# Patient Record
Sex: MALE | Race: WHITE | Employment: OTHER | URBAN - METROPOLITAN AREA
[De-identification: names, ages, dates, MRNs, and addresses within clinical notes are randomized per-mention and may not be internally consistent; named-entity substitution may affect disease eponyms.]

---

## 2017-01-03 ENCOUNTER — TELEPHONE (OUTPATIENT)
Dept: PAIN MANAGEMENT | Age: 47
End: 2017-01-03

## 2017-01-04 ENCOUNTER — TELEPHONE (OUTPATIENT)
Dept: FAMILY MEDICINE CLINIC | Age: 47
End: 2017-01-04

## 2017-01-30 ENCOUNTER — OFFICE VISIT (OUTPATIENT)
Dept: FAMILY MEDICINE CLINIC | Age: 47
End: 2017-01-30

## 2017-01-30 VITALS
HEIGHT: 66 IN | RESPIRATION RATE: 20 BRPM | OXYGEN SATURATION: 98 % | SYSTOLIC BLOOD PRESSURE: 122 MMHG | DIASTOLIC BLOOD PRESSURE: 76 MMHG | HEART RATE: 101 BPM | WEIGHT: 125 LBS | BODY MASS INDEX: 20.09 KG/M2

## 2017-01-30 DIAGNOSIS — Z87.09 HX OF PNEUMOTHORAX: ICD-10-CM

## 2017-01-30 DIAGNOSIS — M81.0 OSTEOPOROSIS: ICD-10-CM

## 2017-01-30 DIAGNOSIS — Z72.0 TOBACCO ABUSE: ICD-10-CM

## 2017-01-30 DIAGNOSIS — G89.4 CHRONIC PAIN SYNDROME: Primary | ICD-10-CM

## 2017-01-30 PROCEDURE — 99213 OFFICE O/P EST LOW 20 MIN: CPT | Performed by: INTERNAL MEDICINE

## 2017-02-01 LAB — CELIAC PANEL: 6 UNITS (ref 0–19)

## 2017-02-06 ENCOUNTER — HOSPITAL ENCOUNTER (OUTPATIENT)
Dept: GENERAL RADIOLOGY | Age: 47
Discharge: HOME | End: 2017-02-06
Attending: INTERNAL MEDICINE | Admitting: INTERNAL MEDICINE

## 2017-02-06 DIAGNOSIS — M81.0 OSTEOPOROSIS: Primary | ICD-10-CM

## 2017-02-06 DIAGNOSIS — M81.0 AGE-RELATED OSTEOPOROSIS WITHOUT CURRENT PATHOLOGICAL FRACTURE: ICD-10-CM

## 2017-02-06 DIAGNOSIS — M81.0 OSTEOPOROSIS: ICD-10-CM

## 2017-02-14 ENCOUNTER — TELEPHONE (OUTPATIENT)
Dept: FAMILY MEDICINE CLINIC | Age: 47
End: 2017-02-14

## 2017-02-14 RX ORDER — HYDROXYZINE HYDROCHLORIDE 25 MG/1
TABLET, FILM COATED ORAL
Qty: 90 TABLET | Refills: 0 | Status: SHIPPED | OUTPATIENT
Start: 2017-02-14 | End: 2017-03-11 | Stop reason: SDUPTHER

## 2017-02-22 ENCOUNTER — OFFICE VISIT (OUTPATIENT)
Dept: PAIN MANAGEMENT | Age: 47
End: 2017-02-22

## 2017-02-22 VITALS
SYSTOLIC BLOOD PRESSURE: 122 MMHG | HEIGHT: 64 IN | DIASTOLIC BLOOD PRESSURE: 84 MMHG | BODY MASS INDEX: 20.49 KG/M2 | HEART RATE: 102 BPM | WEIGHT: 120 LBS

## 2017-02-22 DIAGNOSIS — M96.1 FAILED BACK SYNDROME OF LUMBAR SPINE: ICD-10-CM

## 2017-02-22 DIAGNOSIS — M81.0 OSTEOPOROSIS: ICD-10-CM

## 2017-02-22 DIAGNOSIS — M51.36 DDD (DEGENERATIVE DISC DISEASE), LUMBAR: ICD-10-CM

## 2017-02-22 DIAGNOSIS — G89.4 CHRONIC PAIN SYNDROME: Primary | ICD-10-CM

## 2017-02-22 DIAGNOSIS — G89.29 CHRONIC PAIN OF RIGHT KNEE: ICD-10-CM

## 2017-02-22 DIAGNOSIS — F32.A DEPRESSION, UNSPECIFIED DEPRESSION TYPE: ICD-10-CM

## 2017-02-22 DIAGNOSIS — M25.561 CHRONIC PAIN OF RIGHT KNEE: ICD-10-CM

## 2017-02-22 DIAGNOSIS — F51.01 PRIMARY INSOMNIA: ICD-10-CM

## 2017-02-22 PROBLEM — M51.369 DDD (DEGENERATIVE DISC DISEASE), LUMBAR: Status: ACTIVE | Noted: 2017-02-22

## 2017-02-22 PROCEDURE — 99244 OFF/OP CNSLTJ NEW/EST MOD 40: CPT | Performed by: NURSE PRACTITIONER

## 2017-02-22 RX ORDER — DULOXETIN HYDROCHLORIDE 30 MG/1
30 CAPSULE, DELAYED RELEASE ORAL 2 TIMES DAILY
Qty: 60 CAPSULE | Refills: 0 | Status: SHIPPED | OUTPATIENT
Start: 2017-02-22 | End: 2017-03-22 | Stop reason: SDUPTHER

## 2017-02-22 RX ORDER — OXYCODONE HYDROCHLORIDE AND ACETAMINOPHEN 5; 325 MG/1; MG/1
1 TABLET ORAL EVERY 6 HOURS PRN
Qty: 84 TABLET | Refills: 0 | Status: SHIPPED | OUTPATIENT
Start: 2017-02-22 | End: 2017-03-22 | Stop reason: SDUPTHER

## 2017-02-22 RX ORDER — AMITRIPTYLINE HYDROCHLORIDE 25 MG/1
25 TABLET, FILM COATED ORAL NIGHTLY
Qty: 28 TABLET | Refills: 0 | Status: SHIPPED | OUTPATIENT
Start: 2017-02-22 | End: 2017-03-22 | Stop reason: SDUPTHER

## 2017-02-22 RX ORDER — TIZANIDINE 4 MG/1
2 TABLET ORAL 2 TIMES DAILY
Qty: 30 TABLET | Refills: 0 | Status: SHIPPED | OUTPATIENT
Start: 2017-02-22 | End: 2017-03-22 | Stop reason: SDUPTHER

## 2017-02-27 RX ORDER — MONTELUKAST SODIUM 10 MG/1
10 TABLET ORAL NIGHTLY
Qty: 90 TABLET | Refills: 3 | Status: SHIPPED | OUTPATIENT
Start: 2017-02-27 | End: 2018-03-02 | Stop reason: SDUPTHER

## 2017-02-27 RX ORDER — BUDESONIDE AND FORMOTEROL FUMARATE DIHYDRATE 80; 4.5 UG/1; UG/1
2 AEROSOL RESPIRATORY (INHALATION) 2 TIMES DAILY
Qty: 1 INHALER | Refills: 5 | Status: SHIPPED | OUTPATIENT
Start: 2017-02-27 | End: 2017-09-06 | Stop reason: ALTCHOICE

## 2017-03-07 ENCOUNTER — TELEPHONE (OUTPATIENT)
Dept: FAMILY MEDICINE CLINIC | Age: 47
End: 2017-03-07

## 2017-03-13 RX ORDER — HYDROXYZINE HYDROCHLORIDE 25 MG/1
TABLET, FILM COATED ORAL
Qty: 90 TABLET | Refills: 0 | Status: SHIPPED | OUTPATIENT
Start: 2017-03-13 | End: 2017-03-14 | Stop reason: ALTCHOICE

## 2017-03-14 ENCOUNTER — OFFICE VISIT (OUTPATIENT)
Dept: ENDOCRINOLOGY | Age: 47
End: 2017-03-14

## 2017-03-14 VITALS
RESPIRATION RATE: 16 BRPM | HEIGHT: 64 IN | SYSTOLIC BLOOD PRESSURE: 129 MMHG | HEART RATE: 84 BPM | DIASTOLIC BLOOD PRESSURE: 83 MMHG | OXYGEN SATURATION: 98 % | WEIGHT: 118.6 LBS | BODY MASS INDEX: 20.25 KG/M2

## 2017-03-14 DIAGNOSIS — M81.0 OSTEOPOROSIS: Primary | ICD-10-CM

## 2017-03-14 PROCEDURE — 99205 OFFICE O/P NEW HI 60 MIN: CPT | Performed by: INTERNAL MEDICINE

## 2017-03-22 ENCOUNTER — OFFICE VISIT (OUTPATIENT)
Dept: PAIN MANAGEMENT | Age: 47
End: 2017-03-22

## 2017-03-22 VITALS
BODY MASS INDEX: 21.42 KG/M2 | HEART RATE: 104 BPM | SYSTOLIC BLOOD PRESSURE: 121 MMHG | DIASTOLIC BLOOD PRESSURE: 72 MMHG | WEIGHT: 125 LBS

## 2017-03-22 DIAGNOSIS — G89.4 CHRONIC PAIN SYNDROME: Primary | ICD-10-CM

## 2017-03-22 DIAGNOSIS — Z98.890 HISTORY OF BACK SURGERY: ICD-10-CM

## 2017-03-22 DIAGNOSIS — M96.1 FAILED BACK SYNDROME OF LUMBAR SPINE: ICD-10-CM

## 2017-03-22 DIAGNOSIS — M81.0 OSTEOPOROSIS: ICD-10-CM

## 2017-03-22 DIAGNOSIS — M51.36 DDD (DEGENERATIVE DISC DISEASE), LUMBAR: ICD-10-CM

## 2017-03-22 PROCEDURE — 99213 OFFICE O/P EST LOW 20 MIN: CPT | Performed by: NURSE PRACTITIONER

## 2017-03-22 PROCEDURE — L0642 LO SAG RI AN/POS PNL PRE OTS: HCPCS | Performed by: NURSE PRACTITIONER

## 2017-03-22 RX ORDER — TIZANIDINE 4 MG/1
4 TABLET ORAL DAILY
Qty: 30 TABLET | Refills: 0 | Status: SHIPPED | OUTPATIENT
Start: 2017-03-22 | End: 2017-04-19 | Stop reason: SDUPTHER

## 2017-03-22 RX ORDER — DULOXETIN HYDROCHLORIDE 30 MG/1
30 CAPSULE, DELAYED RELEASE ORAL 2 TIMES DAILY
Qty: 60 CAPSULE | Refills: 0 | Status: SHIPPED | OUTPATIENT
Start: 2017-03-22 | End: 2017-04-19 | Stop reason: SDUPTHER

## 2017-03-22 RX ORDER — OXYCODONE HYDROCHLORIDE AND ACETAMINOPHEN 5; 325 MG/1; MG/1
1 TABLET ORAL EVERY 8 HOURS PRN
Qty: 30 TABLET | Refills: 0 | Status: SHIPPED | OUTPATIENT
Start: 2017-03-22 | End: 2017-04-19

## 2017-03-22 RX ORDER — OXYCODONE HYDROCHLORIDE AND ACETAMINOPHEN 5; 325 MG/1; MG/1
1 TABLET ORAL EVERY 6 HOURS PRN
Qty: 84 TABLET | Refills: 0 | Status: SHIPPED | OUTPATIENT
Start: 2017-03-22 | End: 2017-03-22 | Stop reason: SDUPTHER

## 2017-03-22 RX ORDER — AMITRIPTYLINE HYDROCHLORIDE 25 MG/1
25 TABLET, FILM COATED ORAL NIGHTLY
Qty: 28 TABLET | Refills: 0 | Status: SHIPPED | OUTPATIENT
Start: 2017-03-22 | End: 2017-04-19 | Stop reason: SDUPTHER

## 2017-03-31 ENCOUNTER — TELEPHONE (OUTPATIENT)
Dept: FAMILY MEDICINE CLINIC | Age: 47
End: 2017-03-31

## 2017-03-31 ENCOUNTER — TELEPHONE (OUTPATIENT)
Dept: PAIN MANAGEMENT | Age: 47
End: 2017-03-31

## 2017-03-31 RX ORDER — HYDROXYZINE HYDROCHLORIDE 25 MG/1
TABLET, FILM COATED ORAL
Qty: 90 TABLET | Refills: 0 | Status: SHIPPED | OUTPATIENT
Start: 2017-03-31 | End: 2017-04-19 | Stop reason: SDUPTHER

## 2017-04-14 ENCOUNTER — HOSPITAL ENCOUNTER (OUTPATIENT)
Dept: OTHER | Age: 47
Discharge: HOME | End: 2017-04-14
Attending: INTERNAL MEDICINE | Admitting: INTERNAL MEDICINE

## 2017-04-14 LAB
24HR URINE VOLUME (ML): 2000 ML
CALCIUM 24 HOUR URINE: 92 MG/24 HR (ref 42–353)
CREATININE 24 HOUR URINE: 0.5 G/24HR (ref 0.6–2.5)

## 2017-04-17 ENCOUNTER — TELEPHONE (OUTPATIENT)
Dept: ENDOCRINOLOGY | Age: 47
End: 2017-04-17

## 2017-04-18 RX ORDER — DULOXETIN HYDROCHLORIDE 30 MG/1
CAPSULE, DELAYED RELEASE ORAL
Qty: 60 CAPSULE | Refills: 0 | OUTPATIENT
Start: 2017-04-18

## 2017-04-19 ENCOUNTER — TELEPHONE (OUTPATIENT)
Dept: PAIN MANAGEMENT | Age: 47
End: 2017-04-19

## 2017-04-19 ENCOUNTER — OFFICE VISIT (OUTPATIENT)
Dept: PAIN MANAGEMENT | Age: 47
End: 2017-04-19

## 2017-04-19 VITALS
DIASTOLIC BLOOD PRESSURE: 84 MMHG | BODY MASS INDEX: 20.56 KG/M2 | SYSTOLIC BLOOD PRESSURE: 115 MMHG | WEIGHT: 120 LBS | HEART RATE: 76 BPM

## 2017-04-19 DIAGNOSIS — G89.4 CHRONIC PAIN SYNDROME: Primary | ICD-10-CM

## 2017-04-19 DIAGNOSIS — M81.0 OSTEOPOROSIS: ICD-10-CM

## 2017-04-19 DIAGNOSIS — F51.01 PRIMARY INSOMNIA: ICD-10-CM

## 2017-04-19 DIAGNOSIS — Z98.890 HISTORY OF BACK SURGERY: ICD-10-CM

## 2017-04-19 DIAGNOSIS — M51.36 DDD (DEGENERATIVE DISC DISEASE), LUMBAR: ICD-10-CM

## 2017-04-19 DIAGNOSIS — M96.1 FAILED BACK SYNDROME OF LUMBAR SPINE: ICD-10-CM

## 2017-04-19 PROCEDURE — 99213 OFFICE O/P EST LOW 20 MIN: CPT | Performed by: NURSE PRACTITIONER

## 2017-04-19 RX ORDER — BUPRENORPHINE 5 UG/H
1 PATCH TRANSDERMAL WEEKLY
Qty: 4 PATCH | Refills: 0 | Status: SHIPPED | OUTPATIENT
Start: 2017-04-19 | End: 2017-05-17 | Stop reason: SDUPTHER

## 2017-04-19 RX ORDER — HYDROXYZINE HYDROCHLORIDE 25 MG/1
TABLET, FILM COATED ORAL
Qty: 90 TABLET | Refills: 0 | Status: SHIPPED | OUTPATIENT
Start: 2017-04-19 | End: 2017-06-02 | Stop reason: SDUPTHER

## 2017-04-19 RX ORDER — DULOXETIN HYDROCHLORIDE 30 MG/1
30 CAPSULE, DELAYED RELEASE ORAL 2 TIMES DAILY
Qty: 60 CAPSULE | Refills: 0 | Status: SHIPPED | OUTPATIENT
Start: 2017-04-19 | End: 2017-05-17 | Stop reason: SDUPTHER

## 2017-04-19 RX ORDER — TIZANIDINE 4 MG/1
4 TABLET ORAL 2 TIMES DAILY
Qty: 60 TABLET | Refills: 0 | Status: SHIPPED | OUTPATIENT
Start: 2017-04-19 | End: 2017-05-17 | Stop reason: SDUPTHER

## 2017-04-19 RX ORDER — AMITRIPTYLINE HYDROCHLORIDE 25 MG/1
25 TABLET, FILM COATED ORAL NIGHTLY
Qty: 28 TABLET | Refills: 0 | Status: SHIPPED | OUTPATIENT
Start: 2017-04-19 | End: 2017-05-17 | Stop reason: SDUPTHER

## 2017-05-17 ENCOUNTER — OFFICE VISIT (OUTPATIENT)
Dept: PAIN MANAGEMENT | Age: 47
End: 2017-05-17

## 2017-05-17 VITALS
BODY MASS INDEX: 20.94 KG/M2 | DIASTOLIC BLOOD PRESSURE: 82 MMHG | WEIGHT: 122.2 LBS | HEART RATE: 79 BPM | SYSTOLIC BLOOD PRESSURE: 124 MMHG

## 2017-05-17 DIAGNOSIS — M81.0 OSTEOPOROSIS: ICD-10-CM

## 2017-05-17 DIAGNOSIS — G89.4 CHRONIC PAIN SYNDROME: Primary | ICD-10-CM

## 2017-05-17 DIAGNOSIS — Z98.890 HISTORY OF BACK SURGERY: ICD-10-CM

## 2017-05-17 DIAGNOSIS — M96.1 FAILED BACK SYNDROME OF LUMBAR SPINE: ICD-10-CM

## 2017-05-17 DIAGNOSIS — M51.36 DDD (DEGENERATIVE DISC DISEASE), LUMBAR: ICD-10-CM

## 2017-05-17 DIAGNOSIS — F51.01 PRIMARY INSOMNIA: ICD-10-CM

## 2017-05-17 DIAGNOSIS — F32.A DEPRESSION, UNSPECIFIED DEPRESSION TYPE: ICD-10-CM

## 2017-05-17 PROCEDURE — 99213 OFFICE O/P EST LOW 20 MIN: CPT | Performed by: NURSE PRACTITIONER

## 2017-05-17 RX ORDER — DULOXETIN HYDROCHLORIDE 30 MG/1
30 CAPSULE, DELAYED RELEASE ORAL 2 TIMES DAILY
Qty: 60 CAPSULE | Refills: 0 | Status: SHIPPED | OUTPATIENT
Start: 2017-05-17 | End: 2017-06-14 | Stop reason: SDUPTHER

## 2017-05-17 RX ORDER — TIZANIDINE 4 MG/1
4 TABLET ORAL 2 TIMES DAILY
Qty: 60 TABLET | Refills: 0 | Status: SHIPPED | OUTPATIENT
Start: 2017-05-17 | End: 2017-06-14 | Stop reason: SDUPTHER

## 2017-05-17 RX ORDER — AMITRIPTYLINE HYDROCHLORIDE 25 MG/1
25 TABLET, FILM COATED ORAL NIGHTLY
Qty: 28 TABLET | Refills: 0 | Status: SHIPPED | OUTPATIENT
Start: 2017-05-17 | End: 2017-06-14 | Stop reason: SDUPTHER

## 2017-05-17 RX ORDER — BUPRENORPHINE 5 UG/H
1 PATCH TRANSDERMAL WEEKLY
Qty: 4 PATCH | Refills: 0 | Status: SHIPPED | OUTPATIENT
Start: 2017-05-17 | End: 2017-06-14

## 2017-06-02 ENCOUNTER — OFFICE VISIT (OUTPATIENT)
Dept: FAMILY MEDICINE CLINIC | Age: 47
End: 2017-06-02

## 2017-06-02 VITALS
WEIGHT: 120 LBS | BODY MASS INDEX: 20.49 KG/M2 | HEIGHT: 64 IN | SYSTOLIC BLOOD PRESSURE: 122 MMHG | HEART RATE: 104 BPM | DIASTOLIC BLOOD PRESSURE: 73 MMHG | RESPIRATION RATE: 24 BRPM

## 2017-06-02 DIAGNOSIS — J44.1 ACUTE EXACERBATION OF CHRONIC OBSTRUCTIVE PULMONARY DISEASE (COPD) (HCC): Primary | ICD-10-CM

## 2017-06-02 DIAGNOSIS — F41.9 ANXIETY: ICD-10-CM

## 2017-06-02 PROCEDURE — 99213 OFFICE O/P EST LOW 20 MIN: CPT | Performed by: FAMILY MEDICINE

## 2017-06-02 RX ORDER — DOXYCYCLINE HYCLATE 100 MG
100 TABLET ORAL 2 TIMES DAILY
Qty: 20 TABLET | Refills: 0 | Status: SHIPPED | OUTPATIENT
Start: 2017-06-02 | End: 2017-06-12

## 2017-06-02 RX ORDER — PREDNISONE 20 MG/1
20 TABLET ORAL 2 TIMES DAILY
Qty: 10 TABLET | Refills: 0 | Status: SHIPPED | OUTPATIENT
Start: 2017-06-02 | End: 2017-06-07

## 2017-06-02 RX ORDER — HYDROXYZINE HYDROCHLORIDE 25 MG/1
TABLET, FILM COATED ORAL
Qty: 90 TABLET | Refills: 0 | Status: SHIPPED | OUTPATIENT
Start: 2017-06-02 | End: 2017-07-11 | Stop reason: SDUPTHER

## 2017-06-14 ENCOUNTER — TELEPHONE (OUTPATIENT)
Dept: PAIN MANAGEMENT | Age: 47
End: 2017-06-14

## 2017-06-14 ENCOUNTER — OFFICE VISIT (OUTPATIENT)
Dept: PAIN MANAGEMENT | Age: 47
End: 2017-06-14

## 2017-06-14 VITALS
WEIGHT: 123 LBS | DIASTOLIC BLOOD PRESSURE: 80 MMHG | BODY MASS INDEX: 21.11 KG/M2 | SYSTOLIC BLOOD PRESSURE: 119 MMHG | HEART RATE: 93 BPM

## 2017-06-14 DIAGNOSIS — M81.0 OSTEOPOROSIS: ICD-10-CM

## 2017-06-14 DIAGNOSIS — G89.4 CHRONIC PAIN SYNDROME: Primary | ICD-10-CM

## 2017-06-14 DIAGNOSIS — M51.36 DDD (DEGENERATIVE DISC DISEASE), LUMBAR: ICD-10-CM

## 2017-06-14 DIAGNOSIS — F41.9 ANXIETY: ICD-10-CM

## 2017-06-14 DIAGNOSIS — M25.562 LEFT KNEE PAIN, UNSPECIFIED CHRONICITY: ICD-10-CM

## 2017-06-14 DIAGNOSIS — Z98.890 HISTORY OF BACK SURGERY: ICD-10-CM

## 2017-06-14 DIAGNOSIS — M96.1 FAILED BACK SYNDROME OF LUMBAR SPINE: ICD-10-CM

## 2017-06-14 PROCEDURE — 99213 OFFICE O/P EST LOW 20 MIN: CPT | Performed by: NURSE PRACTITIONER

## 2017-06-14 RX ORDER — BUPRENORPHINE 10 UG/H
1 PATCH TRANSDERMAL WEEKLY
Qty: 4 PATCH | Refills: 0 | Status: SHIPPED | OUTPATIENT
Start: 2017-06-14 | End: 2017-07-12 | Stop reason: SDUPTHER

## 2017-06-14 RX ORDER — TIZANIDINE 4 MG/1
4 TABLET ORAL 2 TIMES DAILY
Qty: 60 TABLET | Refills: 0 | Status: SHIPPED | OUTPATIENT
Start: 2017-06-14 | End: 2017-07-12 | Stop reason: SDUPTHER

## 2017-06-14 RX ORDER — DULOXETIN HYDROCHLORIDE 30 MG/1
30 CAPSULE, DELAYED RELEASE ORAL 2 TIMES DAILY
Qty: 60 CAPSULE | Refills: 0 | Status: SHIPPED | OUTPATIENT
Start: 2017-06-14 | End: 2017-07-12 | Stop reason: SDUPTHER

## 2017-06-14 RX ORDER — AMITRIPTYLINE HYDROCHLORIDE 25 MG/1
25 TABLET, FILM COATED ORAL NIGHTLY
Qty: 28 TABLET | Refills: 0 | Status: SHIPPED | OUTPATIENT
Start: 2017-06-14 | End: 2017-07-12 | Stop reason: SDUPTHER

## 2017-07-07 ENCOUNTER — TELEPHONE (OUTPATIENT)
Dept: PAIN MANAGEMENT | Age: 47
End: 2017-07-07

## 2017-07-07 ENCOUNTER — TELEPHONE (OUTPATIENT)
Dept: FAMILY MEDICINE CLINIC | Age: 47
End: 2017-07-07

## 2017-07-07 RX ORDER — GUAIFENESIN AND CODEINE PHOSPHATE 100; 10 MG/5ML; MG/5ML
5 SOLUTION ORAL
COMMUNITY
Start: 2016-12-14 | End: 2017-07-11

## 2017-07-07 RX ORDER — GUAIFENESIN AND CODEINE PHOSPHATE 100; 10 MG/5ML; MG/5ML
5 SOLUTION ORAL 2 TIMES DAILY PRN
Status: CANCELLED | OUTPATIENT
Start: 2017-07-07 | End: 2017-07-14

## 2017-07-08 RX ORDER — BENZONATATE 100 MG/1
100 CAPSULE ORAL 3 TIMES DAILY PRN
Qty: 60 CAPSULE | Refills: 0 | Status: SHIPPED | OUTPATIENT
Start: 2017-07-08 | End: 2017-07-11

## 2017-07-11 ENCOUNTER — OFFICE VISIT (OUTPATIENT)
Dept: FAMILY MEDICINE CLINIC | Age: 47
End: 2017-07-11

## 2017-07-11 VITALS
HEART RATE: 106 BPM | SYSTOLIC BLOOD PRESSURE: 134 MMHG | WEIGHT: 123 LBS | DIASTOLIC BLOOD PRESSURE: 76 MMHG | RESPIRATION RATE: 16 BRPM | BODY MASS INDEX: 21 KG/M2 | OXYGEN SATURATION: 97 % | HEIGHT: 64 IN

## 2017-07-11 DIAGNOSIS — M96.1 FAILED BACK SYNDROME OF LUMBAR SPINE: ICD-10-CM

## 2017-07-11 DIAGNOSIS — Z87.09 HX OF PNEUMOTHORAX: ICD-10-CM

## 2017-07-11 DIAGNOSIS — J44.1 COPD EXACERBATION (HCC): Primary | ICD-10-CM

## 2017-07-11 DIAGNOSIS — M81.0 OSTEOPOROSIS: ICD-10-CM

## 2017-07-11 DIAGNOSIS — F41.9 ANXIETY: ICD-10-CM

## 2017-07-11 DIAGNOSIS — J41.0 SIMPLE CHRONIC BRONCHITIS (HCC): ICD-10-CM

## 2017-07-11 DIAGNOSIS — M51.36 DDD (DEGENERATIVE DISC DISEASE), LUMBAR: ICD-10-CM

## 2017-07-11 PROCEDURE — 94010 BREATHING CAPACITY TEST: CPT | Performed by: INTERNAL MEDICINE

## 2017-07-11 PROCEDURE — 99214 OFFICE O/P EST MOD 30 MIN: CPT | Performed by: INTERNAL MEDICINE

## 2017-07-11 RX ORDER — VARENICLINE TARTRATE 25 MG
KIT ORAL
Qty: 1 EACH | Refills: 0 | Status: SHIPPED | OUTPATIENT
Start: 2017-07-11 | End: 2018-02-14

## 2017-07-11 RX ORDER — AMOXICILLIN AND CLAVULANATE POTASSIUM 875; 125 MG/1; MG/1
TABLET, FILM COATED ORAL
Refills: 0 | COMMUNITY
Start: 2017-07-07 | End: 2017-08-30 | Stop reason: ALTCHOICE

## 2017-07-11 RX ORDER — HYDROXYZINE HYDROCHLORIDE 25 MG/1
TABLET, FILM COATED ORAL
Qty: 90 TABLET | Refills: 0 | Status: SHIPPED | OUTPATIENT
Start: 2017-07-11 | End: 2017-08-23 | Stop reason: SDUPTHER

## 2017-07-11 RX ORDER — LORATADINE 10 MG/1
10 TABLET ORAL DAILY
Status: CANCELLED | OUTPATIENT
Start: 2017-07-11

## 2017-07-11 RX ORDER — ALENDRONATE SODIUM 70 MG/1
TABLET ORAL
Refills: 3 | COMMUNITY
Start: 2017-06-15 | End: 2017-09-14

## 2017-07-11 RX ORDER — LORATADINE 10 MG/1
TABLET ORAL
Qty: 90 TABLET | Refills: 1 | Status: SHIPPED | OUTPATIENT
Start: 2017-07-11 | End: 2018-03-28 | Stop reason: SDUPTHER

## 2017-07-11 RX ORDER — GUAIFENESIN AND CODEINE PHOSPHATE 100; 10 MG/5ML; MG/5ML
SOLUTION ORAL
Qty: 118 ML | Refills: 0 | Status: SHIPPED | OUTPATIENT
Start: 2017-07-11 | End: 2017-07-19 | Stop reason: SDUPTHER

## 2017-07-11 RX ORDER — PREDNISONE 10 MG/1
TABLET ORAL
Refills: 0 | COMMUNITY
Start: 2017-07-07 | End: 2017-08-30 | Stop reason: ALTCHOICE

## 2017-07-11 RX ORDER — MONTELUKAST SODIUM 10 MG/1
10 TABLET ORAL NIGHTLY
Qty: 90 TABLET | Refills: 3 | Status: CANCELLED | OUTPATIENT
Start: 2017-07-11

## 2017-07-11 RX ORDER — BUPRENORPHINE 5 UG/H
1 PATCH TRANSDERMAL
COMMUNITY
End: 2017-07-11

## 2017-07-11 RX ORDER — AMITRIPTYLINE HYDROCHLORIDE 10 MG/1
10 TABLET, FILM COATED ORAL
COMMUNITY
End: 2017-08-09

## 2017-07-12 ENCOUNTER — OFFICE VISIT (OUTPATIENT)
Dept: PAIN MANAGEMENT | Age: 47
End: 2017-07-12

## 2017-07-12 VITALS
BODY MASS INDEX: 21.46 KG/M2 | WEIGHT: 125 LBS | SYSTOLIC BLOOD PRESSURE: 128 MMHG | DIASTOLIC BLOOD PRESSURE: 86 MMHG | HEART RATE: 89 BPM

## 2017-07-12 DIAGNOSIS — G47.00 INSOMNIA, UNSPECIFIED TYPE: ICD-10-CM

## 2017-07-12 DIAGNOSIS — M47.816 FACET ARTHROPATHY, LUMBAR: ICD-10-CM

## 2017-07-12 DIAGNOSIS — M96.1 FAILED BACK SYNDROME OF LUMBAR SPINE: ICD-10-CM

## 2017-07-12 DIAGNOSIS — F32.A DEPRESSION, UNSPECIFIED DEPRESSION TYPE: ICD-10-CM

## 2017-07-12 DIAGNOSIS — M25.562 LEFT KNEE PAIN, UNSPECIFIED CHRONICITY: ICD-10-CM

## 2017-07-12 DIAGNOSIS — F41.9 ANXIETY: ICD-10-CM

## 2017-07-12 DIAGNOSIS — G89.4 CHRONIC PAIN SYNDROME: Primary | ICD-10-CM

## 2017-07-12 DIAGNOSIS — M81.0 OSTEOPOROSIS: ICD-10-CM

## 2017-07-12 DIAGNOSIS — M51.36 DDD (DEGENERATIVE DISC DISEASE), LUMBAR: ICD-10-CM

## 2017-07-12 PROCEDURE — 99213 OFFICE O/P EST LOW 20 MIN: CPT | Performed by: NURSE PRACTITIONER

## 2017-07-12 RX ORDER — BUPRENORPHINE 10 UG/H
1 PATCH TRANSDERMAL WEEKLY
Qty: 4 PATCH | Refills: 0 | Status: SHIPPED | OUTPATIENT
Start: 2017-07-12 | End: 2017-08-09 | Stop reason: SDUPTHER

## 2017-07-12 RX ORDER — TIZANIDINE 4 MG/1
4 TABLET ORAL 2 TIMES DAILY
Qty: 60 TABLET | Refills: 0 | Status: SHIPPED | OUTPATIENT
Start: 2017-07-12 | End: 2017-08-09 | Stop reason: SDUPTHER

## 2017-07-12 RX ORDER — DULOXETIN HYDROCHLORIDE 30 MG/1
30 CAPSULE, DELAYED RELEASE ORAL 2 TIMES DAILY
Qty: 60 CAPSULE | Refills: 0 | Status: SHIPPED | OUTPATIENT
Start: 2017-07-12 | End: 2017-08-09 | Stop reason: SDUPTHER

## 2017-07-12 RX ORDER — AMITRIPTYLINE HYDROCHLORIDE 25 MG/1
25 TABLET, FILM COATED ORAL NIGHTLY
Qty: 28 TABLET | Refills: 0 | Status: SHIPPED | OUTPATIENT
Start: 2017-07-12 | End: 2017-08-09 | Stop reason: SDUPTHER

## 2017-07-14 RX ORDER — DULOXETIN HYDROCHLORIDE 30 MG/1
CAPSULE, DELAYED RELEASE ORAL
Qty: 60 CAPSULE | Refills: 0 | OUTPATIENT
Start: 2017-07-14

## 2017-07-18 DIAGNOSIS — J41.0 SIMPLE CHRONIC BRONCHITIS (HCC): Primary | ICD-10-CM

## 2017-07-18 RX ORDER — GUAIFENESIN AND CODEINE PHOSPHATE 100; 10 MG/5ML; MG/5ML
SOLUTION ORAL
Qty: 118 ML | Refills: 0 | OUTPATIENT
Start: 2017-07-18

## 2017-07-19 ENCOUNTER — TELEPHONE (OUTPATIENT)
Dept: FAMILY MEDICINE CLINIC | Age: 47
End: 2017-07-19

## 2017-07-19 DIAGNOSIS — J44.9 CHRONIC OBSTRUCTIVE PULMONARY DISEASE, UNSPECIFIED COPD TYPE (HCC): Primary | ICD-10-CM

## 2017-07-19 DIAGNOSIS — R06.02 SOB (SHORTNESS OF BREATH): Primary | ICD-10-CM

## 2017-07-19 RX ORDER — GUAIFENESIN AND CODEINE PHOSPHATE 100; 10 MG/5ML; MG/5ML
SOLUTION ORAL
Qty: 118 ML | Refills: 0 | Status: SHIPPED | OUTPATIENT
Start: 2017-07-19 | End: 2017-08-30 | Stop reason: ALTCHOICE

## 2017-07-19 RX ORDER — GUAIFENESIN AND CODEINE PHOSPHATE 100; 10 MG/5ML; MG/5ML
SOLUTION ORAL
Qty: 118 ML | Refills: 0 | OUTPATIENT
Start: 2017-07-19

## 2017-08-07 ENCOUNTER — HOSPITAL ENCOUNTER (OUTPATIENT)
Dept: PULMONOLOGY | Age: 47
Discharge: HOME | End: 2017-08-07
Attending: INTERNAL MEDICINE | Admitting: INTERNAL MEDICINE

## 2017-08-07 DIAGNOSIS — R06.02 SHORTNESS OF BREATH: ICD-10-CM

## 2017-08-09 ENCOUNTER — OFFICE VISIT (OUTPATIENT)
Dept: PAIN MANAGEMENT | Age: 47
End: 2017-08-09

## 2017-08-09 VITALS
SYSTOLIC BLOOD PRESSURE: 136 MMHG | HEART RATE: 92 BPM | BODY MASS INDEX: 21.42 KG/M2 | DIASTOLIC BLOOD PRESSURE: 83 MMHG | WEIGHT: 124.8 LBS

## 2017-08-09 DIAGNOSIS — M81.0 OSTEOPOROSIS, UNSPECIFIED OSTEOPOROSIS TYPE, UNSPECIFIED PATHOLOGICAL FRACTURE PRESENCE: ICD-10-CM

## 2017-08-09 DIAGNOSIS — M51.36 DDD (DEGENERATIVE DISC DISEASE), LUMBAR: ICD-10-CM

## 2017-08-09 DIAGNOSIS — F32.A DEPRESSION, UNSPECIFIED DEPRESSION TYPE: ICD-10-CM

## 2017-08-09 DIAGNOSIS — F41.9 ANXIETY: ICD-10-CM

## 2017-08-09 DIAGNOSIS — M25.562 LEFT KNEE PAIN, UNSPECIFIED CHRONICITY: ICD-10-CM

## 2017-08-09 DIAGNOSIS — M96.1 FAILED BACK SYNDROME OF LUMBAR SPINE: ICD-10-CM

## 2017-08-09 DIAGNOSIS — G89.4 CHRONIC PAIN SYNDROME: Primary | ICD-10-CM

## 2017-08-09 DIAGNOSIS — F51.01 PRIMARY INSOMNIA: ICD-10-CM

## 2017-08-09 DIAGNOSIS — J44.9 CHRONIC OBSTRUCTIVE PULMONARY DISEASE, UNSPECIFIED COPD TYPE (HCC): ICD-10-CM

## 2017-08-09 PROCEDURE — 99213 OFFICE O/P EST LOW 20 MIN: CPT | Performed by: NURSE PRACTITIONER

## 2017-08-09 RX ORDER — BUPRENORPHINE 10 UG/H
1 PATCH TRANSDERMAL WEEKLY
Qty: 4 PATCH | Refills: 0 | Status: SHIPPED | OUTPATIENT
Start: 2017-08-09 | End: 2017-09-05

## 2017-08-09 RX ORDER — TIZANIDINE 4 MG/1
4 TABLET ORAL 2 TIMES DAILY
Qty: 60 TABLET | Refills: 0 | Status: SHIPPED | OUTPATIENT
Start: 2017-08-09 | End: 2017-09-05 | Stop reason: SDUPTHER

## 2017-08-09 RX ORDER — DULOXETIN HYDROCHLORIDE 30 MG/1
30 CAPSULE, DELAYED RELEASE ORAL 2 TIMES DAILY
Qty: 60 CAPSULE | Refills: 0 | Status: SHIPPED | OUTPATIENT
Start: 2017-08-09 | End: 2017-09-05 | Stop reason: SDUPTHER

## 2017-08-09 RX ORDER — AMITRIPTYLINE HYDROCHLORIDE 25 MG/1
25 TABLET, FILM COATED ORAL NIGHTLY
Qty: 28 TABLET | Refills: 0 | Status: SHIPPED | OUTPATIENT
Start: 2017-08-09 | End: 2017-09-05 | Stop reason: SDUPTHER

## 2017-08-10 ENCOUNTER — HOSPITAL ENCOUNTER (OUTPATIENT)
Dept: PULMONOLOGY | Age: 47
Discharge: HOME | End: 2017-08-10
Attending: INTERNAL MEDICINE | Admitting: INTERNAL MEDICINE

## 2017-08-10 DIAGNOSIS — R06.02 SHORTNESS OF BREATH: ICD-10-CM

## 2017-08-10 DIAGNOSIS — R06.02 SOB (SHORTNESS OF BREATH): ICD-10-CM

## 2017-08-10 LAB
DLCO: NORMAL ML/MMHG SEC
FEV1/FVC: NORMAL %
FEV1: NORMAL LITERS
FVC: NORMAL LITERS
TLC: NORMAL LITERS

## 2017-08-10 PROCEDURE — 94729 DIFFUSING CAPACITY: CPT | Performed by: INTERNAL MEDICINE

## 2017-08-10 PROCEDURE — 94010 BREATHING CAPACITY TEST: CPT | Performed by: INTERNAL MEDICINE

## 2017-08-23 DIAGNOSIS — F41.9 ANXIETY: ICD-10-CM

## 2017-08-23 RX ORDER — HYDROXYZINE HYDROCHLORIDE 25 MG/1
TABLET, FILM COATED ORAL
Qty: 90 TABLET | Refills: 0 | Status: SHIPPED | OUTPATIENT
Start: 2017-08-23 | End: 2017-10-11 | Stop reason: ALTCHOICE

## 2017-08-30 ENCOUNTER — OFFICE VISIT (OUTPATIENT)
Dept: PULMONOLOGY | Age: 47
End: 2017-08-30

## 2017-08-30 ENCOUNTER — TELEPHONE (OUTPATIENT)
Dept: PULMONOLOGY | Age: 47
End: 2017-08-30

## 2017-08-30 VITALS
SYSTOLIC BLOOD PRESSURE: 120 MMHG | HEIGHT: 64 IN | HEART RATE: 105 BPM | TEMPERATURE: 98.5 F | OXYGEN SATURATION: 99 % | WEIGHT: 124 LBS | BODY MASS INDEX: 21.17 KG/M2 | RESPIRATION RATE: 16 BRPM | DIASTOLIC BLOOD PRESSURE: 82 MMHG

## 2017-08-30 DIAGNOSIS — J40 BRONCHITIS: ICD-10-CM

## 2017-08-30 DIAGNOSIS — J44.9 COPD, MILD (HCC): Primary | ICD-10-CM

## 2017-08-30 DIAGNOSIS — J43.2 CENTRILOBULAR EMPHYSEMA (HCC): ICD-10-CM

## 2017-08-30 DIAGNOSIS — Z72.0 TOBACCO ABUSE: ICD-10-CM

## 2017-08-30 DIAGNOSIS — R04.2 HEMOPTYSIS: ICD-10-CM

## 2017-08-30 PROCEDURE — 99215 OFFICE O/P EST HI 40 MIN: CPT | Performed by: INTERNAL MEDICINE

## 2017-08-30 RX ORDER — BUDESONIDE AND FORMOTEROL FUMARATE DIHYDRATE 160; 4.5 UG/1; UG/1
2 AEROSOL RESPIRATORY (INHALATION) 2 TIMES DAILY
Qty: 1 INHALER | Refills: 6 | Status: SHIPPED | OUTPATIENT
Start: 2017-08-30 | End: 2017-09-06 | Stop reason: ALTCHOICE

## 2017-08-30 RX ORDER — LEVOFLOXACIN 500 MG/1
500 TABLET, FILM COATED ORAL DAILY
Qty: 7 TABLET | Refills: 0 | Status: SHIPPED | OUTPATIENT
Start: 2017-08-30 | End: 2017-09-06

## 2017-08-31 RX ORDER — FLUTICASONE FUROATE AND VILANTEROL 200; 25 UG/1; UG/1
1 POWDER RESPIRATORY (INHALATION) DAILY
Qty: 1 EACH | Refills: 6 | Status: SHIPPED | OUTPATIENT
Start: 2017-08-31 | End: 2020-04-17 | Stop reason: SDUPTHER

## 2017-09-05 ENCOUNTER — OFFICE VISIT (OUTPATIENT)
Dept: PAIN MANAGEMENT | Age: 47
End: 2017-09-05

## 2017-09-05 VITALS
HEART RATE: 88 BPM | BODY MASS INDEX: 21.28 KG/M2 | SYSTOLIC BLOOD PRESSURE: 134 MMHG | WEIGHT: 124 LBS | DIASTOLIC BLOOD PRESSURE: 93 MMHG

## 2017-09-05 DIAGNOSIS — F41.9 ANXIETY: ICD-10-CM

## 2017-09-05 DIAGNOSIS — M81.0 OSTEOPOROSIS, UNSPECIFIED OSTEOPOROSIS TYPE, UNSPECIFIED PATHOLOGICAL FRACTURE PRESENCE: ICD-10-CM

## 2017-09-05 DIAGNOSIS — M96.1 FAILED BACK SYNDROME OF LUMBAR SPINE: ICD-10-CM

## 2017-09-05 DIAGNOSIS — M25.562 ACUTE PAIN OF LEFT KNEE: ICD-10-CM

## 2017-09-05 DIAGNOSIS — M51.36 DDD (DEGENERATIVE DISC DISEASE), LUMBAR: ICD-10-CM

## 2017-09-05 DIAGNOSIS — M25.562 LEFT KNEE PAIN, UNSPECIFIED CHRONICITY: ICD-10-CM

## 2017-09-05 DIAGNOSIS — R04.2 HEMOPTYSIS: ICD-10-CM

## 2017-09-05 DIAGNOSIS — G89.4 CHRONIC PAIN SYNDROME: Primary | ICD-10-CM

## 2017-09-05 PROBLEM — M25.559 HIP PAIN: Status: ACTIVE | Noted: 2017-09-05

## 2017-09-05 PROCEDURE — 99213 OFFICE O/P EST LOW 20 MIN: CPT | Performed by: NURSE PRACTITIONER

## 2017-09-05 RX ORDER — AMITRIPTYLINE HYDROCHLORIDE 25 MG/1
25 TABLET, FILM COATED ORAL NIGHTLY
Qty: 28 TABLET | Refills: 0 | Status: SHIPPED | OUTPATIENT
Start: 2017-09-05 | End: 2017-09-06 | Stop reason: SDUPTHER

## 2017-09-05 RX ORDER — OXYCODONE HYDROCHLORIDE AND ACETAMINOPHEN 5; 325 MG/1; MG/1
1 TABLET ORAL EVERY 8 HOURS PRN
COMMUNITY
End: 2017-09-14 | Stop reason: ALTCHOICE

## 2017-09-05 RX ORDER — DULOXETIN HYDROCHLORIDE 30 MG/1
30 CAPSULE, DELAYED RELEASE ORAL 2 TIMES DAILY
Qty: 60 CAPSULE | Refills: 0 | Status: SHIPPED | OUTPATIENT
Start: 2017-09-05 | End: 2018-01-15 | Stop reason: SDUPTHER

## 2017-09-05 RX ORDER — PREDNISONE 20 MG/1
20 TABLET ORAL 3 TIMES DAILY
COMMUNITY
End: 2017-12-13

## 2017-09-05 RX ORDER — TIZANIDINE 4 MG/1
4 TABLET ORAL 2 TIMES DAILY
Qty: 60 TABLET | Refills: 0 | Status: SHIPPED | OUTPATIENT
Start: 2017-09-05 | End: 2018-01-24 | Stop reason: ALTCHOICE

## 2017-09-05 RX ORDER — IBUPROFEN 800 MG/1
800 TABLET ORAL EVERY 8 HOURS PRN
COMMUNITY
End: 2017-09-05 | Stop reason: SDUPTHER

## 2017-09-05 RX ORDER — IBUPROFEN 800 MG/1
800 TABLET ORAL EVERY 8 HOURS PRN
Qty: 120 TABLET | Refills: 0 | Status: SHIPPED | OUTPATIENT
Start: 2017-09-05 | End: 2017-09-14 | Stop reason: ALTCHOICE

## 2017-09-06 ENCOUNTER — OFFICE VISIT (OUTPATIENT)
Dept: FAMILY MEDICINE CLINIC | Age: 47
End: 2017-09-06

## 2017-09-06 VITALS
WEIGHT: 124 LBS | RESPIRATION RATE: 16 BRPM | OXYGEN SATURATION: 98 % | HEART RATE: 106 BPM | BODY MASS INDEX: 21.17 KG/M2 | HEIGHT: 64 IN | DIASTOLIC BLOOD PRESSURE: 68 MMHG | SYSTOLIC BLOOD PRESSURE: 128 MMHG

## 2017-09-06 DIAGNOSIS — M25.552 PAIN OF BOTH HIP JOINTS: ICD-10-CM

## 2017-09-06 DIAGNOSIS — M96.1 FAILED BACK SYNDROME OF LUMBAR SPINE: ICD-10-CM

## 2017-09-06 DIAGNOSIS — M25.551 PAIN OF BOTH HIP JOINTS: ICD-10-CM

## 2017-09-06 DIAGNOSIS — G89.4 CHRONIC PAIN SYNDROME: ICD-10-CM

## 2017-09-06 DIAGNOSIS — M81.0 OSTEOPOROSIS WITHOUT CURRENT PATHOLOGICAL FRACTURE, UNSPECIFIED OSTEOPOROSIS TYPE: Primary | ICD-10-CM

## 2017-09-06 DIAGNOSIS — J43.2 CENTRILOBULAR EMPHYSEMA (HCC): ICD-10-CM

## 2017-09-06 DIAGNOSIS — G47.00 INSOMNIA, UNSPECIFIED TYPE: ICD-10-CM

## 2017-09-06 DIAGNOSIS — F41.9 ANXIETY: ICD-10-CM

## 2017-09-06 LAB — VITAMIN D 25-HYDROXY: 38 NG/ML

## 2017-09-06 PROCEDURE — 36415 COLL VENOUS BLD VENIPUNCTURE: CPT | Performed by: INTERNAL MEDICINE

## 2017-09-06 PROCEDURE — 99214 OFFICE O/P EST MOD 30 MIN: CPT | Performed by: INTERNAL MEDICINE

## 2017-09-06 RX ORDER — AMITRIPTYLINE HYDROCHLORIDE 25 MG/1
TABLET, FILM COATED ORAL
Qty: 60 TABLET | Refills: 5 | Status: SHIPPED | OUTPATIENT
Start: 2017-09-06 | End: 2017-11-29

## 2017-09-07 LAB
CULTURE, RESPIRATORY: NORMAL
GRAM STAIN RESULT: NORMAL

## 2017-09-12 ENCOUNTER — TELEPHONE (OUTPATIENT)
Dept: FAMILY MEDICINE CLINIC | Age: 47
End: 2017-09-12

## 2017-09-12 RX ORDER — CELECOXIB 100 MG/1
CAPSULE ORAL
Qty: 60 CAPSULE | Refills: 0 | Status: SHIPPED | OUTPATIENT
Start: 2017-09-12 | End: 2018-03-28

## 2017-09-12 NOTE — TELEPHONE ENCOUNTER
Pt reports cage has slipped on back ksurgery. Ortho has referred back to neurosurgery. Offered celebrex.  Pt may go to hospital where neurosurgeon has privilages

## 2017-09-14 ENCOUNTER — OFFICE VISIT (OUTPATIENT)
Dept: FAMILY MEDICINE CLINIC | Age: 47
End: 2017-09-14

## 2017-09-14 ENCOUNTER — OFFICE VISIT (OUTPATIENT)
Dept: ENDOCRINOLOGY | Age: 47
End: 2017-09-14

## 2017-09-14 ENCOUNTER — HOSPITAL ENCOUNTER (OUTPATIENT)
Dept: CT IMAGING | Age: 47
Discharge: HOME | End: 2017-09-14
Attending: INTERNAL MEDICINE | Admitting: INTERNAL MEDICINE

## 2017-09-14 VITALS
WEIGHT: 135 LBS | HEIGHT: 64 IN | OXYGEN SATURATION: 96 % | BODY MASS INDEX: 23.05 KG/M2 | RESPIRATION RATE: 16 BRPM | SYSTOLIC BLOOD PRESSURE: 154 MMHG | DIASTOLIC BLOOD PRESSURE: 68 MMHG | HEART RATE: 112 BPM

## 2017-09-14 VITALS
RESPIRATION RATE: 16 BRPM | HEART RATE: 92 BPM | SYSTOLIC BLOOD PRESSURE: 138 MMHG | DIASTOLIC BLOOD PRESSURE: 72 MMHG | WEIGHT: 131 LBS | HEIGHT: 64 IN | BODY MASS INDEX: 22.36 KG/M2

## 2017-09-14 DIAGNOSIS — M81.8 OTHER OSTEOPOROSIS WITHOUT CURRENT PATHOLOGICAL FRACTURE: Primary | ICD-10-CM

## 2017-09-14 DIAGNOSIS — J43.2 CENTRILOBULAR EMPHYSEMA (HCC): ICD-10-CM

## 2017-09-14 DIAGNOSIS — M51.36 DDD (DEGENERATIVE DISC DISEASE), LUMBAR: ICD-10-CM

## 2017-09-14 DIAGNOSIS — R04.2 HEMOPTYSIS: ICD-10-CM

## 2017-09-14 DIAGNOSIS — G89.4 CHRONIC PAIN SYNDROME: Primary | ICD-10-CM

## 2017-09-14 PROCEDURE — 99213 OFFICE O/P EST LOW 20 MIN: CPT | Performed by: INTERNAL MEDICINE

## 2017-09-14 PROCEDURE — 99212 OFFICE O/P EST SF 10 MIN: CPT | Performed by: INTERNAL MEDICINE

## 2017-09-14 RX ORDER — ACETAMINOPHEN AND CODEINE PHOSPHATE 300; 30 MG/1; MG/1
TABLET ORAL
Qty: 40 TABLET | Refills: 0 | Status: SHIPPED | OUTPATIENT
Start: 2017-09-14 | End: 2017-09-22 | Stop reason: SDUPTHER

## 2017-09-21 ENCOUNTER — TELEPHONE (OUTPATIENT)
Dept: FAMILY MEDICINE CLINIC | Age: 47
End: 2017-09-21

## 2017-09-22 RX ORDER — ACETAMINOPHEN AND CODEINE PHOSPHATE 300; 30 MG/1; MG/1
TABLET ORAL
Qty: 120 TABLET | Refills: 0 | Status: SHIPPED | OUTPATIENT
Start: 2017-09-22 | End: 2017-10-11 | Stop reason: ALTCHOICE

## 2017-09-29 ENCOUNTER — TELEPHONE (OUTPATIENT)
Dept: FAMILY MEDICINE CLINIC | Age: 47
End: 2017-09-29

## 2017-09-29 NOTE — TELEPHONE ENCOUNTER
Was Rx hydroxine for anxiety is not helping. Wanting to know if you can change to something different to help with his anxiety. States last time had surgery he was prescribed ativan or something different. [facility]  Please call back at [phone]-- leave message      Please address today

## 2017-09-30 NOTE — TELEPHONE ENCOUNTER
I do not prescribe ativan. Can refer to Dr Ricardo Holloway for anxiety and can prescribe buspar in the meantime if interested.

## 2017-10-02 RX ORDER — BUSPIRONE HYDROCHLORIDE 10 MG/1
TABLET ORAL
Qty: 90 TABLET | Refills: 0 | Status: SHIPPED | OUTPATIENT
Start: 2017-10-02 | End: 2017-10-05

## 2017-10-03 NOTE — TELEPHONE ENCOUNTER
I spoke with patient. He is aware that medication sent in to pharmacy. He says this is fine. He also says he is waiting on surgery date due to s2 disc being herniated. He was put on morphine for 7 days and will call back when he has a for sure date so he can follow up.

## 2017-10-05 ENCOUNTER — TELEPHONE (OUTPATIENT)
Dept: FAMILY MEDICINE CLINIC | Age: 47
End: 2017-10-05

## 2017-10-05 DIAGNOSIS — F41.9 ANXIETY: Primary | ICD-10-CM

## 2017-10-05 RX ORDER — QUETIAPINE FUMARATE 25 MG/1
TABLET, FILM COATED ORAL
Qty: 60 TABLET | Refills: 0 | Status: SHIPPED | OUTPATIENT
Start: 2017-10-05 | End: 2017-10-30

## 2017-10-06 ENCOUNTER — TELEPHONE (OUTPATIENT)
Dept: FAMILY MEDICINE CLINIC | Age: 47
End: 2017-10-06

## 2017-10-06 ENCOUNTER — TELEPHONE (OUTPATIENT)
Dept: PSYCHIATRY | Age: 47
End: 2017-10-06

## 2017-10-06 NOTE — TELEPHONE ENCOUNTER
Had to change buspar to generic seriquil. Took this med back in 2005. This med knocks him out, to where he sleeps too many hours. Wanting to know if there is a different med you can Rx. Wanting to know if you will call it in to [facility].  Please call pt's wife back at [phone] or [phone]    Please address today

## 2017-10-06 NOTE — TELEPHONE ENCOUNTER
NP referral per Dr. 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

## 2017-10-06 NOTE — TELEPHONE ENCOUNTER
I have made a referral to Dr Dewayne Goldberg. I do not have another alternative. I have ordered the lowest dose. With his insomnia and anxiety it might be good for him to catch up on sleep. He could use a lower dose.

## 2017-10-10 ENCOUNTER — TELEPHONE (OUTPATIENT)
Dept: FAMILY MEDICINE CLINIC | Age: 47
End: 2017-10-10

## 2017-10-10 DIAGNOSIS — M96.1 POST LAMINECTOMY SYNDROME: Primary | ICD-10-CM

## 2017-10-10 DIAGNOSIS — M96.1 FAILED BACK SYNDROME OF LUMBAR SPINE: ICD-10-CM

## 2017-10-11 ENCOUNTER — OFFICE VISIT (OUTPATIENT)
Dept: PAIN MANAGEMENT | Age: 47
End: 2017-10-11

## 2017-10-11 VITALS
WEIGHT: 131.2 LBS | HEIGHT: 64 IN | BODY MASS INDEX: 22.4 KG/M2 | SYSTOLIC BLOOD PRESSURE: 145 MMHG | DIASTOLIC BLOOD PRESSURE: 94 MMHG | HEART RATE: 112 BPM

## 2017-10-11 DIAGNOSIS — M54.16 LUMBAR RADICULOPATHY: ICD-10-CM

## 2017-10-11 DIAGNOSIS — Z79.891 ENCOUNTER FOR MONITORING OPIOID MAINTENANCE THERAPY: ICD-10-CM

## 2017-10-11 DIAGNOSIS — Z51.81 ENCOUNTER FOR MONITORING OPIOID MAINTENANCE THERAPY: ICD-10-CM

## 2017-10-11 DIAGNOSIS — M96.1 POSTLAMINECTOMY SYNDROME, LUMBAR: Primary | ICD-10-CM

## 2017-10-11 PROCEDURE — G0444 DEPRESSION SCREEN ANNUAL: HCPCS | Performed by: ANESTHESIOLOGY

## 2017-10-11 PROCEDURE — 99204 OFFICE O/P NEW MOD 45 MIN: CPT | Performed by: ANESTHESIOLOGY

## 2017-10-11 ASSESSMENT — ENCOUNTER SYMPTOMS
COUGH: 0
BACK PAIN: 1
ORTHOPNEA: 0
SHORTNESS OF BREATH: 0
VOMITING: 0
ABDOMINAL PAIN: 0
HEMOPTYSIS: 0
CONSTIPATION: 0
WHEEZING: 0
HEARTBURN: 0
NAUSEA: 0
EYE REDNESS: 0
EYE DISCHARGE: 0
EYE PAIN: 0

## 2017-10-11 ASSESSMENT — PATIENT HEALTH QUESTIONNAIRE - PHQ9
3. TROUBLE FALLING OR STAYING ASLEEP: 3
SUM OF ALL RESPONSES TO PHQ9 QUESTIONS 1 & 2: 6
5. POOR APPETITE OR OVEREATING: 3
8. MOVING OR SPEAKING SO SLOWLY THAT OTHER PEOPLE COULD HAVE NOTICED. OR THE OPPOSITE, BEING SO FIGETY OR RESTLESS THAT YOU HAVE BEEN MOVING AROUND A LOT MORE THAN USUAL: 0
9. THOUGHTS THAT YOU WOULD BE BETTER OFF DEAD, OR OF HURTING YOURSELF: 0
10. IF YOU CHECKED OFF ANY PROBLEMS, HOW DIFFICULT HAVE THESE PROBLEMS MADE IT FOR YOU TO DO YOUR WORK, TAKE CARE OF THINGS AT HOME, OR GET ALONG WITH OTHER PEOPLE: 3
6. FEELING BAD ABOUT YOURSELF - OR THAT YOU ARE A FAILURE OR HAVE LET YOURSELF OR YOUR FAMILY DOWN: 1
SUM OF ALL RESPONSES TO PHQ QUESTIONS 1-9: 19
1. LITTLE INTEREST OR PLEASURE IN DOING THINGS: 3
7. TROUBLE CONCENTRATING ON THINGS, SUCH AS READING THE NEWSPAPER OR WATCHING TELEVISION: 3
2. FEELING DOWN, DEPRESSED OR HOPELESS: 3
4. FEELING TIRED OR HAVING LITTLE ENERGY: 3

## 2017-10-11 NOTE — PATIENT INSTRUCTIONS
1. Follow with spine surgeon - referral placed for Dr. Brayan Sawyer as requested. 2. I have referred you to Counselor (Dr. Ada Adams) in your PCP's office. 3. Continue tizanidine and Cymbalta as before. 4. I will provide you with Morphine for severe pain, after urine test results are in.  5. Otherwise follow back with us after 1 month. Patient Education        Developing a Pain Management Plan: Care Instructions  Your Care Instructions  Some diseases and injuries can cause pain that lasts a long time. You don't need to live with uncontrolled pain. A pain management plan helps you find ways to control pain with side effects you can live with. There are things you can do to help with pain. Only you know how much pain you feel. Constant pain can make you depressed. It can cause stress and make it hard for you to eat and sleep. But there are ways to control the pain. They can help you stay active, improve your mood, and heal faster. Your plan can include many types of pain control. You may take prescription or over-the-counter drugs. You can also try physical treatments and behavioral methods. Some medical treatments also help with pain. For example, radiation can be used to reduce pain from bone cancer. You and your doctor will work to make your plan. Be sure to read it often. Change it if your pain is not under control. Follow-up care is a key part of your treatment and safety. Be sure to make and go to all appointments, and call your doctor if you are having problems. It's also a good idea to know your test results and keep a list of the medicines you take. How can you care for yourself at home? Physical treatments  · Be safe with medicines. Take pain medicines exactly as directed. ¨ If the doctor gave you a prescription medicine for pain, take it exactly as prescribed. ¨ If you are not taking a prescription pain medicine, ask your doctor if you can take an over-the-counter medicine.   · If your pain medicine causes side effects such as constipation or nausea, you may need to take other medicines for those problems. Talk to your doctor about any side effects you have. · Hot or cold compresses applied directly to the skin can help sore muscles. Put ice or a cold pack on the painful area for 10 to 20 minutes at a time. Put a thin cloth between the ice and your skin. You may find that it helps to switch between cold and heat. Try a heating pad set on low or a warm cloth on the area for 10 to 15 minutes at a time. · Hydrotherapy uses flowing water to relax muscles. You may want to sit in a hot tub or a steam bath. Or use a shower or a sitz bath to help your pain. · Massage therapy is rubbing the soft tissues of the body. It eases tension and pain. It also improves blood flow and helps you relax. · Transcutaneous electrical nerve stimulation (TENS) uses a gentle electric current applied to the skin for pain relief. You can use TENS at home after you learn how. · Acupuncture is a form of traditional [facility] medicine. It uses very thin needles inserted into certain points of the body. It is done by a person with special training (acupuncturist). · If you get physical therapy, make sure to do any home exercises or stretching your therapist has prescribed. · Stay as active as you can. Try to get some physical activity every day. Behavioral treatments  · Biofeedback teaches you to control a body function that you normally don't think about. These are things like skin temperature, heart rate, and blood pressure. At first, you will use a machine to give you feedback on the function you want to control. Then a therapist will teach you what to do next. Over time, you can stop using the machine. · Breathing techniques can help you relax and get rid of tension. · Guided imagery is a series of thoughts and images that can focus your attention away from your pain.  When you do it, you use all your senses to help your of this information.

## 2017-10-11 NOTE — PROGRESS NOTES
the past. Pain worse with prolonged activities, standing, walking, bending and nothing seems to help. No help from PT since the flare, and tried multiple opioids with variable help; currently on Celebrex, tizanidine and Cymbalta through PCP. RED FLAG symptoms (Symptoms may include, but not limited to, acute trauma, fever, drug use, malignancy, weakness, perianal numbness, bladder/bowel changes) - No    History of COPD - seen Pulmonary (Dr. Yadiel Leyva)    Therapies Tried:    Chiropractic Manipulation: No / N/A   Physical Therapy: Yes / Benefit <30%   Home Exercise: No / N/A   TENS Therapy: No / N/A   Psychotherapy: No / N/A   Injections: No / N/A   Surgery: None Recently / N/A    Pain Medications Tried:    NSAIDS: None Recently / N/A - intolerant   Antidepressants: Yes / depression/anxiety - on Elavil, Cymbalta, Seroquel   Anticonvulsants: Yes / not recently - intolerant of Neurontin   Muscle Relaxants: Yes / on tizanidine    Opioids: Yes / tried Butrans, Percocet, morphine since the flare     Past Medical History:   Diagnosis Date    Chronic back pain     COPD (chronic obstructive pulmonary disease) ([facility] [address]     Hip pain 9/5/2017    ?  CAM possible bilateral superior femoral head-heck junction bump suggestive of femoro acetabular impingement syndrome    History of back surgery 12/26/2016    Osteoporosis     Spontaneous pneumothorax     1991    Tobacco abuse        Past Surgical History:   Procedure Laterality Date    BACK SURGERY      1st rods, 2nd tens unit 3rd rods and screw removed, only has cages now    TESTICLE TORSION REDUCTION      x 2       Allergies   Allergen Reactions    Bactrim [Sulfamethoxazole-Trimethoprim] Diarrhea    Biaxin [Clarithromycin] Diarrhea    Diclofenac Diarrhea    Neurontin [Gabapentin] Swelling     Leg swelling    Robaxin [Methocarbamol]      Eyes swell and get bloodshot    Toradol [Ketorolac Tromethamine] Other (See Comments)     Eyes swell and bloodshot    Tramadol Other (See Comments)     Eyes swell and get bloodshot    Sulfa Antibiotics Nausea And Vomiting       Current Outpatient Prescriptions   Medication Sig Dispense Refill    celecoxib (CELEBREX) 100 MG capsule 1 po bid to take the place of motrin if effective 60 capsule 0    amitriptyline (ELAVIL) 25 MG tablet 1-2 po q hs for sleep 60 tablet 5    tiZANidine (ZANAFLEX) 4 MG tablet Take 1 tablet by mouth 2 times daily 60 tablet 0    DULoxetine (CYMBALTA) 30 MG extended release capsule Take 1 capsule by mouth 2 times daily 60 capsule 0    Fluticasone Furoate-Vilanterol (BREO ELLIPTA) 200-25 MCG/INH AEPB Inhale 1 puff into the lungs daily 1 each 6    tiotropium (SPIRIVA RESPIMAT) 2.5 MCG/ACT AERS inhaler Inhale 2 puffs into the lungs daily 1 Inhaler 6    vitamin D (CHOLECALCIFEROL) 1000 UNIT TABS tablet Take 5 tablets by mouth daily 90 tablet 1    loratadine (CLARITIN) 10 MG tablet 1 po q day 90 tablet 1    ipratropium (ATROVENT HFA) 17 MCG/ACT inhaler 1 puff qid 1 Inhaler 3    diclofenac sodium (VOLTAREN) 1 % GEL Apply 2-4 g topically 2 times daily 5 Tube 0    Tens Unit MISC by Does not apply route 1 each 0    montelukast (SINGULAIR) 10 MG tablet Take 1 tablet by mouth nightly 90 tablet 3    magnesium oxide (MAG-OX) 400 MG tablet Take 400 mg by mouth daily      Calcium Citrate (CALCITRATE PO) Take 200 mg by mouth three times daily      albuterol (PROVENTIL HFA;VENTOLIN HFA) 108 (90 BASE) MCG/ACT inhaler Inhale 2 puffs into the lungs every 4 hours as needed for Wheezing.  QUEtiapine (SEROQUEL) 25 MG tablet 1 - 2 po q hs to replace amitriptyline and buspar 60 tablet 0    denosumab (PROLIA) 60 MG/ML SOLN SC injection Inject 1 mL into the skin once for 1 dose 1 mL 0    predniSONE (DELTASONE) 20 MG tablet Take 20 mg by mouth 3 times daily      varenicline (CHANTIX STARTING MONTH BEN) 0.5 MG X 11 & 1 MG X 42 tablet Take as directed in pack.  Disp 1 starter pack 1 each 0     No current facility-administered and posteriorly displaces proximal left S1 nerve root. SACRUM AND SI JOINTS:  Unremarkable  OTHER:  None\"       Review of Systems:   Review of Systems   Constitutional: Negative for chills, fever, malaise/fatigue and weight loss. HENT: Negative for hearing loss and tinnitus. Eyes: Negative for pain, discharge and redness. Respiratory: Negative for cough, hemoptysis, shortness of breath and wheezing. Cardiovascular: Negative for chest pain, palpitations and orthopnea. Gastrointestinal: Negative for abdominal pain, constipation, heartburn, nausea and vomiting. Genitourinary: Negative for frequency, hematuria and urgency. Musculoskeletal: Positive for back pain. Negative for falls, joint pain, myalgias and neck pain. Skin: Negative for itching and rash. Neurological: Positive for tingling (left leg) and sensory change (left leg). Negative for dizziness, focal weakness, seizures, weakness and headaches. Endo/Heme/Allergies: Does not bruise/bleed easily. Psychiatric/Behavioral: Positive for depression. Negative for substance abuse and suicidal ideas. The patient is nervous/anxious. The patient does not have insomnia. Physical Exam:   Physical Exam   Constitutional: He is oriented to person, place, and time. He appears distressed. In mild distress, ambulates without help   HENT:   Head: Normocephalic. Eyes: EOM are normal. Pupils are equal, round, and reactive to light. Neck: Normal range of motion. Neck supple. No thyromegaly present. Cardiovascular: Normal rate, regular rhythm, normal heart sounds and intact distal pulses. Pulmonary/Chest: Effort normal and breath sounds normal. No respiratory distress. He exhibits no tenderness. Abdominal: Soft. Bowel sounds are normal. He exhibits no mass. There is no tenderness. There is no guarding. Musculoskeletal: Normal range of motion. He exhibits tenderness. He exhibits no deformity.    Exam of back showed mild to moderate anxiety/depression. Plan:     1. Chronic low back pain for 20 years; s/p lumbar fusion X 2 (last surgery in 2013). 2. He was doing relatively well after the surgery, until pain flared up 2 months ago. 3. Reviewed MRI results with patient in detail - evidence for L4-5, L5-S1 discectomy and spacers; left paracentral disc extrusion at L5-S1 impinging on exiting nerve root. 4. He has failed PT, medications through several providers and awaiting surgical consult with Dr. Elif Chow; requesting referral for the same (ordered). 5. Encouraged him to continue tizanidine, Celebrex and Cymbalta as before. He has higher PHQ scores and is anxious about impending surgery - will refer to [facility] (Dr. Jesus Woo) in his PCP's office for counseling. 6. Discussed role and limitation of opioids in detail; informed him to comply with opioid requirements through one provider. He has not required opioid since last surgery, until the flare 2 months ago. 7. I will get UDS and medication agreement. I will provide MS Contin 15 mg q12h, to help through the flare after the UDS results are in - may follow back with us after the surgical consult. - Encouraged regular home exercises. - Counseled on dual effects, limitations, side effects and possible complications of opioids for chronic pain, including dependence and addiction.  - Educational material provided on multidisciplinary chronic pain management, safe opioid use. Goals of chronic opioid therapy:    Long-term vs. Short-term. Multi-disciplinary comprehensive pain management with functional improvement and reduced opioid use. Analgesic Plan:   Continue present regimen: Yes   Adjust dose of present analgesic: No   Switch analgesics: Yes   Add/Adjust concomitant therapy: Yes -  counseling. Controlled Substances Monitoring: Attestation: The Prescription Monitoring Report for this patient was reviewed today.  Christopher Luis MD)  Documentation: Possible medication side effects, risk of tolerance and/or dependence, and alternative treatments discussed. , Random urine drug screen sent today., No signs of potential drug abuse or diversion identified., Medication contract signed today. Torrey Arrieta MD)    The entire treatment plan was discussed with patient and agreed. More than 45 minutes spent on face-to-face encounter with the patient by the provider. More than 50% of the time spent on counseling/coordination of care regarding chronic pain.         Natalya Patino MD  Board Certified in Anesthesiology and Pain Medicine

## 2017-10-13 DIAGNOSIS — G89.4 CHRONIC PAIN SYNDROME: ICD-10-CM

## 2017-10-13 DIAGNOSIS — M81.0 OSTEOPOROSIS, UNSPECIFIED OSTEOPOROSIS TYPE, UNSPECIFIED PATHOLOGICAL FRACTURE PRESENCE: ICD-10-CM

## 2017-10-13 DIAGNOSIS — M25.562 LEFT KNEE PAIN, UNSPECIFIED CHRONICITY: ICD-10-CM

## 2017-10-13 DIAGNOSIS — M51.36 DDD (DEGENERATIVE DISC DISEASE), LUMBAR: ICD-10-CM

## 2017-10-13 DIAGNOSIS — F41.9 ANXIETY: ICD-10-CM

## 2017-10-15 LAB
6-ACETYLMORPHINE: NOT DETECTED
7-AMINOCLONAZEPAM: NOT DETECTED
ALPHA-OH-ALPRAZOLAM: NOT DETECTED
ALPRAZOLAM: NOT DETECTED
AMPHETAMINE: NOT DETECTED
BARBITURATES: NOT DETECTED
BENZOYLECGONINE: NOT DETECTED
BUPRENORPHINE: NOT DETECTED
CARISOPRODOL: NOT DETECTED
CLONAZEPAM: NOT DETECTED
CODEINE: NOT DETECTED
CREATININE URINE: 25.8 MG/DL (ref 20–400)
DIAZEPAM: NOT DETECTED
DRUGS EXPECTED: NORMAL
EER PAIN MGT DRUG PANEL, HIGH RES/EMIT U: NORMAL
ETHYL GLUCURONIDE: NOT DETECTED
FENTANYL: NOT DETECTED
HYDROCODONE: NOT DETECTED
HYDROMORPHONE: NOT DETECTED
LORAZEPAM: NOT DETECTED
MARIJUANA METABOLITE: NOT DETECTED
MDA: NOT DETECTED
MDEA: NOT DETECTED
MDMA URINE: NOT DETECTED
MEPERIDINE: NOT DETECTED
METHADONE: NOT DETECTED
METHAMPHETAMINE: NOT DETECTED
METHYLPHENIDATE: NOT DETECTED
MIDAZOLAM: NOT DETECTED
MORPHINE: NOT DETECTED
NORBUPRENORPHINE, FREE: NOT DETECTED
NORDIAZEPAM: NOT DETECTED
NORFENTANYL: NOT DETECTED
NORHYDROCODONE, URINE: NOT DETECTED
NOROXYCODONE: NOT DETECTED
NOROXYMORPHONE, URINE: NOT DETECTED
OXAZEPAM: NOT DETECTED
OXYCODONE: NOT DETECTED
OXYMORPHONE: NOT DETECTED
PAIN MANAGEMENT DRUG PANEL: NORMAL
PAIN MANAGEMENT DRUG PANEL: NORMAL
PCP: NOT DETECTED
PHENTERMINE: NOT DETECTED
PROPOXYPHENE: NOT DETECTED
TAPENTADOL, URINE: NOT DETECTED
TAPENTADOL-O-SULFATE, URINE: NOT DETECTED
TEMAZEPAM: NOT DETECTED
TRAMADOL: PRESENT
ZOLPIDEM: NOT DETECTED

## 2017-10-16 ENCOUNTER — TELEPHONE (OUTPATIENT)
Dept: PAIN MANAGEMENT | Age: 47
End: 2017-10-16

## 2017-10-16 DIAGNOSIS — M96.1 FAILED BACK SYNDROME OF LUMBAR SPINE: Primary | ICD-10-CM

## 2017-10-16 RX ORDER — MORPHINE SULFATE 15 MG/1
15 TABLET, FILM COATED, EXTENDED RELEASE ORAL 2 TIMES DAILY
Qty: 30 TABLET | Refills: 0 | Status: SHIPPED | OUTPATIENT
Start: 2017-10-16 | End: 2017-10-31 | Stop reason: SDUPTHER

## 2017-10-16 RX ORDER — DULOXETIN HYDROCHLORIDE 30 MG/1
CAPSULE, DELAYED RELEASE ORAL
Qty: 60 CAPSULE | Refills: 0 | OUTPATIENT
Start: 2017-10-16

## 2017-10-18 ENCOUNTER — TELEPHONE (OUTPATIENT)
Dept: ENDOCRINOLOGY | Age: 47
End: 2017-10-18

## 2017-10-18 ENCOUNTER — CLINICAL DOCUMENTATION (OUTPATIENT)
Dept: PAIN MANAGEMENT | Age: 47
End: 2017-10-18

## 2017-10-19 ENCOUNTER — TELEPHONE (OUTPATIENT)
Dept: PAIN MANAGEMENT | Age: 47
End: 2017-10-19

## 2017-10-26 ENCOUNTER — OFFICE VISIT (OUTPATIENT)
Dept: PSYCHOLOGY | Age: 47
End: 2017-10-26

## 2017-10-26 ENCOUNTER — NURSE ONLY (OUTPATIENT)
Age: 47
End: 2017-10-26

## 2017-10-26 DIAGNOSIS — M81.0 AGE-RELATED OSTEOPOROSIS WITHOUT CURRENT PATHOLOGICAL FRACTURE: Primary | ICD-10-CM

## 2017-10-26 DIAGNOSIS — F32.89 OTHER DEPRESSION: Primary | ICD-10-CM

## 2017-10-26 PROCEDURE — 4004F PT TOBACCO SCREEN RCVD TLK: CPT | Performed by: PSYCHOLOGIST

## 2017-10-26 PROCEDURE — 90791 PSYCH DIAGNOSTIC EVALUATION: CPT | Performed by: PSYCHOLOGIST

## 2017-10-26 PROCEDURE — 96372 THER/PROPH/DIAG INJ SC/IM: CPT | Performed by: INTERNAL MEDICINE

## 2017-10-26 NOTE — Clinical Note
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 He is very focused on wanting relief from his anxiety especially now in the midst of possible impending surgery. He will find out plan on 11/6 with Dr. Alex Redman. I know he declined psychiatry but probably wouldn't have been able to be seen until January long after surgery. He was open to discussing another SSRI but already on amitriptyline and duloxetine for chronic pain and I know this complicates the picture. Wondering your thoughts about consult with Noel Gutierrezver to adjust these? Or possibly a curbside consult with Dr. Evan Pichardo? Otherwise I'm not sure what direction to go from medication standpoint. He did agree to f/u with me next week, I will try to get him to focus on relaxation training but because he tends to be so medically focused, really wants a \"medicine\" to fix this. Let me know what you think.  Kena

## 2017-10-26 NOTE — PROGRESS NOTES
Behavioral Health Consultation  Isabel Tay PsyD  Psychologist  10/26/2017  10:45 AM      Time spent with Patient: 30 minutes  This is patient's first  [facility] appointment. Reason for Consult:  Anxiety  Referring Provider: MD Miguel Angel Smiley 59, [address]    Pt provided informed consent for the behavioral health program. Discussed with patient model of service to include the limits of confidentiality (i.e. abuse reporting, suicide intervention, etc.) and short-term intervention focused approach. Pt indicated understanding. Feedback given to PCP. S:    Going to meet with Dr. Ghada Cole on 11/6 to decide what surgery to do for herniated disc. Had a rosie in back, if surgery happens, surgeon will go back going in to put rods back in on both sides, will have to be opened front and back. Work hx: Retired, working part time [address] [place] [address] [place] where wife  at [address] [place] [address]. Worried about financial on wife as well. Medical issues: Arachroiditis, worried about the financial impact on his wife with another surgery     : 4 years    Medications: Amitryptiline used for chronic pain, cymbalta used for chronic pain. PCP tried quetiapine, hydroxyzine, and buspirone, none of which pt doesn't feel helps with his anxiety. He brought them here today to give back to PCP to dispose of. Rest of appt focused on anxiety management as he approaches having another potential surgery.      O:  MSE:    Appearance    alert, cooperative  Appetite abnormal: poor  Sleep disturbance Yes  Fatigue Yes  Loss of pleasure Yes  Impulsive behavior No  Speech    normal rate, normal volume and well articulated  Mood    Anxious  Worried about needing another surgery  Affect    Congruent with full range  Thought Content    intact  Thought Process    linear, goal directed and coherent  Associations    logical connections  Insight    Fair to good  Judgment    Intact  Orientation    oriented to person, (CALCITRATE PO) Take 200 mg by mouth three times daily      albuterol (PROVENTIL HFA;VENTOLIN HFA) 108 (90 BASE) MCG/ACT inhaler Inhale 2 puffs into the lungs every 4 hours as needed for Wheezing. No current facility-administered medications for this visit. Social History:   Social History     Social History    Marital status: Single     Spouse name: N/A    Number of children: N/A    Years of education: N/A     Occupational History     ( [employer])      On Medical Leave Currently     Social History Main Topics    Smoking status: Current Every Day Smoker     Packs/day: 0.50     Years: 30.00     Types: Cigarettes    Smokeless tobacco: Never Used    Alcohol use No    Drug use:      Types: Marijuana      Comment: In [address] Sexual activity: No     Other Topics Concern    Not on file     Social History Narrative    No narrative on file       TOBACCO:   reports that he has been smoking Cigarettes. He has a 15.00 pack-year smoking history. He has never used smokeless tobacco.  ETOH:   reports that he does not drink alcohol. Family History:   Family History   Problem Relation Age of Onset    Arthritis Mother     Other Mother     Other Father          A:    Pt presenting with chronic anxiety exacerbated by worry about another impending back surgery. Chronic pain flared up again about 2 months ago, feels he was doing fairly well until this time. News that he has another herniated disc, will meet with Dr. Brayan Sawyer (surgeon) on 11/6 to discuss surgery plan. If surgeon does go back in, will also put rods back in to stabilize area. He is very much wanting medication management to manage current health anxiety. Reported he was prescribed lorazepam in the past for short term use to manage anxiety pre-surgery. Discussed the contraindications for chronic anxiety and that consult with PCP would have to be discussed to see if there are others options.     Pt is taking amitriptyline and

## 2017-10-26 NOTE — PROGRESS NOTES
..After obtaining consent, and per orders of Dr. Ana Covington, injection of Prolia given in Right arm by Zaida Magallon. Patient instructed to remain in clinic for 20 minutes afterwards, and to report any adverse reaction to me immediately.  Lot # [id number] exp 8/19 patient advised wait 20 min to watch for allergic reaction such as SOB, hives, itching

## 2017-10-31 DIAGNOSIS — M96.1 POSTLAMINECTOMY SYNDROME, LUMBAR: Primary | ICD-10-CM

## 2017-10-31 RX ORDER — MORPHINE SULFATE 15 MG/1
15 TABLET, FILM COATED, EXTENDED RELEASE ORAL 2 TIMES DAILY
Qty: 30 TABLET | Refills: 0 | Status: SHIPPED | OUTPATIENT
Start: 2017-10-31 | End: 2017-11-16 | Stop reason: SDUPTHER

## 2017-10-31 NOTE — TELEPHONE ENCOUNTER
The patient called and needs a refill of his pain medication. He is getting to ready to go to pre op testing for Dr. Ghada Cole to do surgery. She is waiting for results before she schedules.     [facility] Complete

## 2017-11-06 ENCOUNTER — TELEPHONE (OUTPATIENT)
Dept: FAMILY MEDICINE CLINIC | Age: 47
End: 2017-11-06

## 2017-11-06 NOTE — TELEPHONE ENCOUNTER
Patient wife requesting referral to be placed to [address] [place] [address] surgeon at [facility] \[facility]\"" AT [place]. Orthopedic surgeon that was referred on 10/11/17 evaluated patient and determined that she will not be able to conduct sx because patient had nicotine in his system upon evaluation. Please advise.

## 2017-11-06 NOTE — TELEPHONE ENCOUNTER
I will send a copy to Dr. Mecca Hagan who recommended Dr Randi Mejia and see if Dr Pito Delgado is an acceptable alternative. Please get me Dr. James Sandy regarding his apt.

## 2017-11-08 ENCOUNTER — TELEPHONE (OUTPATIENT)
Dept: FAMILY MEDICINE CLINIC | Age: 47
End: 2017-11-08

## 2017-11-08 DIAGNOSIS — M96.1 POST LAMINECTOMY SYNDROME: Primary | ICD-10-CM

## 2017-11-08 NOTE — TELEPHONE ENCOUNTER
Melisa Felty calling from Dr Amber Boyd office. States the difficulty in getting pt in is because: 1) they are awaiting results of nicotine test to come back. He had it done yesterday, and it is now in process. The doctor will not do surgery unless he is nicotine free, because the nicotine interferes with the bone fusion. Pt stated to their office he is still smoking 2 pks of cigarettes a day. 2) pt was kicked out of prior pain management doctor because he tested positive for cocaine, and negative for his prescribed pain meds. Prior to surgery, he has to have clean urines from the pain management doctor, testing only positive for the prescribed pain meds, and negative for any of the other drugs. Melisa Felty is going to fax a copy of Dr Amber Boyd qualifications for surgery.

## 2017-11-09 ENCOUNTER — TELEPHONE (OUTPATIENT)
Dept: FAMILY MEDICINE CLINIC | Age: 47
End: 2017-11-09

## 2017-11-09 NOTE — TELEPHONE ENCOUNTER
Telephone encounter from 10/06/17 patient stated not interested in seeing Dr. Draper No at this time.  Ok to get rid of?

## 2017-11-13 ENCOUNTER — TELEPHONE (OUTPATIENT)
Dept: FAMILY MEDICINE CLINIC | Age: 47
End: 2017-11-13

## 2017-11-13 ENCOUNTER — OFFICE VISIT (OUTPATIENT)
Dept: ORTHOPEDIC SURGERY | Age: 47
End: 2017-11-13

## 2017-11-13 VITALS
RESPIRATION RATE: 16 BRPM | BODY MASS INDEX: 22.36 KG/M2 | HEART RATE: 130 BPM | DIASTOLIC BLOOD PRESSURE: 85 MMHG | WEIGHT: 131 LBS | SYSTOLIC BLOOD PRESSURE: 128 MMHG | HEIGHT: 64 IN | TEMPERATURE: 98.2 F

## 2017-11-13 DIAGNOSIS — G03.9 ARACHNOIDITIS: Primary | ICD-10-CM

## 2017-11-13 DIAGNOSIS — F11.20 NARCOTIC DEPENDENCY, CONTINUOUS (HCC): ICD-10-CM

## 2017-11-13 PROCEDURE — 99243 OFF/OP CNSLTJ NEW/EST LOW 30: CPT | Performed by: ORTHOPAEDIC SURGERY

## 2017-11-13 PROCEDURE — G8427 DOCREV CUR MEDS BY ELIG CLIN: HCPCS | Performed by: ORTHOPAEDIC SURGERY

## 2017-11-13 PROCEDURE — G8420 CALC BMI NORM PARAMETERS: HCPCS | Performed by: ORTHOPAEDIC SURGERY

## 2017-11-13 PROCEDURE — G8484 FLU IMMUNIZE NO ADMIN: HCPCS | Performed by: ORTHOPAEDIC SURGERY

## 2017-11-13 RX ORDER — MIRTAZAPINE 15 MG/1
15 TABLET, ORALLY DISINTEGRATING ORAL NIGHTLY
Qty: 30 TABLET | Refills: 0 | Status: SHIPPED | OUTPATIENT
Start: 2017-11-13 | End: 2018-03-28

## 2017-11-13 NOTE — PROGRESS NOTES
NAME:  Pito Pereira    :    1970    CSN:    [id number]    DOS:    2017    ASSESSMENT:  Arachnoiditis and narcotic dependency. 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 Thank you Monica Phelan for your kind referral    DME: No orders of the defined types were placed in this encounter. HPI:      Thank you Dr. Cecy Castellon MD for referring Pito Pereira for consultation for low back pain. It was my pleasure to evaluate your patient. The patient intake form was reviewed first for chief complaint of low back pain, past medical history, and review of systems on 2017. Patient is a 52y.o. year old male who presents with chief complaint of pain in the low back and left leg. He states that the back pain exceeds the leg pain. The leg pain radiates with associated numbness and tingling into the lateral thigh and calf. He gets spasms of discomfort. His problems started in  with right leg and back pain. He underwent surgery by Dr. Brooks Lama. He then developed hardware loosening and went to a Dr. Raj Butler in [place] who removed his hardware and told him he had osteoporosis. This was in . He has been having ongoing low back pain ever since. He is using a lumbar corset and has been getting MS Contin as well as Cymbalta Zanaflex Celebrex and Deltasone. He works part time as a employee at [employer]. He denies any right-sided leg pain or bowel or bladder difficulties. He apparently has been told recently that he has arachnoiditis. ROS: Pertinent items are noted in HPI  Review of systems reviewed from Patient History Form dated on 17 and available in the patient's chart under the Media tab.          Past Medical History    Allergies   Allergen Reactions    Bactrim times daily 60 capsule 0    Fluticasone Furoate-Vilanterol (BREO ELLIPTA) 200-25 MCG/INH AEPB Inhale 1 puff into the lungs daily 1 each 6    tiotropium (SPIRIVA RESPIMAT) 2.5 MCG/ACT AERS inhaler Inhale 2 puffs into the lungs daily 1 Inhaler 6    vitamin D (CHOLECALCIFEROL) 1000 UNIT TABS tablet Take 5 tablets by mouth daily 90 tablet 1    loratadine (CLARITIN) 10 MG tablet 1 po q day 90 tablet 1    ipratropium (ATROVENT HFA) 17 MCG/ACT inhaler 1 puff qid 1 Inhaler 3    varenicline (CHANTIX STARTING MONTH BEN) 0.5 MG X 11 & 1 MG X 42 tablet Take as directed in pack. Disp 1 starter pack 1 each 0    diclofenac sodium (VOLTAREN) 1 % GEL Apply 2-4 g topically 2 times daily 5 Tube 0    Tens Unit MISC by Does not apply route 1 each 0    montelukast (SINGULAIR) 10 MG tablet Take 1 tablet by mouth nightly 90 tablet 3    magnesium oxide (MAG-OX) 400 MG tablet Take 400 mg by mouth daily      Calcium Citrate (CALCITRATE PO) Take 200 mg by mouth three times daily      albuterol (PROVENTIL HFA;VENTOLIN HFA) 108 (90 BASE) MCG/ACT inhaler Inhale 2 puffs into the lungs every 4 hours as needed for Wheezing. No current facility-administered medications for this visit. @FLOW([id number]])@    Physical Exam:    There were no vitals taken for this visit. Constitutional: the patient appears to be alert and oriented to person place and time. Neuro-Muscular exam:     Cervical spine: Range of motion is full and there is no tenderness. Upper extremities:  Demonstrate a full free range of motion of the shoulders, elbows, wrists and hands. No gross asymmetry. Motor function is 5/5, sensory intact, and DTR's are 1-2+ bilaterally. Sensory exam is normal.  Pulses are 1+ bilaterally. Shoulders: No evidence of any winging or atrophy. Impingement sign is negative bilaterally. Apprehension sign is negative bilaterally. Elbows: show no evidence of any asymmetry. There is no evidence of any effusion.  Range of motion is full with no varus valgus laxity. Wrists and hands: show no evidence of any swelling or asymmetry. Digits maintain a full range of motion. There is no clubbing or cyanosis. Lower extremities: Standing limb alignment is normal. Gait is within normal limits without Trendelenburg sign. Hips: show negative logroll bilaterally. Trochanters are nontender. Knees: demonstrate normal alignment. There is no evidence of any effusion. Range of motion is full. Lachman sign negative anterior posterior stress testing Giselle sign negative bilaterally. No sign of laxity to varus valgus stress testing. The patellofemoral joint tracks normally without pain. Ankles and feet show full range of motion with no evidence of hind, mid or forefoot deformity. Lumbosacral spine: reveals mild-moderate tenderness and mild-moderate spasm. Painful and reduced LS ROM noted. Straight leg raise is positive at 80 degrees on left. DTR's are 1+ bilaterally, motor strength is 5/5 and sensation normal, including heel and toe gait. Peripheral pulses are palpable at 1+ bilaterally. Babinskis are downgoing bilaterally    Skin: Integument intact. No abrasions. DATA:    No results found for this or any previous visit (from the past 504 hour(s)). RADIOGRAPHS: AP lateral lumbar spine films from an outside facility show obliquely oriented interbody grafts at L4 5 and L5-S1 which have compromised the integrity of the L5 vertebral body and virtually sliced in half. There is a nonunion present at both levels but there is no evidence of translational instability or coronal or sagittal deformity. MRI lumbar spine is reviewed and shows disc degeneration at L4 5 and L5-S1 with a central divots in the L5 vertebral body which is quite large. There nerve rootlets layer out somewhat anterior to what would be expected but I don't see actual arachnoiditis per se. No results found.   Reviewed by Dr. Orville Gitelman:      Elba Rawls MD FACS  [facility]  11/13/2017

## 2017-11-13 NOTE — TELEPHONE ENCOUNTER
Pt called and stated that he saw a new surgeon today Dr. Abena Weiss. Pt also states that he will need a med refill on his Morphine 10 mg 11/16/17. Pt would like to know if you would like him to come in for a f/u appt or what is the next step with our office? The appt from Dr. Abena Weiss is in pt encounters from today. Last seen 10/11/17  Disposition none/after surgeon appt   Ok to refill?   Oarrs done

## 2017-11-16 ENCOUNTER — OFFICE VISIT (OUTPATIENT)
Dept: PAIN MANAGEMENT | Age: 47
End: 2017-11-16

## 2017-11-16 VITALS
HEART RATE: 109 BPM | HEIGHT: 64 IN | BODY MASS INDEX: 22.53 KG/M2 | DIASTOLIC BLOOD PRESSURE: 86 MMHG | WEIGHT: 132 LBS | SYSTOLIC BLOOD PRESSURE: 129 MMHG

## 2017-11-16 DIAGNOSIS — M47.26 OSTEOARTHRITIS OF SPINE WITH RADICULOPATHY, LUMBAR REGION: ICD-10-CM

## 2017-11-16 DIAGNOSIS — M96.1 POSTLAMINECTOMY SYNDROME, LUMBAR: Primary | ICD-10-CM

## 2017-11-16 DIAGNOSIS — G89.4 CHRONIC PAIN SYNDROME: ICD-10-CM

## 2017-11-16 DIAGNOSIS — F11.90 CHRONIC, CONTINUOUS USE OF OPIOIDS: ICD-10-CM

## 2017-11-16 PROCEDURE — G8484 FLU IMMUNIZE NO ADMIN: HCPCS | Performed by: ANESTHESIOLOGY

## 2017-11-16 PROCEDURE — 99214 OFFICE O/P EST MOD 30 MIN: CPT | Performed by: ANESTHESIOLOGY

## 2017-11-16 PROCEDURE — 4004F PT TOBACCO SCREEN RCVD TLK: CPT | Performed by: ANESTHESIOLOGY

## 2017-11-16 PROCEDURE — G8420 CALC BMI NORM PARAMETERS: HCPCS | Performed by: ANESTHESIOLOGY

## 2017-11-16 PROCEDURE — G8427 DOCREV CUR MEDS BY ELIG CLIN: HCPCS | Performed by: ANESTHESIOLOGY

## 2017-11-16 RX ORDER — MORPHINE SULFATE 15 MG/1
15 TABLET, FILM COATED, EXTENDED RELEASE ORAL 2 TIMES DAILY
Qty: 60 TABLET | Refills: 0 | Status: SHIPPED | OUTPATIENT
Start: 2017-11-16 | End: 2017-11-20 | Stop reason: SDUPTHER

## 2017-11-16 ASSESSMENT — ENCOUNTER SYMPTOMS
EYE PAIN: 0
COUGH: 0
NAUSEA: 0
ORTHOPNEA: 0
EYE DISCHARGE: 0
CONSTIPATION: 0
ABDOMINAL PAIN: 0
VOMITING: 0
BACK PAIN: 1
SHORTNESS OF BREATH: 0
EYE REDNESS: 0
WHEEZING: 0
HEMOPTYSIS: 0
HEARTBURN: 0

## 2017-11-16 NOTE — PROGRESS NOTES
Take 1 tablet by mouth nightly 30 tablet 0    predniSONE (DELTASONE) 20 MG tablet Take 20 mg by mouth 3 times daily      varenicline (CHANTIX STARTING MONTH PAK) 0.5 MG X 11 & 1 MG X 42 tablet Take as directed in pack. Disp 1 starter pack 1 each 0     No current facility-administered medications for this visit. Family History   Problem Relation Age of Onset    Arthritis Mother     Other Mother     Other Father        Social History     Social History    Marital status: Single     Spouse name: N/A    Number of children: N/A    Years of education: N/A     Occupational History     ( [employer])      On Medical Leave Currently     Social History Main Topics    Smoking status: Current Every Day Smoker     Packs/day: 0.50     Years: 30.00     Types: Cigarettes    Smokeless tobacco: Never Used    Alcohol use No    Drug use: No      Comment: In [employer] Only    Sexual activity: No     Other Topics Concern    Not on file     Social History Narrative    No narrative on file     Imaging Studies:   MRI LS (09/21/2017) [facility]  \"Impression:  L5-S1 small moderate left paracentral disc extrusion abutting and posteriorly displacing proximal left S1 nerve root. \"    Review of Systems:   Review of Systems   Constitutional: Negative for chills, fever, malaise/fatigue and weight loss. HENT: Negative for hearing loss and tinnitus. Eyes: Negative for pain, discharge and redness. Respiratory: Negative for cough, hemoptysis, shortness of breath and wheezing. Cardiovascular: Negative for chest pain, palpitations and orthopnea. Gastrointestinal: Negative for abdominal pain, constipation, heartburn, nausea and vomiting. Genitourinary: Negative for frequency, hematuria and urgency. Musculoskeletal: Positive for back pain. Negative for falls, joint pain, myalgias and neck pain. Skin: Negative for itching and rash. Neurological: Positive for tingling.  Negative for dizziness, sensory change,

## 2017-11-16 NOTE — TELEPHONE ENCOUNTER
Pt called and needs PA done on the Morphine 15 mg. Pt went ahead and paid for 10 tabs at [facility], why we are waiting for the PA so he does not have to go without his meds. Pt will need a new Rx for the Morphine ER 15 mg due to for fitting the rest of the Rx. I let pt know that I spoke with Dr. Maribel Bentley and we will baxter new Rx for the rest of the Rx to be picked up tomorrow in office 11/17/17. I also let pt know that ill call [facility]. I did call [facility] to verify that pt did pay for 10 tabs.

## 2017-11-20 RX ORDER — MORPHINE SULFATE 15 MG/1
15 TABLET, FILM COATED, EXTENDED RELEASE ORAL 2 TIMES DAILY
Qty: 40 TABLET | Refills: 0 | Status: SHIPPED | OUTPATIENT
Start: 2017-11-20 | End: 2017-12-06 | Stop reason: SDUPTHER

## 2017-12-05 DIAGNOSIS — G89.4 CHRONIC PAIN SYNDROME: ICD-10-CM

## 2017-12-05 DIAGNOSIS — M47.26 OSTEOARTHRITIS OF SPINE WITH RADICULOPATHY, LUMBAR REGION: ICD-10-CM

## 2017-12-05 DIAGNOSIS — M96.1 POSTLAMINECTOMY SYNDROME, LUMBAR: ICD-10-CM

## 2017-12-06 RX ORDER — MORPHINE SULFATE 15 MG/1
15 TABLET, FILM COATED, EXTENDED RELEASE ORAL 2 TIMES DAILY
Qty: 60 TABLET | Refills: 0 | Status: SHIPPED | OUTPATIENT
Start: 2017-12-09 | End: 2018-01-04 | Stop reason: SDUPTHER

## 2017-12-13 ENCOUNTER — OFFICE VISIT (OUTPATIENT)
Dept: FAMILY MEDICINE CLINIC | Age: 47
End: 2017-12-13

## 2017-12-13 ENCOUNTER — OFFICE VISIT (OUTPATIENT)
Dept: PSYCHOLOGY | Age: 47
End: 2017-12-13

## 2017-12-13 VITALS
SYSTOLIC BLOOD PRESSURE: 128 MMHG | DIASTOLIC BLOOD PRESSURE: 72 MMHG | HEART RATE: 88 BPM | RESPIRATION RATE: 16 BRPM | BODY MASS INDEX: 23.22 KG/M2 | OXYGEN SATURATION: 98 % | WEIGHT: 136 LBS | HEIGHT: 64 IN

## 2017-12-13 DIAGNOSIS — R60.0 LOCALIZED EDEMA: ICD-10-CM

## 2017-12-13 DIAGNOSIS — F41.9 ANXIETY: Primary | ICD-10-CM

## 2017-12-13 DIAGNOSIS — J40 BRONCHITIS: Primary | ICD-10-CM

## 2017-12-13 DIAGNOSIS — F41.9 ANXIETY: ICD-10-CM

## 2017-12-13 DIAGNOSIS — G89.4 CHRONIC PAIN SYNDROME: ICD-10-CM

## 2017-12-13 DIAGNOSIS — M81.0 OSTEOPOROSIS OF MULTIPLE SITES: ICD-10-CM

## 2017-12-13 DIAGNOSIS — G03.9 ARACHNOIDITIS: ICD-10-CM

## 2017-12-13 DIAGNOSIS — M96.1 POSTLAMINECTOMY SYNDROME, LUMBAR: ICD-10-CM

## 2017-12-13 LAB
ALBUMIN SERPL-MCNC: 4.2 G/DL (ref 3.4–5)
ALP BLD-CCNC: 104 U/L (ref 40–129)
ALT SERPL-CCNC: 20 U/L (ref 10–40)
ANION GAP SERPL CALCULATED.3IONS-SCNC: 10 MMOL/L (ref 3–16)
AST SERPL-CCNC: 22 U/L (ref 15–37)
BASOPHILS ABSOLUTE: 0 K/UL (ref 0–0.2)
BASOPHILS RELATIVE PERCENT: 0.5 %
BILIRUB SERPL-MCNC: <0.2 MG/DL (ref 0–1)
BILIRUBIN DIRECT: <0.2 MG/DL (ref 0–0.3)
BILIRUBIN, INDIRECT: ABNORMAL MG/DL (ref 0–1)
BUN BLDV-MCNC: 4 MG/DL (ref 7–20)
CALCIUM SERPL-MCNC: 9.7 MG/DL (ref 8.3–10.6)
CHLORIDE BLD-SCNC: 103 MMOL/L (ref 99–110)
CHOLESTEROL, TOTAL: 154 MG/DL (ref 0–199)
CO2: 30 MMOL/L (ref 21–32)
CREAT SERPL-MCNC: 0.8 MG/DL (ref 0.9–1.3)
EOSINOPHILS ABSOLUTE: 0.4 K/UL (ref 0–0.6)
EOSINOPHILS RELATIVE PERCENT: 3.9 %
GFR AFRICAN AMERICAN: >60
GFR NON-AFRICAN AMERICAN: >60
GLUCOSE BLD-MCNC: 93 MG/DL (ref 70–99)
HCT VFR BLD CALC: 44.6 % (ref 40.5–52.5)
HDLC SERPL-MCNC: 34 MG/DL (ref 40–60)
HEMOGLOBIN: 14.9 G/DL (ref 13.5–17.5)
LDL CHOLESTEROL CALCULATED: 96 MG/DL
LYMPHOCYTES ABSOLUTE: 3.5 K/UL (ref 1–5.1)
LYMPHOCYTES RELATIVE PERCENT: 35.6 %
MCH RBC QN AUTO: 30.9 PG (ref 26–34)
MCHC RBC AUTO-ENTMCNC: 33.5 G/DL (ref 31–36)
MCV RBC AUTO: 92.2 FL (ref 80–100)
MONOCYTES ABSOLUTE: 0.8 K/UL (ref 0–1.3)
MONOCYTES RELATIVE PERCENT: 8.6 %
NEUTROPHILS ABSOLUTE: 5 K/UL (ref 1.7–7.7)
NEUTROPHILS RELATIVE PERCENT: 51.4 %
PDW BLD-RTO: 14.6 % (ref 12.4–15.4)
PLATELET # BLD: 334 K/UL (ref 135–450)
PMV BLD AUTO: 8 FL (ref 5–10.5)
POTASSIUM SERPL-SCNC: 5.8 MMOL/L (ref 3.5–5.1)
PRO-BNP: 57 PG/ML (ref 0–124)
RBC # BLD: 4.83 M/UL (ref 4.2–5.9)
SODIUM BLD-SCNC: 143 MMOL/L (ref 136–145)
TOTAL PROTEIN: 6.3 G/DL (ref 6.4–8.2)
TRIGL SERPL-MCNC: 122 MG/DL (ref 0–150)
TSH REFLEX FT4: 1.71 UIU/ML (ref 0.27–4.2)
VLDLC SERPL CALC-MCNC: 24 MG/DL
WBC # BLD: 9.8 K/UL (ref 4–11)

## 2017-12-13 PROCEDURE — 36415 COLL VENOUS BLD VENIPUNCTURE: CPT | Performed by: INTERNAL MEDICINE

## 2017-12-13 PROCEDURE — 4004F PT TOBACCO SCREEN RCVD TLK: CPT | Performed by: INTERNAL MEDICINE

## 2017-12-13 PROCEDURE — 99214 OFFICE O/P EST MOD 30 MIN: CPT | Performed by: INTERNAL MEDICINE

## 2017-12-13 PROCEDURE — G8484 FLU IMMUNIZE NO ADMIN: HCPCS | Performed by: INTERNAL MEDICINE

## 2017-12-13 PROCEDURE — G8420 CALC BMI NORM PARAMETERS: HCPCS | Performed by: INTERNAL MEDICINE

## 2017-12-13 PROCEDURE — 90832 PSYTX W PT 30 MINUTES: CPT | Performed by: PSYCHOLOGIST

## 2017-12-13 PROCEDURE — G8427 DOCREV CUR MEDS BY ELIG CLIN: HCPCS | Performed by: INTERNAL MEDICINE

## 2017-12-13 RX ORDER — FUROSEMIDE 20 MG/1
TABLET ORAL
Qty: 15 TABLET | Refills: 0 | Status: SHIPPED | OUTPATIENT
Start: 2017-12-13 | End: 2018-02-14

## 2017-12-13 RX ORDER — ERGOCALCIFEROL 1.25 MG/1
CAPSULE ORAL
Qty: 12 CAPSULE | Refills: 3 | Status: SHIPPED | OUTPATIENT
Start: 2017-12-13 | End: 2019-02-11 | Stop reason: SDUPTHER

## 2017-12-13 RX ORDER — LEVOFLOXACIN 250 MG/1
TABLET ORAL
Qty: 7 TABLET | Refills: 0 | Status: SHIPPED | OUTPATIENT
Start: 2017-12-13 | End: 2018-02-14

## 2017-12-13 ASSESSMENT — ANXIETY QUESTIONNAIRES
6. BECOMING EASILY ANNOYED OR IRRITABLE: 3-NEARLY EVERY DAY
1. FEELING NERVOUS, ANXIOUS, OR ON EDGE: 3-NEARLY EVERY DAY
4. TROUBLE RELAXING: 3-NEARLY EVERY DAY
7. FEELING AFRAID AS IF SOMETHING AWFUL MIGHT HAPPEN: 3-NEARLY EVERY DAY
GAD7 TOTAL SCORE: 19
3. WORRYING TOO MUCH ABOUT DIFFERENT THINGS: 3-NEARLY EVERY DAY
2. NOT BEING ABLE TO STOP OR CONTROL WORRYING: 3-NEARLY EVERY DAY
5. BEING SO RESTLESS THAT IT IS HARD TO SIT STILL: 1-SEVERAL DAYS

## 2017-12-13 ASSESSMENT — PATIENT HEALTH QUESTIONNAIRE - PHQ9
6. FEELING BAD ABOUT YOURSELF - OR THAT YOU ARE A FAILURE OR HAVE LET YOURSELF OR YOUR FAMILY DOWN: 2
3. TROUBLE FALLING OR STAYING ASLEEP: 3
8. MOVING OR SPEAKING SO SLOWLY THAT OTHER PEOPLE COULD HAVE NOTICED. OR THE OPPOSITE, BEING SO FIGETY OR RESTLESS THAT YOU HAVE BEEN MOVING AROUND A LOT MORE THAN USUAL: 2
2. FEELING DOWN, DEPRESSED OR HOPELESS: 1
SUM OF ALL RESPONSES TO PHQ9 QUESTIONS 1 & 2: 3
10. IF YOU CHECKED OFF ANY PROBLEMS, HOW DIFFICULT HAVE THESE PROBLEMS MADE IT FOR YOU TO DO YOUR WORK, TAKE CARE OF THINGS AT HOME, OR GET ALONG WITH OTHER PEOPLE: 3
1. LITTLE INTEREST OR PLEASURE IN DOING THINGS: 2
9. THOUGHTS THAT YOU WOULD BE BETTER OFF DEAD, OR OF HURTING YOURSELF: 0
4. FEELING TIRED OR HAVING LITTLE ENERGY: 2
5. POOR APPETITE OR OVEREATING: 0
7. TROUBLE CONCENTRATING ON THINGS, SUCH AS READING THE NEWSPAPER OR WATCHING TELEVISION: 1
SUM OF ALL RESPONSES TO PHQ QUESTIONS 1-9: 13

## 2017-12-13 NOTE — Clinical Note
Dr. Levar Nunez you can do me a favor with this brenden. He was referred to you before you closed your practice. He declined scheduling at the time due to what I suspect was his struggle to accepting the seriousness of his mental health issues (very focused on his physical health/chronic pain issues). More motivational work with him has gotten him to be open to consult with you. Would you please consider taking him?  Thank you, C

## 2017-12-13 NOTE — PROGRESS NOTES
Behavioral Health Consultation Follow-up  Tramanie Li PsyD  Psychologist  12/13/2017  8:30 AM      Time spent with Patient: 30 minutes  This is patient's second  [facility] appointment. Reason for Consult:  Anxiety  Referring Provider: Ana Franklin MD  [address]    Feedback given to PCP. S:    Last appt: 10/26. Working with Dr. New Mason, prescribed water therapy. No surgery right now, back is too unstable due to his specialist being concerned about osteoporosis. Wants to make sure bones strong enough, on Prolia given every 6 months, have to get enough Vit D, dexa scan. Dr. New Mason is prescribing morphine for pain and trying to strengthen his muscles to get him ready for surgery.      Psych med: mirtazapine \"makes me jittery\", upsets stomach     Sleep: 2 hours sleep per night    Stressors: financial worries, wife wants pt to apply for SSDI    Medical hx: surgeon: Dr. Corinna Hoffman said pt can go back to work but very restricted     Work hx: at [employer], retired from [employer] after back problems started     O:  MSE:    Appearance  alert, cooperative  Appetite abnormal: poor  Sleep disturbance Yes  Fatigue Yes  Loss of pleasure Yes, not leaving house much because worried about falling  Impulsive behavior No  Speech    normal rate, normal volume and well articulated  Mood    Anxious  Depressed from the pain  Affect    Anxious with full range  Thought Content    intact  Thought Process    linear, goal directed and coherent  Associations    logical connections  Insight    Good  Judgment    Intact  Orientation    oriented to person, place, time, and general circumstances  Memory    recent and remote memory intact  Attention/Concentration    intact  Morbid ideation No  Suicide Assessment    no suicidal ideation      History:    Medications:   Current Outpatient Prescriptions   Medication Sig Dispense Refill    morphine (MS CONTIN) 15 MG extended release tablet Take 1 tablet by mouth 2 times Currently     Social History Main Topics    Smoking status: Current Every Day Smoker     Packs/day: 0.50     Years: 30.00     Types: Cigarettes    Smokeless tobacco: Never Used    Alcohol use No    Drug use: No      Comment: In [employer] Only    Sexual activity: No     Other Topics Concern    Not on file     Social History Narrative    No narrative on file       TOBACCO:   reports that he has been smoking Cigarettes. He has a 15.00 pack-year smoking history. He has never used smokeless tobacco.  ETOH:   reports that he does not drink alcohol. Family History:   Family History   Problem Relation Age of Onset    Arthritis Mother     Other Mother     Other Father          A:  See S: above. Anxiety and depression continue due to chronic pain. News that back surgery on hold for now due to surgeon's concern about osteoporosis being stable enough. Pt continues with Dr. Maximino Esquivel with pain management which he stated has been very helpful. We continued to focus on discussing benefits of trying another medication for the worsening anxiety symptoms which includes ruminative worries about health, irritability, and severe sleep trouble. He continued to repeat the past benefits of benzodiazepines. Emphasized with him due to the current severity of his medical and mental health that it may require more aggressive medications, encouraged him to be open with PCP about her recommendations. Hoping if pt sleeps better, this could improve mood overall. Pt going to meet with PCP after this consult this morning. Denied any si/hi risk, intent, or plan.      PHQ Scores 12/13/2017 10/11/2017 12/27/2016   PHQ2 Score 3 6 0   PHQ9 Score 13 19 0     Interpretation of Total Score Depression Severity: 1-4 = Minimal depression, 5-9 = Mild depression, 10-14 = Moderate depression, 15-19 = Moderately severe depression, 20-27 = Severe depression    JUANCHO 7 SCORE 12/13/2017   JUANCHO-7 Total Score 19     Interpretation of JUANCHO-7 score: 5-9 = mild anxiety, 10-14 = moderate anxiety, 15+ = severe anxiety. Recommend referral to behavioral health for scores 10 or greater. Diagnosis: Anxiety      Diagnosis Date    Chronic back pain     COPD (chronic obstructive pulmonary disease) ([facility] [address]     Hip pain 9/5/2017    ? CAM possible bilateral superior femoral head-heck junction bump suggestive of femoro acetabular impingement syndrome    History of back surgery 12/26/2016    Osteoporosis     Spontaneous pneumothorax     1991    Tobacco abuse      Problems with primary support group, Problems related to the social environment, Occupational problems, Economic problems and Other psychosocial and environmental problems      Plan:  Pt interventions:    Trained in strategies for increasing balanced thinking, Discussed self-care (sleep, nutrition, rewarding activities, social support, exercise), Discussed benefits of referral for specialty care, Motivational Interviewing to determine importance and readiness for change, Discussed potential barriers to change and Supportive techniques    Pt Behavioral Change Plan:    1. Talk with Dr. Jordan Reyes about medication for ruminative thinking, irritability, and sleep trouble and/or consider referral to psychiatry again  2.  Return to clinic for Dr. Jaren Wilkerson as needed

## 2017-12-13 NOTE — PROGRESS NOTES
HPI: Susan Mcleod presents for insomnia, sore throat, cough, discussion of long-term management of back disease. Emphysematous COPD. History of pneumothorax Tobacco since 2002. Up to 2 packs a day. Currently on Spiriva, albuterol, Breo,, Singulair. Spirometry 217 with FEV1 of 2.95 and FEV1/FVC of 68. Follows with Dr. Yovany Keating pulmonology. Notes that over the last week he has had some increasing cough and sputum production. Sore throat. Pulmonologist told him to cut back on his albuterol for acute symptoms only. He is increased his smoking. Denies any fevers. Bilateral lower extremity edema. No fevers. Feels like this is doing worse. He denies GE reflux or vomiting. Spinal stenosis and lumbar surgery with post laminectomy syndrome. Treatment with epidurals, narcotics, current arachnoiditis. Not a candidate for surgery at this time. Chronic pain. Following with Dr. Brandy Bunch Pain management with good result. Is concerned that he cannot work. Was given release by his orthopedist. Once to follow-up qualify for SSI. Physical labor. Yesterday noted lower extremity edema bilaterally. Says this is happened in the past. No increased salt, shortness of breath, or steroid use. Has been active. No history of clots. Positive tobacco. No diagnosis of peripheral vascular disease. Osteoporosis and treatment with high-dose vitamin D. Used sing injectable and following with endocrinology. No celiac disease    Insomnia with anxiety. He has been on Seroquel, hydroxyzine, BuSpar, Remeron, and Cymbalta. Has been on benzodiazepines in the past buther not medications that I prescribed. He is given a referral to psychiatry last month that declined. He is now having more difficulty with insomnia and concentration. He is interested in following with psychiatry. States Seroquel made him too sleepy. Remeron causes him agitation.  He is on Cymbalta 30 mg twice a day            PMH:  Testicular torsion  Back surgery x 2  spont 3 Encounters:   12/13/17 136 lb (61.7 kg)   11/16/17 132 lb (59.9 kg)   11/13/17 131 lb (59.4 kg)       Physical Exam     NAD alert and cooperative Appropriate  HEENT: No oral masses. Mild erythema posterior pharynx. TMs unremarkable. Lungs increased  I to E ratio without any wheezes rales or rhonchi. Cardiovascular exam regular rate and rhythm without murmur. No point tenderness to the abdomen. No fluid. No hepatosplenomegaly. Difficult exam.  Lower edema to the ankle bilaterally. No cords. Good pulses. Feet are warm. Mild erythema. Right slightly more swollen than left      Chemistry        Component Value Date/Time     06/24/2015 0640    K 4.6 06/24/2015 0640     06/24/2015 0640    CO2 27 06/24/2015 0640    BUN 6 (L) 06/24/2015 0640    CREATININE 0.8 (L) 06/24/2015 0640        Component Value Date/Time    CALCIUM 9.7 06/24/2015 0640    ALKPHOS 94 12/27/2016 1527    AST 16 12/27/2016 1527    ALT 22 12/27/2016 1527    BILITOT 0.5 12/27/2016 1527            Lab Results   Component Value Date    WBC 9.2 12/27/2016    HGB 16.3 12/27/2016    HCT 48.4 12/27/2016    MCV 92.8 12/27/2016     12/27/2016     Lab Results   Component Value Date    LABA1C 5.3 12/27/2016     Lab Results   Component Value Date    .4 12/27/2016     Lab Results   Component Value Date    LABA1C 5.3 12/27/2016     No components found for: CHLPL  No results found for: TRIG  No results found for: HDL  No results found for: LDLCALC  No results found for: LABVLDL    Old labs and records reviewed or requested  Discussed past lab and studies with patient   1. Bronchitis     2. Postlaminectomy syndrome, lumbar     3. Osteoporosis of multiple sites     4. Anxiety  TSH WITH REFLEX TO FT4   5. Chronic pain syndrome     6. Arachnoiditis     7. Localized edema  Basic Metabolic Panel    BRAIN NATRIURETIC PEPTIDE (BNP)    CBC Auto Differential    Hepatic Function Panel    Lipid Panel     Bronchitis. Emphysema.-year-old sputum seen.

## 2018-01-01 ENCOUNTER — HOSPITAL ENCOUNTER (OUTPATIENT)
Dept: OTHER | Age: 48
Discharge: HOME | End: 2018-01-31
Attending: ANESTHESIOLOGY | Admitting: ANESTHESIOLOGY

## 2018-01-04 ENCOUNTER — TELEPHONE (OUTPATIENT)
Dept: PAIN MANAGEMENT | Age: 48
End: 2018-01-04

## 2018-01-04 ENCOUNTER — TELEPHONE (OUTPATIENT)
Dept: FAMILY MEDICINE CLINIC | Age: 48
End: 2018-01-04

## 2018-01-04 DIAGNOSIS — F41.9 ANXIETY: Primary | ICD-10-CM

## 2018-01-04 DIAGNOSIS — M96.1 POSTLAMINECTOMY SYNDROME, LUMBAR: ICD-10-CM

## 2018-01-04 DIAGNOSIS — M47.26 OSTEOARTHRITIS OF SPINE WITH RADICULOPATHY, LUMBAR REGION: ICD-10-CM

## 2018-01-04 DIAGNOSIS — G89.4 CHRONIC PAIN SYNDROME: ICD-10-CM

## 2018-01-04 RX ORDER — MORPHINE SULFATE 15 MG/1
15 TABLET, FILM COATED, EXTENDED RELEASE ORAL 2 TIMES DAILY
Qty: 60 TABLET | Refills: 0 | Status: SHIPPED | OUTPATIENT
Start: 2018-01-08 | End: 2018-01-24 | Stop reason: SDUPTHER

## 2018-01-04 NOTE — TELEPHONE ENCOUNTER
Please find out which ER and try to get the notes and XR results (I do not see any recent visit in chart). I will order XR if one was not done within the past month. May follow with us as scheduled on 01/18/17 to discuss further options.

## 2018-01-09 ENCOUNTER — TELEPHONE (OUTPATIENT)
Dept: FAMILY MEDICINE CLINIC | Age: 48
End: 2018-01-09

## 2018-01-09 RX ORDER — NICOTINE 21 MG/24HR
1 PATCH, TRANSDERMAL 24 HOURS TRANSDERMAL EVERY 24 HOURS
Qty: 30 PATCH | Refills: 0 | Status: SHIPPED | OUTPATIENT
Start: 2018-01-09 | End: 2018-02-14 | Stop reason: SDUPTHER

## 2018-01-09 NOTE — TELEPHONE ENCOUNTER
ashlyn suggested referral to UC. I can make if he would like    I really think he should take seroquel, can take 1/2 or 1/4 pill if desired.  Needs to sleep    I can prescribe nicotene patch    Did he secure magan apt?

## 2018-01-11 ENCOUNTER — TELEPHONE (OUTPATIENT)
Dept: FAMILY MEDICINE CLINIC | Age: 48
End: 2018-01-11

## 2018-01-15 DIAGNOSIS — M81.0 OSTEOPOROSIS, UNSPECIFIED OSTEOPOROSIS TYPE, UNSPECIFIED PATHOLOGICAL FRACTURE PRESENCE: ICD-10-CM

## 2018-01-15 DIAGNOSIS — G89.4 CHRONIC PAIN SYNDROME: ICD-10-CM

## 2018-01-15 DIAGNOSIS — M25.562 LEFT KNEE PAIN, UNSPECIFIED CHRONICITY: ICD-10-CM

## 2018-01-15 DIAGNOSIS — F41.9 ANXIETY: ICD-10-CM

## 2018-01-15 DIAGNOSIS — M51.36 DDD (DEGENERATIVE DISC DISEASE), LUMBAR: ICD-10-CM

## 2018-01-15 LAB
6-ACETYLMORPHINE: NOT DETECTED
7-AMINOCLONAZEPAM: NOT DETECTED
ALPHA-OH-ALPRAZOLAM: NOT DETECTED
ALPRAZOLAM: NOT DETECTED
AMPHETAMINE: NOT DETECTED
BARBITURATES: NOT DETECTED
BENZOYLECGONINE: NOT DETECTED
BUPRENORPHINE: NOT DETECTED
CARISOPRODOL: NOT DETECTED
CLONAZEPAM: NOT DETECTED
CODEINE: NOT DETECTED
CREATININE URINE: 141.4 MG/DL (ref 20–400)
DIAZEPAM: NOT DETECTED
DRUGS EXPECTED: NORMAL
EER PAIN MGT DRUG PANEL, HIGH RES/EMIT U: NORMAL
ETHYL GLUCURONIDE: NOT DETECTED
FENTANYL: NOT DETECTED
HYDROCODONE: NOT DETECTED
HYDROMORPHONE: NOT DETECTED
LORAZEPAM: NOT DETECTED
MARIJUANA METABOLITE: NOT DETECTED
MDA: NOT DETECTED
MDEA: NOT DETECTED
MDMA URINE: NOT DETECTED
MEPERIDINE: NOT DETECTED
METHADONE: NOT DETECTED
METHAMPHETAMINE: NOT DETECTED
METHYLPHENIDATE: NOT DETECTED
MIDAZOLAM: NOT DETECTED
MORPHINE: NOT DETECTED
NORBUPRENORPHINE, FREE: NOT DETECTED
NORDIAZEPAM: NOT DETECTED
NORFENTANYL: NOT DETECTED
NORHYDROCODONE, URINE: NOT DETECTED
NOROXYCODONE: NOT DETECTED
NOROXYMORPHONE, URINE: NOT DETECTED
OXAZEPAM: NOT DETECTED
OXYCODONE: NOT DETECTED
OXYMORPHONE: NOT DETECTED
PAIN MANAGEMENT DRUG PANEL: NORMAL
PAIN MANAGEMENT DRUG PANEL: NORMAL
PCP: NOT DETECTED
PHENTERMINE: NOT DETECTED
PROPOXYPHENE: NOT DETECTED
TAPENTADOL, URINE: NOT DETECTED
TAPENTADOL-O-SULFATE, URINE: NOT DETECTED
TEMAZEPAM: NOT DETECTED
TRAMADOL: NOT DETECTED
ZOLPIDEM: NOT DETECTED

## 2018-01-15 RX ORDER — DULOXETIN HYDROCHLORIDE 30 MG/1
30 CAPSULE, DELAYED RELEASE ORAL 2 TIMES DAILY
Qty: 60 CAPSULE | Refills: 0 | Status: SHIPPED | OUTPATIENT
Start: 2018-01-15 | End: 2018-03-02 | Stop reason: SDUPTHER

## 2018-01-24 ENCOUNTER — OFFICE VISIT (OUTPATIENT)
Dept: PAIN MANAGEMENT | Age: 48
End: 2018-01-24

## 2018-01-24 VITALS
DIASTOLIC BLOOD PRESSURE: 87 MMHG | HEIGHT: 64 IN | HEART RATE: 96 BPM | SYSTOLIC BLOOD PRESSURE: 129 MMHG | WEIGHT: 138.6 LBS | BODY MASS INDEX: 23.66 KG/M2

## 2018-01-24 DIAGNOSIS — M96.1 POSTLAMINECTOMY SYNDROME, LUMBAR: ICD-10-CM

## 2018-01-24 DIAGNOSIS — M47.26 OSTEOARTHRITIS OF SPINE WITH RADICULOPATHY, LUMBAR REGION: ICD-10-CM

## 2018-01-24 DIAGNOSIS — F11.90 CHRONIC, CONTINUOUS USE OF OPIOIDS: Primary | ICD-10-CM

## 2018-01-24 PROCEDURE — 99214 OFFICE O/P EST MOD 30 MIN: CPT | Performed by: ANESTHESIOLOGY

## 2018-01-24 PROCEDURE — G8484 FLU IMMUNIZE NO ADMIN: HCPCS | Performed by: ANESTHESIOLOGY

## 2018-01-24 PROCEDURE — 4004F PT TOBACCO SCREEN RCVD TLK: CPT | Performed by: ANESTHESIOLOGY

## 2018-01-24 PROCEDURE — G8427 DOCREV CUR MEDS BY ELIG CLIN: HCPCS | Performed by: ANESTHESIOLOGY

## 2018-01-24 PROCEDURE — G8420 CALC BMI NORM PARAMETERS: HCPCS | Performed by: ANESTHESIOLOGY

## 2018-01-24 RX ORDER — MORPHINE SULFATE 15 MG/1
15 TABLET, FILM COATED, EXTENDED RELEASE ORAL 2 TIMES DAILY
Qty: 60 TABLET | Refills: 0 | Status: SHIPPED | OUTPATIENT
Start: 2018-02-07 | End: 2018-03-07 | Stop reason: SDUPTHER

## 2018-01-24 RX ORDER — BACLOFEN 10 MG/1
10 TABLET ORAL 3 TIMES DAILY
Qty: 90 TABLET | Refills: 1 | Status: SHIPPED | OUTPATIENT
Start: 2018-01-24 | End: 2018-03-07 | Stop reason: SDUPTHER

## 2018-01-24 ASSESSMENT — ENCOUNTER SYMPTOMS
VOMITING: 0
EYE REDNESS: 0
COUGH: 0
NAUSEA: 0
EYE PAIN: 0
HEMOPTYSIS: 0
EYE DISCHARGE: 0
BACK PAIN: 0
ORTHOPNEA: 0
ABDOMINAL PAIN: 0
CONSTIPATION: 0
HEARTBURN: 0
WHEEZING: 0
SHORTNESS OF BREATH: 0

## 2018-01-24 NOTE — PROGRESS NOTES
[facility]  [address], [place], [address]  [phone] (O)  [phone] (f)    1/24/18        Kaleb Haque  1970      Dionne Vasquez MD      Chief Complain:   Chief Complaint   Patient presents with    Lower Back Pain     The patient complains of lumbar pain that radiates down both legs, but the right leg is worse. History of Present Illness   HPI    Seen us since 10/2017 -  Chronic low back pain since failed back surgeries in 2013  Followed with other pain providers and was on chronic opioids for years; seen spine surgeon - no surgery. Pain constant in lower back, achy, sharp, episodically worse with radiation to left leg with sapsm, no weakness or incontinence. Pain worse with daily activities, standing, walking and helped somewhat by pain medication. RED FLAG symptoms (Symptoms may include, but not limited to, acute trauma, fever, drug use, malignancy, weakness, perianal numbness, bladder/bowel changes) since last visit - No    Changes since last visit:   Seen [address] [place] [address] for anxiety. Was in ER for leg spasm. Past Medical History:   Diagnosis Date    Chronic back pain     COPD (chronic obstructive pulmonary disease) [address] [address]     Hip pain 9/5/2017    ?  CAM possible bilateral superior femoral head-heck junction bump suggestive of femoro acetabular impingement syndrome    History of back surgery 12/26/2016    Osteoporosis     Spontaneous pneumothorax     1991    Tobacco abuse        Past Surgical History:   Procedure Laterality Date    BACK SURGERY      1st rods, 2nd tens unit 3rd rods and screw removed, only has cages now    TESTICLE TORSION REDUCTION      x 2       Allergies   Allergen Reactions    Bactrim [Sulfamethoxazole-Trimethoprim] Diarrhea    Biaxin [Clarithromycin] Diarrhea    Diclofenac Diarrhea    Neurontin [Gabapentin] Swelling     Leg swelling    Robaxin [Methocarbamol]      Eyes swell and get bloodshot    Seroquel [Quetiapine]     Toradol [Ketorolac Tromethamine] Other (See Comments)     Eyes swell and bloodshot    Tramadol Other (See Comments)     Eyes swell and get bloodshot    Sulfa Antibiotics Nausea And Vomiting       Current Outpatient Prescriptions   Medication Sig Dispense Refill    baclofen (LIORESAL) 10 MG tablet Take 1 tablet by mouth 3 times daily 90 tablet 1    [START ON 2/7/2018] morphine (MS CONTIN) 15 MG extended release tablet Take 1 tablet by mouth 2 times daily for 30 days.  Earliest Fill Date: 2/7/18 60 tablet 0    DULoxetine (CYMBALTA) 30 MG extended release capsule Take 1 capsule by mouth 2 times daily 60 capsule 0    nicotine (NICODERM CQ) 21 MG/24HR Place 1 patch onto the skin every 24 hours 30 patch 0    vitamin D (ERGOCALCIFEROL) 73721 units CAPS capsule 1 po q week 12 capsule 3    diclofenac sodium (VOLTAREN) 1 % GEL Apply 2-4 g topically 2 times daily 5 Tube 0    levofloxacin (LEVAQUIN) 250 MG tablet 1 po q d 7 tablet 0    furosemide (LASIX) 20 MG tablet 1 po up to twice weekly for edema 15 tablet 0    mirtazapine (REMERON SOLTAB) 15 MG disintegrating tablet Take 1 tablet by mouth nightly 30 tablet 0    celecoxib (CELEBREX) 100 MG capsule 1 po bid to take the place of motrin if effective 60 capsule 0    Fluticasone Furoate-Vilanterol (BREO ELLIPTA) 200-25 MCG/INH AEPB Inhale 1 puff into the lungs daily 1 each 6    tiotropium (SPIRIVA RESPIMAT) 2.5 MCG/ACT AERS inhaler Inhale 2 puffs into the lungs daily 1 Inhaler 6    loratadine (CLARITIN) 10 MG tablet 1 po q day 90 tablet 1    Tens Unit MISC by Does not apply route 1 each 0    montelukast (SINGULAIR) 10 MG tablet Take 1 tablet by mouth nightly 90 tablet 3    magnesium oxide (MAG-OX) 400 MG tablet Take 400 mg by mouth daily      Calcium Citrate (CALCITRATE PO) Take 200 mg by mouth three times daily      albuterol (PROVENTIL HFA;VENTOLIN HFA) 108 (90 BASE) MCG/ACT inhaler Inhale 2 puffs into the lungs every 4 hours as needed for Wheezing.  denosumab (PROLIA) 60 MG/ML SOLN SC injection Inject 1 mL into the skin once for 1 dose 1 mL 0    varenicline (CHANTIX STARTING MONTH PAK) 0.5 MG X 11 & 1 MG X 42 tablet Take as directed in pack. Disp 1 starter pack 1 each 0     No current facility-administered medications for this visit. Family History   Problem Relation Age of Onset    Arthritis Mother     Other Mother     Other Father        Social History     Social History    Marital status: Single     Spouse name: N/A    Number of children: N/A    Years of education: N/A     Occupational History     ( [employer])      On Medical Leave Currently     Social History Main Topics    Smoking status: Current Every Day Smoker     Packs/day: 0.50     Years: 30.00     Types: Cigarettes    Smokeless tobacco: Never Used    Alcohol use No    Drug use: No      Comment: In [employer] Only    Sexual activity: No     Other Topics Concern    Not on file     Social History Narrative    No narrative on file         Imaging Studies:   MRI LS (09/21/2017) [facility]  \"Impression:  L5-S1 small moderate left paracentral disc extrusion abutting and posteriorly displacing proximal left S1 nerve root. \"    Results of the above studies were personally reviewed by me with the patient in detail along with the images, when available. The patient was instructed to contact the primary care provider regarding other unrelated findings not addressed during today's visit. Review of Systems:   Review of Systems   Constitutional: Negative for chills, fever, malaise/fatigue and weight loss. HENT: Negative for hearing loss and tinnitus. Eyes: Negative for pain, discharge and redness. Respiratory: Negative for cough, hemoptysis, shortness of breath and wheezing. Cardiovascular: Negative for chest pain, palpitations and orthopnea. Gastrointestinal: Negative for abdominal pain, constipation, heartburn, nausea and vomiting.    Genitourinary: on the right side and 2+ on the left side. Patellar reflexes are 2+ on the right side and 1+ on the left side. Achilles reflexes are 2+ on the right side and 1+ on the left side. Skin: Skin is warm. No rash noted. He is not diaphoretic. Psychiatric: His behavior is normal. Thought content normal. His mood appears anxious. His affect is labile. His speech is rapid and/or pressured. Vitals:    01/24/18 1428   BP: 129/87   Site: Right Arm   Position: Sitting   Cuff Size: Medium Adult   Pulse: 96   Weight: 138 lb 9.6 oz (62.9 kg)   Height: 5' 4\" (1.626 m)       Body mass index is 23.79 kg/m². Pain Score:   6 /10    Goals of chronic pain therapy:      Multi-disciplinary comprehensive pain management with functional improvement and reduced opioid use. Prescription pain medication monitoring:      ORT Score =  4   Risk factors - COPD, anxiety. MEDD current = 30     OARRS:    Checked today: Yes    Adverse report: No   UDS:    Date of last UDS: 11/16/2017    Adverse report: Yes      Medication Effects -        Analgesia:      Average level of pain for the past month = 7/10     Percentage of pain relief = 40%     Activities Improved: Activities of daily living = 10%     Sleep = 5%     Mood = 10%     Social functioning = 10%     Adverse Effects: Any adverse effects: No     Type/Severity of side effect: None    Aberrant Behavior:     Requests for frequent early refills: No     Increased dose without authorization: No     Reports lost or stolen prescriptions: No     Attempts to obtain prescriptions from other providers: No     Use of pain medication in response to situational stressor: No      Increasingly unkempt or impaired: No     Negative mood changes: No     Abusing alcohol or illicit drugs: No     Appears intoxicated: No     Other: NA     Controlled Substances Monitoring:    Attestation: The Prescription Monitoring Report for this patient was reviewed today.  (Caleb Styles MD)  Documentation: Possible medication side effects, risk of tolerance and/or dependence, and alternative treatments discussed., No signs of potential drug abuse or diversion identified. Lexx Alberto MD)    Based on an analysis of risks, benefits, and alternatives, utilization of prescription drugs is medically reasonable for the treatment of chronic pain. Overall Progress:       PEG Score = 6.5 / 10     Physical Therapy: scheduled to start   Counseling: referred to outside [facility] services   Injections: N/A     Patient compliance on plan of care:  Compliant   Progress on current plan:  Fair    Assessment:    1. Postlaminectomy syndrome, lumbar  Chronic.  - baclofen (LIORESAL) 10 MG tablet; Take 1 tablet by mouth 3 times daily  Dispense: 90 tablet; Refill: 1  - morphine (MS CONTIN) 15 MG extended release tablet; Take 1 tablet by mouth 2 times daily for 30 days. Earliest Fill Date: 2/7/18  Dispense: 60 tablet; Refill: 0    2. Osteoarthritis of spine with radiculopathy, lumbar region  Chronic.  - baclofen (LIORESAL) 10 MG tablet; Take 1 tablet by mouth 3 times daily  Dispense: 90 tablet; Refill: 1  - morphine (MS CONTIN) 15 MG extended release tablet; Take 1 tablet by mouth 2 times daily for 30 days. Earliest Fill Date: 2/7/18  Dispense: 60 tablet; Refill: 0    3. Chronic, continuous use of opioids      Plan:     1. Chronic low back pain with radiation to the left leg; no acute changes. 2. He has seen spine surgeon, and wishes to continue conservative care due to osteoporosis. 3. Reports episodic flare of leg pain with severe muscle spasm; tizanidine not helping. 4. May trial baclofen for spasm/chronic pain; may continue MS Contin 15 mg BID as before. 5. Counseled on role and limitation of medications and injections - he wishes to try PT first.  6. May consider further options, if pain unimproved after PT.      Analgesic Plan:   Continue present regimen: Yes   Adjust dose of present analgesic: No   Switch

## 2018-01-24 NOTE — PROGRESS NOTES
No new problems, but he feels like the medication maybe not working as well. He is being more active at home. Gertrudis Edwards first PT Appointment next week.

## 2018-01-24 NOTE — PATIENT INSTRUCTIONS
1. Continue water therapy and follow with Counselor as scheduled. 2. I have provided baclofen and MS Contin - take as prescribed. 3. Follow with us after therapy for further options. Patient Education        Safe Use of Opioid Pain Medicine: Care Instructions  Your Care Instructions  Pain is your body's way of warning you that something is wrong. Pain feels different for everybody. Only you can describe your pain. A doctor can suggest or prescribe many types of medicines for pain. These range from over-the-counter medicines like acetaminophen (Tylenol) to powerful medicines called opioids. Examples of opioids are fentanyl, hydrocodone, morphine, and oxycodone. Heroin is an illegal opioid  Opioids are strong medicines. They can help you manage pain when you use them the right way. But if you misuse them, they can cause serious harm and even death. For these reasons, doctors are very careful about how they prescribe opioids. If you decide to take opioids, here are some things to remember. · Keep your doctor informed. You can get addicted to opioids. The risk is higher if you have a history of substance use. Your doctor will monitor you closely for signs of misuse and addiction and to figure out when you no longer need to take opioids. · Make a treatment plan. The goal of your plan is to be able to function and do the things you need to do, even if you still have some pain. You might be able to manage your pain with other non-opioid options like physical therapy, relaxation, or over-the-counter pain medicines. · Be aware of the side effects. Opioids can cause serious side effects, such as constipation, dry mouth, and nausea. And over time, you may need a higher dose to get pain relief. This is called tolerance. Your body also gets used to opioids. This is called physical dependence. If you suddenly stop taking them, you may have withdrawal symptoms.   The doctor carefully considered what pain medicine is right

## 2018-02-01 ENCOUNTER — HOSPITAL ENCOUNTER (OUTPATIENT)
Dept: OTHER | Age: 48
Discharge: HOME | End: 2018-02-28
Attending: ANESTHESIOLOGY | Admitting: ANESTHESIOLOGY

## 2018-02-14 ENCOUNTER — OFFICE VISIT (OUTPATIENT)
Dept: FAMILY MEDICINE CLINIC | Age: 48
End: 2018-02-14

## 2018-02-14 VITALS
SYSTOLIC BLOOD PRESSURE: 126 MMHG | DIASTOLIC BLOOD PRESSURE: 74 MMHG | RESPIRATION RATE: 18 BRPM | BODY MASS INDEX: 23.56 KG/M2 | HEIGHT: 64 IN | OXYGEN SATURATION: 97 % | HEART RATE: 89 BPM | WEIGHT: 138 LBS

## 2018-02-14 DIAGNOSIS — M47.26 OSTEOARTHRITIS OF SPINE WITH RADICULOPATHY, LUMBAR REGION: ICD-10-CM

## 2018-02-14 DIAGNOSIS — N40.0 PROSTATISM: ICD-10-CM

## 2018-02-14 DIAGNOSIS — J43.2 CENTRILOBULAR EMPHYSEMA (HCC): ICD-10-CM

## 2018-02-14 DIAGNOSIS — M81.0 OSTEOPOROSIS, UNSPECIFIED OSTEOPOROSIS TYPE, UNSPECIFIED PATHOLOGICAL FRACTURE PRESENCE: ICD-10-CM

## 2018-02-14 DIAGNOSIS — R93.5 ABDOMINAL ULTRASOUND, ABNORMAL: ICD-10-CM

## 2018-02-14 DIAGNOSIS — R19.00 ABDOMINAL SWELLING: ICD-10-CM

## 2018-02-14 DIAGNOSIS — R60.0 LOCALIZED EDEMA: Primary | ICD-10-CM

## 2018-02-14 DIAGNOSIS — F11.20 NARCOTIC DEPENDENCY, CONTINUOUS (HCC): ICD-10-CM

## 2018-02-14 LAB
ALBUMIN SERPL-MCNC: 4.3 G/DL (ref 3.4–5)
ALP BLD-CCNC: 106 U/L (ref 40–129)
ALT SERPL-CCNC: 41 U/L (ref 10–40)
ANION GAP SERPL CALCULATED.3IONS-SCNC: 11 MMOL/L (ref 3–16)
AST SERPL-CCNC: 28 U/L (ref 15–37)
BASOPHILS ABSOLUTE: 0.1 K/UL (ref 0–0.2)
BASOPHILS RELATIVE PERCENT: 0.6 %
BILIRUB SERPL-MCNC: 0.3 MG/DL (ref 0–1)
BILIRUBIN DIRECT: <0.2 MG/DL (ref 0–0.3)
BILIRUBIN URINE: NEGATIVE
BILIRUBIN, INDIRECT: ABNORMAL MG/DL (ref 0–1)
BLOOD, URINE: NEGATIVE
BUN BLDV-MCNC: 5 MG/DL (ref 7–20)
CALCIUM SERPL-MCNC: 9.7 MG/DL (ref 8.3–10.6)
CHLORIDE BLD-SCNC: 100 MMOL/L (ref 99–110)
CLARITY: CLEAR
CO2: 30 MMOL/L (ref 21–32)
COLOR: YELLOW
CREAT SERPL-MCNC: 0.8 MG/DL (ref 0.9–1.3)
EOSINOPHILS ABSOLUTE: 0.4 K/UL (ref 0–0.6)
EOSINOPHILS RELATIVE PERCENT: 3.8 %
GFR AFRICAN AMERICAN: >60
GFR NON-AFRICAN AMERICAN: >60
GLUCOSE BLD-MCNC: 100 MG/DL (ref 70–99)
GLUCOSE URINE: NEGATIVE MG/DL
HBA1C MFR BLD: 5.5 %
HCT VFR BLD CALC: 45.8 % (ref 40.5–52.5)
HEMOGLOBIN: 15.5 G/DL (ref 13.5–17.5)
KETONES, URINE: NEGATIVE MG/DL
LEUKOCYTE ESTERASE, URINE: NEGATIVE
LYMPHOCYTES ABSOLUTE: 3.1 K/UL (ref 1–5.1)
LYMPHOCYTES RELATIVE PERCENT: 33 %
MCH RBC QN AUTO: 31.1 PG (ref 26–34)
MCHC RBC AUTO-ENTMCNC: 33.8 G/DL (ref 31–36)
MCV RBC AUTO: 92.1 FL (ref 80–100)
MICROSCOPIC EXAMINATION: NORMAL
MONOCYTES ABSOLUTE: 1 K/UL (ref 0–1.3)
MONOCYTES RELATIVE PERCENT: 10.1 %
NEUTROPHILS ABSOLUTE: 4.9 K/UL (ref 1.7–7.7)
NEUTROPHILS RELATIVE PERCENT: 52.5 %
NITRITE, URINE: NEGATIVE
PDW BLD-RTO: 14.1 % (ref 12.4–15.4)
PH UA: 6
PLATELET # BLD: 344 K/UL (ref 135–450)
PMV BLD AUTO: 7.8 FL (ref 5–10.5)
POTASSIUM SERPL-SCNC: 5 MMOL/L (ref 3.5–5.1)
PROTEIN UA: NEGATIVE MG/DL
RBC # BLD: 4.97 M/UL (ref 4.2–5.9)
RHEUMATOID FACTOR: <10 IU/ML
SEDIMENTATION RATE, ERYTHROCYTE: 10 MM/HR (ref 0–15)
SODIUM BLD-SCNC: 141 MMOL/L (ref 136–145)
SPECIFIC GRAVITY UA: 1.01
TOTAL PROTEIN: 6.5 G/DL (ref 6.4–8.2)
URINE REFLEX TO CULTURE: NORMAL
URINE TYPE: NORMAL
UROBILINOGEN, URINE: 0.2 E.U./DL
WBC # BLD: 9.4 K/UL (ref 4–11)

## 2018-02-14 PROCEDURE — 3023F SPIROM DOC REV: CPT | Performed by: INTERNAL MEDICINE

## 2018-02-14 PROCEDURE — 83036 HEMOGLOBIN GLYCOSYLATED A1C: CPT | Performed by: INTERNAL MEDICINE

## 2018-02-14 PROCEDURE — 36415 COLL VENOUS BLD VENIPUNCTURE: CPT | Performed by: INTERNAL MEDICINE

## 2018-02-14 PROCEDURE — 81003 URINALYSIS AUTO W/O SCOPE: CPT | Performed by: INTERNAL MEDICINE

## 2018-02-14 PROCEDURE — 99214 OFFICE O/P EST MOD 30 MIN: CPT | Performed by: INTERNAL MEDICINE

## 2018-02-14 PROCEDURE — G8484 FLU IMMUNIZE NO ADMIN: HCPCS | Performed by: INTERNAL MEDICINE

## 2018-02-14 PROCEDURE — G8427 DOCREV CUR MEDS BY ELIG CLIN: HCPCS | Performed by: INTERNAL MEDICINE

## 2018-02-14 PROCEDURE — 4004F PT TOBACCO SCREEN RCVD TLK: CPT | Performed by: INTERNAL MEDICINE

## 2018-02-14 PROCEDURE — G8420 CALC BMI NORM PARAMETERS: HCPCS | Performed by: INTERNAL MEDICINE

## 2018-02-14 PROCEDURE — G8926 SPIRO NO PERF OR DOC: HCPCS | Performed by: INTERNAL MEDICINE

## 2018-02-14 RX ORDER — FUROSEMIDE 20 MG/1
TABLET ORAL
Qty: 15 TABLET | Refills: 0 | Status: SHIPPED | OUTPATIENT
Start: 2018-02-14 | End: 2020-04-17 | Stop reason: ALTCHOICE

## 2018-02-14 RX ORDER — MAGNESIUM OXIDE 400 MG/1
TABLET ORAL
Qty: 30 TABLET | Refills: 5 | Status: SHIPPED | OUTPATIENT
Start: 2018-02-14 | End: 2020-11-04

## 2018-02-14 RX ORDER — NICOTINE 21 MG/24HR
1 PATCH, TRANSDERMAL 24 HOURS TRANSDERMAL EVERY 24 HOURS
Qty: 30 PATCH | Refills: 0 | Status: SHIPPED | OUTPATIENT
Start: 2018-02-14 | End: 2018-03-28 | Stop reason: SDUPTHER

## 2018-02-14 NOTE — PROGRESS NOTES
HPI: Valentina Douglas presents for recurrent lower extremity edema. Chronic health concerns include emphysematous COPD, spinal stenosis, os laminectomy syndrome, osteoporosis, mood disorder, prostatism. Ice is noted the onset of lower extremity edema. Went to the emergency room both times. Lasix was given the first time. This time films were done of the legs and ankles which were unremarkable. He states he woke eye days ago with bilateral cool and pale legs. Positive pain and swelling. Heat made them worse. Ice was helpful. Positive pruritus. No shortness of breath. No change in urine. Was concerned with diabetes. No change in medicine. Eyes new palpitations or shortness of breath. Increasing abdominal bur girth. Is down to 2 cigarettes a day. No history of peripheral vascular disease diagnosis. Cholesterol is not elevated. His is a recurrent problem which is worsened in the last 5 days. He is use of compression stockings    Physical images COPD with history of pneumothorax. Peak tobacco 2 packs a day down to 2 cigarettes a day. Taking Spiriva, albuterol, Brio, and Singulair. Spirometry 2017 with FEV1 of 2.95 and FEV1 over FVC of 68%. Follows with Dr. Pilo Barajas pulmonary. Nicotine patch. No breathing complaints today. States he is doing better. Immunizations up-to-date     Spinal stenosis and lumbar surgery with post laminectomy syndrome. Treatment with epidurals, narcotics, current arachnoiditis. Not a candidate for surgery at this time. Chronic pain. Following with Dr. Darwin Hurtado Pain management with good result.      Osteoporosis and treatment with high-dose vitamin D. Used sing injectable and following with endocrinology. No celiac disease     Insomnia with anxiety. He has been on benzodiazepine, Seroquel, hydroxyzine, BuSpar, Remeron, and Cymbalta. He is actually doing better. Currently using Cymbalta 60 mg daily and amitriptyline 1:59 at night when necessary.  Regarding physical therapy.            PMH:  Testicular torsion  Back surgery x 2  spont PTX           SH + tobacco 2 cigarettes per day. . . Denies alcohol or drugs. Positive pain management. Does stretching.      FH arthritis, diabetes    Review of systems: Anxiety, insomnia, recurrent bronchitis emphysema, palpitations with anxiety. No syncope. No GE reflux. Rare constipation. Has a prostatism. Chronic pain. Intermittent lower extremity edema.      12 point ROS reviewed and negative other than mentioned above  Allergies   Allergen Reactions    Bactrim [Sulfamethoxazole-Trimethoprim] Diarrhea    Biaxin [Clarithromycin] Diarrhea    Diclofenac Diarrhea    Neurontin [Gabapentin] Swelling     Leg swelling    Robaxin [Methocarbamol]      Eyes swell and get bloodshot    Seroquel [Quetiapine]     Toradol [Ketorolac Tromethamine] Other (See Comments)     Eyes swell and bloodshot    Tramadol Other (See Comments)     Eyes swell and get bloodshot    Sulfa Antibiotics Nausea And Vomiting       Outpatient Prescriptions Marked as Taking for the 2/14/18 encounter (Office Visit) with Brandy Miller MD   Medication Sig Dispense Refill    nicotine (NICODERM CQ) 21 MG/24HR Place 1 patch onto the skin every 24 hours 30 patch 0    furosemide (LASIX) 20 MG tablet 1 po q day for edema x 5 days then as directed 15 tablet 0    magnesium oxide (MAG-OX) 400 MG tablet 1 po q day 30 tablet 5    Calcium Citrate 200 MG TABS 1 po bid 60 tablet 5    baclofen (LIORESAL) 10 MG tablet Take 1 tablet by mouth 3 times daily 90 tablet 1    morphine (MS CONTIN) 15 MG extended release tablet Take 1 tablet by mouth 2 times daily for 30 days.  Earliest Fill Date: 2/7/18 60 tablet 0    DULoxetine (CYMBALTA) 30 MG extended release capsule Take 1 capsule by mouth 2 times daily 60 capsule 0    vitamin D (ERGOCALCIFEROL) 47619 units CAPS capsule 1 po q week 12 capsule 3    diclofenac sodium (VOLTAREN) 1 % GEL Apply 2-4 g topically 2 times daily 5 Tube 0 RHEUMATOID FACTOR    VL Art Eval Lower Ext Mult Unilat    VL DUP LOWER EXTREMITY VENOUS BILATERAL    US Abdomen Complete    POCT glycosylated hemoglobin (Hb A1C)    URINE RT REFLEX TO CULTURE    PSA, TOTAL AND FREE   2. Centrilobular emphysema ([address]     3. Osteoporosis, unspecified osteoporosis type, unspecified pathological fracture presence     4. Narcotic dependency, continuous ([address]     5. Osteoarthritis of spine with radiculopathy, lumbar region     6. Abdominal swelling     7. Abdominal ultrasound, abnormal  US Abdomen Complete   8. Prostatism  PSA, TOTAL AND FREE     Intermittent lower extremity edema with discomfort. Unclear to me etiology. We'll check for peripheral vascular disease ultrasound. Laboratories to be obtained. Centrilobular emphysema. Doing much better. Continue on with medications and pulmonary follow-up    Osteoporosis. Due for injection in March. Continue current medications    Postlaminectomy syndrome. Follow with his pain management physician. Prostatism PSA    Anxiety and depression improved. Was given referral to mental health access point and [facility] behavioral health services. Further evaluation with vascular etiology is not clear      Follow-up post tests      Diagnosis and treatment discussed.   Possible side effects of medication reviewed  Patients questions answered  Follow up understood  Pt aware if they are not contacted about any test results , this does not mean they are normal.  They should call

## 2018-02-14 NOTE — LETTER
[address], [address] [address]  Phone: [phone]  Fax: [phone]    Christine Rossi MD        February 14, 2018     Patient: Jason Quesada   YOB: 1970   Date of Visit: 2/14/2018       To Whom It May Concern:     Joelle Magaña was seen today in the office accompanied by Jay Bob, his wife.   It was important for her to be here today as well        Sincerely,        Christine Rossi MD

## 2018-02-15 ENCOUNTER — TELEPHONE (OUTPATIENT)
Dept: FAMILY MEDICINE CLINIC | Age: 48
End: 2018-02-15

## 2018-02-15 NOTE — TELEPHONE ENCOUNTER
Magan Gray with [facility]. called stating that the 200 mg Calcium Citrate patient was prescribed is currently on back order but that they can get a 250 mg & that one would be covered by patient's insurance. Pls call back and adv. [phone].

## 2018-02-16 LAB
PROSTATE SPECIFIC ANTIGEN FREE: 0.2 UG/L
PROSTATE SPECIFIC ANTIGEN PERCENT FREE: 33.3 %
PROSTATE SPECIFIC ANTIGEN: 0.6 UG/L (ref 0–4)

## 2018-02-20 DIAGNOSIS — I73.9 CLAUDICATION (HCC): Primary | ICD-10-CM

## 2018-02-23 ENCOUNTER — HOSPITAL ENCOUNTER (OUTPATIENT)
Dept: VASCULAR LAB | Age: 48
Discharge: HOME | End: 2018-02-23
Admitting: INTERNAL MEDICINE

## 2018-02-23 DIAGNOSIS — R60.0 LOCALIZED EDEMA: ICD-10-CM

## 2018-02-23 DIAGNOSIS — R93.5 ABDOMINAL ULTRASOUND, ABNORMAL: ICD-10-CM

## 2018-02-24 PROBLEM — R60.0 LOCALIZED EDEMA: Status: ACTIVE | Noted: 2018-02-24

## 2018-02-26 ENCOUNTER — HOSPITAL ENCOUNTER (OUTPATIENT)
Dept: VASCULAR LAB | Age: 48
Discharge: HOME | End: 2018-02-26
Attending: INTERNAL MEDICINE | Admitting: INTERNAL MEDICINE

## 2018-02-26 DIAGNOSIS — I73.9 CLAUDICATION (HCC): ICD-10-CM

## 2018-02-26 DIAGNOSIS — R60.0 LOCALIZED EDEMA: ICD-10-CM

## 2018-03-01 ENCOUNTER — HOSPITAL ENCOUNTER (OUTPATIENT)
Dept: OTHER | Age: 48
Discharge: HOME | End: 2018-03-31
Attending: ANESTHESIOLOGY | Admitting: ANESTHESIOLOGY

## 2018-03-05 ENCOUNTER — OFFICE VISIT (OUTPATIENT)
Dept: FAMILY MEDICINE CLINIC | Age: 48
End: 2018-03-05

## 2018-03-05 VITALS
WEIGHT: 137 LBS | HEART RATE: 95 BPM | OXYGEN SATURATION: 98 % | DIASTOLIC BLOOD PRESSURE: 72 MMHG | SYSTOLIC BLOOD PRESSURE: 124 MMHG | HEIGHT: 64 IN | RESPIRATION RATE: 16 BRPM | BODY MASS INDEX: 23.39 KG/M2

## 2018-03-05 DIAGNOSIS — J43.2 CENTRILOBULAR EMPHYSEMA (HCC): ICD-10-CM

## 2018-03-05 DIAGNOSIS — Z20.818 STREPTOCOCCUS EXPOSURE: Primary | ICD-10-CM

## 2018-03-05 DIAGNOSIS — R05.9 COUGH: ICD-10-CM

## 2018-03-05 PROCEDURE — 99213 OFFICE O/P EST LOW 20 MIN: CPT | Performed by: INTERNAL MEDICINE

## 2018-03-05 PROCEDURE — 3023F SPIROM DOC REV: CPT | Performed by: INTERNAL MEDICINE

## 2018-03-05 PROCEDURE — 4004F PT TOBACCO SCREEN RCVD TLK: CPT | Performed by: INTERNAL MEDICINE

## 2018-03-05 PROCEDURE — G8427 DOCREV CUR MEDS BY ELIG CLIN: HCPCS | Performed by: INTERNAL MEDICINE

## 2018-03-05 PROCEDURE — G8484 FLU IMMUNIZE NO ADMIN: HCPCS | Performed by: INTERNAL MEDICINE

## 2018-03-05 PROCEDURE — G8926 SPIRO NO PERF OR DOC: HCPCS | Performed by: INTERNAL MEDICINE

## 2018-03-05 PROCEDURE — G8420 CALC BMI NORM PARAMETERS: HCPCS | Performed by: INTERNAL MEDICINE

## 2018-03-05 RX ORDER — AMOXICILLIN 500 MG/1
CAPSULE ORAL
Qty: 40 CAPSULE | Refills: 0 | Status: SHIPPED | OUTPATIENT
Start: 2018-03-05 | End: 2018-03-28 | Stop reason: ALTCHOICE

## 2018-03-05 RX ORDER — GUAIFENESIN AND CODEINE PHOSPHATE 100; 10 MG/5ML; MG/5ML
SOLUTION ORAL
Qty: 118 ML | Refills: 0 | Status: SHIPPED | OUTPATIENT
Start: 2018-03-05 | End: 2018-03-28 | Stop reason: ALTCHOICE

## 2018-03-05 NOTE — PROGRESS NOTES
cigarettes per day. . . Denies alcohol or drugs. Positive pain management. Does stretching.      FH arthritis, diabetes     Review of systems: Anxiety, insomnia, recurrent bronchitis emphysema, palpitations with anxiety. No syncope. No GE reflux. Rare constipation. Has a prostatism. Chronic pain. Intermittent lower extremity edema.        12 point ROS reviewed and negative other than mentioned above  Allergies   Allergen Reactions    Bactrim [Sulfamethoxazole-Trimethoprim] Diarrhea    Biaxin [Clarithromycin] Diarrhea    Diclofenac Diarrhea    Neurontin [Gabapentin] Swelling     Leg swelling    Robaxin [Methocarbamol]      Eyes swell and get bloodshot    Toradol [Ketorolac Tromethamine] Other (See Comments)     Eyes swell and bloodshot    Tramadol Other (See Comments)     Eyes swell and get bloodshot    Sulfa Antibiotics Nausea And Vomiting       No outpatient prescriptions have been marked as taking for the 3/5/18 encounter (Office Visit) with Gwendolyn Cardenas MD.             Past Medical History:   Diagnosis Date    Chronic back pain     [facility] (chronic obstructive pulmonary disease) [facility] [address]     Hip pain 9/5/2017    ?  CAM possible bilateral superior femoral head-heck junction bump suggestive of femoro acetabular impingement syndrome    History of back surgery 12/26/2016    Localized edema 2/24/2018    Neg abd ultrasound and DVT    Osteoporosis     Spontaneous pneumothorax     1991    Tobacco abuse        Past Surgical History:   Procedure Laterality Date    BACK SURGERY      1st rods, 2nd tens unit 3rd rods and screw removed, only has cages now    TESTICLE TORSION REDUCTION      x 2         Social History     Social History    Marital status: Single     Spouse name: N/A    Number of children: N/A    Years of education: N/A     Occupational History     ( [employer])      On Medical Leave Currently     Social History Main Topics    Smoking status: Current Every Day Smoker Packs/day: 0.50     Years: 30.00     Types: Cigarettes    Smokeless tobacco: Never Used    Alcohol use No    Drug use: No      Comment: In High School Only    Sexual activity: No     Other Topics Concern    Not on file     Social History Narrative    No narrative on file       Family History   Problem Relation Age of Onset    Arthritis Mother     Other Mother     Other Father            Review of Systems        Objective     /72   Pulse 95   Resp 16   Ht 5' 4\" (1.626 m)   Wt 137 lb (62.1 kg)   SpO2 98% Comment: RA  BMI 23.52 kg/m²     @LASTSAO2(3)@    Wt Readings from Last 3 Encounters:   03/05/18 137 lb (62.1 kg)   02/14/18 138 lb (62.6 kg)   02/13/18 140 lb 10.5 oz (63.8 kg)       Physical Exam     NAD alert and cooperative   HEENT: No oral masses. No carotid bruits. Lungs are clear. Good I to E ratio today no wheezes rales or rhonchi  Cardiovascular exam regular rate and rhythm without any murmur click  Positive obesity. No point tenderness. I do not detect any hepatosplenomegaly. D diminished but present pulses in lower extremities. No edema. Sensation is intact.       Chemistry        Component Value Date/Time     02/14/2018 1625    K 5.0 02/14/2018 1625     02/14/2018 1625    CO2 30 02/14/2018 1625    BUN 5 (L) 02/14/2018 1625    CREATININE 0.8 (L) 02/14/2018 1625        Component Value Date/Time    CALCIUM 9.7 02/14/2018 1625    ALKPHOS 106 02/14/2018 1625    AST 28 02/14/2018 1625    ALT 41 (H) 02/14/2018 1625    BILITOT 0.3 02/14/2018 1625            Lab Results   Component Value Date    WBC 9.4 02/14/2018    HGB 15.5 02/14/2018    HCT 45.8 02/14/2018    MCV 92.1 02/14/2018     02/14/2018     Lab Results   Component Value Date    LABA1C 5.5 02/14/2018     Lab Results   Component Value Date    .4 12/27/2016     Lab Results   Component Value Date    LABA1C 5.5 02/14/2018     No components found for: CHLPL  Lab Results   Component Value Date    TRIG 122

## 2018-03-07 ENCOUNTER — OFFICE VISIT (OUTPATIENT)
Dept: PAIN MANAGEMENT | Age: 48
End: 2018-03-07

## 2018-03-07 ENCOUNTER — TELEPHONE (OUTPATIENT)
Dept: ENDOCRINOLOGY | Age: 48
End: 2018-03-07

## 2018-03-07 VITALS
WEIGHT: 137 LBS | DIASTOLIC BLOOD PRESSURE: 91 MMHG | BODY MASS INDEX: 23.39 KG/M2 | SYSTOLIC BLOOD PRESSURE: 144 MMHG | HEART RATE: 106 BPM | HEIGHT: 64 IN

## 2018-03-07 DIAGNOSIS — F11.90 CHRONIC, CONTINUOUS USE OF OPIOIDS: ICD-10-CM

## 2018-03-07 DIAGNOSIS — M96.1 POSTLAMINECTOMY SYNDROME OF LUMBAR REGION: Primary | ICD-10-CM

## 2018-03-07 PROCEDURE — G8427 DOCREV CUR MEDS BY ELIG CLIN: HCPCS | Performed by: ANESTHESIOLOGY

## 2018-03-07 PROCEDURE — G8420 CALC BMI NORM PARAMETERS: HCPCS | Performed by: ANESTHESIOLOGY

## 2018-03-07 PROCEDURE — 4004F PT TOBACCO SCREEN RCVD TLK: CPT | Performed by: ANESTHESIOLOGY

## 2018-03-07 PROCEDURE — 99213 OFFICE O/P EST LOW 20 MIN: CPT | Performed by: ANESTHESIOLOGY

## 2018-03-07 PROCEDURE — G8484 FLU IMMUNIZE NO ADMIN: HCPCS | Performed by: ANESTHESIOLOGY

## 2018-03-07 RX ORDER — BACLOFEN 10 MG/1
10 TABLET ORAL 3 TIMES DAILY
Qty: 90 TABLET | Refills: 1 | Status: SHIPPED | OUTPATIENT
Start: 2018-03-07 | End: 2018-05-08 | Stop reason: SDUPTHER

## 2018-03-07 RX ORDER — MORPHINE SULFATE 15 MG/1
15 TABLET, FILM COATED, EXTENDED RELEASE ORAL 2 TIMES DAILY
Qty: 60 TABLET | Refills: 0 | Status: SHIPPED | OUTPATIENT
Start: 2018-03-09 | End: 2018-04-02 | Stop reason: SDUPTHER

## 2018-03-07 NOTE — PROGRESS NOTES
Eyes swell and get bloodshot    Sulfa Antibiotics Nausea And Vomiting       Current Outpatient Prescriptions   Medication Sig Dispense Refill    denosumab (PROLIA) 60 MG/ML SOLN SC injection Inject 1 mL into the skin once for 1 dose 1 mL 0    baclofen (LIORESAL) 10 MG tablet Take 1 tablet by mouth 3 times daily 90 tablet 1    [START ON 3/9/2018] morphine (MS CONTIN) 15 MG extended release tablet Take 1 tablet by mouth 2 times daily for 30 days. Earliest Fill Date: 3/9/18 60 tablet 0    montelukast (SINGULAIR) 10 MG tablet TAKE 1 TABLET BY MOUTH NIGHTLY 30 tablet 5    DULoxetine (CYMBALTA) 30 MG extended release capsule TAKE 1 CAPSULE BY MOUTH TWO TIMES A DAY  60 capsule 5    amoxicillin (AMOXIL) 500 MG capsule 2 po bid x 10 days 40 capsule 0    guaiFENesin-codeine (TUSSI-ORGANIDIN NR) 100-10 MG/5ML syrup 5 cc po qid prn cough.  118 mL 0    nicotine (NICODERM CQ) 21 MG/24HR Place 1 patch onto the skin every 24 hours 30 patch 0    furosemide (LASIX) 20 MG tablet 1 po q day for edema x 5 days then as directed 15 tablet 0    magnesium oxide (MAG-OX) 400 MG tablet 1 po q day 30 tablet 5    Calcium Citrate 200 MG TABS 1 po bid 60 tablet 5    vitamin D (ERGOCALCIFEROL) 58782 units CAPS capsule 1 po q week 12 capsule 3    diclofenac sodium (VOLTAREN) 1 % GEL Apply 2-4 g topically 2 times daily 5 Tube 0    mirtazapine (REMERON SOLTAB) 15 MG disintegrating tablet Take 1 tablet by mouth nightly 30 tablet 0    celecoxib (CELEBREX) 100 MG capsule 1 po bid to take the place of motrin if effective 60 capsule 0    Fluticasone Furoate-Vilanterol (BREO ELLIPTA) 200-25 MCG/INH AEPB Inhale 1 puff into the lungs daily 1 each 6    tiotropium (SPIRIVA RESPIMAT) 2.5 MCG/ACT AERS inhaler Inhale 2 puffs into the lungs daily 1 Inhaler 6    loratadine (CLARITIN) 10 MG tablet 1 po q day 90 tablet 1    Tens Unit MISC by Does not apply route 1 each 0    albuterol (PROVENTIL HFA;VENTOLIN HFA) 108 (90 BASE) MCG/ACT inhaler Inhale 2 puffs into the lungs every 4 hours as needed for Wheezing. No current facility-administered medications for this visit. Family History   Problem Relation Age of Onset    Arthritis Mother     Other Mother     Other Father        Social History     Social History    Marital status: Single     Spouse name: N/A    Number of children: N/A    Years of education: N/A     Occupational History     ( [employer])      On Medical Leave Currently     Social History Main Topics    Smoking status: Current Every Day Smoker     Packs/day: 0.50     Years: 30.00     Types: Cigarettes    Smokeless tobacco: Never Used    Alcohol use No    Drug use: No      Comment: In [employer] Only    Sexual activity: No     Other Topics Concern    Not on file     Social History Narrative    No narrative on file         Imaging Studies:   MRI LS (09/21/2017) [facility]  \"Impression:  L5-S1 small moderate left paracentral disc extrusion abutting and posteriorly displacing proximal left S1 nerve root. \"    Results of the above studies were personally reviewed by me with the patient in detail along with the images, when available. The patient was instructed to contact the primary care provider regarding other unrelated findings not addressed during today's visit. Review of Systems:   ROS    The patient was instructed to contact primary care physician regarding ROS positives not being addressed during today's visit. Physical Exam:   Physical Exam    Vitals:    03/07/18 1411   BP: (!) 144/91   Site: Left Arm   Position: Sitting   Cuff Size: Medium Adult   Pulse: 106   Weight: 137 lb (62.1 kg)   Height: 5' 4\" (1.626 m)       Body mass index is 23.52 kg/m². Pain Score:   7 /10    Goals of chronic pain therapy:      Multi-disciplinary comprehensive pain management with functional improvement and reduced opioid use.      Prescription pain medication monitoring:      MEDD current = 30   ORT Score =  4   Other

## 2018-03-21 ENCOUNTER — TELEPHONE (OUTPATIENT)
Dept: ENDOCRINOLOGY | Age: 48
End: 2018-03-21

## 2018-03-27 ENCOUNTER — TELEPHONE (OUTPATIENT)
Dept: ENDOCRINOLOGY | Age: 48
End: 2018-03-27

## 2018-03-27 NOTE — TELEPHONE ENCOUNTER
Called [facility] to see what the issues is with getting the patients Asya, spoke to Mya rojas, she said that it was being sent to patients add but they had an old add.  After being on hold for several minutes she was able to get a hold of the company that they use to [address] [place] [address] and they will be re routing medication tomorrow, she is not sure if it will be here tomorrow or not, but gave me there phone number so it could be tracked [phone]

## 2018-03-28 ENCOUNTER — OFFICE VISIT (OUTPATIENT)
Dept: PSYCHOLOGY | Age: 48
End: 2018-03-28

## 2018-03-28 ENCOUNTER — HOSPITAL ENCOUNTER (OUTPATIENT)
Dept: OTHER | Age: 48
Discharge: HOME | End: 2018-03-28
Attending: INTERNAL MEDICINE | Admitting: INTERNAL MEDICINE

## 2018-03-28 ENCOUNTER — OFFICE VISIT (OUTPATIENT)
Dept: FAMILY MEDICINE CLINIC | Age: 48
End: 2018-03-28

## 2018-03-28 ENCOUNTER — TELEPHONE (OUTPATIENT)
Dept: ENDOCRINOLOGY | Age: 48
End: 2018-03-28

## 2018-03-28 VITALS
SYSTOLIC BLOOD PRESSURE: 132 MMHG | BODY MASS INDEX: 23.73 KG/M2 | OXYGEN SATURATION: 94 % | RESPIRATION RATE: 16 BRPM | HEART RATE: 109 BPM | DIASTOLIC BLOOD PRESSURE: 80 MMHG | HEIGHT: 64 IN | TEMPERATURE: 98.2 F | WEIGHT: 139 LBS

## 2018-03-28 DIAGNOSIS — Z20.818 STREPTOCOCCUS EXPOSURE: Primary | ICD-10-CM

## 2018-03-28 DIAGNOSIS — M25.552 LEFT HIP PAIN: ICD-10-CM

## 2018-03-28 DIAGNOSIS — G89.4 CHRONIC PAIN SYNDROME: ICD-10-CM

## 2018-03-28 DIAGNOSIS — F41.9 ANXIETY: Primary | ICD-10-CM

## 2018-03-28 DIAGNOSIS — J43.2 CENTRILOBULAR EMPHYSEMA (HCC): ICD-10-CM

## 2018-03-28 DIAGNOSIS — F41.9 ANXIETY: ICD-10-CM

## 2018-03-28 DIAGNOSIS — J02.9 SORE THROAT: ICD-10-CM

## 2018-03-28 LAB — S PYO AG THROAT QL: NORMAL

## 2018-03-28 PROCEDURE — G8420 CALC BMI NORM PARAMETERS: HCPCS | Performed by: INTERNAL MEDICINE

## 2018-03-28 PROCEDURE — G8484 FLU IMMUNIZE NO ADMIN: HCPCS | Performed by: INTERNAL MEDICINE

## 2018-03-28 PROCEDURE — 3023F SPIROM DOC REV: CPT | Performed by: INTERNAL MEDICINE

## 2018-03-28 PROCEDURE — 99214 OFFICE O/P EST MOD 30 MIN: CPT | Performed by: INTERNAL MEDICINE

## 2018-03-28 PROCEDURE — G8427 DOCREV CUR MEDS BY ELIG CLIN: HCPCS | Performed by: INTERNAL MEDICINE

## 2018-03-28 PROCEDURE — 90832 PSYTX W PT 30 MINUTES: CPT | Performed by: PSYCHOLOGIST

## 2018-03-28 PROCEDURE — G8926 SPIRO NO PERF OR DOC: HCPCS | Performed by: INTERNAL MEDICINE

## 2018-03-28 PROCEDURE — 4004F PT TOBACCO SCREEN RCVD TLK: CPT | Performed by: INTERNAL MEDICINE

## 2018-03-28 PROCEDURE — 87880 STREP A ASSAY W/OPTIC: CPT | Performed by: INTERNAL MEDICINE

## 2018-03-28 RX ORDER — LORATADINE 10 MG/1
TABLET ORAL
Qty: 90 TABLET | Refills: 1 | Status: SHIPPED | OUTPATIENT
Start: 2018-03-28 | End: 2019-04-03 | Stop reason: SDUPTHER

## 2018-03-28 RX ORDER — MONTELUKAST SODIUM 10 MG/1
10 TABLET ORAL NIGHTLY
Qty: 30 TABLET | Refills: 5 | Status: SHIPPED | OUTPATIENT
Start: 2018-03-28 | End: 2018-07-25 | Stop reason: SDUPTHER

## 2018-03-28 RX ORDER — NICOTINE 21 MG/24HR
1 PATCH, TRANSDERMAL 24 HOURS TRANSDERMAL EVERY 24 HOURS
Qty: 30 PATCH | Refills: 0 | Status: SHIPPED | OUTPATIENT
Start: 2018-03-28 | End: 2018-11-14

## 2018-03-28 RX ORDER — AMOXICILLIN 500 MG/1
CAPSULE ORAL
Qty: 40 CAPSULE | Refills: 0 | Status: SHIPPED | OUTPATIENT
Start: 2018-03-28 | End: 2018-06-25

## 2018-03-28 RX ORDER — AMITRIPTYLINE HYDROCHLORIDE 25 MG/1
TABLET, FILM COATED ORAL
Qty: 60 TABLET | Refills: 5 | Status: SHIPPED | OUTPATIENT
Start: 2018-03-28 | End: 2020-04-17 | Stop reason: SDUPTHER

## 2018-03-28 RX ORDER — ALBUTEROL SULFATE 90 UG/1
2 AEROSOL, METERED RESPIRATORY (INHALATION) EVERY 4 HOURS PRN
Qty: 1 INHALER | Refills: 5 | Status: SHIPPED | OUTPATIENT
Start: 2018-03-28 | End: 2018-06-25 | Stop reason: SDUPTHER

## 2018-03-28 ASSESSMENT — PATIENT HEALTH QUESTIONNAIRE - PHQ9
9. THOUGHTS THAT YOU WOULD BE BETTER OFF DEAD, OR OF HURTING YOURSELF: 0
1. LITTLE INTEREST OR PLEASURE IN DOING THINGS: 1
7. TROUBLE CONCENTRATING ON THINGS, SUCH AS READING THE NEWSPAPER OR WATCHING TELEVISION: 2
8. MOVING OR SPEAKING SO SLOWLY THAT OTHER PEOPLE COULD HAVE NOTICED. OR THE OPPOSITE, BEING SO FIGETY OR RESTLESS THAT YOU HAVE BEEN MOVING AROUND A LOT MORE THAN USUAL: 2
5. POOR APPETITE OR OVEREATING: 3
6. FEELING BAD ABOUT YOURSELF - OR THAT YOU ARE A FAILURE OR HAVE LET YOURSELF OR YOUR FAMILY DOWN: 1
SUM OF ALL RESPONSES TO PHQ9 QUESTIONS 1 & 2: 4
SUM OF ALL RESPONSES TO PHQ QUESTIONS 1-9: 18
2. FEELING DOWN, DEPRESSED OR HOPELESS: 3
10. IF YOU CHECKED OFF ANY PROBLEMS, HOW DIFFICULT HAVE THESE PROBLEMS MADE IT FOR YOU TO DO YOUR WORK, TAKE CARE OF THINGS AT HOME, OR GET ALONG WITH OTHER PEOPLE: 3
4. FEELING TIRED OR HAVING LITTLE ENERGY: 3
3. TROUBLE FALLING OR STAYING ASLEEP: 3

## 2018-03-28 ASSESSMENT — ANXIETY QUESTIONNAIRES
4. TROUBLE RELAXING: 2-OVER HALF THE DAYS
5. BEING SO RESTLESS THAT IT IS HARD TO SIT STILL: 1-SEVERAL DAYS
1. FEELING NERVOUS, ANXIOUS, OR ON EDGE: 2-OVER HALF THE DAYS
2. NOT BEING ABLE TO STOP OR CONTROL WORRYING: 2-OVER HALF THE DAYS
GAD7 TOTAL SCORE: 14
3. WORRYING TOO MUCH ABOUT DIFFERENT THINGS: 2-OVER HALF THE DAYS
7. FEELING AFRAID AS IF SOMETHING AWFUL MIGHT HAPPEN: 2-OVER HALF THE DAYS
6. BECOMING EASILY ANNOYED OR IRRITABLE: 3-NEARLY EVERY DAY

## 2018-03-28 NOTE — TELEPHONE ENCOUNTER
Pharmacy called needing clarification on lidocaine. They need the max # of doses patient may take daily and if he is to swallow or just gargle and spit. Please advise.

## 2018-03-28 NOTE — PROGRESS NOTES
HPI: El Becerra presents for concern with strep exposure and sore throat. Chronic health concerns include chronic low back pain, anxiety, COPD, osteoporosis, failed postlaminectomy syndrome, anxiety. Wife diagnosed with strep 5 days ago. 3 days ago he had the onset of myalgias sore throat cough. Temperature intolerance. Using no medications. No history of rheumatic fever. Having cough. Has emphysema. Able to drink. No GI symptoms    Chronic left groin pain. Has been worsening over the last year. Film in the past with prior physician was suggestive of femoral acetabular impingement syndrome bilaterally. Says the pain is getting worse. Difficulty walking. Is following with pain management who is dealing with his back issues. States he feels like it. Placed on occasion is concerned. No bowel or bladder incontinence         Positive COPD with history of pneumothorax. Peak tobacco 2 packs a day and now none for one week. Taking Spiriva, albuterol, Breo, and Singulair. Spirometry 2017 with FEV1 of 2.95 and FEV1 over FVC of 68%. Follows with Dr. Corinna Armando pulmonary. Missed last appointment. Nicotine patch intermittently. . Increased cough with recent sore throat however no shortness of breath or hemoptysis.     Spinal stenosis and lumbar surgery with post laminectomy syndrome. Treatment with epidurals, narcotics, current arachnoiditis. Not a candidate for surgery at this time. Chronic pain. Following with Dr. Gray Pain management with good result.      Osteoporosis and treatment with high-dose vitamin D. prolixa  injectable and following with endocrinology. No celiac disease     Insomnia with anxiety. He has been on benzodiazepine, Seroquel, hydroxyzine, BuSpar, Remeron, and Cymbalta. currently on Cymbalta and 50 mg of Elavil at nighttime. Feels like he is doing better. Following up with psychology. Does not have a psychiatrist. She'll drugs.           PMH:  Testicular torsion  Back surgery x 2  spont Component Value Date    .4 12/27/2016     Lab Results   Component Value Date    LABA1C 5.5 02/14/2018     No components found for: CHLPL  Lab Results   Component Value Date    TRIG 122 12/13/2017     Lab Results   Component Value Date    HDL 34 (L) 12/13/2017     Lab Results   Component Value Date    LDLCALC 96 12/13/2017     Lab Results   Component Value Date    LABVLDL 24 12/13/2017       Old labs and records reviewed or requested  Discussed past lab and studies with patient     1. Streptococcus exposure  amoxicillin (AMOXIL) 500 MG capsule   2. Sore throat  POCT rapid strep A   3. Chronic pain syndrome  amitriptyline (ELAVIL) 25 MG tablet   4. Anxiety  amitriptyline (ELAVIL) 25 MG tablet   5. Left hip pain  XR Knee Left 3VW    XR Hip Left Min 4Vw W Pelvis    Marina Reece MD (orthopedic surgery)   6. Centrilobular emphysema (HCC)         Sore throat with strep exposure. Purulent postnasal drip. Amoxicillin. Lidocaine as needed for mouthwash. Codeine cough medicine was not given. Minimal coughing during a visit    Chronic pain syndrome. Following up with Dr. Jyoti Jolley. Improved control on Cymbalta and amitriptyline. We'll follow up with psychology. Has not made appointment with psychiatry. Left groin pain. Questionable history of  possible bilateral superior femoral head-heck junction bump suggestive of femoro acetabular impingement syndrome on imaging elsewhere. Crepitus of the knee. I believe his discomfort is primarily from the back however we'll have film and ask orthopedist to see. Smoking cessation. Praised given          No Follow-up on file. Diagnosis and treatment discussed.   Possible side effects of medication reviewed  Patients questions answered  Follow up understood  Pt aware if they are not contacted about any test results , this does not mean they are normal.  They should call

## 2018-03-29 ENCOUNTER — NURSE ONLY (OUTPATIENT)
Age: 48
End: 2018-03-29

## 2018-03-29 DIAGNOSIS — M81.0 SENILE OSTEOPOROSIS: Primary | ICD-10-CM

## 2018-03-30 ENCOUNTER — OFFICE VISIT (OUTPATIENT)
Dept: ORTHOPEDIC SURGERY | Age: 48
End: 2018-03-30

## 2018-03-30 VITALS
SYSTOLIC BLOOD PRESSURE: 138 MMHG | DIASTOLIC BLOOD PRESSURE: 89 MMHG | BODY MASS INDEX: 23.73 KG/M2 | HEIGHT: 64 IN | HEART RATE: 105 BPM | WEIGHT: 139 LBS

## 2018-03-30 DIAGNOSIS — F11.20 NARCOTIC DEPENDENCY, CONTINUOUS (HCC): Primary | ICD-10-CM

## 2018-03-30 DIAGNOSIS — M16.12 ARTHRITIS OF LEFT HIP: ICD-10-CM

## 2018-03-30 PROCEDURE — 4004F PT TOBACCO SCREEN RCVD TLK: CPT | Performed by: ORTHOPAEDIC SURGERY

## 2018-03-30 PROCEDURE — G8427 DOCREV CUR MEDS BY ELIG CLIN: HCPCS | Performed by: ORTHOPAEDIC SURGERY

## 2018-03-30 PROCEDURE — G8484 FLU IMMUNIZE NO ADMIN: HCPCS | Performed by: ORTHOPAEDIC SURGERY

## 2018-03-30 PROCEDURE — 99214 OFFICE O/P EST MOD 30 MIN: CPT | Performed by: ORTHOPAEDIC SURGERY

## 2018-03-30 PROCEDURE — G8420 CALC BMI NORM PARAMETERS: HCPCS | Performed by: ORTHOPAEDIC SURGERY

## 2018-03-30 RX ORDER — PREDNISONE 10 MG/1
TABLET ORAL
Qty: 50 TABLET | Refills: 0 | Status: SHIPPED | OUTPATIENT
Start: 2018-03-30 | End: 2018-06-25

## 2018-03-30 ASSESSMENT — ENCOUNTER SYMPTOMS
GASTROINTESTINAL NEGATIVE: 1
RESPIRATORY NEGATIVE: 1
BACK PAIN: 1

## 2018-04-01 ENCOUNTER — HOSPITAL ENCOUNTER (OUTPATIENT)
Dept: OTHER | Age: 48
Discharge: HOME | End: 2018-04-30
Attending: ANESTHESIOLOGY | Admitting: ANESTHESIOLOGY

## 2018-04-02 ENCOUNTER — TELEPHONE (OUTPATIENT)
Dept: FAMILY MEDICINE CLINIC | Age: 48
End: 2018-04-02

## 2018-04-02 DIAGNOSIS — F11.90 CHRONIC, CONTINUOUS USE OF OPIOIDS: Primary | ICD-10-CM

## 2018-04-02 DIAGNOSIS — M96.1 POSTLAMINECTOMY SYNDROME OF LUMBAR REGION: ICD-10-CM

## 2018-04-02 PROCEDURE — 96372 THER/PROPH/DIAG INJ SC/IM: CPT | Performed by: INTERNAL MEDICINE

## 2018-04-02 RX ORDER — PROMETHAZINE HYDROCHLORIDE 6.25 MG/5ML
6.25 SYRUP ORAL 4 TIMES DAILY PRN
Qty: 118 ML | Refills: 0 | Status: SHIPPED | OUTPATIENT
Start: 2018-04-02 | End: 2018-06-25 | Stop reason: SDUPTHER

## 2018-04-02 RX ORDER — MORPHINE SULFATE 15 MG/1
15 TABLET, FILM COATED, EXTENDED RELEASE ORAL 2 TIMES DAILY
Qty: 60 TABLET | Refills: 0 | Status: SHIPPED | OUTPATIENT
Start: 2018-04-08 | End: 2018-05-08 | Stop reason: SDUPTHER

## 2018-04-09 LAB
6-ACETYLMORPHINE: NOT DETECTED
7-AMINOCLONAZEPAM: NOT DETECTED
ALPHA-OH-ALPRAZOLAM: NOT DETECTED
ALPRAZOLAM: NOT DETECTED
AMPHETAMINE: NOT DETECTED
BARBITURATES: NOT DETECTED
BENZOYLECGONINE: NOT DETECTED
BUPRENORPHINE: NOT DETECTED
CARISOPRODOL: NOT DETECTED
CLONAZEPAM: NOT DETECTED
CODEINE: NOT DETECTED
CREATININE URINE: 29.9 MG/DL (ref 20–400)
DIAZEPAM: NOT DETECTED
DRUGS EXPECTED: NORMAL
EER PAIN MGT DRUG PANEL, HIGH RES/EMIT U: NORMAL
ETHYL GLUCURONIDE: NOT DETECTED
FENTANYL: NOT DETECTED
HYDROCODONE: NOT DETECTED
HYDROMORPHONE: NOT DETECTED
LORAZEPAM: NOT DETECTED
MARIJUANA METABOLITE: NOT DETECTED
MDA: NOT DETECTED
MDEA: NOT DETECTED
MDMA URINE: NOT DETECTED
MEPERIDINE: NOT DETECTED
METHADONE: NOT DETECTED
METHAMPHETAMINE: NOT DETECTED
METHYLPHENIDATE: NOT DETECTED
MIDAZOLAM: NOT DETECTED
MORPHINE: NOT DETECTED
NORBUPRENORPHINE, FREE: NOT DETECTED
NORDIAZEPAM: NOT DETECTED
NORFENTANYL: NOT DETECTED
NORHYDROCODONE, URINE: NOT DETECTED
NOROXYCODONE: NOT DETECTED
NOROXYMORPHONE, URINE: NOT DETECTED
OXAZEPAM: NOT DETECTED
OXYCODONE: NOT DETECTED
OXYMORPHONE: NOT DETECTED
PAIN MANAGEMENT DRUG PANEL: NORMAL
PAIN MANAGEMENT DRUG PANEL: NORMAL
PCP: NOT DETECTED
PHENTERMINE: NOT DETECTED
PROPOXYPHENE: NOT DETECTED
TAPENTADOL, URINE: NOT DETECTED
TAPENTADOL-O-SULFATE, URINE: NOT DETECTED
TEMAZEPAM: NOT DETECTED
TRAMADOL: NOT DETECTED
ZOLPIDEM: NOT DETECTED

## 2018-04-10 ENCOUNTER — TELEPHONE (OUTPATIENT)
Dept: ORTHOPEDIC SURGERY | Age: 48
End: 2018-04-10

## 2018-04-10 DIAGNOSIS — M16.12 ARTHRITIS OF LEFT HIP: Primary | ICD-10-CM

## 2018-04-19 ENCOUNTER — OFFICE VISIT (OUTPATIENT)
Dept: ORTHOPEDIC SURGERY | Age: 48
End: 2018-04-19

## 2018-04-19 ENCOUNTER — PRE-EVALUATION (OUTPATIENT)
Dept: ORTHOPEDIC SURGERY | Age: 48
End: 2018-04-19

## 2018-04-19 VITALS
BODY MASS INDEX: 23.73 KG/M2 | DIASTOLIC BLOOD PRESSURE: 85 MMHG | HEIGHT: 64 IN | WEIGHT: 139 LBS | HEART RATE: 105 BPM | SYSTOLIC BLOOD PRESSURE: 132 MMHG

## 2018-04-19 DIAGNOSIS — M25.551 PAIN OF BOTH HIP JOINTS: ICD-10-CM

## 2018-04-19 DIAGNOSIS — M25.552 PAIN OF BOTH HIP JOINTS: ICD-10-CM

## 2018-04-19 DIAGNOSIS — M16.12 PRIMARY OSTEOARTHRITIS OF LEFT HIP: Primary | ICD-10-CM

## 2018-04-19 PROCEDURE — G8420 CALC BMI NORM PARAMETERS: HCPCS | Performed by: ORTHOPAEDIC SURGERY

## 2018-04-19 PROCEDURE — 4004F PT TOBACCO SCREEN RCVD TLK: CPT | Performed by: ORTHOPAEDIC SURGERY

## 2018-04-19 PROCEDURE — G8427 DOCREV CUR MEDS BY ELIG CLIN: HCPCS | Performed by: ORTHOPAEDIC SURGERY

## 2018-04-19 PROCEDURE — 99213 OFFICE O/P EST LOW 20 MIN: CPT | Performed by: ORTHOPAEDIC SURGERY

## 2018-04-19 PROCEDURE — APPSS15 APP SPLIT SHARED TIME 0-15 MINUTES: Performed by: NURSE PRACTITIONER

## 2018-04-19 RX ORDER — MELOXICAM 15 MG/1
15 TABLET ORAL DAILY
Qty: 30 TABLET | Refills: 1 | Status: SHIPPED | OUTPATIENT
Start: 2018-04-19 | End: 2018-06-07 | Stop reason: SDUPTHER

## 2018-04-25 ENCOUNTER — OFFICE VISIT (OUTPATIENT)
Dept: ORTHOPEDIC SURGERY | Age: 48
End: 2018-04-25

## 2018-04-25 VITALS
DIASTOLIC BLOOD PRESSURE: 84 MMHG | WEIGHT: 139 LBS | HEIGHT: 64 IN | SYSTOLIC BLOOD PRESSURE: 122 MMHG | BODY MASS INDEX: 23.73 KG/M2 | HEART RATE: 98 BPM

## 2018-04-25 DIAGNOSIS — M16.12 PRIMARY OSTEOARTHRITIS OF LEFT HIP: Primary | ICD-10-CM

## 2018-04-25 PROCEDURE — 99999 PR OFFICE/OUTPT VISIT,PROCEDURE ONLY: CPT | Performed by: ORTHOPAEDIC SURGERY

## 2018-04-25 PROCEDURE — 20611 DRAIN/INJ JOINT/BURSA W/US: CPT | Performed by: ORTHOPAEDIC SURGERY

## 2018-05-01 ENCOUNTER — HOSPITAL ENCOUNTER (OUTPATIENT)
Dept: OTHER | Age: 48
Discharge: HOME | End: 2018-05-31
Attending: ANESTHESIOLOGY | Admitting: ANESTHESIOLOGY

## 2018-05-08 ENCOUNTER — TELEPHONE (OUTPATIENT)
Dept: PAIN MANAGEMENT | Age: 48
End: 2018-05-08

## 2018-05-08 ENCOUNTER — OFFICE VISIT (OUTPATIENT)
Dept: PAIN MANAGEMENT | Age: 48
End: 2018-05-08

## 2018-05-08 VITALS
BODY MASS INDEX: 24.41 KG/M2 | SYSTOLIC BLOOD PRESSURE: 126 MMHG | HEIGHT: 63 IN | WEIGHT: 137.8 LBS | DIASTOLIC BLOOD PRESSURE: 85 MMHG | HEART RATE: 113 BPM

## 2018-05-08 DIAGNOSIS — M96.1 POSTLAMINECTOMY SYNDROME OF LUMBAR REGION: Primary | ICD-10-CM

## 2018-05-08 DIAGNOSIS — G89.4 CHRONIC PAIN SYNDROME: ICD-10-CM

## 2018-05-08 DIAGNOSIS — F11.90 CHRONIC, CONTINUOUS USE OF OPIOIDS: ICD-10-CM

## 2018-05-08 DIAGNOSIS — G89.29 CHRONIC HIP PAIN, LEFT: ICD-10-CM

## 2018-05-08 DIAGNOSIS — M25.552 CHRONIC HIP PAIN, LEFT: ICD-10-CM

## 2018-05-08 PROCEDURE — G8420 CALC BMI NORM PARAMETERS: HCPCS | Performed by: ANESTHESIOLOGY

## 2018-05-08 PROCEDURE — 4004F PT TOBACCO SCREEN RCVD TLK: CPT | Performed by: ANESTHESIOLOGY

## 2018-05-08 PROCEDURE — 99213 OFFICE O/P EST LOW 20 MIN: CPT | Performed by: ANESTHESIOLOGY

## 2018-05-08 PROCEDURE — G8427 DOCREV CUR MEDS BY ELIG CLIN: HCPCS | Performed by: ANESTHESIOLOGY

## 2018-05-08 RX ORDER — MORPHINE SULFATE 15 MG/1
TABLET, FILM COATED, EXTENDED RELEASE ORAL
Qty: 90 TABLET | Refills: 0 | Status: SHIPPED | OUTPATIENT
Start: 2018-05-08 | End: 2018-06-07 | Stop reason: DRUGHIGH

## 2018-05-08 RX ORDER — BACLOFEN 10 MG/1
10 TABLET ORAL 3 TIMES DAILY
Qty: 90 TABLET | Refills: 1 | Status: SHIPPED | OUTPATIENT
Start: 2018-05-08 | End: 2018-06-07 | Stop reason: DRUGHIGH

## 2018-05-08 ASSESSMENT — ENCOUNTER SYMPTOMS
NAUSEA: 0
BACK PAIN: 1
ABDOMINAL PAIN: 0
WHEEZING: 0
HEMOPTYSIS: 0
VOMITING: 0
SHORTNESS OF BREATH: 0
EYE REDNESS: 0
EYE DISCHARGE: 0
EYE PAIN: 0
HEARTBURN: 0
CONSTIPATION: 0
COUGH: 0
ORTHOPNEA: 0

## 2018-05-16 ENCOUNTER — TELEPHONE (OUTPATIENT)
Dept: FAMILY MEDICINE CLINIC | Age: 48
End: 2018-05-16

## 2018-05-18 ENCOUNTER — PRE-EVALUATION (OUTPATIENT)
Dept: ORTHOPEDIC SURGERY | Age: 48
End: 2018-05-18

## 2018-05-18 ENCOUNTER — OFFICE VISIT (OUTPATIENT)
Dept: ORTHOPEDIC SURGERY | Age: 48
End: 2018-05-18

## 2018-05-18 VITALS — HEIGHT: 63 IN | WEIGHT: 137.79 LBS | BODY MASS INDEX: 24.41 KG/M2

## 2018-05-18 DIAGNOSIS — M16.12 PRIMARY OSTEOARTHRITIS OF LEFT HIP: Primary | ICD-10-CM

## 2018-05-18 DIAGNOSIS — M25.552 PAIN OF BOTH HIP JOINTS: ICD-10-CM

## 2018-05-18 DIAGNOSIS — M25.551 PAIN OF BOTH HIP JOINTS: ICD-10-CM

## 2018-05-18 PROCEDURE — 99213 OFFICE O/P EST LOW 20 MIN: CPT | Performed by: ORTHOPAEDIC SURGERY

## 2018-05-18 PROCEDURE — APPSS15 APP SPLIT SHARED TIME 0-15 MINUTES: Performed by: NURSE PRACTITIONER

## 2018-05-18 PROCEDURE — G8420 CALC BMI NORM PARAMETERS: HCPCS | Performed by: ORTHOPAEDIC SURGERY

## 2018-05-18 PROCEDURE — 4004F PT TOBACCO SCREEN RCVD TLK: CPT | Performed by: ORTHOPAEDIC SURGERY

## 2018-05-18 PROCEDURE — G8427 DOCREV CUR MEDS BY ELIG CLIN: HCPCS | Performed by: ORTHOPAEDIC SURGERY

## 2018-05-18 RX ORDER — MELOXICAM 15 MG/1
15 TABLET ORAL DAILY
Qty: 30 TABLET | Refills: 1 | Status: CANCELLED | OUTPATIENT
Start: 2018-05-18

## 2018-06-07 ENCOUNTER — OFFICE VISIT (OUTPATIENT)
Dept: PAIN MANAGEMENT | Age: 48
End: 2018-06-07

## 2018-06-07 VITALS
HEART RATE: 104 BPM | SYSTOLIC BLOOD PRESSURE: 140 MMHG | DIASTOLIC BLOOD PRESSURE: 99 MMHG | BODY MASS INDEX: 23.83 KG/M2 | HEIGHT: 64 IN | WEIGHT: 139.6 LBS

## 2018-06-07 DIAGNOSIS — M13.0 POLYARTHROPATHY, MULTIPLE SITES: ICD-10-CM

## 2018-06-07 DIAGNOSIS — M96.1 POSTLAMINECTOMY SYNDROME, LUMBAR: Primary | ICD-10-CM

## 2018-06-07 DIAGNOSIS — F11.90 CHRONIC, CONTINUOUS USE OF OPIOIDS: ICD-10-CM

## 2018-06-07 PROCEDURE — G8420 CALC BMI NORM PARAMETERS: HCPCS | Performed by: ANESTHESIOLOGY

## 2018-06-07 PROCEDURE — G8427 DOCREV CUR MEDS BY ELIG CLIN: HCPCS | Performed by: ANESTHESIOLOGY

## 2018-06-07 PROCEDURE — 4004F PT TOBACCO SCREEN RCVD TLK: CPT | Performed by: ANESTHESIOLOGY

## 2018-06-07 PROCEDURE — 99213 OFFICE O/P EST LOW 20 MIN: CPT | Performed by: ANESTHESIOLOGY

## 2018-06-07 RX ORDER — MELOXICAM 15 MG/1
15 TABLET ORAL DAILY
Qty: 30 TABLET | Refills: 1 | Status: SHIPPED | OUTPATIENT
Start: 2018-06-07 | End: 2018-07-25

## 2018-06-07 RX ORDER — BACLOFEN 20 MG/1
20 TABLET ORAL 3 TIMES DAILY
Qty: 90 TABLET | Refills: 1 | Status: SHIPPED | OUTPATIENT
Start: 2018-06-07 | End: 2018-07-25

## 2018-06-07 RX ORDER — MORPHINE SULFATE 15 MG/1
15 TABLET, FILM COATED, EXTENDED RELEASE ORAL 2 TIMES DAILY
Qty: 60 TABLET | Refills: 0 | Status: SHIPPED | OUTPATIENT
Start: 2018-06-07 | End: 2018-07-05 | Stop reason: SDUPTHER

## 2018-06-07 ASSESSMENT — ENCOUNTER SYMPTOMS
HEMOPTYSIS: 0
NAUSEA: 0
EYE DISCHARGE: 0
SHORTNESS OF BREATH: 0
ABDOMINAL PAIN: 0
VOMITING: 0
HEARTBURN: 0
EYE PAIN: 0
WHEEZING: 0
ORTHOPNEA: 0
COUGH: 0
CONSTIPATION: 0
EYE REDNESS: 0
BACK PAIN: 1

## 2018-06-21 ENCOUNTER — TELEPHONE (OUTPATIENT)
Dept: FAMILY MEDICINE CLINIC | Age: 48
End: 2018-06-21

## 2018-06-25 ENCOUNTER — TELEPHONE (OUTPATIENT)
Dept: FAMILY MEDICINE CLINIC | Age: 48
End: 2018-06-25

## 2018-06-25 ENCOUNTER — OFFICE VISIT (OUTPATIENT)
Dept: FAMILY MEDICINE CLINIC | Age: 48
End: 2018-06-25

## 2018-06-25 VITALS
WEIGHT: 134 LBS | OXYGEN SATURATION: 98 % | BODY MASS INDEX: 23 KG/M2 | DIASTOLIC BLOOD PRESSURE: 84 MMHG | SYSTOLIC BLOOD PRESSURE: 110 MMHG | HEART RATE: 102 BPM

## 2018-06-25 DIAGNOSIS — J06.9 UPPER RESPIRATORY INFECTION, ACUTE: Primary | ICD-10-CM

## 2018-06-25 DIAGNOSIS — R19.7 DIARRHEA, UNSPECIFIED TYPE: Primary | ICD-10-CM

## 2018-06-25 DIAGNOSIS — R19.7 VOMITING AND DIARRHEA: ICD-10-CM

## 2018-06-25 DIAGNOSIS — F11.90 CHRONIC, CONTINUOUS USE OF OPIOIDS: ICD-10-CM

## 2018-06-25 DIAGNOSIS — M81.0 OSTEOPOROSIS, UNSPECIFIED OSTEOPOROSIS TYPE, UNSPECIFIED PATHOLOGICAL FRACTURE PRESENCE: ICD-10-CM

## 2018-06-25 DIAGNOSIS — R11.10 VOMITING AND DIARRHEA: ICD-10-CM

## 2018-06-25 DIAGNOSIS — M96.1 POSTLAMINECTOMY SYNDROME, LUMBAR: ICD-10-CM

## 2018-06-25 DIAGNOSIS — J43.2 CENTRILOBULAR EMPHYSEMA (HCC): ICD-10-CM

## 2018-06-25 DIAGNOSIS — R06.09 DOE (DYSPNEA ON EXERTION): ICD-10-CM

## 2018-06-25 PROBLEM — R60.0 LOCALIZED EDEMA: Status: RESOLVED | Noted: 2018-02-24 | Resolved: 2018-06-25

## 2018-06-25 PROCEDURE — 3023F SPIROM DOC REV: CPT | Performed by: INTERNAL MEDICINE

## 2018-06-25 PROCEDURE — 4004F PT TOBACCO SCREEN RCVD TLK: CPT | Performed by: INTERNAL MEDICINE

## 2018-06-25 PROCEDURE — G8926 SPIRO NO PERF OR DOC: HCPCS | Performed by: INTERNAL MEDICINE

## 2018-06-25 PROCEDURE — 99214 OFFICE O/P EST MOD 30 MIN: CPT | Performed by: INTERNAL MEDICINE

## 2018-06-25 PROCEDURE — G8428 CUR MEDS NOT DOCUMENT: HCPCS | Performed by: INTERNAL MEDICINE

## 2018-06-25 PROCEDURE — G8420 CALC BMI NORM PARAMETERS: HCPCS | Performed by: INTERNAL MEDICINE

## 2018-06-25 RX ORDER — ALBUTEROL SULFATE 90 UG/1
2 AEROSOL, METERED RESPIRATORY (INHALATION) EVERY 4 HOURS PRN
Qty: 1 INHALER | Refills: 5 | Status: SHIPPED | OUTPATIENT
Start: 2018-06-25 | End: 2018-07-25

## 2018-06-25 RX ORDER — DIPHENOXYLATE HYDROCHLORIDE AND ATROPINE SULFATE 2.5; .025 MG/1; MG/1
1 TABLET ORAL 4 TIMES DAILY PRN
Qty: 40 TABLET | Refills: 0 | Status: SHIPPED | OUTPATIENT
Start: 2018-06-25 | End: 2018-07-05

## 2018-06-25 RX ORDER — AMOXICILLIN 500 MG/1
CAPSULE ORAL
Qty: 40 CAPSULE | Refills: 0 | Status: SHIPPED | OUTPATIENT
Start: 2018-06-25 | End: 2018-07-25 | Stop reason: ALTCHOICE

## 2018-06-25 RX ORDER — PROMETHAZINE HYDROCHLORIDE 6.25 MG/5ML
6.25 SYRUP ORAL 4 TIMES DAILY PRN
Qty: 118 ML | Refills: 0 | Status: SHIPPED | OUTPATIENT
Start: 2018-06-25 | End: 2018-07-02

## 2018-06-27 ENCOUNTER — TELEPHONE (OUTPATIENT)
Dept: FAMILY MEDICINE CLINIC | Age: 48
End: 2018-06-27

## 2018-06-28 ENCOUNTER — TELEPHONE (OUTPATIENT)
Dept: FAMILY MEDICINE CLINIC | Age: 48
End: 2018-06-28

## 2018-06-28 DIAGNOSIS — R05.9 COUGH: Primary | ICD-10-CM

## 2018-06-28 RX ORDER — GUAIFENESIN AND CODEINE PHOSPHATE 100; 10 MG/5ML; MG/5ML
SOLUTION ORAL
Qty: 140 ML | Refills: 0 | Status: SHIPPED | OUTPATIENT
Start: 2018-06-28 | End: 2018-07-05

## 2018-06-28 RX ORDER — MELOXICAM 15 MG/1
TABLET ORAL
Qty: 30 TABLET | Refills: 0 | Status: SHIPPED | OUTPATIENT
Start: 2018-06-28 | End: 2018-11-14

## 2018-06-28 NOTE — TELEPHONE ENCOUNTER
The PT's wife is calling in wanting to let the Dr know that the Medication that she prescribed \"Phenergan\" isn't working at all. She did find a previously medication   guaiFENesin-codeine (TUSSI-BONITA NR) 100-10 MG/5ML syrup.          Best call DQUZ--[phone]

## 2018-07-05 ENCOUNTER — TELEPHONE (OUTPATIENT)
Dept: PAIN MANAGEMENT | Age: 48
End: 2018-07-05

## 2018-07-05 DIAGNOSIS — M96.1 POSTLAMINECTOMY SYNDROME, LUMBAR: ICD-10-CM

## 2018-07-05 DIAGNOSIS — M13.0 POLYARTHROPATHY, MULTIPLE SITES: ICD-10-CM

## 2018-07-05 DIAGNOSIS — F11.90 CHRONIC, CONTINUOUS USE OF OPIOIDS: Primary | ICD-10-CM

## 2018-07-05 RX ORDER — MORPHINE SULFATE 15 MG/1
15 TABLET, FILM COATED, EXTENDED RELEASE ORAL 2 TIMES DAILY
Qty: 60 TABLET | Refills: 0 | Status: SHIPPED | OUTPATIENT
Start: 2018-07-07 | End: 2018-08-13 | Stop reason: SDUPTHER

## 2018-07-06 DIAGNOSIS — F11.90 CHRONIC, CONTINUOUS USE OF OPIOIDS: Primary | ICD-10-CM

## 2018-07-10 DIAGNOSIS — F11.10 OPIOID ABUSE (HCC): Primary | ICD-10-CM

## 2018-07-10 LAB
6-ACETYLMORPHINE: NOT DETECTED
7-AMINOCLONAZEPAM: NOT DETECTED
ALPHA-OH-ALPRAZOLAM: NOT DETECTED
ALPRAZOLAM: NOT DETECTED
AMPHETAMINE: NOT DETECTED
BARBITURATES: NOT DETECTED
BENZOYLECGONINE: NOT DETECTED
BUPRENORPHINE: NOT DETECTED
CARISOPRODOL: NOT DETECTED
CLONAZEPAM: NOT DETECTED
CODEINE: NOT DETECTED
CREATININE URINE: 100.3 MG/DL (ref 20–400)
DIAZEPAM: NOT DETECTED
DRUGS EXPECTED: NORMAL
EER PAIN MGT DRUG PANEL, HIGH RES/EMIT U: NORMAL
ETHYL GLUCURONIDE: NOT DETECTED
FENTANYL: NOT DETECTED
HYDROCODONE: NOT DETECTED
HYDROMORPHONE: NOT DETECTED
LORAZEPAM: NOT DETECTED
MARIJUANA METABOLITE: NOT DETECTED
MDA: NOT DETECTED
MDEA: NOT DETECTED
MDMA URINE: NOT DETECTED
MEPERIDINE: NOT DETECTED
METHADONE: NOT DETECTED
METHAMPHETAMINE: NOT DETECTED
METHYLPHENIDATE: NOT DETECTED
MIDAZOLAM: NOT DETECTED
MORPHINE: NOT DETECTED
NORBUPRENORPHINE, FREE: PRESENT
NORDIAZEPAM: NOT DETECTED
NORFENTANYL: NOT DETECTED
NORHYDROCODONE, URINE: NOT DETECTED
NOROXYCODONE: NOT DETECTED
NOROXYMORPHONE, URINE: NOT DETECTED
OXAZEPAM: NOT DETECTED
OXYCODONE: NOT DETECTED
OXYMORPHONE: NOT DETECTED
PAIN MANAGEMENT DRUG PANEL: NORMAL
PAIN MANAGEMENT DRUG PANEL: NORMAL
PCP: NOT DETECTED
PHENTERMINE: NOT DETECTED
PROPOXYPHENE: NOT DETECTED
TAPENTADOL, URINE: NOT DETECTED
TAPENTADOL-O-SULFATE, URINE: NOT DETECTED
TEMAZEPAM: NOT DETECTED
TRAMADOL: NOT DETECTED
ZOLPIDEM: NOT DETECTED

## 2018-07-25 ENCOUNTER — OFFICE VISIT (OUTPATIENT)
Dept: FAMILY MEDICINE CLINIC | Age: 48
End: 2018-07-25

## 2018-07-25 ENCOUNTER — OFFICE VISIT (OUTPATIENT)
Dept: ORTHOPEDIC SURGERY | Age: 48
End: 2018-07-25

## 2018-07-25 VITALS
WEIGHT: 131 LBS | SYSTOLIC BLOOD PRESSURE: 124 MMHG | DIASTOLIC BLOOD PRESSURE: 81 MMHG | HEIGHT: 64 IN | BODY MASS INDEX: 22.36 KG/M2 | OXYGEN SATURATION: 98 % | HEART RATE: 78 BPM

## 2018-07-25 VITALS
HEART RATE: 77 BPM | DIASTOLIC BLOOD PRESSURE: 82 MMHG | BODY MASS INDEX: 22.36 KG/M2 | WEIGHT: 131 LBS | HEIGHT: 64 IN | SYSTOLIC BLOOD PRESSURE: 125 MMHG

## 2018-07-25 DIAGNOSIS — R35.89 POLYURIA: ICD-10-CM

## 2018-07-25 DIAGNOSIS — J43.2 CENTRILOBULAR EMPHYSEMA (HCC): Primary | ICD-10-CM

## 2018-07-25 DIAGNOSIS — M96.1 POSTLAMINECTOMY SYNDROME, LUMBAR: ICD-10-CM

## 2018-07-25 DIAGNOSIS — M81.0 OSTEOPOROSIS, UNSPECIFIED OSTEOPOROSIS TYPE, UNSPECIFIED PATHOLOGICAL FRACTURE PRESENCE: ICD-10-CM

## 2018-07-25 DIAGNOSIS — M16.12 PRIMARY OSTEOARTHRITIS OF LEFT HIP: Primary | ICD-10-CM

## 2018-07-25 LAB
ANION GAP SERPL CALCULATED.3IONS-SCNC: 11 MMOL/L (ref 3–16)
BILIRUBIN, POC: ABNORMAL
BLOOD URINE, POC: NEGATIVE
BUN BLDV-MCNC: 7 MG/DL (ref 7–20)
CALCIUM SERPL-MCNC: 10 MG/DL (ref 8.3–10.6)
CHLORIDE BLD-SCNC: 103 MMOL/L (ref 99–110)
CLARITY, POC: ABNORMAL
CO2: 29 MMOL/L (ref 21–32)
COLOR, POC: ABNORMAL
CREAT SERPL-MCNC: 0.9 MG/DL (ref 0.9–1.3)
GFR AFRICAN AMERICAN: >60
GFR NON-AFRICAN AMERICAN: >60
GLUCOSE BLD-MCNC: 120 MG/DL (ref 70–99)
GLUCOSE URINE, POC: NEGATIVE
KETONES, POC: ABNORMAL
LEUKOCYTE EST, POC: ABNORMAL
NITRITE, POC: NEGATIVE
PH, POC: 6.5
POTASSIUM SERPL-SCNC: 5.6 MMOL/L (ref 3.5–5.1)
PROTEIN, POC: ABNORMAL
SODIUM BLD-SCNC: 143 MMOL/L (ref 136–145)
SPECIFIC GRAVITY, POC: 1.02
UROBILINOGEN, POC: 0.2

## 2018-07-25 PROCEDURE — 20611 DRAIN/INJ JOINT/BURSA W/US: CPT | Performed by: ORTHOPAEDIC SURGERY

## 2018-07-25 PROCEDURE — G8926 SPIRO NO PERF OR DOC: HCPCS | Performed by: INTERNAL MEDICINE

## 2018-07-25 PROCEDURE — 36415 COLL VENOUS BLD VENIPUNCTURE: CPT | Performed by: INTERNAL MEDICINE

## 2018-07-25 PROCEDURE — G8420 CALC BMI NORM PARAMETERS: HCPCS | Performed by: INTERNAL MEDICINE

## 2018-07-25 PROCEDURE — G8427 DOCREV CUR MEDS BY ELIG CLIN: HCPCS | Performed by: INTERNAL MEDICINE

## 2018-07-25 PROCEDURE — 4004F PT TOBACCO SCREEN RCVD TLK: CPT | Performed by: INTERNAL MEDICINE

## 2018-07-25 PROCEDURE — 99213 OFFICE O/P EST LOW 20 MIN: CPT | Performed by: INTERNAL MEDICINE

## 2018-07-25 PROCEDURE — 3023F SPIROM DOC REV: CPT | Performed by: INTERNAL MEDICINE

## 2018-07-25 PROCEDURE — 81002 URINALYSIS NONAUTO W/O SCOPE: CPT | Performed by: INTERNAL MEDICINE

## 2018-07-25 PROCEDURE — 99999 PR OFFICE/OUTPT VISIT,PROCEDURE ONLY: CPT | Performed by: ORTHOPAEDIC SURGERY

## 2018-07-25 RX ORDER — FLUTICASONE PROPIONATE 50 MCG
1 SPRAY, SUSPENSION (ML) NASAL DAILY
Qty: 1 BOTTLE | Refills: 3 | Status: SHIPPED | OUTPATIENT
Start: 2018-07-25 | End: 2018-10-15

## 2018-07-25 RX ORDER — TIZANIDINE 4 MG/1
4 TABLET ORAL 3 TIMES DAILY
Qty: 90 TABLET | Refills: 1 | Status: SHIPPED | OUTPATIENT
Start: 2018-07-25 | End: 2019-04-03 | Stop reason: SDUPTHER

## 2018-07-25 RX ORDER — ALBUTEROL SULFATE 90 UG/1
2 AEROSOL, METERED RESPIRATORY (INHALATION) EVERY 4 HOURS PRN
Qty: 1 INHALER | Refills: 5 | Status: SHIPPED | OUTPATIENT
Start: 2018-07-25 | End: 2020-04-17 | Stop reason: SDUPTHER

## 2018-07-25 RX ORDER — MONTELUKAST SODIUM 10 MG/1
10 TABLET ORAL NIGHTLY
Qty: 30 TABLET | Refills: 5 | Status: SHIPPED | OUTPATIENT
Start: 2018-07-25 | End: 2018-09-14 | Stop reason: SDUPTHER

## 2018-07-25 NOTE — PROGRESS NOTES
ULTRASOUND GUIDED HIP INJECTION    Emma Menon    July 25, 2018    Chief Complaint   Patient presents with    Hip Pain     LT Hip Injection with Ultrasound       /82   Pulse 77   Ht 5' 4\" (1.626 m)   Wt 131 lb (59.4 kg)   BMI 22.49 kg/m²     Cruz Spangler returns after 3 months for a repeat ultrasound directed intra-articular injection of  his left hip. He did obtain about 50% relief of his left hip pain from the injection and desires for repeat. He is now having intermittent ache which is up to 7 with prolonged activities. He denies any difficulty putting on his shoe on his left foot. Review of systems reviewed from patient history dated on  7/15/18  and available in the patient's chart under media tab. Physical Exam:   Examination of the lefthip shows a positive logroll. No Lesion of the skin is noted over the injection site    Impression:  left hip osteoarthritis. Plan:  1. Injection with cortisone was recommended into  left   hip joint using ultrasound visualization. He understands the risks and benefits of the injection and describes no potential allergies. Time out was performed to verify correct person, correct procedure, and correct site. 2. Ultrasound visualization was first performed utilizing the [facility] Ultrasound unit using an 5/2 MHz Curved Probe. finding the femoral neck head junction. Next the femoral artery and vein were visualized with ultrasound medial to the femoral head. The location of the needle insertion was determined well lateral to the neurovascular structures and the site was anesthetized with 5 mL of 1% Lidocaine. The site was then prepped with ChloraPrep. A sterile cover was placed over the transducer head and the femoral head neck junction once again visualized. A 20-gauge spinal needle was then introduced under ultrasound control to the head neck junction.  Once the needle was intracapsular the hip joint was injected with 3 mL  of 1% Lidocaine mixed with 2 ml of 40 mg Depo Medrol. Jaret tolerated the procedure well and  did report 10% relief of  hip pain within 5 minutes of the injection. 3. He is return to Dr. Kaplan Colon care.     Maciel Doe MD  7/25/2018     Depo-Medrol:  NDC: 4911-5546-54  Lot #: [id number]  Expiration Date: 8/20

## 2018-07-25 NOTE — PATIENT INSTRUCTIONS
Call for lung appointment  If pain management dose not take your insurance, please call your insurance company to see who is available for you and I will refer  Increased amitriptylene to 50 my at night   Use either meloxicam or motrin

## 2018-07-25 NOTE — PROGRESS NOTES
HPI: Violet Maria presents for referral pain management. Chronic health concerns include chronic low back pain, anxiety, COPD, osteoporosis, failed postlaminectomy syndrome, anxiety. Hip arthritis       Positive COPD with history of pneumothorax. Peak tobacco 2 packs a day and now 1-2 cigarettes daily. Taking Spiriva, albuterol, Breo, and Singulair. Spirometry 2017 with FEV1 of 2.95 and FEV1 over FVC of 68%. Follows with Dr. Andrew Robins pulmonary. Wonders about nebulizer. Has been using chronic codeine cough suppressant. States Tessalon and over-the-counter is not effective.     Spinal stenosis and lumbar surgery with post laminectomy syndrome. Treatment with epidurals, narcotics, muscle relaxer. OA hip. Injection improved. Recently discharged from Dr. Shila Rose practice secondary to finding his wife's setbacks own in his urine screen. Was on morphine. States this was an accident. Osteoporosis and treatment with high-dose vitamin D. prolixa  injectable and following with endocrinology. No celiac disease     Insomnia and anxiety improved. Is following with counselor. Positive Cymbalta and Elavil at nighttime. Has declined psychiatry follow-up. No benzodiazepines and Atarax when necessary. Has been on Seroquel, hydroxyzine, BuSpar, and Remeron with adverse effects or in effectiveness.        Positive migraines. No constipation with Elavil. Uses 25 mg to 50 mg at nighttime. States that he was unaware he could not use Motrin with meloxicam.      PMH:  Testicular torsion  Back surgery x 2  spont PTX          [address] [facility] + tobacco 2 cigarettes per day. . . Denies alcohol or drugs. Positive pain management. Does stretching.      FH arthritis, diabetes     Review of systems: Anxiety, insomnia, recurrent bronchitis emphysema, palpitations with anxiety. No syncope. No GE reflux. Rare constipation. Has a prostatism. Chronic pain.  Intermittent lower extremity edema.      Constitutional, ent, CV, respiratory, GI, , joint, skin, allergic and psychiatric ROS negative except for above    Allergies   Allergen Reactions    Bactrim [Sulfamethoxazole-Trimethoprim] Diarrhea    Biaxin [Clarithromycin] Diarrhea    Diclofenac Diarrhea    Neurontin [Gabapentin] Swelling     Leg swelling    Robaxin [Methocarbamol]      Eyes swell and get bloodshot    Toradol [Ketorolac Tromethamine] Other (See Comments)     Eyes swell and bloodshot    Tramadol Other (See Comments)     Eyes swell and get bloodshot    Celebrex [Celecoxib]      Causes severe foot swelling.  Sulfa Antibiotics Nausea And Vomiting       Outpatient Prescriptions Marked as Taking for the 7/25/18 encounter (Office Visit) with Martin Holder MD   Medication Sig Dispense Refill    morphine (MS CONTIN) 15 MG extended release tablet Take 1 tablet by mouth 2 times daily for 30 days. Wily Cutler Date: 7/7/18 60 tablet 0    meloxicam (MOBIC) 15 MG tablet TAKE 1 TABLET BY MOUTH ONE TIME A DAY  30 tablet 0    albuterol sulfate  (90 Base) MCG/ACT inhaler Inhale 2 puffs into the lungs every 4 hours as needed for Wheezing 1 Inhaler 5    meloxicam (MOBIC) 15 MG tablet Take 1 tablet by mouth daily 30 tablet 1    baclofen (LIORESAL) 20 MG tablet Take 1 tablet by mouth 3 times daily 90 tablet 1    montelukast (SINGULAIR) 10 MG tablet Take 1 tablet by mouth nightly 30 tablet 5    loratadine (CLARITIN) 10 MG tablet 1 po q day 90 tablet 1    nicotine (NICODERM CQ) 21 MG/24HR Place 1 patch onto the skin every 24 hours 30 patch 0    amitriptyline (ELAVIL) 25 MG tablet 2 po q HS 60 tablet 5    lidocaine viscous (XYLOCAINE) 2 % solution Take 10 mLs by mouth as needed for Irritation or Pain 200 mL 0    denosumab (PROLIA) 60 MG/ML SOLN SC injection Inject 1 mL into the skin once for 1 dose 1 mL 0    DULoxetine (CYMBALTA) 30 MG extended release capsule TAKE 1 CAPSULE BY MOUTH TWO TIMES A DAY  60 capsule 5    furosemide (LASIX) 20 MG tablet 1 po q day for edema x 5 days then as

## 2018-07-26 LAB
ESTIMATED AVERAGE GLUCOSE: 108.3 MG/DL
HBA1C MFR BLD: 5.4 %

## 2018-07-27 LAB
PROSTATE SPECIFIC ANTIGEN FREE: 0.3 UG/L
PROSTATE SPECIFIC ANTIGEN PERCENT FREE: 27.3 %
PROSTATE SPECIFIC ANTIGEN: 1.1 UG/L (ref 0–4)

## 2018-08-08 ENCOUNTER — TELEPHONE (OUTPATIENT)
Dept: FAMILY MEDICINE CLINIC | Age: 48
End: 2018-08-08

## 2018-08-13 ENCOUNTER — TELEPHONE (OUTPATIENT)
Dept: FAMILY MEDICINE CLINIC | Age: 48
End: 2018-08-13

## 2018-08-13 DIAGNOSIS — M96.1 POSTLAMINECTOMY SYNDROME, LUMBAR: ICD-10-CM

## 2018-08-13 DIAGNOSIS — M13.0 POLYARTHROPATHY, MULTIPLE SITES: ICD-10-CM

## 2018-08-13 RX ORDER — MORPHINE SULFATE 15 MG/1
15 TABLET, FILM COATED, EXTENDED RELEASE ORAL 2 TIMES DAILY
Qty: 60 TABLET | Refills: 0 | Status: SHIPPED | OUTPATIENT
Start: 2018-08-13 | End: 2018-09-13 | Stop reason: SDUPTHER

## 2018-08-13 NOTE — TELEPHONE ENCOUNTER
Controlled Substances Monitoring:     RX Monitoring 8/13/2018   Attestation The Prescription Monitoring Report for this patient was reviewed today.    Documentation -   Medication Contracts -

## 2018-08-13 NOTE — TELEPHONE ENCOUNTER
I called patient and left v/m to return call regarding medication. Pt needs a urine before getting script.

## 2018-08-15 ENCOUNTER — TELEPHONE (OUTPATIENT)
Dept: FAMILY MEDICINE CLINIC | Age: 48
End: 2018-08-15

## 2018-08-15 NOTE — TELEPHONE ENCOUNTER
Pt called in wanting to know if the results of his drug screen/urine sample were back. Adv that would not likely be ready today. Pt also wanted to know if the prescription that Dr. Ana Gutiérrez was to write out for him was sent to his pharmacy yet. (He thinks it was morphine. He used to get it from his pain doctor). Please call back and adv: [phone].

## 2018-08-16 NOTE — TELEPHONE ENCOUNTER
The PT is calling in wanting to know when he can get the script that he's needing. He said he feels like he's killing himself taking OTC meds for the pain.

## 2018-08-17 LAB
6-ACETYLMORPHINE: NOT DETECTED
7-AMINOCLONAZEPAM: NOT DETECTED
ALPHA-OH-ALPRAZOLAM: NOT DETECTED
ALPRAZOLAM: NOT DETECTED
AMPHETAMINE: NOT DETECTED
BARBITURATES: NOT DETECTED
BENZOYLECGONINE: NOT DETECTED
BUPRENORPHINE: NOT DETECTED
CARISOPRODOL: NOT DETECTED
CLONAZEPAM: NOT DETECTED
CODEINE: NOT DETECTED
CREATININE URINE: 167.5 MG/DL (ref 20–400)
DIAZEPAM: NOT DETECTED
DRUGS EXPECTED: NORMAL
EER PAIN MGT DRUG PANEL, HIGH RES/EMIT U: NORMAL
ETHYL GLUCURONIDE: NOT DETECTED
FENTANYL: NOT DETECTED
HYDROCODONE: NOT DETECTED
HYDROMORPHONE: NOT DETECTED
LORAZEPAM: NOT DETECTED
MARIJUANA METABOLITE: NOT DETECTED
MDA: NOT DETECTED
MDEA: NOT DETECTED
MDMA URINE: NOT DETECTED
MEPERIDINE: NOT DETECTED
METHADONE: NOT DETECTED
METHAMPHETAMINE: NOT DETECTED
METHYLPHENIDATE: NOT DETECTED
MIDAZOLAM: NOT DETECTED
MORPHINE: NOT DETECTED
NORBUPRENORPHINE, FREE: NOT DETECTED
NORDIAZEPAM: NOT DETECTED
NORFENTANYL: NOT DETECTED
NORHYDROCODONE, URINE: PRESENT
NOROXYCODONE: NOT DETECTED
NOROXYMORPHONE, URINE: NOT DETECTED
OXAZEPAM: NOT DETECTED
OXYCODONE: NOT DETECTED
OXYMORPHONE: NOT DETECTED
PAIN MANAGEMENT DRUG PANEL: NORMAL
PAIN MANAGEMENT DRUG PANEL: NORMAL
PCP: NOT DETECTED
PHENTERMINE: NOT DETECTED
PROPOXYPHENE: NOT DETECTED
TAPENTADOL, URINE: NOT DETECTED
TAPENTADOL-O-SULFATE, URINE: NOT DETECTED
TEMAZEPAM: NOT DETECTED
TRAMADOL: NOT DETECTED
ZOLPIDEM: NOT DETECTED

## 2018-09-12 ENCOUNTER — TELEPHONE (OUTPATIENT)
Dept: FAMILY MEDICINE CLINIC | Age: 48
End: 2018-09-12

## 2018-09-12 NOTE — TELEPHONE ENCOUNTER
Pt wife Gianni Munguia called stating that recently patient went to the ER for his back and the physician's there prescribed him steroids and pain killers. Today she said patient went for a walk and while he was out he got sick and started vomiting and his back pain returned. She said that when that happened patient said he felt his back pop. Gianni Munguia wants to know if she should take patient back to the ER. Best call back number: [phone] or [phone].

## 2018-09-13 DIAGNOSIS — M13.0 POLYARTHROPATHY, MULTIPLE SITES: ICD-10-CM

## 2018-09-13 DIAGNOSIS — M96.1 POSTLAMINECTOMY SYNDROME, LUMBAR: ICD-10-CM

## 2018-09-14 RX ORDER — MORPHINE SULFATE 15 MG/1
15 TABLET, FILM COATED, EXTENDED RELEASE ORAL 2 TIMES DAILY
Qty: 60 TABLET | Refills: 0 | Status: SHIPPED | OUTPATIENT
Start: 2018-09-14 | End: 2018-09-19 | Stop reason: SDUPTHER

## 2018-09-14 RX ORDER — MONTELUKAST SODIUM 10 MG/1
10 TABLET ORAL NIGHTLY
Qty: 30 TABLET | Refills: 5 | Status: SHIPPED | OUTPATIENT
Start: 2018-09-14 | End: 2019-04-03 | Stop reason: SDUPTHER

## 2018-09-17 ENCOUNTER — OFFICE VISIT (OUTPATIENT)
Dept: FAMILY MEDICINE CLINIC | Age: 48
End: 2018-09-17

## 2018-09-17 VITALS
RESPIRATION RATE: 16 BRPM | HEIGHT: 64 IN | BODY MASS INDEX: 20.83 KG/M2 | SYSTOLIC BLOOD PRESSURE: 112 MMHG | WEIGHT: 122 LBS | OXYGEN SATURATION: 97 % | HEART RATE: 111 BPM | DIASTOLIC BLOOD PRESSURE: 72 MMHG

## 2018-09-17 DIAGNOSIS — M81.0 OSTEOPOROSIS, UNSPECIFIED OSTEOPOROSIS TYPE, UNSPECIFIED PATHOLOGICAL FRACTURE PRESENCE: ICD-10-CM

## 2018-09-17 DIAGNOSIS — R63.4 WEIGHT LOSS: ICD-10-CM

## 2018-09-17 DIAGNOSIS — F11.20 NARCOTIC DEPENDENCY, CONTINUOUS (HCC): ICD-10-CM

## 2018-09-17 DIAGNOSIS — J43.2 CENTRILOBULAR EMPHYSEMA (HCC): ICD-10-CM

## 2018-09-17 DIAGNOSIS — Z13.31 POSITIVE DEPRESSION SCREENING: ICD-10-CM

## 2018-09-17 DIAGNOSIS — G89.4 CHRONIC PAIN SYNDROME: Primary | ICD-10-CM

## 2018-09-17 PROCEDURE — G8427 DOCREV CUR MEDS BY ELIG CLIN: HCPCS | Performed by: INTERNAL MEDICINE

## 2018-09-17 PROCEDURE — G8420 CALC BMI NORM PARAMETERS: HCPCS | Performed by: INTERNAL MEDICINE

## 2018-09-17 PROCEDURE — G8431 POS CLIN DEPRES SCRN F/U DOC: HCPCS | Performed by: INTERNAL MEDICINE

## 2018-09-17 PROCEDURE — G8926 SPIRO NO PERF OR DOC: HCPCS | Performed by: INTERNAL MEDICINE

## 2018-09-17 PROCEDURE — 3023F SPIROM DOC REV: CPT | Performed by: INTERNAL MEDICINE

## 2018-09-17 PROCEDURE — 99214 OFFICE O/P EST MOD 30 MIN: CPT | Performed by: INTERNAL MEDICINE

## 2018-09-17 PROCEDURE — 4004F PT TOBACCO SCREEN RCVD TLK: CPT | Performed by: INTERNAL MEDICINE

## 2018-09-17 RX ORDER — ONDANSETRON 4 MG/1
4 TABLET, FILM COATED ORAL DAILY PRN
Qty: 21 TABLET | Refills: 0 | Status: SHIPPED | OUTPATIENT
Start: 2018-09-17 | End: 2018-09-24

## 2018-09-17 NOTE — LETTER
[address], [place] [address]  Phone: [phone]  Fax: [phone]    Frederick Esparza MD        September 17, 2018     Patient: Markell Stone   YOB: 1970   Date of Visit: 9/17/2018       To Whom It May Concern:     Kalyan Neville was seen today for hospital follow up. He should not returnt o work prior to 9/24/18    If you have any questions or concerns, please don't hesitate to call.     Sincerely,        Frederick Esparza MD

## 2018-09-17 NOTE — PROGRESS NOTES
HPI: Real Farrar presents for hospitalization follow-up. Chronic health concerns include chronic low back pain, anxiety, COPD, osteoporosis, failed postlaminectomy syndrome, anxiety. Hip arthritis. Weight loss. Positive vomiting. Financial difficulties with food. Chronic pain. No vomiting over last 4 days. Hospitalized for intractable back pain. Recurrent MRI of thoracic and lumbar spine without change. No insurance and declines neurosurgery and physical therapy intervention. States she will be getting insurance again in last month. Wishes to have refill on his pain medicine in the interim.       Positive COPD with history of pneumothorax. Peak tobacco 2 packs a day and now 1-2 cigarettes daily. Taking Spiriva, albuterol, Breo, and Singulair. Spirometry 2017 with FEV1 of 2.95 and FEV1 over FVC of 68%. Follows with Dr. Jb Shaw pulmonary. Breathing doing relatively well today.       Spinal stenosis and lumbar surgery with post laminectomy syndrome. Treatment with epidurals, narcotics, muscle relaxer. OA hip. Injection improved. Recently discharged from Dr. Pernell Reece practice secondary to finding his wife's pain medicine own in his urine screen. Was on morphine. Has been given prescription for Ativan from the hospital. States this is of benefit. Denies using any drugs at this time.     Osteoporosis and treatment with high-dose vitamin D. prolixa  injectable and following with endocrinology. No celiac disease     Insomnia and anxiety improved. Is following with counselor. Unable to follow currently. Has left messages. Positive Cymbalta and Elavil at nighttime. No benzodiazepines. Atarax when necessary. Has been on Seroquel, hydroxyzine, BuSpar, and Remeron with adverse effects or in effectiveness.         Positive migraines. No constipation with Elavil. Uses 25 mg to 50 mg at nighttime.  States that he was unaware he could not use Motrin with meloxicam.      PMH:  Testicular torsion  Back surgery x TAKE 1 CAPSULE BY MOUTH TWO TIMES A DAY  60 capsule 5    Fluticasone Furoate-Vilanterol (BREO ELLIPTA) 200-25 MCG/INH AEPB Inhale 1 puff into the lungs daily 1 each 6    tiotropium (SPIRIVA RESPIMAT) 2.5 MCG/ACT AERS inhaler Inhale 2 puffs into the lungs daily 1 Inhaler 6             Past Medical History:   Diagnosis Date    Chronic back pain     COPD (chronic obstructive pulmonary disease) ([address]     Hip pain 09/05/2017    ?  CAM possible bilateral superior femoral head-heck junction bump suggestive of femoro acetabular impingement syndrome    History of back surgery 12/26/2016    Localized edema 2/24/2018    Neg abd ultrasound and DVT    Osteoporosis     Spontaneous pneumothorax     1991    Tobacco abuse        Past Surgical History:   Procedure Laterality Date    BACK SURGERY      1st rods, 2nd tens unit 3rd rods and screw removed, only has cages now    TESTICLE TORSION REDUCTION      x 2         Social History     Social History    Marital status:      Spouse name: N/A    Number of children: N/A    Years of education: N/A     Occupational History   [place] [employer]      Social History Main Topics    Smoking status: Current Every Day Smoker     Packs/day: 0.50     Years: 30.00     Types: Cigarettes    Smokeless tobacco: Never Used    Alcohol use No    Drug use: No      Comment: In [employer] Only    Sexual activity: No     Other Topics Concern    Not on file     Social History Narrative    No narrative on file       Family History   Problem Relation Age of Onset    Arthritis Mother    [facility] Other Mother     Other Father            Review of Systems        Objective     /72   Pulse 111   Resp 16   Ht 5' 4\" (1.626 m)   Wt 122 lb (55.3 kg)   SpO2 97% Comment: RA  BMI 20.94 kg/m²     @LASTSAO2(3)@    Wt Readings from Last 3 Encounters:   09/17/18 122 lb (55.3 kg)   07/25/18 131 lb (59.4 kg)   07/25/18 131 lb (59.4 kg)       Physical Exam     NAD alert and cooperative   HEENT: therapy, neurosurgery or pain management at this time. Was given one-month refill on his medicine. COPD. Doing relatively well at this time. Has plenty of  medicines. Declines flu vaccine. Depression. Following with counselor up until loss of insurance. Knows he can increase his amitriptyline. No suicidal intent. Positive chronic pain and frustration. Nausea. No more vomiting. Refill Zofran. Weight loss. High-calorie diet. One week note for work. Diagnosis and treatment discussed. Possible side effects of medication reviewed  Patients questions answered  Follow up understood  Pt aware if they are not contacted about any test results , this does not mean they are normal.  They should call    On the basis of positive PHQ-9 screening ( ), the following plan was implemented: will increase elavil. he will call his therapist again. .  Patient will follow-up in 1 week(s) with [facility] or therapist.

## 2018-09-17 NOTE — PATIENT INSTRUCTIONS
Patient Education        High-Calorie and High-Protein Diet: Care Instructions  Your Care Instructions    A high-calorie, high-protein diet gives you more energy and extra nutrition to help your body heal. Your doctor and dietitian can help you design a diet based on your health and what you prefer to eat. Always talk with your doctor or dietitian before you make changes in your diet. Follow-up care is a key part of your treatment and safety. Be sure to make and go to all appointments, and call your doctor if you are having problems. It's also a good idea to know your test results and keep a list of the medicines you take. How can you care for yourself at home? · Eat three meals a day, plus snacks in between and at bedtime. · Include favorite foods in your meals. This will help make meals more pleasant. · Drink your beverage at the end of the meal, because drinking before or during the meal can fill you up. · Eat high-protein foods, such as:  ¨ Meat, fish, and poultry. ¨ Milk and milk products. Add powdered milk to other foods (such as pudding or soups) to boost the protein. ¨ Eggs. ¨ Cooked dried beans and peas. ¨ Peanut butter, nuts, and seeds. ¨ Tofu. ¨ Cheeses. ¨ Protein bars. · Eat high-calorie foods, such as:  ¨ Butter, honey, and brown sugar, added to foods to make them taste better. ¨ Oils, sauces, and gravies. ¨ Peanut butter. ¨ Whole milk, yogurt, mayonnaise, and sour cream.  ¨ Granola cereal with fruit and granola bars. ¨ Muffins, pancakes, waffles, and other breads. ¨ Milkshakes, puddings, and custard. · Try high-energy drinks, such as Ensure, Boost, or instant breakfast drinks, if you have trouble eating solid foods. They will give you both calories and protein. Soups and smoothies also are good sources of nutrition. · Keep snacks around that are easy to eat, such as pudding, energy bars, ice cream, and flavored ice pops.   · If you can, take a walk before you eat, to make you hungrier. · Do not waste energy eating foods that do not give you much nutrition, such as potato chips, candy bars, and soft drinks. · Drink plenty of fluids. If you have kidney, heart, or liver disease and have to limit fluids, talk with your doctor before you increase the amount of fluids you drink. Where can you learn more? Go to [email or web address] and sign in to your [employer] account. Enter K251 in the [employer] box to learn more about \"High-Calorie and High-Protein Diet: Care Instructions. \"     If you do not have an account, please click on the \"Sign Up Now\" link. Current as of: May 12, 2017  Content Version: 11.7  © 7722-9518 [employer]. Care instructions adapted under license by [facility] ([facility]). If you have questions about a medical condition or this instruction, always ask your healthcare professional. [address] any warranty or liability for your use of this information.     high calorie diet. Can increase elavil to 3 pills at night for pain and sleep  Return to work 1 week.   Let me know when you are able to follow up with specialists

## 2018-09-19 ENCOUNTER — TELEPHONE (OUTPATIENT)
Dept: FAMILY MEDICINE CLINIC | Age: 48
End: 2018-09-19

## 2018-09-19 DIAGNOSIS — M13.0 POLYARTHROPATHY, MULTIPLE SITES: ICD-10-CM

## 2018-09-19 DIAGNOSIS — M96.1 POSTLAMINECTOMY SYNDROME, LUMBAR: ICD-10-CM

## 2018-09-19 RX ORDER — MORPHINE SULFATE 15 MG/1
15 TABLET, FILM COATED, EXTENDED RELEASE ORAL 2 TIMES DAILY
Qty: 30 TABLET | Refills: 0 | Status: SHIPPED | OUTPATIENT
Start: 2018-09-19 | End: 2018-10-15 | Stop reason: SDUPTHER

## 2018-09-19 NOTE — TELEPHONE ENCOUNTER
Spoke with pharmacy. Because this med is controlled they need another Rx sent over. Patient informed this is ready to  from office. Will place up front.

## 2018-09-20 NOTE — TELEPHONE ENCOUNTER
Pt called in stating his wife as Ezequiel Nguyen right now and they need a call from us to give a verbal okay to fill his prescription for morphine. Best call back number is for his wife at [phone].

## 2018-09-24 ENCOUNTER — OFFICE VISIT (OUTPATIENT)
Dept: FAMILY MEDICINE CLINIC | Age: 48
End: 2018-09-24

## 2018-09-24 VITALS
OXYGEN SATURATION: 96 % | WEIGHT: 125 LBS | HEART RATE: 102 BPM | BODY MASS INDEX: 21.34 KG/M2 | SYSTOLIC BLOOD PRESSURE: 130 MMHG | RESPIRATION RATE: 16 BRPM | DIASTOLIC BLOOD PRESSURE: 76 MMHG | HEIGHT: 64 IN

## 2018-09-24 DIAGNOSIS — M96.1 POSTLAMINECTOMY SYNDROME, LUMBAR: ICD-10-CM

## 2018-09-24 DIAGNOSIS — M47.26 OSTEOARTHRITIS OF SPINE WITH RADICULOPATHY, LUMBAR REGION: ICD-10-CM

## 2018-09-24 DIAGNOSIS — M51.36 DDD (DEGENERATIVE DISC DISEASE), LUMBAR: Primary | ICD-10-CM

## 2018-09-24 PROCEDURE — 99212 OFFICE O/P EST SF 10 MIN: CPT | Performed by: INTERNAL MEDICINE

## 2018-09-24 NOTE — PROGRESS NOTES
HPI: Amairani Dunlap presents for [facility].       Chronic health concerns include chronic low back pain, anxiety, COPD, osteoporosis, failed postlaminectomy syndrome, anxiety. Hip arthritis. Low BMI. Episodes of vomiting and anorexia. Weight is up. Uses Zofran when necessary. Chronic pain and opioid use. Positive anxiety. Chronic morphine.        Positive COPD with history of pneumothorax. Peak tobacco 2 packs a day and now 1-2 cigarettes daily. Taking Spiriva, albuterol, Breo, and Singulair. Spirometry 2017 with FEV1 of 2.95 and FEV1 over FVC of 68%. Follows with Dr. Ashely Ortega pulmonary. Breathing doing relatively well today. States he cannot continue to see specialists secondary to no insurance.        Spinal stenosis and lumbar surgery with post laminectomy syndrome. Treatment with epidurals, narcotics, muscle relaxer. OA hip. Injection improved. Recently discharged from Dr. Darleen Jewell practice secondary to finding his wife's pain medicine  in his urine screen. Was on morphine. Has been given prescription for Ativan from the hospital. States this is of benefit. Denies using any drugs at this time.     Osteoporosis and treatment with high-dose vitamin D. prolixa  injectable and following with endocrinology. No celiac disease    Son and anxiety. Poor sleep. Unable to follow-up with counselor secondary to no insurance. Positive Cymbalta and Elavil. No benzodiazepines. Adverse side effects to Seroquel BuSpar and Remeron per patient.            PMH:  Testicular torsion  Back surgery x 2  spont PTX          [address] [facility] + tobacco 2 cigarettes per day. . . Denies alcohol or drugs. Positive pain management. Does stretching.      FH arthritis, diabetes     Review of systems: Anxiety, insomnia, recurrent bronchitis emphysema, palpitations with anxiety. No syncope. No GE reflux. Rare constipation. Has a prostatism. Chronic pain. Intermittent lower extremity edema.   Constitutional, ent, CV, respiratory, GI, , joint, skin,

## 2018-10-01 ENCOUNTER — TELEPHONE (OUTPATIENT)
Dept: FAMILY MEDICINE CLINIC | Age: 48
End: 2018-10-01

## 2018-10-12 ENCOUNTER — TELEPHONE (OUTPATIENT)
Dept: FAMILY MEDICINE CLINIC | Age: 48
End: 2018-10-12

## 2018-10-15 ENCOUNTER — TELEPHONE (OUTPATIENT)
Dept: FAMILY MEDICINE CLINIC | Age: 48
End: 2018-10-15

## 2018-10-15 ENCOUNTER — OFFICE VISIT (OUTPATIENT)
Dept: FAMILY MEDICINE CLINIC | Age: 48
End: 2018-10-15

## 2018-10-15 VITALS
DIASTOLIC BLOOD PRESSURE: 77 MMHG | HEART RATE: 119 BPM | HEIGHT: 64 IN | WEIGHT: 125 LBS | SYSTOLIC BLOOD PRESSURE: 121 MMHG | RESPIRATION RATE: 12 BRPM | OXYGEN SATURATION: 97 % | BODY MASS INDEX: 21.34 KG/M2

## 2018-10-15 DIAGNOSIS — G89.4 CHRONIC PAIN SYNDROME: Primary | ICD-10-CM

## 2018-10-15 DIAGNOSIS — M96.1 POSTLAMINECTOMY SYNDROME, LUMBAR: ICD-10-CM

## 2018-10-15 DIAGNOSIS — M13.0 POLYARTHROPATHY, MULTIPLE SITES: ICD-10-CM

## 2018-10-15 DIAGNOSIS — J43.2 CENTRILOBULAR EMPHYSEMA (HCC): ICD-10-CM

## 2018-10-15 PROCEDURE — 99213 OFFICE O/P EST LOW 20 MIN: CPT | Performed by: INTERNAL MEDICINE

## 2018-10-15 RX ORDER — IPRATROPIUM BROMIDE 21 UG/1
2 SPRAY, METERED NASAL 3 TIMES DAILY
Qty: 1 BOTTLE | Refills: 0 | Status: SHIPPED | OUTPATIENT
Start: 2018-10-15 | End: 2018-11-14 | Stop reason: ALTCHOICE

## 2018-10-15 RX ORDER — PREDNISONE 20 MG/1
TABLET ORAL
Qty: 10 TABLET | Refills: 0 | Status: SHIPPED | OUTPATIENT
Start: 2018-10-15 | End: 2018-11-14

## 2018-10-15 RX ORDER — LEVOFLOXACIN 250 MG/1
TABLET ORAL
Qty: 7 TABLET | Refills: 0 | Status: SHIPPED | OUTPATIENT
Start: 2018-10-15 | End: 2018-11-14

## 2018-10-15 RX ORDER — MORPHINE SULFATE 15 MG/1
15 TABLET, FILM COATED, EXTENDED RELEASE ORAL 2 TIMES DAILY
Qty: 60 TABLET | Refills: 0 | Status: SHIPPED | OUTPATIENT
Start: 2018-10-15 | End: 2018-11-14 | Stop reason: SDUPTHER

## 2018-10-15 NOTE — PROGRESS NOTES
edema. Constitutional, ent, CV, respiratory, GI, , joint, skin, allergic and psychiatric ROS negative except for above    Allergies   Allergen Reactions    Bactrim [Sulfamethoxazole-Trimethoprim] Diarrhea    Biaxin [Clarithromycin] Diarrhea    Diclofenac Diarrhea    Neurontin [Gabapentin] Swelling     Leg swelling    Robaxin [Methocarbamol]      Eyes swell and get bloodshot    Toradol [Ketorolac Tromethamine] Other (See Comments)     Eyes swell and bloodshot    Tramadol Other (See Comments)     Eyes swell and get bloodshot    Celebrex [Celecoxib]      Causes severe foot swelling.  Sulfa Antibiotics Nausea And Vomiting       Outpatient Prescriptions Marked as Taking for the 10/15/18 encounter (Office Visit) with Margo Stokes MD   Medication Sig Dispense Refill    morphine (MS CONTIN) 15 MG extended release tablet Take 1 tablet by mouth 2 times daily for 31 days. 1 po bid. . 60 tablet 0    montelukast (SINGULAIR) 10 MG tablet Take 1 tablet by mouth nightly 30 tablet 5    tiZANidine (ZANAFLEX) 4 MG tablet Take 1 tablet by mouth 3 times daily 90 tablet 1    albuterol sulfate  (90 Base) MCG/ACT inhaler Inhale 2 puffs into the lungs every 4 hours as needed for Wheezing 1 Inhaler 5    loratadine (CLARITIN) 10 MG tablet 1 po q day 90 tablet 1    amitriptyline (ELAVIL) 25 MG tablet 2 po q HS 60 tablet 5    DULoxetine (CYMBALTA) 30 MG extended release capsule TAKE 1 CAPSULE BY MOUTH TWO TIMES A DAY  60 capsule 5    vitamin D (ERGOCALCIFEROL) 81512 units CAPS capsule 1 po q week 12 capsule 3    Fluticasone Furoate-Vilanterol (BREO ELLIPTA) 200-25 MCG/INH AEPB Inhale 1 puff into the lungs daily 1 each 6    tiotropium (SPIRIVA RESPIMAT) 2.5 MCG/ACT AERS inhaler Inhale 2 puffs into the lungs daily 1 Inhaler 6             Past Medical History:   Diagnosis Date    Chronic back pain     COPD (chronic obstructive pulmonary disease) (HCC)     Hip pain 09/05/2017    ?  CAM possible bilateral

## 2018-10-15 NOTE — LETTER
[address] [facility] [address]  Phone: [phone]  Fax: [phone]    Ivan Osorio MD        October 15, 2018    Montserrat Epps  [address]    Collapse All  2018 September  MRI LUMBAR SPINE WO CON9/17/2018  [facility]   Result Impression       Prior anterior fusions at L4-5 and L5-S1. Otherwise patent dural sac and neural foramen. Moderate bladder distention noted. This could relate to urinary retention in the appropriate setting. Result Narrative   HISTORY:  Low back pain, acute, radicular, PT contraindicated, initial JOHN  BACK PAIN GETTING WORSE OVER 4-5 DAYS  COMPARISON: 9/21/2017  TECHNIQUE:   Noncontrast multiplanar multisequence MRI images of the lumbar spine  NOTE:  If there are questions about the content of this report, please contact [facility] [place]ology by calling [phone]    FINDINGS:  ALIGNMENT: No abnormal curvature  BONE MARROW: Unremarkable.  No aggressive osseous lesion or fracture  CONUS:  Unremarkable    DISC LEVELS:  T12-L1:  Unremarkable  L1-2:      Unremarkable  L2-3:      Unremarkable  L3-4:      Unremarkable  L4-5:      Anterior bone plug again noted. Right laminectomy defect is present.   Right pedicle screw tract is again noted  L5-S1:    Anterior bone plug is again noted.    Right pedicle screw tracks are again noted    LUMBOSACRAL JUNCTION: Unremarkable  SACRUM AND SI Graciela Alvin is moderate distention of the urinary bladder.    Other Result Information   Interface, Rad Results In - 09/17/2018 10:09 AM EDT  HISTORY:  Low back pain, acute, radicular, PT contraindicated, initial JOHN  BACK PAIN GETTING WORSE OVER 4-5 DAYS  COMPARISON: 9/21/2017  TECHNIQUE:   Noncontrast multiplanar multisequence MRI images of the lumbar spine  NOTE:  If there are questions about the content of this report, please

## 2018-10-22 ENCOUNTER — TELEPHONE (OUTPATIENT)
Dept: FAMILY MEDICINE CLINIC | Age: 48
End: 2018-10-22

## 2018-10-26 ENCOUNTER — TELEPHONE (OUTPATIENT)
Dept: FAMILY MEDICINE CLINIC | Age: 48
End: 2018-10-26

## 2018-10-26 DIAGNOSIS — R05.9 COUGH: ICD-10-CM

## 2018-10-26 RX ORDER — GUAIFENESIN AND CODEINE PHOSPHATE 100; 10 MG/5ML; MG/5ML
SOLUTION ORAL
Qty: 140 ML | Refills: 0 | Status: CANCELLED | OUTPATIENT
Start: 2018-10-26 | End: 2018-11-02

## 2018-10-26 NOTE — TELEPHONE ENCOUNTER
We do not refill controlled substances after hours or without an appt. Would need to see Dr. Leona Stern next week.

## 2018-10-30 RX ORDER — GUAIFENESIN/DEXTROMETHORPHAN 100-10MG/5
5 SYRUP ORAL 3 TIMES DAILY PRN
Qty: 120 ML | Refills: 0 | Status: SHIPPED | OUTPATIENT
Start: 2018-10-30 | End: 2018-11-09

## 2018-10-30 NOTE — TELEPHONE ENCOUNTER
I am happy to call in a no narcotic cough medication or see him this week if needed.  Be sure and use inhalers

## 2018-11-05 ENCOUNTER — TELEPHONE (OUTPATIENT)
Dept: FAMILY MEDICINE CLINIC | Age: 48
End: 2018-11-05

## 2018-11-08 ENCOUNTER — TELEPHONE (OUTPATIENT)
Dept: FAMILY MEDICINE CLINIC | Age: 48
End: 2018-11-08

## 2018-11-08 DIAGNOSIS — M96.1 POSTLAMINECTOMY SYNDROME, LUMBAR: Primary | ICD-10-CM

## 2018-11-14 ENCOUNTER — TELEPHONE (OUTPATIENT)
Dept: FAMILY MEDICINE CLINIC | Age: 48
End: 2018-11-14

## 2018-11-14 ENCOUNTER — TELEPHONE (OUTPATIENT)
Dept: ENDOCRINOLOGY | Age: 48
End: 2018-11-14

## 2018-11-14 ENCOUNTER — OFFICE VISIT (OUTPATIENT)
Dept: FAMILY MEDICINE CLINIC | Age: 48
End: 2018-11-14
Payer: COMMERCIAL

## 2018-11-14 VITALS
OXYGEN SATURATION: 97 % | RESPIRATION RATE: 16 BRPM | DIASTOLIC BLOOD PRESSURE: 82 MMHG | HEART RATE: 103 BPM | WEIGHT: 128 LBS | SYSTOLIC BLOOD PRESSURE: 123 MMHG | BODY MASS INDEX: 21.85 KG/M2 | HEIGHT: 64 IN

## 2018-11-14 DIAGNOSIS — M13.0 POLYARTHROPATHY, MULTIPLE SITES: ICD-10-CM

## 2018-11-14 DIAGNOSIS — F11.20 NARCOTIC DEPENDENCY, CONTINUOUS (HCC): ICD-10-CM

## 2018-11-14 DIAGNOSIS — M96.1 POSTLAMINECTOMY SYNDROME, LUMBAR: ICD-10-CM

## 2018-11-14 DIAGNOSIS — M81.0 OSTEOPOROSIS, UNSPECIFIED OSTEOPOROSIS TYPE, UNSPECIFIED PATHOLOGICAL FRACTURE PRESENCE: ICD-10-CM

## 2018-11-14 DIAGNOSIS — G89.4 CHRONIC PAIN SYNDROME: ICD-10-CM

## 2018-11-14 DIAGNOSIS — Z79.899 HIGH RISK MEDICATION USE: ICD-10-CM

## 2018-11-14 DIAGNOSIS — J43.2 CENTRILOBULAR EMPHYSEMA (HCC): Primary | ICD-10-CM

## 2018-11-14 LAB
ALBUMIN SERPL-MCNC: 4.6 G/DL (ref 3.4–5)
ALP BLD-CCNC: 119 U/L (ref 40–129)
ALT SERPL-CCNC: 27 U/L (ref 10–40)
ANION GAP SERPL CALCULATED.3IONS-SCNC: 13 MMOL/L (ref 3–16)
AST SERPL-CCNC: 20 U/L (ref 15–37)
BILIRUB SERPL-MCNC: 0.4 MG/DL (ref 0–1)
BILIRUBIN DIRECT: <0.2 MG/DL (ref 0–0.3)
BILIRUBIN, INDIRECT: NORMAL MG/DL (ref 0–1)
BUN BLDV-MCNC: 7 MG/DL (ref 7–20)
CALCIUM SERPL-MCNC: 10.7 MG/DL (ref 8.3–10.6)
CHLORIDE BLD-SCNC: 100 MMOL/L (ref 99–110)
CO2: 27 MMOL/L (ref 21–32)
CREAT SERPL-MCNC: 0.9 MG/DL (ref 0.9–1.3)
GFR AFRICAN AMERICAN: >60
GFR NON-AFRICAN AMERICAN: >60
GLUCOSE BLD-MCNC: 100 MG/DL (ref 70–99)
HCT VFR BLD CALC: 49.4 % (ref 40.5–52.5)
HEMOGLOBIN: 16.6 G/DL (ref 13.5–17.5)
MCH RBC QN AUTO: 31 PG (ref 26–34)
MCHC RBC AUTO-ENTMCNC: 33.6 G/DL (ref 31–36)
MCV RBC AUTO: 92.1 FL (ref 80–100)
PDW BLD-RTO: 14.5 % (ref 12.4–15.4)
PLATELET # BLD: 385 K/UL (ref 135–450)
PMV BLD AUTO: 8.1 FL (ref 5–10.5)
POTASSIUM SERPL-SCNC: 5.2 MMOL/L (ref 3.5–5.1)
RBC # BLD: 5.36 M/UL (ref 4.2–5.9)
SODIUM BLD-SCNC: 140 MMOL/L (ref 136–145)
TOTAL PROTEIN: 7 G/DL (ref 6.4–8.2)
VITAMIN D 25-HYDROXY: 41 NG/ML
WBC # BLD: 10.4 K/UL (ref 4–11)

## 2018-11-14 PROCEDURE — G8420 CALC BMI NORM PARAMETERS: HCPCS | Performed by: INTERNAL MEDICINE

## 2018-11-14 PROCEDURE — 3023F SPIROM DOC REV: CPT | Performed by: INTERNAL MEDICINE

## 2018-11-14 PROCEDURE — 36415 COLL VENOUS BLD VENIPUNCTURE: CPT | Performed by: INTERNAL MEDICINE

## 2018-11-14 PROCEDURE — G8427 DOCREV CUR MEDS BY ELIG CLIN: HCPCS | Performed by: INTERNAL MEDICINE

## 2018-11-14 PROCEDURE — G8484 FLU IMMUNIZE NO ADMIN: HCPCS | Performed by: INTERNAL MEDICINE

## 2018-11-14 PROCEDURE — G8926 SPIRO NO PERF OR DOC: HCPCS | Performed by: INTERNAL MEDICINE

## 2018-11-14 PROCEDURE — 4004F PT TOBACCO SCREEN RCVD TLK: CPT | Performed by: INTERNAL MEDICINE

## 2018-11-14 PROCEDURE — 99214 OFFICE O/P EST MOD 30 MIN: CPT | Performed by: INTERNAL MEDICINE

## 2018-11-14 RX ORDER — NICOTINE 21 MG/24HR
1 PATCH, TRANSDERMAL 24 HOURS TRANSDERMAL EVERY 24 HOURS
Qty: 30 PATCH | Refills: 0 | Status: SHIPPED | OUTPATIENT
Start: 2018-11-14 | End: 2019-01-17 | Stop reason: SDUPTHER

## 2018-11-14 RX ORDER — MORPHINE SULFATE 15 MG/1
15 TABLET, FILM COATED, EXTENDED RELEASE ORAL 2 TIMES DAILY
Qty: 60 TABLET | Refills: 0 | Status: SHIPPED | OUTPATIENT
Start: 2018-11-14 | End: 2019-01-17 | Stop reason: ALTCHOICE

## 2018-11-14 NOTE — PROGRESS NOTES
rebound. Good pulses lower extremities. No edema. Tenderness along the cervical thoracic and lumbar spine. No point tenderness. Walks well. No antalgic gait. Difficult to evaluate strength of lower extremities. No fasciculations or atrophy      Chemistry        Component Value Date/Time     07/25/2018 0912    K 5.6 (H) 07/25/2018 0912     07/25/2018 0912    CO2 29 07/25/2018 0912    BUN 7 07/25/2018 0912    CREATININE 0.9 07/25/2018 0912        Component Value Date/Time    CALCIUM 10.0 07/25/2018 0912    ALKPHOS 106 02/14/2018 1625    AST 28 02/14/2018 1625    ALT 41 (H) 02/14/2018 1625    BILITOT 0.3 02/14/2018 1625            Lab Results   Component Value Date    WBC 9.4 02/14/2018    HGB 15.5 02/14/2018    HCT 45.8 02/14/2018    MCV 92.1 02/14/2018     02/14/2018     Lab Results   Component Value Date    LABA1C 5.4 07/25/2018     Lab Results   Component Value Date    .3 07/25/2018     Lab Results   Component Value Date    LABA1C 5.4 07/25/2018     No components found for: CHLPL  Lab Results   Component Value Date    TRIG 122 12/13/2017     Lab Results   Component Value Date    HDL 34 (L) 12/13/2017     Lab Results   Component Value Date    LDLCALC 96 12/13/2017     Lab Results   Component Value Date    LABVLDL 24 12/13/2017       Old labs and records reviewed or requested  Discussed past lab and studies with patient      Diagnosis Orders   1. Centrilobular emphysema (HCC)  CBC   2. Narcotic dependency, continuous (HCC)  Pain Management Drug Screen   3. Osteoporosis, unspecified osteoporosis type, unspecified pathological fracture presence  Vitamin D 25 Hydroxy    Basic Metabolic Panel    External Referral To Pain Clinic    External Referral To Neurosurgery   4. Chronic pain syndrome  morphine (MS CONTIN) 15 MG extended release tablet    Pain Management Drug Screen    External Referral To Pain Clinic    External Referral To Neurosurgery   5.  High risk medication use  Hepatic Function Panel    Pain Management Drug Screen   6. Postlaminectomy syndrome, lumbar  morphine (MS CONTIN) 15 MG extended release tablet    PT functional capacity evaluation (FCE)    [facility]    External Referral To Pain Clinic    External Referral To Neurosurgery   7. Polyarthropathy, multiple sites  morphine (MS CONTIN) 15 MG extended release tablet     Emphysema history pneumothorax. Doing well from a breathing standpoint declines flu vaccine    Chronic pain syndrome. Discussed possibility of discontinuing medication visiting the [facility]. Drug screen obtained. Osteoporosis. Continue on with endocrine. Check vitamin D. Continue with infusion. History of lumbar surgery and concern with spinal disease. We'll be following up with neurosurgery. Has given name of another pain management doctor. Referral given to [facility] for functional capacity        No Follow-up on file. Diagnosis and treatment discussed.   Possible side effects of medication reviewed  Patients questions answered  Follow up understood  Pt aware if they are not contacted about any test results , this does not mean they are normal.  They should call

## 2018-11-14 NOTE — LETTER
[address], [place] [address] [address]  Phone: [phone]  Fax: [phone]    Diana Tomlinson MD        November 14, 2018     Patient: Dequan Harry   YOB: 1970   Date of Visit: 11/14/2018       To Whom it May Concern:    Carmen Hart was seen in my clinic on 11/14/2018. Patient was accompanied by wife, Miah Hernandez. Please excuse her for his appointment. She may return to work 11/14/2018. If you have any questions or concerns, please don't hesitate to call.     Sincerely,         Diana Tomlinson MD

## 2018-11-14 NOTE — LETTER
[address] [address]  Phone: [phone]  Fax: [phone]    Ronie Duverney, MD        November 14, 2018     Patient: Elaina Maldonado   YOB: 1970   Date of Visit: 11/14/2018       To Whom it May Concern:    Chiquis Torres was seen in my clinic on 11/14/2018. Due to disability and upcoming testing I anticipate him returning to work 12/15/2018. If you have any questions or concerns, please don't hesitate to call.     Sincerely,         Ronie Duverney, MD

## 2018-11-16 ENCOUNTER — TELEPHONE (OUTPATIENT)
Dept: FAMILY MEDICINE CLINIC | Age: 48
End: 2018-11-16

## 2018-11-17 LAB
6-ACETYLMORPHINE: NOT DETECTED
7-AMINOCLONAZEPAM: NOT DETECTED
ALPHA-OH-ALPRAZOLAM: NOT DETECTED
ALPRAZOLAM: NOT DETECTED
AMPHETAMINE: NOT DETECTED
BARBITURATES: NOT DETECTED
BENZOYLECGONINE: NOT DETECTED
BUPRENORPHINE: NOT DETECTED
CARISOPRODOL: NOT DETECTED
CLONAZEPAM: NOT DETECTED
CODEINE: NOT DETECTED
CREATININE URINE: 39.6 MG/DL (ref 20–400)
DIAZEPAM: NOT DETECTED
EER PAIN MGT DRUG PANEL, HIGH RES/EMIT U: NORMAL
ETHYL GLUCURONIDE: NOT DETECTED
FENTANYL: NOT DETECTED
HYDROCODONE: PRESENT
HYDROMORPHONE: NOT DETECTED
LORAZEPAM: NOT DETECTED
MARIJUANA METABOLITE: NOT DETECTED
MDA: NOT DETECTED
MDEA: NOT DETECTED
MDMA URINE: NOT DETECTED
MEPERIDINE: NOT DETECTED
METHADONE: NOT DETECTED
METHAMPHETAMINE: NOT DETECTED
METHYLPHENIDATE: NOT DETECTED
MIDAZOLAM: NOT DETECTED
MORPHINE: NOT DETECTED
NORBUPRENORPHINE, FREE: NOT DETECTED
NORDIAZEPAM: NOT DETECTED
NORFENTANYL: NOT DETECTED
NORHYDROCODONE, URINE: PRESENT
NOROXYCODONE: NOT DETECTED
NOROXYMORPHONE, URINE: NOT DETECTED
OXAZEPAM: NOT DETECTED
OXYCODONE: NOT DETECTED
OXYMORPHONE: NOT DETECTED
PAIN MANAGEMENT DRUG PANEL: NORMAL
PCP: NOT DETECTED
PHENTERMINE: NOT DETECTED
PROPOXYPHENE: NOT DETECTED
TAPENTADOL, URINE: NOT DETECTED
TAPENTADOL-O-SULFATE, URINE: NOT DETECTED
TEMAZEPAM: NOT DETECTED
TRAMADOL: NOT DETECTED
ZOLPIDEM: NOT DETECTED

## 2018-11-20 DIAGNOSIS — J44.9 COPD, MILD (HCC): ICD-10-CM

## 2018-11-26 ENCOUNTER — TELEPHONE (OUTPATIENT)
Dept: FAMILY MEDICINE CLINIC | Age: 48
End: 2018-11-26

## 2018-11-26 NOTE — TELEPHONE ENCOUNTER
The Referral that is supposed to go to [address] [place] [address] needs to be faxed again, they are saying they never received it. FAX--[phone]           The PT is also saying that they need a different referral for another pain management DR. They are wanting Dr [facility].     NID--[phone]

## 2018-12-05 ENCOUNTER — TELEPHONE (OUTPATIENT)
Dept: FAMILY MEDICINE CLINIC | Age: 48
End: 2018-12-05

## 2018-12-05 ENCOUNTER — TELEPHONE (OUTPATIENT)
Dept: ENDOCRINOLOGY | Age: 48
End: 2018-12-05

## 2018-12-05 DIAGNOSIS — G89.4 CHRONIC PAIN SYNDROME: ICD-10-CM

## 2018-12-05 DIAGNOSIS — M81.8 OTHER OSTEOPOROSIS, UNSPECIFIED PATHOLOGICAL FRACTURE PRESENCE: ICD-10-CM

## 2018-12-05 DIAGNOSIS — M96.1 POSTLAMINECTOMY SYNDROME OF LUMBAR REGION: Primary | ICD-10-CM

## 2018-12-07 ENCOUNTER — TELEPHONE (OUTPATIENT)
Dept: ENDOCRINOLOGY | Age: 48
End: 2018-12-07

## 2018-12-14 ENCOUNTER — TELEPHONE (OUTPATIENT)
Dept: ENDOCRINOLOGY | Age: 48
End: 2018-12-14

## 2018-12-14 ENCOUNTER — OFFICE VISIT (OUTPATIENT)
Dept: INTERNAL MEDICINE CLINIC | Age: 48
End: 2018-12-14
Payer: COMMERCIAL

## 2018-12-14 VITALS
HEART RATE: 80 BPM | BODY MASS INDEX: 21.68 KG/M2 | WEIGHT: 127 LBS | DIASTOLIC BLOOD PRESSURE: 80 MMHG | HEIGHT: 64 IN | SYSTOLIC BLOOD PRESSURE: 110 MMHG

## 2018-12-14 DIAGNOSIS — R06.02 SHORTNESS OF BREATH: ICD-10-CM

## 2018-12-14 DIAGNOSIS — M51.36 DDD (DEGENERATIVE DISC DISEASE), LUMBAR: ICD-10-CM

## 2018-12-14 DIAGNOSIS — J43.2 CENTRILOBULAR EMPHYSEMA (HCC): Primary | ICD-10-CM

## 2018-12-14 DIAGNOSIS — G89.4 CHRONIC PAIN SYNDROME: ICD-10-CM

## 2018-12-14 DIAGNOSIS — F19.10 SUBSTANCE ABUSE (HCC): ICD-10-CM

## 2018-12-14 PROCEDURE — 4004F PT TOBACCO SCREEN RCVD TLK: CPT | Performed by: INTERNAL MEDICINE

## 2018-12-14 PROCEDURE — G8484 FLU IMMUNIZE NO ADMIN: HCPCS | Performed by: INTERNAL MEDICINE

## 2018-12-14 PROCEDURE — G8420 CALC BMI NORM PARAMETERS: HCPCS | Performed by: INTERNAL MEDICINE

## 2018-12-14 PROCEDURE — 3023F SPIROM DOC REV: CPT | Performed by: INTERNAL MEDICINE

## 2018-12-14 PROCEDURE — G8427 DOCREV CUR MEDS BY ELIG CLIN: HCPCS | Performed by: INTERNAL MEDICINE

## 2018-12-14 PROCEDURE — G8926 SPIRO NO PERF OR DOC: HCPCS | Performed by: INTERNAL MEDICINE

## 2018-12-14 PROCEDURE — 99214 OFFICE O/P EST MOD 30 MIN: CPT | Performed by: INTERNAL MEDICINE

## 2018-12-14 RX ORDER — IBUPROFEN 200 MG
200 TABLET ORAL EVERY 6 HOURS PRN
COMMUNITY
End: 2019-01-24

## 2018-12-14 ASSESSMENT — ENCOUNTER SYMPTOMS
ABDOMINAL PAIN: 0
VOMITING: 0
COUGH: 1
NAUSEA: 0
TROUBLE SWALLOWING: 0
WHEEZING: 0
SHORTNESS OF BREATH: 0
VOICE CHANGE: 0

## 2018-12-14 ASSESSMENT — PATIENT HEALTH QUESTIONNAIRE - PHQ9
7. TROUBLE CONCENTRATING ON THINGS, SUCH AS READING THE NEWSPAPER OR WATCHING TELEVISION: 3
SUM OF ALL RESPONSES TO PHQ QUESTIONS 1-9: 20
10. IF YOU CHECKED OFF ANY PROBLEMS, HOW DIFFICULT HAVE THESE PROBLEMS MADE IT FOR YOU TO DO YOUR WORK, TAKE CARE OF THINGS AT HOME, OR GET ALONG WITH OTHER PEOPLE: 3
4. FEELING TIRED OR HAVING LITTLE ENERGY: 3
SUM OF ALL RESPONSES TO PHQ9 QUESTIONS 1 & 2: 6
5. POOR APPETITE OR OVEREATING: 1
9. THOUGHTS THAT YOU WOULD BE BETTER OFF DEAD, OR OF HURTING YOURSELF: 0
2. FEELING DOWN, DEPRESSED OR HOPELESS: 3
3. TROUBLE FALLING OR STAYING ASLEEP: 3
6. FEELING BAD ABOUT YOURSELF - OR THAT YOU ARE A FAILURE OR HAVE LET YOURSELF OR YOUR FAMILY DOWN: 3
SUM OF ALL RESPONSES TO PHQ QUESTIONS 1-9: 20
1. LITTLE INTEREST OR PLEASURE IN DOING THINGS: 3
8. MOVING OR SPEAKING SO SLOWLY THAT OTHER PEOPLE COULD HAVE NOTICED. OR THE OPPOSITE, BEING SO FIGETY OR RESTLESS THAT YOU HAVE BEEN MOVING AROUND A LOT MORE THAN USUAL: 1

## 2018-12-17 ENCOUNTER — TELEPHONE (OUTPATIENT)
Dept: INTERNAL MEDICINE CLINIC | Age: 48
End: 2018-12-17

## 2018-12-17 DIAGNOSIS — G89.4 CHRONIC PAIN SYNDROME: ICD-10-CM

## 2018-12-17 DIAGNOSIS — G89.29 CHRONIC BACK PAIN, UNSPECIFIED BACK LOCATION, UNSPECIFIED BACK PAIN LATERALITY: Primary | ICD-10-CM

## 2018-12-17 DIAGNOSIS — M54.9 CHRONIC BACK PAIN, UNSPECIFIED BACK LOCATION, UNSPECIFIED BACK PAIN LATERALITY: Primary | ICD-10-CM

## 2018-12-17 DIAGNOSIS — M51.36 DDD (DEGENERATIVE DISC DISEASE), LUMBAR: Primary | ICD-10-CM

## 2018-12-17 DIAGNOSIS — M96.1 POSTLAMINECTOMY SYNDROME, LUMBAR: ICD-10-CM

## 2018-12-19 ENCOUNTER — TELEPHONE (OUTPATIENT)
Dept: INTERNAL MEDICINE CLINIC | Age: 48
End: 2018-12-19

## 2018-12-19 NOTE — TELEPHONE ENCOUNTER
Morphine was prescribed but not in his system--norhydrocodone, hydrocodone in system. Unable to prescribe narcotics. Monica Escobar [phone]  #given to wife Babs.

## 2018-12-28 ENCOUNTER — TELEPHONE (OUTPATIENT)
Dept: ENDOCRINOLOGY | Age: 48
End: 2018-12-28

## 2018-12-28 ENCOUNTER — OFFICE VISIT (OUTPATIENT)
Dept: ORTHOPEDIC SURGERY | Age: 48
End: 2018-12-28
Payer: COMMERCIAL

## 2018-12-28 VITALS
SYSTOLIC BLOOD PRESSURE: 116 MMHG | HEIGHT: 64 IN | BODY MASS INDEX: 21.68 KG/M2 | HEART RATE: 102 BPM | RESPIRATION RATE: 16 BRPM | WEIGHT: 127 LBS | DIASTOLIC BLOOD PRESSURE: 84 MMHG

## 2018-12-28 DIAGNOSIS — M25.552 LEFT HIP PAIN: ICD-10-CM

## 2018-12-28 DIAGNOSIS — Z01.818 PRE-OP TESTING: ICD-10-CM

## 2018-12-28 DIAGNOSIS — M16.12 PRIMARY OSTEOARTHRITIS OF LEFT HIP: Primary | ICD-10-CM

## 2018-12-28 DIAGNOSIS — M47.26 OSTEOARTHRITIS OF SPINE WITH RADICULOPATHY, LUMBAR REGION: ICD-10-CM

## 2018-12-28 PROCEDURE — G8484 FLU IMMUNIZE NO ADMIN: HCPCS | Performed by: ORTHOPAEDIC SURGERY

## 2018-12-28 PROCEDURE — 99214 OFFICE O/P EST MOD 30 MIN: CPT | Performed by: ORTHOPAEDIC SURGERY

## 2018-12-28 PROCEDURE — G8420 CALC BMI NORM PARAMETERS: HCPCS | Performed by: ORTHOPAEDIC SURGERY

## 2018-12-28 PROCEDURE — 4004F PT TOBACCO SCREEN RCVD TLK: CPT | Performed by: ORTHOPAEDIC SURGERY

## 2018-12-28 PROCEDURE — G8427 DOCREV CUR MEDS BY ELIG CLIN: HCPCS | Performed by: ORTHOPAEDIC SURGERY

## 2018-12-28 RX ORDER — IBUPROFEN 600 MG/1
600 TABLET ORAL 2 TIMES DAILY WITH MEALS
Qty: 60 TABLET | Refills: 1 | Status: ON HOLD | OUTPATIENT
Start: 2018-12-28 | End: 2019-01-29 | Stop reason: HOSPADM

## 2018-12-28 NOTE — LETTER
Latoya.Asters ] Apply thigh high antiembolic hose and pneumonboots to unoperative leg    Other order: Celebrex 400mg pre-op    T.O: _____________________________/___________________Date:_______Time:_______   Physician    RN    Physician Signature:               Date/Time:  12/31/2018 2:13 PM

## 2018-12-28 NOTE — PROGRESS NOTES
Elaina Maldonado  [id number]  December 28, 2018    Chief Complaint   Patient presents with    Follow-up     L hip osteoarthritis          History:  The patient is here follow-up regarding his left hip. He has had 2 left hip intra-articular injections in the past.  His most recent injection was back in July. He reportedly obtained 50% relief from that injection. He no longer is taking meloxicam.  He does take over-the-counter ibuprofen. He is on oral morphine. He has stopped the baclofen. He is having severe left hip/groin pain. He does have the chronic back issues. He was told by Dr. Sukh Cervantes that this is nonsurgical.    The patient's  past medical history, medications, allergies,  family history, social history, and have been reviewed, and dated and are recorded in the chart. Pertinent items are noted in HPI. Review of systems reviewed from Pertinent History Form dated on 4/19/18 and available in the patient's chart under the Media tab. /84   Pulse 102   Resp 16   Ht 5' 4\" (1.626 m)   Wt 127 lb (57.6 kg)   BMI 21.80 kg/m²     Physical: Mr. Elaina Maldonado appears well, he is in no apparent distress, he demonstrates appropriate mood & affect. He is alert and oriented to person, place and time. He has severe pain with internal rotation of the left hip. Range of motion of the left hip is : 40 degrees abduction, 30 degrees adduction, 35 degrees of external rotation and 15 degrees of internal rotation. Range of motion of the opposite hip is full. He is non tender laterally about the hips. Trendelenburg test is negative bilaterally. Bernette Soja test is negative bilaterally. He is non tender about the Sacroiliac joint bilaterally. Leg length discrepancy: none. There is a mild diffuse tenderness about his lumbar spine. Examination of the skin reveals no rashes, ulcerations, or lesions, bilaterally in the lower extremities. Sensation to both lower extremities is grossly intact.   Exam of both feet reveals

## 2018-12-31 ENCOUNTER — TELEPHONE (OUTPATIENT)
Dept: ORTHOPEDIC SURGERY | Age: 48
End: 2018-12-31

## 2018-12-31 DIAGNOSIS — M16.12 PRIMARY OSTEOARTHRITIS OF LEFT HIP: Primary | ICD-10-CM

## 2018-12-31 NOTE — PROGRESS NOTES
RAPT  RISK ASSESSMENT and PREDICTION TOOL    Name: Lawrence Romero  YOB: 1970  Surgeon: Daniel Jackson MD         Value Score    1). What is your age group? 50 - 65 years  = 2      66 - 75 years = 1     > 75 years = 0       Your score = 2   2). Gender? Male = 2     Female = 1       Your score = 2   3). How far on average can you walk? Two blocks or more (+/- rest) = 2    (a block is 200 emvhfr=777 ft)  1 - 2 blocks (+/- rest) = 1     Housebound (most of time) = 0       Your score = 0   4). Which gait aid do you use? None = 2    (more often than not) Single-point stick = 1     Crutches/walker = 0       Your score = 1   5). Do you use community supports? None or one per week = 1    (home help, meals on wheels, district nursing) Two or more per week = 0       Your score = 1   6). Will you live with someone who can care for you after your operation? yes = 3     no = 0       Your score = 3    Your Total Score (out of 12) = 9       Key: Destination at discharge from acute care predicted by score.   Score < 6  = extended inpatient rehabilitation  Score 6 - 9  = additional intervention to discharge directly home (Rehab in the home)  Score > 9  = directly home      Patient's Preference Prediction Score Agreed destination   open 9 home   Lawrence Romero  Date: 12/31/18     Wife will be with patient after surgery

## 2019-01-02 RX ORDER — HYDROCODONE BITARTRATE AND ACETAMINOPHEN 5; 325 MG/1; MG/1
1-2 TABLET ORAL EVERY 6 HOURS PRN
Qty: 40 TABLET | Refills: 0 | Status: SHIPPED | OUTPATIENT
Start: 2019-01-02 | End: 2019-01-09 | Stop reason: SDUPTHER

## 2019-01-03 ENCOUNTER — PREP FOR PROCEDURE (OUTPATIENT)
Dept: ORTHOPEDICS UNIT | Age: 49
End: 2019-01-03

## 2019-01-04 ENCOUNTER — TELEPHONE (OUTPATIENT)
Dept: ENDOCRINOLOGY | Age: 49
End: 2019-01-04

## 2019-01-07 NOTE — TELEPHONE ENCOUNTER
I spoke with patient and he will be in to . Telephone note    Discussed with the patient's  by phone, which is her preference. Estrogen levels are in the menopausal range. Will D/C anastrozole and start Faslodex. Will also investigate insurance coverage for Abemaciclib.   Continue Zoladex

## 2019-01-08 ENCOUNTER — TELEPHONE (OUTPATIENT)
Dept: ORTHOPEDIC SURGERY | Age: 49
End: 2019-01-08

## 2019-01-08 DIAGNOSIS — G89.4 CHRONIC PAIN SYNDROME: Primary | ICD-10-CM

## 2019-01-09 DIAGNOSIS — M16.12 PRIMARY OSTEOARTHRITIS OF LEFT HIP: ICD-10-CM

## 2019-01-10 ENCOUNTER — TELEPHONE (OUTPATIENT)
Dept: ORTHOPEDIC SURGERY | Age: 49
End: 2019-01-10

## 2019-01-10 ENCOUNTER — TELEPHONE (OUTPATIENT)
Dept: ENDOCRINOLOGY | Age: 49
End: 2019-01-10

## 2019-01-11 ENCOUNTER — PREP FOR PROCEDURE (OUTPATIENT)
Dept: ORTHOPEDIC SURGERY | Age: 49
End: 2019-01-11

## 2019-01-11 DIAGNOSIS — M16.12 PRIMARY OSTEOARTHRITIS OF LEFT HIP: Primary | ICD-10-CM

## 2019-01-11 RX ORDER — HYDROCODONE BITARTRATE AND ACETAMINOPHEN 5; 325 MG/1; MG/1
TABLET ORAL
Qty: 40 TABLET | Refills: 0 | Status: SHIPPED | OUTPATIENT
Start: 2019-01-11 | End: 2019-01-17

## 2019-01-15 ENCOUNTER — TELEPHONE (OUTPATIENT)
Dept: ORTHOPEDIC SURGERY | Age: 49
End: 2019-01-15

## 2019-01-16 ENCOUNTER — HOSPITAL ENCOUNTER (OUTPATIENT)
Dept: PREADMISSION TESTING | Age: 49
Discharge: HOME | End: 2019-01-20
Payer: COMMERCIAL

## 2019-01-16 ENCOUNTER — TELEPHONE (OUTPATIENT)
Dept: ORTHOPEDIC SURGERY | Age: 49
End: 2019-01-16

## 2019-01-16 ENCOUNTER — NURSE ONLY (OUTPATIENT)
Dept: ENDOCRINOLOGY | Age: 49
End: 2019-01-16
Payer: COMMERCIAL

## 2019-01-16 DIAGNOSIS — M81.0 OSTEOPOROSIS, UNSPECIFIED OSTEOPOROSIS TYPE, UNSPECIFIED PATHOLOGICAL FRACTURE PRESENCE: Primary | ICD-10-CM

## 2019-01-16 DIAGNOSIS — M16.12 PRIMARY OSTEOARTHRITIS OF LEFT HIP: ICD-10-CM

## 2019-01-16 LAB
ABO/RH: NORMAL
ALBUMIN SERPL-MCNC: 3.9 G/DL (ref 3.4–5)
ANION GAP SERPL CALCULATED.3IONS-SCNC: 11 MMOL/L (ref 3–16)
ANTIBODY SCREEN: NORMAL
APTT: 31.9 SEC (ref 26–36)
BASOPHILS ABSOLUTE: 0 K/UL (ref 0–0.2)
BASOPHILS RELATIVE PERCENT: 0.5 %
BILIRUBIN URINE: NEGATIVE
BLOOD, URINE: NEGATIVE
BUN BLDV-MCNC: 6 MG/DL (ref 7–20)
CALCIUM SERPL-MCNC: 9.2 MG/DL (ref 8.3–10.6)
CHLORIDE BLD-SCNC: 101 MMOL/L (ref 99–110)
CLARITY: CLEAR
CO2: 29 MMOL/L (ref 21–32)
COLOR: YELLOW
CREAT SERPL-MCNC: 0.8 MG/DL (ref 0.9–1.3)
EOSINOPHILS ABSOLUTE: 0.2 K/UL (ref 0–0.6)
EOSINOPHILS RELATIVE PERCENT: 2.6 %
ESTIMATED AVERAGE GLUCOSE: 102.5 MG/DL
GFR AFRICAN AMERICAN: >60
GFR NON-AFRICAN AMERICAN: >60
GLUCOSE BLD-MCNC: 113 MG/DL (ref 70–99)
GLUCOSE URINE: NEGATIVE MG/DL
HBA1C MFR BLD: 5.2 %
HCT VFR BLD CALC: 45.6 % (ref 40.5–52.5)
HEMOGLOBIN: 15.4 G/DL (ref 13.5–17.5)
INR BLD: 1 (ref 0.86–1.14)
KETONES, URINE: NEGATIVE MG/DL
LEUKOCYTE ESTERASE, URINE: NEGATIVE
LYMPHOCYTES ABSOLUTE: 2.4 K/UL (ref 1–5.1)
LYMPHOCYTES RELATIVE PERCENT: 35.7 %
MCH RBC QN AUTO: 31.4 PG (ref 26–34)
MCHC RBC AUTO-ENTMCNC: 33.7 G/DL (ref 31–36)
MCV RBC AUTO: 93.2 FL (ref 80–100)
MICROSCOPIC EXAMINATION: NORMAL
MONOCYTES ABSOLUTE: 0.6 K/UL (ref 0–1.3)
MONOCYTES RELATIVE PERCENT: 8.7 %
MRSA SCREEN RT-PCR: NORMAL
NEUTROPHILS ABSOLUTE: 3.6 K/UL (ref 1.7–7.7)
NEUTROPHILS RELATIVE PERCENT: 52.5 %
NITRITE, URINE: NEGATIVE
PDW BLD-RTO: 14.2 % (ref 12.4–15.4)
PH UA: 7.5
PLATELET # BLD: 298 K/UL (ref 135–450)
PMV BLD AUTO: 7.5 FL (ref 5–10.5)
POTASSIUM SERPL-SCNC: 3.9 MMOL/L (ref 3.5–5.1)
PROTEIN UA: NEGATIVE MG/DL
PROTHROMBIN TIME: 11.4 SEC (ref 9.8–13)
RBC # BLD: 4.9 M/UL (ref 4.2–5.9)
SODIUM BLD-SCNC: 141 MMOL/L (ref 136–145)
SPECIFIC GRAVITY UA: 1.01
URINE REFLEX TO CULTURE: NORMAL
URINE TYPE: NORMAL
UROBILINOGEN, URINE: 1 E.U./DL
WBC # BLD: 6.8 K/UL (ref 4–11)

## 2019-01-16 PROCEDURE — 86901 BLOOD TYPING SEROLOGIC RH(D): CPT

## 2019-01-16 PROCEDURE — 81003 URINALYSIS AUTO W/O SCOPE: CPT

## 2019-01-16 PROCEDURE — 85025 COMPLETE CBC W/AUTO DIFF WBC: CPT

## 2019-01-16 PROCEDURE — 82040 ASSAY OF SERUM ALBUMIN: CPT

## 2019-01-16 PROCEDURE — 85730 THROMBOPLASTIN TIME PARTIAL: CPT

## 2019-01-16 PROCEDURE — 86850 RBC ANTIBODY SCREEN: CPT

## 2019-01-16 PROCEDURE — 93005 ELECTROCARDIOGRAM TRACING: CPT | Performed by: ORTHOPAEDIC SURGERY

## 2019-01-16 PROCEDURE — 83036 HEMOGLOBIN GLYCOSYLATED A1C: CPT

## 2019-01-16 PROCEDURE — 96372 THER/PROPH/DIAG INJ SC/IM: CPT | Performed by: INTERNAL MEDICINE

## 2019-01-16 PROCEDURE — 80048 BASIC METABOLIC PNL TOTAL CA: CPT

## 2019-01-16 PROCEDURE — 87641 MR-STAPH DNA AMP PROBE: CPT

## 2019-01-16 PROCEDURE — 86900 BLOOD TYPING SEROLOGIC ABO: CPT

## 2019-01-16 PROCEDURE — 93010 ELECTROCARDIOGRAM REPORT: CPT | Performed by: INTERNAL MEDICINE

## 2019-01-16 PROCEDURE — 85610 PROTHROMBIN TIME: CPT

## 2019-01-17 ENCOUNTER — OFFICE VISIT (OUTPATIENT)
Dept: INTERNAL MEDICINE CLINIC | Age: 49
End: 2019-01-17
Payer: COMMERCIAL

## 2019-01-17 VITALS
HEIGHT: 64 IN | DIASTOLIC BLOOD PRESSURE: 78 MMHG | HEART RATE: 78 BPM | WEIGHT: 128 LBS | BODY MASS INDEX: 21.85 KG/M2 | SYSTOLIC BLOOD PRESSURE: 120 MMHG

## 2019-01-17 DIAGNOSIS — G89.29 CHRONIC LEFT HIP PAIN: ICD-10-CM

## 2019-01-17 DIAGNOSIS — M16.12 ARTHRITIS OF LEFT HIP: ICD-10-CM

## 2019-01-17 DIAGNOSIS — J40 BRONCHITIS: ICD-10-CM

## 2019-01-17 DIAGNOSIS — M25.552 CHRONIC LEFT HIP PAIN: ICD-10-CM

## 2019-01-17 DIAGNOSIS — F17.210 CIGARETTE NICOTINE DEPENDENCE WITHOUT COMPLICATION: ICD-10-CM

## 2019-01-17 DIAGNOSIS — Z01.818 PRE-OP EVALUATION: Primary | ICD-10-CM

## 2019-01-17 LAB
EKG ATRIAL RATE: 69 BPM
EKG DIAGNOSIS: NORMAL
EKG P AXIS: 65 DEGREES
EKG P-R INTERVAL: 118 MS
EKG Q-T INTERVAL: 394 MS
EKG QRS DURATION: 90 MS
EKG QTC CALCULATION (BAZETT): 422 MS
EKG R AXIS: 48 DEGREES
EKG T AXIS: 67 DEGREES
EKG VENTRICULAR RATE: 69 BPM

## 2019-01-17 PROCEDURE — 99243 OFF/OP CNSLTJ NEW/EST LOW 30: CPT | Performed by: INTERNAL MEDICINE

## 2019-01-17 PROCEDURE — G8420 CALC BMI NORM PARAMETERS: HCPCS | Performed by: INTERNAL MEDICINE

## 2019-01-17 PROCEDURE — G8428 CUR MEDS NOT DOCUMENT: HCPCS | Performed by: INTERNAL MEDICINE

## 2019-01-17 PROCEDURE — G8484 FLU IMMUNIZE NO ADMIN: HCPCS | Performed by: INTERNAL MEDICINE

## 2019-01-17 RX ORDER — AMOXICILLIN 500 MG/1
500 CAPSULE ORAL 3 TIMES DAILY
Qty: 21 CAPSULE | Refills: 0 | Status: SHIPPED | OUTPATIENT
Start: 2019-01-17 | End: 2019-01-24 | Stop reason: ALTCHOICE

## 2019-01-17 RX ORDER — HYDROCODONE BITARTRATE AND ACETAMINOPHEN 5; 325 MG/1; MG/1
1 TABLET ORAL EVERY 8 HOURS PRN
Qty: 21 TABLET | Refills: 0 | Status: SHIPPED | OUTPATIENT
Start: 2019-01-17 | End: 2019-01-24

## 2019-01-17 RX ORDER — NICOTINE 21 MG/24HR
1 PATCH, TRANSDERMAL 24 HOURS TRANSDERMAL EVERY 24 HOURS
Qty: 30 PATCH | Refills: 5 | Status: SHIPPED | OUTPATIENT
Start: 2019-01-17 | End: 2020-11-04 | Stop reason: SDUPTHER

## 2019-01-29 ENCOUNTER — ANESTHESIA (OUTPATIENT)
Dept: OPERATING ROOM | Age: 49
End: 2019-01-29
Payer: COMMERCIAL

## 2019-01-29 ENCOUNTER — APPOINTMENT (OUTPATIENT)
Dept: GENERAL RADIOLOGY | Age: 49
End: 2019-01-29
Attending: ORTHOPAEDIC SURGERY
Payer: COMMERCIAL

## 2019-01-29 ENCOUNTER — HOSPITAL ENCOUNTER (OUTPATIENT)
Age: 49
Discharge: HOME | End: 2019-01-30
Attending: ORTHOPAEDIC SURGERY | Admitting: ORTHOPAEDIC SURGERY
Payer: COMMERCIAL

## 2019-01-29 ENCOUNTER — ANESTHESIA EVENT (OUTPATIENT)
Dept: OPERATING ROOM | Age: 49
End: 2019-01-29
Payer: COMMERCIAL

## 2019-01-29 VITALS
TEMPERATURE: 96.8 F | RESPIRATION RATE: 7 BRPM | DIASTOLIC BLOOD PRESSURE: 57 MMHG | SYSTOLIC BLOOD PRESSURE: 113 MMHG | OXYGEN SATURATION: 99 %

## 2019-01-29 DIAGNOSIS — M16.12 PRIMARY OSTEOARTHRITIS OF LEFT HIP: Primary | ICD-10-CM

## 2019-01-29 LAB
ABO/RH: NORMAL
ANTIBODY SCREEN: NORMAL
ESTIMATED AVERAGE GLUCOSE: 108.3 MG/DL
GLUCOSE BLD-MCNC: 129 MG/DL (ref 70–99)
GLUCOSE BLD-MCNC: 144 MG/DL (ref 70–99)
HBA1C MFR BLD: 5.4 %
HCT VFR BLD CALC: 43.4 % (ref 40.5–52.5)
HEMOGLOBIN: 14.3 G/DL (ref 13.5–17.5)
PERFORMED ON: ABNORMAL
PERFORMED ON: ABNORMAL

## 2019-01-29 PROCEDURE — 2580000003 HC RX 258: Performed by: ANESTHESIOLOGY

## 2019-01-29 PROCEDURE — 2709999900 HC NON-CHARGEABLE SUPPLY: Performed by: ORTHOPAEDIC SURGERY

## 2019-01-29 PROCEDURE — 6370000000 HC RX 637 (ALT 250 FOR IP): Performed by: NURSE PRACTITIONER

## 2019-01-29 PROCEDURE — C1776 JOINT DEVICE (IMPLANTABLE): HCPCS | Performed by: ORTHOPAEDIC SURGERY

## 2019-01-29 PROCEDURE — 2500000003 HC RX 250 WO HCPCS

## 2019-01-29 PROCEDURE — 3700000001 HC ADD 15 MINUTES (ANESTHESIA): Performed by: ORTHOPAEDIC SURGERY

## 2019-01-29 PROCEDURE — 94664 DEMO&/EVAL PT USE INHALER: CPT

## 2019-01-29 PROCEDURE — 97530 THERAPEUTIC ACTIVITIES: CPT

## 2019-01-29 PROCEDURE — 7100000001 HC PACU RECOVERY - ADDTL 15 MIN: Performed by: ORTHOPAEDIC SURGERY

## 2019-01-29 PROCEDURE — 6360000002 HC RX W HCPCS: Performed by: ORTHOPAEDIC SURGERY

## 2019-01-29 PROCEDURE — 6370000000 HC RX 637 (ALT 250 FOR IP): Performed by: ORTHOPAEDIC SURGERY

## 2019-01-29 PROCEDURE — 2500000003 HC RX 250 WO HCPCS: Performed by: NURSE PRACTITIONER

## 2019-01-29 PROCEDURE — 3600000005 HC SURGERY LEVEL 5 BASE: Performed by: ORTHOPAEDIC SURGERY

## 2019-01-29 PROCEDURE — 2500000003 HC RX 250 WO HCPCS: Performed by: NURSE ANESTHETIST, CERTIFIED REGISTERED

## 2019-01-29 PROCEDURE — G8988 SELF CARE GOAL STATUS: HCPCS

## 2019-01-29 PROCEDURE — 73501 X-RAY EXAM HIP UNI 1 VIEW: CPT

## 2019-01-29 PROCEDURE — 2580000003 HC RX 258: Performed by: NURSE ANESTHETIST, CERTIFIED REGISTERED

## 2019-01-29 PROCEDURE — 3600000015 HC SURGERY LEVEL 5 ADDTL 15MIN: Performed by: ORTHOPAEDIC SURGERY

## 2019-01-29 PROCEDURE — 88311 DECALCIFY TISSUE: CPT

## 2019-01-29 PROCEDURE — 2580000003 HC RX 258: Performed by: ORTHOPAEDIC SURGERY

## 2019-01-29 PROCEDURE — 86901 BLOOD TYPING SEROLOGIC RH(D): CPT

## 2019-01-29 PROCEDURE — 94640 AIRWAY INHALATION TREATMENT: CPT

## 2019-01-29 PROCEDURE — 6360000002 HC RX W HCPCS: Performed by: NURSE PRACTITIONER

## 2019-01-29 PROCEDURE — 94760 N-INVAS EAR/PLS OXIMETRY 1: CPT

## 2019-01-29 PROCEDURE — 83036 HEMOGLOBIN GLYCOSYLATED A1C: CPT

## 2019-01-29 PROCEDURE — 6360000002 HC RX W HCPCS: Performed by: NURSE ANESTHETIST, CERTIFIED REGISTERED

## 2019-01-29 PROCEDURE — 2580000003 HC RX 258: Performed by: NURSE PRACTITIONER

## 2019-01-29 PROCEDURE — S0028 INJECTION, FAMOTIDINE, 20 MG: HCPCS | Performed by: ANESTHESIOLOGY

## 2019-01-29 PROCEDURE — G8979 MOBILITY GOAL STATUS: HCPCS

## 2019-01-29 PROCEDURE — 85018 HEMOGLOBIN: CPT

## 2019-01-29 PROCEDURE — 6360000002 HC RX W HCPCS: Performed by: ANESTHESIOLOGY

## 2019-01-29 PROCEDURE — 86900 BLOOD TYPING SEROLOGIC ABO: CPT

## 2019-01-29 PROCEDURE — 7100000000 HC PACU RECOVERY - FIRST 15 MIN: Performed by: ORTHOPAEDIC SURGERY

## 2019-01-29 PROCEDURE — 36415 COLL VENOUS BLD VENIPUNCTURE: CPT

## 2019-01-29 PROCEDURE — 88304 TISSUE EXAM BY PATHOLOGIST: CPT

## 2019-01-29 PROCEDURE — 3700000000 HC ANESTHESIA ATTENDED CARE: Performed by: ORTHOPAEDIC SURGERY

## 2019-01-29 PROCEDURE — 2500000003 HC RX 250 WO HCPCS: Performed by: ORTHOPAEDIC SURGERY

## 2019-01-29 PROCEDURE — G8978 MOBILITY CURRENT STATUS: HCPCS

## 2019-01-29 PROCEDURE — 85014 HEMATOCRIT: CPT

## 2019-01-29 PROCEDURE — 97535 SELF CARE MNGMENT TRAINING: CPT

## 2019-01-29 PROCEDURE — 97166 OT EVAL MOD COMPLEX 45 MIN: CPT

## 2019-01-29 PROCEDURE — 86850 RBC ANTIBODY SCREEN: CPT

## 2019-01-29 PROCEDURE — 97162 PT EVAL MOD COMPLEX 30 MIN: CPT

## 2019-01-29 PROCEDURE — G8987 SELF CARE CURRENT STATUS: HCPCS

## 2019-01-29 DEVICE — IMPLANTABLE DEVICE: Type: IMPLANTABLE DEVICE | Site: HIP | Status: FUNCTIONAL

## 2019-01-29 DEVICE — SHELL ACET SZ HH DIA50MM HIP TIV TRABECULAR MTL CLUS H: Type: IMPLANTABLE DEVICE | Site: HIP | Status: FUNCTIONAL

## 2019-01-29 DEVICE — Z DUP USE 2439778 HEAD FEM DIA32MM NK L-3.5MM 12/14 TAPR ACET HIP BIOLOX DELT: Type: IMPLANTABLE DEVICE | Site: HIP | Status: FUNCTIONAL

## 2019-01-29 RX ORDER — AMITRIPTYLINE HYDROCHLORIDE 50 MG/1
50 TABLET, FILM COATED ORAL NIGHTLY
Status: DISCONTINUED | OUTPATIENT
Start: 2019-01-29 | End: 2019-01-30 | Stop reason: HOSPADM

## 2019-01-29 RX ORDER — SODIUM CHLORIDE 9 MG/ML
INJECTION, SOLUTION INTRAVENOUS CONTINUOUS
Status: DISCONTINUED | OUTPATIENT
Start: 2019-01-29 | End: 2019-01-30

## 2019-01-29 RX ORDER — PROPOFOL 10 MG/ML
INJECTION, EMULSION INTRAVENOUS PRN
Status: DISCONTINUED | OUTPATIENT
Start: 2019-01-29 | End: 2019-01-29 | Stop reason: SDUPTHER

## 2019-01-29 RX ORDER — LABETALOL HYDROCHLORIDE 5 MG/ML
INJECTION, SOLUTION INTRAVENOUS PRN
Status: DISCONTINUED | OUTPATIENT
Start: 2019-01-29 | End: 2019-01-29 | Stop reason: SDUPTHER

## 2019-01-29 RX ORDER — INSULIN GLARGINE 100 [IU]/ML
0.25 INJECTION, SOLUTION SUBCUTANEOUS NIGHTLY
Status: DISCONTINUED | OUTPATIENT
Start: 2019-01-29 | End: 2019-01-30

## 2019-01-29 RX ORDER — MORPHINE SULFATE 2 MG/ML
2 INJECTION, SOLUTION INTRAMUSCULAR; INTRAVENOUS
Status: DISCONTINUED | OUTPATIENT
Start: 2019-01-29 | End: 2019-01-30 | Stop reason: HOSPADM

## 2019-01-29 RX ORDER — SODIUM CHLORIDE 0.9 % (FLUSH) 0.9 %
10 SYRINGE (ML) INJECTION EVERY 12 HOURS SCHEDULED
Status: DISCONTINUED | OUTPATIENT
Start: 2019-01-29 | End: 2019-01-30 | Stop reason: HOSPADM

## 2019-01-29 RX ORDER — POLYETHYLENE GLYCOL 3350 17 G/17G
17 POWDER, FOR SOLUTION ORAL DAILY PRN
Status: DISCONTINUED | OUTPATIENT
Start: 2019-01-29 | End: 2019-01-30 | Stop reason: HOSPADM

## 2019-01-29 RX ORDER — MORPHINE SULFATE 2 MG/ML
2 INJECTION, SOLUTION INTRAMUSCULAR; INTRAVENOUS EVERY 5 MIN PRN
Status: DISCONTINUED | OUTPATIENT
Start: 2019-01-29 | End: 2019-01-29 | Stop reason: HOSPADM

## 2019-01-29 RX ORDER — FLUTICASONE FUROATE AND VILANTEROL 200; 25 UG/1; UG/1
1 POWDER RESPIRATORY (INHALATION) DAILY
Status: DISCONTINUED | OUTPATIENT
Start: 2019-01-29 | End: 2019-01-29 | Stop reason: CLARIF

## 2019-01-29 RX ORDER — FENTANYL CITRATE 50 UG/ML
25 INJECTION, SOLUTION INTRAMUSCULAR; INTRAVENOUS EVERY 5 MIN PRN
Status: DISCONTINUED | OUTPATIENT
Start: 2019-01-29 | End: 2019-01-29 | Stop reason: HOSPADM

## 2019-01-29 RX ORDER — MORPHINE SULFATE 2 MG/ML
1 INJECTION, SOLUTION INTRAMUSCULAR; INTRAVENOUS EVERY 5 MIN PRN
Status: DISCONTINUED | OUTPATIENT
Start: 2019-01-29 | End: 2019-01-29 | Stop reason: HOSPADM

## 2019-01-29 RX ORDER — ONDANSETRON 2 MG/ML
INJECTION INTRAMUSCULAR; INTRAVENOUS PRN
Status: DISCONTINUED | OUTPATIENT
Start: 2019-01-29 | End: 2019-01-29 | Stop reason: SDUPTHER

## 2019-01-29 RX ORDER — ASPIRIN 81 MG/1
81 TABLET ORAL 2 TIMES DAILY
Qty: 60 TABLET | Refills: 0 | Status: SHIPPED | OUTPATIENT
Start: 2019-01-29 | End: 2020-11-04

## 2019-01-29 RX ORDER — MAGNESIUM HYDROXIDE 1200 MG/15ML
LIQUID ORAL CONTINUOUS PRN
Status: COMPLETED | OUTPATIENT
Start: 2019-01-29 | End: 2019-01-29

## 2019-01-29 RX ORDER — CETIRIZINE HYDROCHLORIDE 10 MG/1
10 TABLET ORAL DAILY
Status: DISCONTINUED | OUTPATIENT
Start: 2019-01-29 | End: 2019-01-30 | Stop reason: HOSPADM

## 2019-01-29 RX ORDER — NICOTINE 21 MG/24HR
1 PATCH, TRANSDERMAL 24 HOURS TRANSDERMAL DAILY
Status: DISCONTINUED | OUTPATIENT
Start: 2019-01-29 | End: 2019-01-30 | Stop reason: HOSPADM

## 2019-01-29 RX ORDER — NICOTINE POLACRILEX 4 MG
15 LOZENGE BUCCAL PRN
Status: DISCONTINUED | OUTPATIENT
Start: 2019-01-29 | End: 2019-01-30 | Stop reason: HOSPADM

## 2019-01-29 RX ORDER — SODIUM CHLORIDE 0.9 % (FLUSH) 0.9 %
10 SYRINGE (ML) INJECTION EVERY 12 HOURS SCHEDULED
Status: DISCONTINUED | OUTPATIENT
Start: 2019-01-29 | End: 2019-01-29 | Stop reason: HOSPADM

## 2019-01-29 RX ORDER — OXYCODONE HYDROCHLORIDE AND ACETAMINOPHEN 5; 325 MG/1; MG/1
1-2 TABLET ORAL EVERY 4 HOURS PRN
Qty: 60 TABLET | Refills: 0 | Status: SHIPPED | OUTPATIENT
Start: 2019-01-29 | End: 2019-02-04 | Stop reason: SDUPTHER

## 2019-01-29 RX ORDER — DULOXETIN HYDROCHLORIDE 30 MG/1
30 CAPSULE, DELAYED RELEASE ORAL 2 TIMES DAILY
Status: DISCONTINUED | OUTPATIENT
Start: 2019-01-29 | End: 2019-01-30 | Stop reason: HOSPADM

## 2019-01-29 RX ORDER — HYDROMORPHONE HCL 110MG/55ML
PATIENT CONTROLLED ANALGESIA SYRINGE INTRAVENOUS PRN
Status: DISCONTINUED | OUTPATIENT
Start: 2019-01-29 | End: 2019-01-29 | Stop reason: SDUPTHER

## 2019-01-29 RX ORDER — SODIUM CHLORIDE 9 MG/ML
INJECTION, SOLUTION INTRAVENOUS CONTINUOUS PRN
Status: DISCONTINUED | OUTPATIENT
Start: 2019-01-29 | End: 2019-01-29 | Stop reason: SDUPTHER

## 2019-01-29 RX ORDER — TRANEXAMIC ACID 100 MG/ML
INJECTION, SOLUTION INTRAVENOUS
Status: COMPLETED | OUTPATIENT
Start: 2019-01-29 | End: 2019-01-29

## 2019-01-29 RX ORDER — SODIUM CHLORIDE 9 MG/ML
INJECTION, SOLUTION INTRAVENOUS CONTINUOUS
Status: DISCONTINUED | OUTPATIENT
Start: 2019-01-29 | End: 2019-01-29

## 2019-01-29 RX ORDER — ONDANSETRON 2 MG/ML
4 INJECTION INTRAMUSCULAR; INTRAVENOUS
Status: DISCONTINUED | OUTPATIENT
Start: 2019-01-29 | End: 2019-01-29 | Stop reason: HOSPADM

## 2019-01-29 RX ORDER — KETAMINE HCL IN NACL, ISO-OSM 100MG/10ML
SYRINGE (ML) INJECTION PRN
Status: DISCONTINUED | OUTPATIENT
Start: 2019-01-29 | End: 2019-01-29 | Stop reason: SDUPTHER

## 2019-01-29 RX ORDER — OXYCODONE HYDROCHLORIDE AND ACETAMINOPHEN 5; 325 MG/1; MG/1
2 TABLET ORAL EVERY 4 HOURS PRN
Status: DISCONTINUED | OUTPATIENT
Start: 2019-01-29 | End: 2019-01-30 | Stop reason: HOSPADM

## 2019-01-29 RX ORDER — ALBUTEROL SULFATE 90 UG/1
2 AEROSOL, METERED RESPIRATORY (INHALATION) EVERY 4 HOURS PRN
Status: DISCONTINUED | OUTPATIENT
Start: 2019-01-29 | End: 2019-01-30 | Stop reason: HOSPADM

## 2019-01-29 RX ORDER — OXYCODONE HYDROCHLORIDE AND ACETAMINOPHEN 5; 325 MG/1; MG/1
1 TABLET ORAL EVERY 4 HOURS PRN
Status: DISCONTINUED | OUTPATIENT
Start: 2019-01-29 | End: 2019-01-30 | Stop reason: HOSPADM

## 2019-01-29 RX ORDER — SODIUM CHLORIDE 0.9 % (FLUSH) 0.9 %
10 SYRINGE (ML) INJECTION PRN
Status: DISCONTINUED | OUTPATIENT
Start: 2019-01-29 | End: 2019-01-29 | Stop reason: HOSPADM

## 2019-01-29 RX ORDER — MIDAZOLAM HYDROCHLORIDE 1 MG/ML
INJECTION INTRAMUSCULAR; INTRAVENOUS PRN
Status: DISCONTINUED | OUTPATIENT
Start: 2019-01-29 | End: 2019-01-29 | Stop reason: SDUPTHER

## 2019-01-29 RX ORDER — DEXTROSE MONOHYDRATE 50 MG/ML
100 INJECTION, SOLUTION INTRAVENOUS PRN
Status: DISCONTINUED | OUTPATIENT
Start: 2019-01-29 | End: 2019-01-30 | Stop reason: HOSPADM

## 2019-01-29 RX ORDER — ESMOLOL HYDROCHLORIDE 10 MG/ML
INJECTION INTRAVENOUS PRN
Status: DISCONTINUED | OUTPATIENT
Start: 2019-01-29 | End: 2019-01-29 | Stop reason: SDUPTHER

## 2019-01-29 RX ORDER — LIDOCAINE HYDROCHLORIDE 20 MG/ML
INJECTION, SOLUTION EPIDURAL; INFILTRATION; INTRACAUDAL; PERINEURAL PRN
Status: DISCONTINUED | OUTPATIENT
Start: 2019-01-29 | End: 2019-01-29 | Stop reason: SDUPTHER

## 2019-01-29 RX ORDER — MONTELUKAST SODIUM 10 MG/1
10 TABLET ORAL NIGHTLY
Status: DISCONTINUED | OUTPATIENT
Start: 2019-01-29 | End: 2019-01-30 | Stop reason: HOSPADM

## 2019-01-29 RX ORDER — FENTANYL CITRATE 50 UG/ML
INJECTION, SOLUTION INTRAMUSCULAR; INTRAVENOUS PRN
Status: DISCONTINUED | OUTPATIENT
Start: 2019-01-29 | End: 2019-01-29 | Stop reason: SDUPTHER

## 2019-01-29 RX ORDER — MEPERIDINE HYDROCHLORIDE 25 MG/ML
12.5 INJECTION INTRAMUSCULAR; INTRAVENOUS; SUBCUTANEOUS EVERY 5 MIN PRN
Status: DISCONTINUED | OUTPATIENT
Start: 2019-01-29 | End: 2019-01-29 | Stop reason: HOSPADM

## 2019-01-29 RX ORDER — DIPHENHYDRAMINE HCL 25 MG
25 TABLET ORAL EVERY 6 HOURS PRN
Status: DISCONTINUED | OUTPATIENT
Start: 2019-01-29 | End: 2019-01-30 | Stop reason: HOSPADM

## 2019-01-29 RX ORDER — ONDANSETRON 2 MG/ML
4 INJECTION INTRAMUSCULAR; INTRAVENOUS EVERY 6 HOURS PRN
Status: DISCONTINUED | OUTPATIENT
Start: 2019-01-29 | End: 2019-01-30 | Stop reason: HOSPADM

## 2019-01-29 RX ORDER — SUCCINYLCHOLINE/SOD CL,ISO/PF 200MG/10ML
SYRINGE (ML) INTRAVENOUS PRN
Status: DISCONTINUED | OUTPATIENT
Start: 2019-01-29 | End: 2019-01-29 | Stop reason: SDUPTHER

## 2019-01-29 RX ORDER — DOCUSATE SODIUM 100 MG/1
100 CAPSULE, LIQUID FILLED ORAL 2 TIMES DAILY
Status: DISCONTINUED | OUTPATIENT
Start: 2019-01-29 | End: 2019-01-30 | Stop reason: HOSPADM

## 2019-01-29 RX ORDER — FENTANYL CITRATE 50 UG/ML
50 INJECTION, SOLUTION INTRAMUSCULAR; INTRAVENOUS EVERY 5 MIN PRN
Status: COMPLETED | OUTPATIENT
Start: 2019-01-29 | End: 2019-01-29

## 2019-01-29 RX ORDER — OXYCODONE HYDROCHLORIDE AND ACETAMINOPHEN 5; 325 MG/1; MG/1
2 TABLET ORAL PRN
Status: DISCONTINUED | OUTPATIENT
Start: 2019-01-29 | End: 2019-01-29 | Stop reason: HOSPADM

## 2019-01-29 RX ORDER — OXYCODONE HYDROCHLORIDE AND ACETAMINOPHEN 5; 325 MG/1; MG/1
1 TABLET ORAL PRN
Status: DISCONTINUED | OUTPATIENT
Start: 2019-01-29 | End: 2019-01-29 | Stop reason: HOSPADM

## 2019-01-29 RX ORDER — DEXAMETHASONE SODIUM PHOSPHATE 4 MG/ML
INJECTION, SOLUTION INTRA-ARTICULAR; INTRALESIONAL; INTRAMUSCULAR; INTRAVENOUS; SOFT TISSUE PRN
Status: DISCONTINUED | OUTPATIENT
Start: 2019-01-29 | End: 2019-01-29 | Stop reason: SDUPTHER

## 2019-01-29 RX ORDER — TIZANIDINE 4 MG/1
4 TABLET ORAL 3 TIMES DAILY
Status: DISCONTINUED | OUTPATIENT
Start: 2019-01-29 | End: 2019-01-30 | Stop reason: HOSPADM

## 2019-01-29 RX ORDER — DEXTROSE MONOHYDRATE 25 G/50ML
12.5 INJECTION, SOLUTION INTRAVENOUS PRN
Status: DISCONTINUED | OUTPATIENT
Start: 2019-01-29 | End: 2019-01-30 | Stop reason: HOSPADM

## 2019-01-29 RX ORDER — NICOTINE 21 MG/24HR
1 PATCH, TRANSDERMAL 24 HOURS TRANSDERMAL EVERY 24 HOURS
Status: DISCONTINUED | OUTPATIENT
Start: 2019-01-29 | End: 2019-01-29

## 2019-01-29 RX ORDER — ACETAMINOPHEN 325 MG/1
650 TABLET ORAL EVERY 4 HOURS PRN
Status: DISCONTINUED | OUTPATIENT
Start: 2019-01-29 | End: 2019-01-30 | Stop reason: HOSPADM

## 2019-01-29 RX ORDER — SODIUM CHLORIDE 0.9 % (FLUSH) 0.9 %
10 SYRINGE (ML) INJECTION PRN
Status: DISCONTINUED | OUTPATIENT
Start: 2019-01-29 | End: 2019-01-30 | Stop reason: HOSPADM

## 2019-01-29 RX ORDER — LANOLIN ALCOHOL/MO/W.PET/CERES
400 CREAM (GRAM) TOPICAL
Status: DISCONTINUED | OUTPATIENT
Start: 2019-01-30 | End: 2019-01-30 | Stop reason: HOSPADM

## 2019-01-29 RX ORDER — ROCURONIUM BROMIDE 10 MG/ML
INJECTION, SOLUTION INTRAVENOUS PRN
Status: DISCONTINUED | OUTPATIENT
Start: 2019-01-29 | End: 2019-01-29 | Stop reason: SDUPTHER

## 2019-01-29 RX ADMIN — Medication 2 G: at 08:49

## 2019-01-29 RX ADMIN — LIDOCAINE HYDROCHLORIDE 60 MG: 20 INJECTION, SOLUTION EPIDURAL; INFILTRATION; INTRACAUDAL; PERINEURAL at 08:52

## 2019-01-29 RX ADMIN — ROCURONIUM BROMIDE 50 MG: 10 INJECTION INTRAVENOUS at 09:01

## 2019-01-29 RX ADMIN — FENTANYL CITRATE 50 MCG: 50 INJECTION, SOLUTION INTRAMUSCULAR; INTRAVENOUS at 11:45

## 2019-01-29 RX ADMIN — SODIUM CHLORIDE: 9 INJECTION, SOLUTION INTRAVENOUS at 10:15

## 2019-01-29 RX ADMIN — PHENYLEPHRINE HYDROCHLORIDE 50 MCG: 10 INJECTION, SOLUTION INTRAMUSCULAR; INTRAVENOUS; SUBCUTANEOUS at 09:52

## 2019-01-29 RX ADMIN — CETIRIZINE HYDROCHLORIDE 10 MG: 10 TABLET, FILM COATED ORAL at 13:55

## 2019-01-29 RX ADMIN — Medication 10 MG: at 09:15

## 2019-01-29 RX ADMIN — PROPOFOL 200 MG: 10 INJECTION, EMULSION INTRAVENOUS at 08:52

## 2019-01-29 RX ADMIN — MIDAZOLAM 2 MG: 1 INJECTION INTRAMUSCULAR; INTRAVENOUS at 08:52

## 2019-01-29 RX ADMIN — PHENYLEPHRINE HYDROCHLORIDE 50 MCG: 10 INJECTION, SOLUTION INTRAMUSCULAR; INTRAVENOUS; SUBCUTANEOUS at 09:46

## 2019-01-29 RX ADMIN — ESMOLOL HYDROCHLORIDE 20 MG: 100 INJECTION, SOLUTION INTRAVENOUS at 09:15

## 2019-01-29 RX ADMIN — SODIUM CHLORIDE: 9 INJECTION, SOLUTION INTRAVENOUS at 08:49

## 2019-01-29 RX ADMIN — Medication 2 G: at 16:33

## 2019-01-29 RX ADMIN — SODIUM CHLORIDE: 9 INJECTION, SOLUTION INTRAVENOUS at 08:13

## 2019-01-29 RX ADMIN — MONTELUKAST SODIUM 10 MG: 10 TABLET, FILM COATED ORAL at 19:51

## 2019-01-29 RX ADMIN — Medication 100 MG: at 08:52

## 2019-01-29 RX ADMIN — OXYCODONE AND ACETAMINOPHEN 2 TABLET: 5; 325 TABLET ORAL at 22:55

## 2019-01-29 RX ADMIN — PHENYLEPHRINE HYDROCHLORIDE 50 MCG: 10 INJECTION, SOLUTION INTRAMUSCULAR; INTRAVENOUS; SUBCUTANEOUS at 09:48

## 2019-01-29 RX ADMIN — DEXAMETHASONE SODIUM PHOSPHATE 8 MG: 4 INJECTION, SOLUTION INTRAMUSCULAR; INTRAVENOUS at 09:15

## 2019-01-29 RX ADMIN — FENTANYL CITRATE 50 MCG: 50 INJECTION, SOLUTION INTRAMUSCULAR; INTRAVENOUS at 11:31

## 2019-01-29 RX ADMIN — SODIUM CHLORIDE: 9 INJECTION, SOLUTION INTRAVENOUS at 13:47

## 2019-01-29 RX ADMIN — LABETALOL HYDROCHLORIDE 5 MG: 5 INJECTION, SOLUTION INTRAVENOUS at 09:21

## 2019-01-29 RX ADMIN — ESMOLOL HYDROCHLORIDE 10 MG: 100 INJECTION, SOLUTION INTRAVENOUS at 09:13

## 2019-01-29 RX ADMIN — DOCUSATE SODIUM 100 MG: 100 CAPSULE, LIQUID FILLED ORAL at 19:51

## 2019-01-29 RX ADMIN — MORPHINE SULFATE 2 MG: 2 INJECTION, SOLUTION INTRAMUSCULAR; INTRAVENOUS at 16:32

## 2019-01-29 RX ADMIN — MOMETASONE FUROATE AND FORMOTEROL FUMARATE DIHYDRATE 2 PUFF: 200; 5 AEROSOL RESPIRATORY (INHALATION) at 20:10

## 2019-01-29 RX ADMIN — FENTANYL CITRATE 50 MCG: 50 INJECTION, SOLUTION INTRAMUSCULAR; INTRAVENOUS at 11:10

## 2019-01-29 RX ADMIN — TIZANIDINE 4 MG: 4 TABLET ORAL at 13:55

## 2019-01-29 RX ADMIN — OXYCODONE AND ACETAMINOPHEN 2 TABLET: 5; 325 TABLET ORAL at 18:24

## 2019-01-29 RX ADMIN — SUGAMMADEX 200 MG: 100 INJECTION, SOLUTION INTRAVENOUS at 10:35

## 2019-01-29 RX ADMIN — TIZANIDINE 2 MG: 4 TABLET ORAL at 19:52

## 2019-01-29 RX ADMIN — AMITRIPTYLINE HYDROCHLORIDE 50 MG: 50 TABLET, FILM COATED ORAL at 19:51

## 2019-01-29 RX ADMIN — PROPOFOL 100 MG: 10 INJECTION, EMULSION INTRAVENOUS at 09:10

## 2019-01-29 RX ADMIN — OXYCODONE AND ACETAMINOPHEN 2 TABLET: 5; 325 TABLET ORAL at 13:55

## 2019-01-29 RX ADMIN — FENTANYL CITRATE 100 MCG: 50 INJECTION INTRAMUSCULAR; INTRAVENOUS at 08:52

## 2019-01-29 RX ADMIN — FAMOTIDINE 20 MG: 10 INJECTION, SOLUTION INTRAVENOUS at 19:52

## 2019-01-29 RX ADMIN — DULOXETINE HYDROCHLORIDE 30 MG: 30 CAPSULE, DELAYED RELEASE ORAL at 19:52

## 2019-01-29 RX ADMIN — Medication 20 MG: at 09:10

## 2019-01-29 RX ADMIN — FENTANYL CITRATE 50 MCG: 50 INJECTION, SOLUTION INTRAMUSCULAR; INTRAVENOUS at 11:18

## 2019-01-29 RX ADMIN — ONDANSETRON 4 MG: 2 INJECTION INTRAMUSCULAR; INTRAVENOUS at 09:15

## 2019-01-29 RX ADMIN — HYDROMORPHONE HYDROCHLORIDE 1 MG: 2 INJECTION INTRAMUSCULAR; INTRAVENOUS; SUBCUTANEOUS at 10:49

## 2019-01-29 RX ADMIN — DOCUSATE SODIUM 100 MG: 100 CAPSULE, LIQUID FILLED ORAL at 13:54

## 2019-01-29 RX ADMIN — FAMOTIDINE 20 MG: 10 INJECTION, SOLUTION INTRAVENOUS at 08:13

## 2019-01-29 ASSESSMENT — PAIN DESCRIPTION - PROGRESSION
CLINICAL_PROGRESSION: NOT CHANGED
CLINICAL_PROGRESSION: GRADUALLY IMPROVING

## 2019-01-29 ASSESSMENT — PAIN DESCRIPTION - LOCATION
LOCATION: HIP

## 2019-01-29 ASSESSMENT — PULMONARY FUNCTION TESTS
PIF_VALUE: 18
PIF_VALUE: 3
PIF_VALUE: 19
PIF_VALUE: 18
PIF_VALUE: 18
PIF_VALUE: 19
PIF_VALUE: 1
PIF_VALUE: 20
PIF_VALUE: 16
PIF_VALUE: 20
PIF_VALUE: 19
PIF_VALUE: 18
PIF_VALUE: 20
PIF_VALUE: 19
PIF_VALUE: 20
PIF_VALUE: 19
PIF_VALUE: 18
PIF_VALUE: 19
PIF_VALUE: 16
PIF_VALUE: 19
PIF_VALUE: 19
PIF_VALUE: 23
PIF_VALUE: 19
PIF_VALUE: 18
PIF_VALUE: 17
PIF_VALUE: 19
PIF_VALUE: 18
PIF_VALUE: 20
PIF_VALUE: 17
PIF_VALUE: 17
PIF_VALUE: 19
PIF_VALUE: 19
PIF_VALUE: 23
PIF_VALUE: 18
PIF_VALUE: 18
PIF_VALUE: 12
PIF_VALUE: 18
PIF_VALUE: 19
PIF_VALUE: 19
PIF_VALUE: 18
PIF_VALUE: 3
PIF_VALUE: 22
PIF_VALUE: 20
PIF_VALUE: 17
PIF_VALUE: 18
PIF_VALUE: 20
PIF_VALUE: 19
PIF_VALUE: 19
PIF_VALUE: 20
PIF_VALUE: 21
PIF_VALUE: 20
PIF_VALUE: 19
PIF_VALUE: 18
PIF_VALUE: 18
PIF_VALUE: 13
PIF_VALUE: 19
PIF_VALUE: 18
PIF_VALUE: 20
PIF_VALUE: 19
PIF_VALUE: 18
PIF_VALUE: 19
PIF_VALUE: 18
PIF_VALUE: 20
PIF_VALUE: 20
PIF_VALUE: 0
PIF_VALUE: 19
PIF_VALUE: 19
PIF_VALUE: 3
PIF_VALUE: 18
PIF_VALUE: 18
PIF_VALUE: 12
PIF_VALUE: 13
PIF_VALUE: 18
PIF_VALUE: 19
PIF_VALUE: 19
PIF_VALUE: 23
PIF_VALUE: 20
PIF_VALUE: 19
PIF_VALUE: 16
PIF_VALUE: 0
PIF_VALUE: 19
PIF_VALUE: 18
PIF_VALUE: 19
PIF_VALUE: 18
PIF_VALUE: 18
PIF_VALUE: 19
PIF_VALUE: 18
PIF_VALUE: 19
PIF_VALUE: 7
PIF_VALUE: 17
PIF_VALUE: 20
PIF_VALUE: 19
PIF_VALUE: 6
PIF_VALUE: 17
PIF_VALUE: 18
PIF_VALUE: 16
PIF_VALUE: 18
PIF_VALUE: 17
PIF_VALUE: 19
PIF_VALUE: 1

## 2019-01-29 ASSESSMENT — PAIN DESCRIPTION - PAIN TYPE
TYPE: SURGICAL PAIN
TYPE: SURGICAL PAIN;ACUTE PAIN

## 2019-01-29 ASSESSMENT — PAIN SCALES - GENERAL
PAINLEVEL_OUTOF10: 9
PAINLEVEL_OUTOF10: 5
PAINLEVEL_OUTOF10: 5
PAINLEVEL_OUTOF10: 6
PAINLEVEL_OUTOF10: 5
PAINLEVEL_OUTOF10: 0
PAINLEVEL_OUTOF10: 3
PAINLEVEL_OUTOF10: 6
PAINLEVEL_OUTOF10: 10
PAINLEVEL_OUTOF10: 7
PAINLEVEL_OUTOF10: 4
PAINLEVEL_OUTOF10: 10
PAINLEVEL_OUTOF10: 10

## 2019-01-29 ASSESSMENT — PAIN DESCRIPTION - DESCRIPTORS
DESCRIPTORS: ACHING
DESCRIPTORS: ACHING;CONSTANT

## 2019-01-29 ASSESSMENT — PAIN DESCRIPTION - FREQUENCY
FREQUENCY: CONTINUOUS
FREQUENCY: CONTINUOUS

## 2019-01-29 ASSESSMENT — PAIN DESCRIPTION - ONSET
ONSET: ON-GOING
ONSET: ON-GOING

## 2019-01-29 ASSESSMENT — LIFESTYLE VARIABLES: SMOKING_STATUS: 1

## 2019-01-29 ASSESSMENT — PAIN DESCRIPTION - ORIENTATION
ORIENTATION: LEFT
ORIENTATION: LEFT

## 2019-01-29 ASSESSMENT — PAIN - FUNCTIONAL ASSESSMENT: PAIN_FUNCTIONAL_ASSESSMENT: 0-10

## 2019-01-30 ENCOUNTER — TELEPHONE (OUTPATIENT)
Dept: ORTHOPEDIC SURGERY | Age: 49
End: 2019-01-30

## 2019-01-30 VITALS
RESPIRATION RATE: 18 BRPM | HEART RATE: 68 BPM | HEIGHT: 64 IN | SYSTOLIC BLOOD PRESSURE: 113 MMHG | OXYGEN SATURATION: 97 % | WEIGHT: 134.48 LBS | TEMPERATURE: 97.4 F | BODY MASS INDEX: 22.96 KG/M2 | DIASTOLIC BLOOD PRESSURE: 66 MMHG

## 2019-01-30 LAB
GLUCOSE BLD-MCNC: 126 MG/DL (ref 70–99)
HCT VFR BLD CALC: 35.3 % (ref 40.5–52.5)
HEMOGLOBIN: 12.2 G/DL (ref 13.5–17.5)
PERFORMED ON: ABNORMAL

## 2019-01-30 PROCEDURE — 6360000002 HC RX W HCPCS: Performed by: NURSE PRACTITIONER

## 2019-01-30 PROCEDURE — 2500000003 HC RX 250 WO HCPCS: Performed by: NURSE PRACTITIONER

## 2019-01-30 PROCEDURE — 85014 HEMATOCRIT: CPT

## 2019-01-30 PROCEDURE — 97530 THERAPEUTIC ACTIVITIES: CPT

## 2019-01-30 PROCEDURE — 6370000000 HC RX 637 (ALT 250 FOR IP): Performed by: NURSE PRACTITIONER

## 2019-01-30 PROCEDURE — 36415 COLL VENOUS BLD VENIPUNCTURE: CPT

## 2019-01-30 PROCEDURE — 97116 GAIT TRAINING THERAPY: CPT

## 2019-01-30 PROCEDURE — 97535 SELF CARE MNGMENT TRAINING: CPT

## 2019-01-30 PROCEDURE — 99024 POSTOP FOLLOW-UP VISIT: CPT | Performed by: NURSE PRACTITIONER

## 2019-01-30 PROCEDURE — 85018 HEMOGLOBIN: CPT

## 2019-01-30 PROCEDURE — 2580000003 HC RX 258: Performed by: NURSE PRACTITIONER

## 2019-01-30 PROCEDURE — 94760 N-INVAS EAR/PLS OXIMETRY 1: CPT

## 2019-01-30 PROCEDURE — 97110 THERAPEUTIC EXERCISES: CPT

## 2019-01-30 RX ADMIN — Medication 2 G: at 00:31

## 2019-01-30 RX ADMIN — DOCUSATE SODIUM 100 MG: 100 CAPSULE, LIQUID FILLED ORAL at 09:29

## 2019-01-30 RX ADMIN — OXYCODONE AND ACETAMINOPHEN 2 TABLET: 5; 325 TABLET ORAL at 13:57

## 2019-01-30 RX ADMIN — Medication 400 MG: at 09:30

## 2019-01-30 RX ADMIN — OXYCODONE AND ACETAMINOPHEN 2 TABLET: 5; 325 TABLET ORAL at 09:44

## 2019-01-30 RX ADMIN — DULOXETINE HYDROCHLORIDE 30 MG: 30 CAPSULE, DELAYED RELEASE ORAL at 09:30

## 2019-01-30 RX ADMIN — TIZANIDINE 4 MG: 4 TABLET ORAL at 09:30

## 2019-01-30 RX ADMIN — MORPHINE SULFATE 2 MG: 2 INJECTION, SOLUTION INTRAMUSCULAR; INTRAVENOUS at 02:01

## 2019-01-30 RX ADMIN — SODIUM CHLORIDE, PRESERVATIVE FREE 10 ML: 5 INJECTION INTRAVENOUS at 09:31

## 2019-01-30 RX ADMIN — ENOXAPARIN SODIUM 30 MG: 30 INJECTION SUBCUTANEOUS at 09:30

## 2019-01-30 RX ADMIN — OXYCODONE AND ACETAMINOPHEN 2 TABLET: 5; 325 TABLET ORAL at 05:43

## 2019-01-30 RX ADMIN — FAMOTIDINE 20 MG: 10 INJECTION, SOLUTION INTRAVENOUS at 09:30

## 2019-01-30 ASSESSMENT — PAIN DESCRIPTION - LOCATION
LOCATION: HIP

## 2019-01-30 ASSESSMENT — PAIN DESCRIPTION - PROGRESSION
CLINICAL_PROGRESSION: NOT CHANGED

## 2019-01-30 ASSESSMENT — PAIN DESCRIPTION - DESCRIPTORS
DESCRIPTORS: ACHING;CONSTANT

## 2019-01-30 ASSESSMENT — PAIN - FUNCTIONAL ASSESSMENT
PAIN_FUNCTIONAL_ASSESSMENT: ACTIVITIES ARE NOT PREVENTED

## 2019-01-30 ASSESSMENT — PAIN SCALES - GENERAL
PAINLEVEL_OUTOF10: 5
PAINLEVEL_OUTOF10: 3
PAINLEVEL_OUTOF10: 7
PAINLEVEL_OUTOF10: 7
PAINLEVEL_OUTOF10: 4
PAINLEVEL_OUTOF10: 6
PAINLEVEL_OUTOF10: 4
PAINLEVEL_OUTOF10: 5
PAINLEVEL_OUTOF10: 6

## 2019-01-30 ASSESSMENT — PAIN DESCRIPTION - ONSET
ONSET: ON-GOING

## 2019-01-30 ASSESSMENT — PAIN DESCRIPTION - ORIENTATION
ORIENTATION: LEFT

## 2019-01-30 ASSESSMENT — PAIN DESCRIPTION - PAIN TYPE
TYPE: SURGICAL PAIN

## 2019-01-30 ASSESSMENT — PAIN DESCRIPTION - FREQUENCY
FREQUENCY: CONTINUOUS

## 2019-01-31 ENCOUNTER — TELEPHONE (OUTPATIENT)
Dept: ORTHOPEDIC SURGERY | Age: 49
End: 2019-01-31

## 2019-01-31 ENCOUNTER — TELEPHONE (OUTPATIENT)
Dept: INTERNAL MEDICINE CLINIC | Age: 49
End: 2019-01-31

## 2019-02-01 ENCOUNTER — TELEPHONE (OUTPATIENT)
Dept: ORTHOPEDIC SURGERY | Age: 49
End: 2019-02-01

## 2019-02-01 DIAGNOSIS — Z96.642 STATUS POST TOTAL REPLACEMENT OF LEFT HIP: Primary | ICD-10-CM

## 2019-02-04 ENCOUNTER — TELEPHONE (OUTPATIENT)
Dept: ORTHOPEDIC SURGERY | Age: 49
End: 2019-02-04

## 2019-02-04 ENCOUNTER — FOLLOWUP TELEPHONE ENCOUNTER (OUTPATIENT)
Dept: ORTHOPEDICS UNIT | Age: 49
End: 2019-02-04

## 2019-02-04 RX ORDER — OXYCODONE HYDROCHLORIDE AND ACETAMINOPHEN 5; 325 MG/1; MG/1
1-2 TABLET ORAL EVERY 6 HOURS PRN
Qty: 50 TABLET | Refills: 0 | Status: SHIPPED | OUTPATIENT
Start: 2019-02-04 | End: 2019-02-11 | Stop reason: SDUPTHER

## 2019-02-05 ENCOUNTER — FOLLOWUP TELEPHONE ENCOUNTER (OUTPATIENT)
Dept: ORTHOPEDICS UNIT | Age: 49
End: 2019-02-05

## 2019-02-08 ENCOUNTER — TELEPHONE (OUTPATIENT)
Dept: ORTHOPEDIC SURGERY | Age: 49
End: 2019-02-08

## 2019-02-11 ENCOUNTER — OFFICE VISIT (OUTPATIENT)
Dept: ORTHOPEDIC SURGERY | Age: 49
End: 2019-02-11

## 2019-02-11 ENCOUNTER — TELEPHONE (OUTPATIENT)
Dept: INTERNAL MEDICINE CLINIC | Age: 49
End: 2019-02-11

## 2019-02-11 VITALS — BODY MASS INDEX: 21.85 KG/M2 | HEIGHT: 64 IN | WEIGHT: 128 LBS

## 2019-02-11 DIAGNOSIS — E55.9 VITAMIN D DEFICIENCY: Primary | ICD-10-CM

## 2019-02-11 DIAGNOSIS — Z96.642 STATUS POST TOTAL HIP REPLACEMENT, LEFT: Primary | ICD-10-CM

## 2019-02-11 PROCEDURE — 99024 POSTOP FOLLOW-UP VISIT: CPT | Performed by: ORTHOPAEDIC SURGERY

## 2019-02-11 RX ORDER — ERGOCALCIFEROL 1.25 MG/1
CAPSULE ORAL
Qty: 12 CAPSULE | Refills: 3 | Status: SHIPPED | OUTPATIENT
Start: 2019-02-11 | End: 2019-04-22 | Stop reason: SDUPTHER

## 2019-02-11 RX ORDER — OXYCODONE HYDROCHLORIDE AND ACETAMINOPHEN 5; 325 MG/1; MG/1
1-2 TABLET ORAL EVERY 8 HOURS PRN
Qty: 40 TABLET | Refills: 0 | Status: SHIPPED | OUTPATIENT
Start: 2019-02-11 | End: 2019-02-18 | Stop reason: SDUPTHER

## 2019-02-13 ENCOUNTER — TELEPHONE (OUTPATIENT)
Dept: ORTHOPEDIC SURGERY | Age: 49
End: 2019-02-13

## 2019-02-15 ENCOUNTER — TELEPHONE (OUTPATIENT)
Dept: ORTHOPEDIC SURGERY | Age: 49
End: 2019-02-15

## 2019-02-15 DIAGNOSIS — Z96.642 STATUS POST TOTAL HIP REPLACEMENT, LEFT: ICD-10-CM

## 2019-02-16 ENCOUNTER — HOSPITAL ENCOUNTER (EMERGENCY)
Age: 49
Discharge: HOME | End: 2019-02-16
Payer: COMMERCIAL

## 2019-02-16 ENCOUNTER — APPOINTMENT (OUTPATIENT)
Dept: GENERAL RADIOLOGY | Age: 49
End: 2019-02-16
Payer: COMMERCIAL

## 2019-02-16 VITALS
OXYGEN SATURATION: 97 % | SYSTOLIC BLOOD PRESSURE: 134 MMHG | HEART RATE: 86 BPM | BODY MASS INDEX: 21.68 KG/M2 | RESPIRATION RATE: 17 BRPM | DIASTOLIC BLOOD PRESSURE: 72 MMHG | HEIGHT: 64 IN | WEIGHT: 127 LBS | TEMPERATURE: 98.2 F

## 2019-02-16 DIAGNOSIS — Z96.642 S/P HIP REPLACEMENT, LEFT: ICD-10-CM

## 2019-02-16 DIAGNOSIS — M25.552 LEFT HIP PAIN: Primary | ICD-10-CM

## 2019-02-16 LAB
ANION GAP SERPL CALCULATED.3IONS-SCNC: 13 MMOL/L (ref 3–16)
BASOPHILS ABSOLUTE: 0 K/UL (ref 0–0.2)
BASOPHILS RELATIVE PERCENT: 0.3 %
BUN BLDV-MCNC: 9 MG/DL (ref 7–20)
CALCIUM SERPL-MCNC: 9.6 MG/DL (ref 8.3–10.6)
CHLORIDE BLD-SCNC: 102 MMOL/L (ref 99–110)
CO2: 22 MMOL/L (ref 21–32)
CREAT SERPL-MCNC: 0.6 MG/DL (ref 0.9–1.3)
EOSINOPHILS ABSOLUTE: 0.2 K/UL (ref 0–0.6)
EOSINOPHILS RELATIVE PERCENT: 2.8 %
GFR AFRICAN AMERICAN: >60
GFR NON-AFRICAN AMERICAN: >60
GLUCOSE BLD-MCNC: 89 MG/DL (ref 70–99)
HCT VFR BLD CALC: 41.6 % (ref 40.5–52.5)
HEMOGLOBIN: 13.9 G/DL (ref 13.5–17.5)
LYMPHOCYTES ABSOLUTE: 2.5 K/UL (ref 1–5.1)
LYMPHOCYTES RELATIVE PERCENT: 29.2 %
MCH RBC QN AUTO: 31.5 PG (ref 26–34)
MCHC RBC AUTO-ENTMCNC: 33.4 G/DL (ref 31–36)
MCV RBC AUTO: 94.3 FL (ref 80–100)
MONOCYTES ABSOLUTE: 0.6 K/UL (ref 0–1.3)
MONOCYTES RELATIVE PERCENT: 6.8 %
NEUTROPHILS ABSOLUTE: 5.2 K/UL (ref 1.7–7.7)
NEUTROPHILS RELATIVE PERCENT: 60.9 %
PDW BLD-RTO: 14.9 % (ref 12.4–15.4)
PLATELET # BLD: 633 K/UL (ref 135–450)
PMV BLD AUTO: 7.1 FL (ref 5–10.5)
POTASSIUM SERPL-SCNC: 4.2 MMOL/L (ref 3.5–5.1)
RBC # BLD: 4.41 M/UL (ref 4.2–5.9)
SODIUM BLD-SCNC: 137 MMOL/L (ref 136–145)
WBC # BLD: 8.6 K/UL (ref 4–11)

## 2019-02-16 PROCEDURE — 6360000002 HC RX W HCPCS: Performed by: PHYSICIAN ASSISTANT

## 2019-02-16 PROCEDURE — 96375 TX/PRO/DX INJ NEW DRUG ADDON: CPT

## 2019-02-16 PROCEDURE — 96374 THER/PROPH/DIAG INJ IV PUSH: CPT

## 2019-02-16 PROCEDURE — 85025 COMPLETE CBC W/AUTO DIFF WBC: CPT

## 2019-02-16 PROCEDURE — 73502 X-RAY EXAM HIP UNI 2-3 VIEWS: CPT

## 2019-02-16 PROCEDURE — 2500000003 HC RX 250 WO HCPCS: Performed by: PHYSICIAN ASSISTANT

## 2019-02-16 PROCEDURE — 80048 BASIC METABOLIC PNL TOTAL CA: CPT

## 2019-02-16 PROCEDURE — 99283 EMERGENCY DEPT VISIT LOW MDM: CPT

## 2019-02-16 RX ORDER — ORPHENADRINE CITRATE 30 MG/ML
60 INJECTION INTRAMUSCULAR; INTRAVENOUS ONCE
Status: COMPLETED | OUTPATIENT
Start: 2019-02-16 | End: 2019-02-16

## 2019-02-16 RX ORDER — HYDROMORPHONE HYDROCHLORIDE 1 MG/ML
0.5 INJECTION, SOLUTION INTRAMUSCULAR; INTRAVENOUS; SUBCUTANEOUS ONCE
Status: COMPLETED | OUTPATIENT
Start: 2019-02-16 | End: 2019-02-16

## 2019-02-16 RX ORDER — ONDANSETRON 2 MG/ML
4 INJECTION INTRAMUSCULAR; INTRAVENOUS ONCE
Status: COMPLETED | OUTPATIENT
Start: 2019-02-16 | End: 2019-02-16

## 2019-02-16 RX ADMIN — HYDROMORPHONE HYDROCHLORIDE 0.5 MG: 1 INJECTION, SOLUTION INTRAMUSCULAR; INTRAVENOUS; SUBCUTANEOUS at 16:17

## 2019-02-16 RX ADMIN — ONDANSETRON 4 MG: 2 INJECTION INTRAMUSCULAR; INTRAVENOUS at 16:17

## 2019-02-16 RX ADMIN — ORPHENADRINE CITRATE 60 MG: 30 INJECTION INTRAMUSCULAR; INTRAVENOUS at 16:17

## 2019-02-16 ASSESSMENT — ENCOUNTER SYMPTOMS
DIARRHEA: 0
CONSTIPATION: 0
BACK PAIN: 0
NAUSEA: 0
VOMITING: 0
COUGH: 0
COLOR CHANGE: 0
SHORTNESS OF BREATH: 0
ABDOMINAL PAIN: 0
RESPIRATORY NEGATIVE: 1

## 2019-02-16 ASSESSMENT — PAIN SCALES - GENERAL: PAINLEVEL_OUTOF10: 8

## 2019-02-16 ASSESSMENT — PAIN DESCRIPTION - ORIENTATION: ORIENTATION: LEFT

## 2019-02-16 ASSESSMENT — PAIN DESCRIPTION - LOCATION: LOCATION: HIP

## 2019-02-18 RX ORDER — OXYCODONE HYDROCHLORIDE AND ACETAMINOPHEN 5; 325 MG/1; MG/1
1-2 TABLET ORAL EVERY 8 HOURS PRN
Qty: 40 TABLET | Refills: 0 | Status: SHIPPED | OUTPATIENT
Start: 2019-02-18 | End: 2019-02-21 | Stop reason: SDUPTHER

## 2019-02-21 ENCOUNTER — TELEPHONE (OUTPATIENT)
Dept: ORTHOPEDIC SURGERY | Age: 49
End: 2019-02-21

## 2019-02-21 ENCOUNTER — HOSPITAL ENCOUNTER (OUTPATIENT)
Dept: PHYSICAL THERAPY | Age: 49
Setting detail: THERAPIES SERIES
Discharge: HOME | End: 2019-02-21
Payer: COMMERCIAL

## 2019-02-21 DIAGNOSIS — Z96.642 STATUS POST TOTAL HIP REPLACEMENT, LEFT: ICD-10-CM

## 2019-02-21 PROCEDURE — 97110 THERAPEUTIC EXERCISES: CPT

## 2019-02-21 PROCEDURE — 97162 PT EVAL MOD COMPLEX 30 MIN: CPT

## 2019-02-21 PROCEDURE — 97140 MANUAL THERAPY 1/> REGIONS: CPT

## 2019-02-21 RX ORDER — OXYCODONE HYDROCHLORIDE AND ACETAMINOPHEN 5; 325 MG/1; MG/1
1 TABLET ORAL EVERY 8 HOURS PRN
Qty: 30 TABLET | Refills: 0 | Status: SHIPPED | OUTPATIENT
Start: 2019-02-21 | End: 2019-02-28 | Stop reason: SDUPTHER

## 2019-02-21 ASSESSMENT — PAIN DESCRIPTION - ORIENTATION: ORIENTATION: LEFT

## 2019-02-21 ASSESSMENT — PAIN DESCRIPTION - FREQUENCY: FREQUENCY: CONTINUOUS

## 2019-02-21 ASSESSMENT — PAIN DESCRIPTION - DESCRIPTORS: DESCRIPTORS: ACHING

## 2019-02-21 ASSESSMENT — PAIN DESCRIPTION - LOCATION: LOCATION: HIP

## 2019-02-21 ASSESSMENT — PAIN DESCRIPTION - PROGRESSION: CLINICAL_PROGRESSION: GRADUALLY IMPROVING

## 2019-02-21 ASSESSMENT — PAIN DESCRIPTION - PAIN TYPE: TYPE: SURGICAL PAIN

## 2019-02-21 ASSESSMENT — PAIN SCALES - GENERAL: PAINLEVEL_OUTOF10: 4

## 2019-02-21 ASSESSMENT — PAIN DESCRIPTION - DIRECTION: RADIATING_TOWARDS: L GROIN

## 2019-02-25 ENCOUNTER — HOSPITAL ENCOUNTER (OUTPATIENT)
Dept: PHYSICAL THERAPY | Age: 49
Setting detail: THERAPIES SERIES
Discharge: HOME | End: 2019-02-25
Payer: COMMERCIAL

## 2019-02-25 PROCEDURE — 97110 THERAPEUTIC EXERCISES: CPT

## 2019-02-25 PROCEDURE — 97140 MANUAL THERAPY 1/> REGIONS: CPT

## 2019-02-28 ENCOUNTER — TELEPHONE (OUTPATIENT)
Dept: ORTHOPEDIC SURGERY | Age: 49
End: 2019-02-28

## 2019-02-28 ENCOUNTER — HOSPITAL ENCOUNTER (OUTPATIENT)
Dept: PHYSICAL THERAPY | Age: 49
Setting detail: THERAPIES SERIES
Discharge: HOME | End: 2019-02-28
Payer: COMMERCIAL

## 2019-02-28 DIAGNOSIS — Z96.642 STATUS POST TOTAL HIP REPLACEMENT, LEFT: ICD-10-CM

## 2019-02-28 PROCEDURE — 97110 THERAPEUTIC EXERCISES: CPT

## 2019-02-28 RX ORDER — OXYCODONE HYDROCHLORIDE AND ACETAMINOPHEN 5; 325 MG/1; MG/1
1 TABLET ORAL EVERY 8 HOURS PRN
Qty: 30 TABLET | Refills: 0 | Status: SHIPPED | OUTPATIENT
Start: 2019-02-28 | End: 2019-03-14 | Stop reason: SDUPTHER

## 2019-03-04 ENCOUNTER — HOSPITAL ENCOUNTER (OUTPATIENT)
Dept: PHYSICAL THERAPY | Age: 49
Setting detail: THERAPIES SERIES
Discharge: HOME | End: 2019-03-04
Payer: COMMERCIAL

## 2019-03-04 RX ORDER — IBUPROFEN 600 MG/1
TABLET ORAL
Qty: 60 TABLET | Refills: 0 | Status: SHIPPED | OUTPATIENT
Start: 2019-03-04 | End: 2019-04-03 | Stop reason: SDUPTHER

## 2019-03-11 ENCOUNTER — APPOINTMENT (OUTPATIENT)
Dept: PHYSICAL THERAPY | Age: 49
End: 2019-03-11
Payer: COMMERCIAL

## 2019-03-14 ENCOUNTER — HOSPITAL ENCOUNTER (OUTPATIENT)
Dept: PHYSICAL THERAPY | Age: 49
Setting detail: THERAPIES SERIES
Discharge: HOME | End: 2019-03-14
Payer: COMMERCIAL

## 2019-03-14 ENCOUNTER — OFFICE VISIT (OUTPATIENT)
Dept: ORTHOPEDIC SURGERY | Age: 49
End: 2019-03-14

## 2019-03-14 VITALS — WEIGHT: 126.98 LBS | RESPIRATION RATE: 17 BRPM | HEART RATE: 72 BPM | HEIGHT: 64 IN | BODY MASS INDEX: 21.68 KG/M2

## 2019-03-14 DIAGNOSIS — Z96.642 STATUS POST TOTAL REPLACEMENT OF LEFT HIP: Primary | ICD-10-CM

## 2019-03-14 DIAGNOSIS — Z96.642 STATUS POST TOTAL HIP REPLACEMENT, LEFT: ICD-10-CM

## 2019-03-14 PROCEDURE — 99024 POSTOP FOLLOW-UP VISIT: CPT | Performed by: ORTHOPAEDIC SURGERY

## 2019-03-14 PROCEDURE — 97112 NEUROMUSCULAR REEDUCATION: CPT

## 2019-03-14 PROCEDURE — 97110 THERAPEUTIC EXERCISES: CPT

## 2019-03-14 RX ORDER — OXYCODONE HYDROCHLORIDE AND ACETAMINOPHEN 5; 325 MG/1; MG/1
1 TABLET ORAL EVERY 8 HOURS PRN
Qty: 21 TABLET | Refills: 0 | Status: SHIPPED | OUTPATIENT
Start: 2019-03-14 | End: 2019-03-20 | Stop reason: SDUPTHER

## 2019-03-18 ENCOUNTER — TELEPHONE (OUTPATIENT)
Dept: ORTHOPEDIC SURGERY | Age: 49
End: 2019-03-18

## 2019-03-18 ENCOUNTER — HOSPITAL ENCOUNTER (OUTPATIENT)
Dept: PHYSICAL THERAPY | Age: 49
Setting detail: THERAPIES SERIES
Discharge: HOME | End: 2019-03-18
Payer: COMMERCIAL

## 2019-03-18 DIAGNOSIS — Z96.642 STATUS POST TOTAL HIP REPLACEMENT, LEFT: ICD-10-CM

## 2019-03-18 PROCEDURE — 97112 NEUROMUSCULAR REEDUCATION: CPT

## 2019-03-18 PROCEDURE — 97110 THERAPEUTIC EXERCISES: CPT

## 2019-03-20 RX ORDER — OXYCODONE HYDROCHLORIDE AND ACETAMINOPHEN 5; 325 MG/1; MG/1
1 TABLET ORAL 2 TIMES DAILY PRN
Qty: 14 TABLET | Refills: 0 | Status: SHIPPED | OUTPATIENT
Start: 2019-03-20 | End: 2019-03-26 | Stop reason: SDUPTHER

## 2019-03-21 ENCOUNTER — HOSPITAL ENCOUNTER (OUTPATIENT)
Dept: PHYSICAL THERAPY | Age: 49
Setting detail: THERAPIES SERIES
Discharge: HOME | End: 2019-03-21
Payer: COMMERCIAL

## 2019-03-25 ENCOUNTER — HOSPITAL ENCOUNTER (OUTPATIENT)
Dept: PHYSICAL THERAPY | Age: 49
Setting detail: THERAPIES SERIES
Discharge: HOME | End: 2019-03-25
Payer: COMMERCIAL

## 2019-03-26 ENCOUNTER — TELEPHONE (OUTPATIENT)
Dept: ORTHOPEDIC SURGERY | Age: 49
End: 2019-03-26

## 2019-03-26 DIAGNOSIS — Z96.642 STATUS POST TOTAL HIP REPLACEMENT, LEFT: ICD-10-CM

## 2019-03-26 RX ORDER — OXYCODONE HYDROCHLORIDE AND ACETAMINOPHEN 5; 325 MG/1; MG/1
1 TABLET ORAL 2 TIMES DAILY PRN
Qty: 14 TABLET | Refills: 0 | Status: SHIPPED | OUTPATIENT
Start: 2019-03-26 | End: 2019-04-02

## 2019-03-27 ENCOUNTER — TELEPHONE (OUTPATIENT)
Dept: ORTHOPEDIC SURGERY | Age: 49
End: 2019-03-27

## 2019-03-28 ENCOUNTER — HOSPITAL ENCOUNTER (OUTPATIENT)
Dept: PHYSICAL THERAPY | Age: 49
Setting detail: THERAPIES SERIES
Discharge: HOME | End: 2019-03-28
Payer: COMMERCIAL

## 2019-04-01 ENCOUNTER — TELEPHONE (OUTPATIENT)
Dept: INTERNAL MEDICINE CLINIC | Age: 49
End: 2019-04-01

## 2019-04-01 ENCOUNTER — TELEPHONE (OUTPATIENT)
Dept: ORTHOPEDIC SURGERY | Age: 49
End: 2019-04-01

## 2019-04-01 NOTE — TELEPHONE ENCOUNTER
Patient is in [place], [place] visiting family. Patient's wife states he is out of some prescriptions. He needs montelukast (SINGULAIR) 10 MG tablet, tiZANidine (ZANAFLEX) 4 MG tablet, ibuprofen (ADVIL;MOTRIN) 600 MG tablet, loratadine (CLARITIN) 10 MG tablet & diphenoxylate-atropine (DIPHENATOL) 2.5-0.025 MG per tablet sent to [address] [facility] in [place], [place]. Pharmacy phone #[phone].

## 2019-04-01 NOTE — TELEPHONE ENCOUNTER
Please clarify with patient how long he will be there.   Okay with sending prescription except Diphenatol  Need OV  For chronic problems

## 2019-04-01 NOTE — TELEPHONE ENCOUNTER
Per Renetta Casper he is not allowed to write a prescription for out of state refill as they are requesting. Babs was informed.

## 2019-04-03 ENCOUNTER — TELEPHONE (OUTPATIENT)
Dept: INTERNAL MEDICINE CLINIC | Age: 49
End: 2019-04-03

## 2019-04-03 RX ORDER — LORATADINE 10 MG/1
10 TABLET ORAL EVERY EVENING
Qty: 30 TABLET | Refills: 0 | Status: SHIPPED | OUTPATIENT
Start: 2019-04-03 | End: 2019-04-23 | Stop reason: SDUPTHER

## 2019-04-03 RX ORDER — MONTELUKAST SODIUM 10 MG/1
10 TABLET ORAL NIGHTLY
Qty: 30 TABLET | Refills: 0 | Status: SHIPPED | OUTPATIENT
Start: 2019-04-03 | End: 2019-04-22 | Stop reason: SDUPTHER

## 2019-04-03 RX ORDER — TIZANIDINE 4 MG/1
4 TABLET ORAL 3 TIMES DAILY
Qty: 90 TABLET | Refills: 0 | Status: SHIPPED | OUTPATIENT
Start: 2019-04-03 | End: 2019-04-22 | Stop reason: SDUPTHER

## 2019-04-03 RX ORDER — IBUPROFEN 600 MG/1
TABLET ORAL
Qty: 60 TABLET | Refills: 0 | Status: SHIPPED | OUTPATIENT
Start: 2019-04-03 | End: 2019-07-20 | Stop reason: SDUPTHER

## 2019-04-15 ENCOUNTER — TELEPHONE (OUTPATIENT)
Dept: ORTHOPEDIC SURGERY | Age: 49
End: 2019-04-15

## 2019-04-15 NOTE — TELEPHONE ENCOUNTER
Patient wife Leeanne Sanchez called stating that patient is seeking care in [facility]. She states that [facility] is requesting a medical necessity form from Dr. Betty Richmond in order for a patient to see a doctor for his hip in [facility]. Leeanne Sanchez is also requesting a referral be fax to Antoine Lauren.     Ame fax:  [address] [place] [address] fax:  [phone]    Please call Leeanne Sanchez when complete:  [phone]

## 2019-04-22 DIAGNOSIS — E55.9 VITAMIN D DEFICIENCY: ICD-10-CM

## 2019-04-22 RX ORDER — TIZANIDINE 4 MG/1
4 TABLET ORAL 3 TIMES DAILY
Qty: 90 TABLET | Refills: 0 | Status: SHIPPED | OUTPATIENT
Start: 2019-04-22 | End: 2019-05-28 | Stop reason: SDUPTHER

## 2019-04-22 RX ORDER — MONTELUKAST SODIUM 10 MG/1
10 TABLET ORAL NIGHTLY
Qty: 30 TABLET | Refills: 0 | Status: SHIPPED | OUTPATIENT
Start: 2019-04-22 | End: 2020-04-17 | Stop reason: SDUPTHER

## 2019-04-22 RX ORDER — ERGOCALCIFEROL 1.25 MG/1
CAPSULE ORAL
Qty: 12 CAPSULE | Refills: 3 | Status: SHIPPED | OUTPATIENT
Start: 2019-04-22 | End: 2020-04-17 | Stop reason: SDUPTHER

## 2019-04-22 RX ORDER — IBUPROFEN 600 MG/1
TABLET ORAL
Qty: 60 TABLET | Refills: 0 | OUTPATIENT
Start: 2019-04-22

## 2019-04-23 RX ORDER — LORATADINE 10 MG/1
10 TABLET ORAL EVERY EVENING
Qty: 30 TABLET | Refills: 0 | Status: SHIPPED | OUTPATIENT
Start: 2019-04-23 | End: 2019-05-28 | Stop reason: SDUPTHER

## 2019-04-24 ENCOUNTER — TELEPHONE (OUTPATIENT)
Dept: ORTHOPEDIC SURGERY | Age: 49
End: 2019-04-24

## 2019-04-24 NOTE — TELEPHONE ENCOUNTER
Saúl Walter is calling for . She said that Dr in [facility] needs his sx notes. [address] [facility].   Fax to [facility] [phone]  Or fax to [phone]

## 2019-08-20 RX ORDER — IBUPROFEN 600 MG/1
TABLET ORAL
Qty: 60 TABLET | Refills: 0 | Status: SHIPPED | OUTPATIENT
Start: 2019-08-20 | End: 2020-04-17 | Stop reason: SDUPTHER

## 2019-08-20 RX ORDER — TIZANIDINE 4 MG/1
TABLET ORAL
Qty: 90 TABLET | Refills: 0 | Status: SHIPPED | OUTPATIENT
Start: 2019-08-20 | End: 2020-04-17 | Stop reason: SDUPTHER

## 2019-08-20 RX ORDER — LORATADINE 10 MG/1
TABLET ORAL
Qty: 30 TABLET | Refills: 0 | Status: SHIPPED | OUTPATIENT
Start: 2019-08-20 | End: 2020-04-17 | Stop reason: SDUPTHER

## 2020-04-09 ENCOUNTER — OFFICE VISIT (OUTPATIENT)
Dept: ORTHOPEDIC SURGERY | Age: 50
End: 2020-04-09
Payer: COMMERCIAL

## 2020-04-09 ENCOUNTER — TELEPHONE (OUTPATIENT)
Dept: ORTHOPEDIC SURGERY | Age: 50
End: 2020-04-09

## 2020-04-09 VITALS — WEIGHT: 130 LBS | BODY MASS INDEX: 22.2 KG/M2 | TEMPERATURE: 98.8 F | HEIGHT: 64 IN

## 2020-04-09 PROCEDURE — G8428 CUR MEDS NOT DOCUMENT: HCPCS | Performed by: ORTHOPAEDIC SURGERY

## 2020-04-09 PROCEDURE — 99214 OFFICE O/P EST MOD 30 MIN: CPT | Performed by: ORTHOPAEDIC SURGERY

## 2020-04-09 PROCEDURE — 4004F PT TOBACCO SCREEN RCVD TLK: CPT | Performed by: ORTHOPAEDIC SURGERY

## 2020-04-09 PROCEDURE — 3017F COLORECTAL CA SCREEN DOC REV: CPT | Performed by: ORTHOPAEDIC SURGERY

## 2020-04-09 PROCEDURE — G8420 CALC BMI NORM PARAMETERS: HCPCS | Performed by: ORTHOPAEDIC SURGERY

## 2020-04-09 RX ORDER — TIZANIDINE 4 MG/1
4 TABLET ORAL 3 TIMES DAILY PRN
Qty: 42 TABLET | Refills: 0 | Status: SHIPPED | OUTPATIENT
Start: 2020-04-09 | End: 2020-04-23

## 2020-04-09 RX ORDER — METHYLPREDNISOLONE 4 MG/1
TABLET ORAL
Qty: 1 KIT | Refills: 0 | Status: SHIPPED | OUTPATIENT
Start: 2020-04-09 | End: 2020-04-15

## 2020-04-09 NOTE — PROGRESS NOTES
Patient is able to dorsiflex and wiggle all toes. Deep tendon reflexes of the lower extremities are normal and symmetric. He is non tender to palpation of the lumbar spine. Examination of the left hip reveals mild lateral tenderness. He essentially has full range of motion of the left hip. He is neurovascularly intact distally. His scar is well-healed. There is no evidence of erythema or warmth. There is no evidence of swelling. There is no evidence of DVT in the left lower extremity. X-rays: AP pelvis and 2 views of the bilateral hip were obtained and demonstrate moderate osteoarthritis of the right hip. The left total hip prosthesis is well aligned. There is no evidence of loosening or subsidence. Impression: right Hip Osteoarthritis #2 status post left total hip arthroplasty    Plan: At this time, we will treat the patient conservatively. The patient was given a refill/prescription for tizanidine. He was given a prescription for a Medrol Dosepak. He was instructed to not take the ibuprofen while on the steroids. He was encouraged to modify his activities. We instructed the patient on a home program to work on his balance and strengthening. He will follow-up with me in approximately 6 weeks and we will reassess him then. If he continues to have the right hip pain, we will consider referring the patient to Dr. Michelle Matthews for an intra-articular injection. The patient was under pain management prior to his move out of [facility]. We will give the patient a new referral to pain management.

## 2020-04-09 NOTE — TELEPHONE ENCOUNTER
Pt. Came into the office today to see Dr. Chau Mandujano. Pt. Was asking about being seen for his neck, pt. Mary Ellen Chaidez he was seen in the ER recently and has been seen in the past for this. Pt. States that he feels like his neck pain is getting worse and would like to be seen or ask questions on what he can do in the meantime. Told pt. That  I would put in a telephone call so he can be advised.  Pt. Can be reached at [phone]

## 2020-04-09 NOTE — TELEPHONE ENCOUNTER
Looks like last x-ray was taken at [facility] was 12.19.2018 and that was the last ED note I have was from that date as well. Please advise if you would like to set something up with him or if there is something you could advise for me to tell him.

## 2020-04-13 ENCOUNTER — TELEPHONE (OUTPATIENT)
Dept: ORTHOPEDIC SURGERY | Age: 50
End: 2020-04-13

## 2020-04-17 ENCOUNTER — VIRTUAL VISIT (OUTPATIENT)
Dept: INTERNAL MEDICINE CLINIC | Age: 50
End: 2020-04-17
Payer: COMMERCIAL

## 2020-04-17 PROCEDURE — 3017F COLORECTAL CA SCREEN DOC REV: CPT | Performed by: INTERNAL MEDICINE

## 2020-04-17 PROCEDURE — G8427 DOCREV CUR MEDS BY ELIG CLIN: HCPCS | Performed by: INTERNAL MEDICINE

## 2020-04-17 PROCEDURE — G8926 SPIRO NO PERF OR DOC: HCPCS | Performed by: INTERNAL MEDICINE

## 2020-04-17 PROCEDURE — 4004F PT TOBACCO SCREEN RCVD TLK: CPT | Performed by: INTERNAL MEDICINE

## 2020-04-17 PROCEDURE — 3023F SPIROM DOC REV: CPT | Performed by: INTERNAL MEDICINE

## 2020-04-17 PROCEDURE — G8420 CALC BMI NORM PARAMETERS: HCPCS | Performed by: INTERNAL MEDICINE

## 2020-04-17 PROCEDURE — 99214 OFFICE O/P EST MOD 30 MIN: CPT | Performed by: INTERNAL MEDICINE

## 2020-04-17 RX ORDER — MONTELUKAST SODIUM 10 MG/1
10 TABLET ORAL NIGHTLY
Qty: 30 TABLET | Refills: 0 | Status: SHIPPED | OUTPATIENT
Start: 2020-04-17 | End: 2020-06-01

## 2020-04-17 RX ORDER — IBUPROFEN 600 MG/1
600 TABLET ORAL EVERY 8 HOURS PRN
Qty: 60 TABLET | Refills: 1 | Status: SHIPPED | OUTPATIENT
Start: 2020-04-17 | End: 2020-05-20

## 2020-04-17 RX ORDER — ALBUTEROL SULFATE 90 UG/1
2 AEROSOL, METERED RESPIRATORY (INHALATION) EVERY 4 HOURS PRN
Qty: 1 INHALER | Refills: 2 | Status: SHIPPED | OUTPATIENT
Start: 2020-04-17 | End: 2021-02-16 | Stop reason: SDUPTHER

## 2020-04-17 RX ORDER — ERGOCALCIFEROL 1.25 MG/1
CAPSULE ORAL
Qty: 12 CAPSULE | Refills: 2 | Status: SHIPPED | OUTPATIENT
Start: 2020-04-17 | End: 2020-05-19

## 2020-04-17 RX ORDER — TIZANIDINE 4 MG/1
TABLET ORAL
Qty: 90 TABLET | Refills: 1 | Status: SHIPPED | OUTPATIENT
Start: 2020-04-17 | End: 2020-09-01

## 2020-04-17 RX ORDER — LORATADINE 10 MG/1
TABLET ORAL
Qty: 30 TABLET | Refills: 2 | Status: SHIPPED | OUTPATIENT
Start: 2020-04-17 | End: 2020-09-01

## 2020-04-17 RX ORDER — AMITRIPTYLINE HYDROCHLORIDE 25 MG/1
TABLET, FILM COATED ORAL
Qty: 60 TABLET | Refills: 5 | Status: SHIPPED
Start: 2020-04-17 | End: 2021-03-12

## 2020-04-17 ASSESSMENT — ENCOUNTER SYMPTOMS
RHINORRHEA: 1
BACK PAIN: 1
COUGH: 1
TROUBLE SWALLOWING: 0
WHEEZING: 1
PHOTOPHOBIA: 0
SHORTNESS OF BREATH: 0
VOICE CHANGE: 0

## 2020-04-17 NOTE — PROGRESS NOTES
Mable Hilario MD   ibuprofen (ADVIL;MOTRIN) 600 MG tablet Take 1 tablet by mouth every 8 hours as needed for Pain Yes Alma Bradshaw MD   loratadine (CLARITIN) 10 MG tablet TAKE 1 TABLET BY MOUTH EVERY DAY IN THE EVENING Yes Alma Bradshaw MD   montelukast (SINGULAIR) 10 MG tablet Take 1 tablet by mouth nightly Yes Alma Bradshaw MD   vitamin D (ERGOCALCIFEROL) 1.25 MG (32527 UT) CAPS capsule 1 po q week Yes Alma Bradshaw MD   albuterol sulfate  (90 Base) MCG/ACT inhaler Inhale 2 puffs into the lungs every 4 hours as needed for Wheezing Yes Alma Bradshaw MD   amitriptyline (ELAVIL) 25 MG tablet 2 po q HS Yes Alma Bradshaw MD   Fluticasone furoate-vilanterol (BREO ELLIPTA) 200-25 MCG/INH AEPB inhaler Inhale 1 puff into the lungs daily Yes Alma Bradshaw MD   tiZANidine (ZANAFLEX) 4 MG tablet Take 1 tablet by mouth 3 times daily as needed (as needed for pain)  Unique Levi MD   denosumab (PROLIA) 60 MG/ML SOSY SC injection Inject 1 mL into the skin once for 1 dose  Leslye Kurtz MD   aspirin EC 81 MG EC tablet Take 1 tablet by mouth 2 times daily Take for DVT blood clot prophylaxis    Please avoid missing doses.   ANNIKA Grove CNP   nicotine (NICODERM CQ) 14 MG/24HR Place 1 patch onto the skin every 24 hours  M Mable Hilario MD   magnesium oxide (MAG-OX) 400 MG tablet 1 po q day  Ayesha Miles MD   Tens Unit MISC by Does not apply route  ANNIKA Solano CNP       Social History     Tobacco Use    Smoking status: Current Every Day Smoker     Packs/day: 0.50     Years: 30.00     Pack years: 15.00     Types: Cigarettes    Smokeless tobacco: Never Used    Tobacco comment: using patches   Substance Use Topics    Alcohol use: No    Drug use: No     Types: Marijuana     Comment: In High School Only        Allergies   Allergen Reactions    Bactrim [Sulfamethoxazole-Trimethoprim] Diarrhea    Diclofenac Diarrhea     He can tolerate aspirin    Naproxen

## 2020-04-20 ENCOUNTER — TELEPHONE (OUTPATIENT)
Dept: ENDOCRINOLOGY | Age: 50
End: 2020-04-20

## 2020-04-20 NOTE — TELEPHONE ENCOUNTER
Patient called and said he needs to start his prolia since his caresource is back.  He would like to talk to [facility]

## 2020-04-21 ENCOUNTER — TELEPHONE (OUTPATIENT)
Dept: INTERNAL MEDICINE CLINIC | Age: 50
End: 2020-04-21

## 2020-05-05 ENCOUNTER — TELEPHONE (OUTPATIENT)
Dept: ORTHOPEDIC SURGERY | Age: 50
End: 2020-05-05

## 2020-05-06 ENCOUNTER — VIRTUAL VISIT (OUTPATIENT)
Dept: ENDOCRINOLOGY | Age: 50
End: 2020-05-06
Payer: COMMERCIAL

## 2020-05-06 PROCEDURE — 3017F COLORECTAL CA SCREEN DOC REV: CPT | Performed by: INTERNAL MEDICINE

## 2020-05-06 PROCEDURE — G8427 DOCREV CUR MEDS BY ELIG CLIN: HCPCS | Performed by: INTERNAL MEDICINE

## 2020-05-06 PROCEDURE — 99214 OFFICE O/P EST MOD 30 MIN: CPT | Performed by: INTERNAL MEDICINE

## 2020-05-06 RX ORDER — CALCIUM CARBONATE 500(1250)
500 TABLET ORAL DAILY
Qty: 30 TABLET | Refills: 3 | Status: SHIPPED | OUTPATIENT
Start: 2020-05-06 | End: 2020-11-04

## 2020-05-06 NOTE — PROGRESS NOTES
Subjective:          Pursuant to the emergency declaration under the [facility] waiver authority and the [employer] and [employer] Act, this Virtual  Visit was conducted, with patient's consent, to reduce the patient's risk of exposure to COVID-19 and provide continuity of care for an established patient. Patient was at home. Provider was at home. No one else was involved  Services were provided through a video synchronous discussion virtually to substitute for in-person clinic visit. Interim:  Seen last in 2017  Was in [address] [place] [address] 1/19  No fractures    Fanta Mchugh is a  who is here for initial evaluation of osteoporosis. Patient is being seen at the request of  Mara Stewart MD     he was diagnosed with Osteoporsis in 2012    DEXA scan 1/17  LUMBAR SPINE:       The bone mineral density in the lumbar spine including the L1 through L3   levels is measured at 0.707 g/cm2, which corresponds to a T-score of -3.3 and   a Z-score of -3.0.  This is within the osteoporosis range by WHO criteria.       LEFT HIP:       The bone mineral density in the total hip is measured at 0.695 g/cm2   corresponding to a T-score of -2.2 and a Z-score of -2.0.  This is within the   osteopenia range by [facility] criteria.       The bone mineral density of the femoral neck is measured at 0.574 g/cm2   corresponding to a T-score of -2.6 and a Z-score of -1.9.  This is within the   osteoporosis range by WHO criteria.       RIGHT HIP:       The bone mineral density in the total hip is measured at 0.7 g/cm2   corresponding to a T-score of -2.2 and a Z-score of -1.9.  This is within the   osteopenia range by WHO criteria.       The bone mineral density of the femoral neck is measured at 0.566 g/cm2   corresponding to a T-score of -2.7 and a Z-score of -2.0.  This is within the   osteoporosis range by WHO criteria.      Dexa 3/12:      Total Lumbar Spine:    T-score = -3.1    Z-score = -2.9    BMD =      0.753 g/cm       Right Femoral Neck:    T-score = -2.4    Z-score = -1.9    BMD =       0.602 g/cm       Right Total Hip:    T-score = -2.1     Z-score = -1.9    BMD =      0.715 g/cm       Left Femoral Neck:    T-score = -2.5    Z-score = -1.9    BMD =      0.593 g/cm       Total Left Hip:    T-score = -2.3    Z-score = -2.1    BMD =      0.688 g/cm       One-Third Forearm:    T-score = -2.5    Z-score = -2.2    BMD =      0.625 g/cm       Total Forearm:    T-score = -3.1    Z-score = -2.8    BMD =     0.515 g/cm     History of fractures :  Right arm fracture, fall from stairs    Pharmacological therapy for Osteoprosis:  Fosamax - taking since 2012  Prolia  10/17, 3/18, 1/19     FH of Osteoporosis: mom   FH of hip fracture:yes- mom    Secondary causes of osteoprorsis: Menopause, smoking, prolonged steroid use. Calcium: Diet : 900mg Supplement: 200mg TID- not taking    Vitamin D:  50,000IU weekly  Level 31 on 12/16  Exercise: Follows up regularly with a dentist. No major dental procedures planned. Reports h/o spinal fusion surgery, DDD of the back, Disc bulge, stenosis, back problem since Age 28  2013 hard ware removed, as screws came out, was told bones are weak    12/16 CBC, TSH nl  6/15 Testosterone 491 CMP nl  1/17 negative celiac panel    No chronic steroids. Smoker 1PPD  For 17 years. Still smoking but cutting back    SH:  at [facility]    Past Medical History:   Diagnosis Date    Anxiety     Chronic back pain     COPD (chronic obstructive pulmonary disease) ([facility] [address]     Depression     Hip pain 09/05/2017    ?  CAM possible bilateral superior femoral head-heck junction bump suggestive of femoro acetabular impingement syndrome    History of back surgery 12/26/2016    Localized edema 2/24/2018    Neg abd ultrasound and DVT    Osteoporosis     Seasonal allergies     Spontaneous pneumothorax     1991    Testicular torsion     Negative for chest pain, palpitations and leg swelling. Gastrointestinal: Negative for nausea, vomiting and abdominal pain. Genitourinary: Negative for dysuria, urgency and frequency. Musculoskeletal: +muscle, joint pain h/o back surgery  Skin: Negative for itching and rash. Neurological: Negative for dizziness. Negative for tingling, tremors, focal weakness  Endo/Heme/Allergies: see HPI  Psychiatric/Behavioral: Negative for depression and substance abuse. PHYSICAL EXAMINATION:  [ INSTRUCTIONS:  \"[x]\" Indicates a positive item  \"[]\" Indicates a negative item  -- DELETE ALL ITEMS NOT EXAMINED]  Vital Signs: (As obtained by patient/caregiver or practitioner observation)    Blood pressure-  Heart rate-    Respiratory rate-    Temperature-  Pulse oximetry-     Constitutional Appears well-developed and well-nourished No apparent distress        Mental status  Alert and awake  Oriented to person/place/time  Able to follow commands      Eyes:  EOM    [x]  Normal    Sclera  [x]  Normal           Discharge [x]  None visible      HENT:   [x] Normocephalic, atraumatic.     [x] Mouth/Throat: Mucous membranes are moist.     External Ears [x] Normal      Neck: [x] No visualized mass     Pulmonary/Chest: [x] Respiratory effort normal.  [x] No visualized signs of difficulty breathing or respiratory distress             Musculoskeletal:   [x] Normal gait with no signs of ataxia         [x] Normal range of motion of neck              Neurological:        [x] No Facial Asymmetry (Cranial nerve 7 motor function) (limited exam to video visit)          [x] No gaze palsy                Skin:        [x] No significant exanthematous lesions or discoloration noted on facial skin                 Psychiatric:       [x] Normal Affect [x] No Hallucinations            Other pertinent observable physical exam findings-     Due to this being a TeleHealth encounter, evaluation of the following organ systems is limited:

## 2020-05-18 ENCOUNTER — HOSPITAL ENCOUNTER (OUTPATIENT)
Age: 50
Discharge: HOME | End: 2020-05-18
Payer: COMMERCIAL

## 2020-05-18 ENCOUNTER — HOSPITAL ENCOUNTER (OUTPATIENT)
Dept: GENERAL RADIOLOGY | Age: 50
Discharge: HOME | End: 2020-05-18
Payer: COMMERCIAL

## 2020-05-18 LAB — VITAMIN D 25-HYDROXY: 65.7 NG/ML

## 2020-05-18 PROCEDURE — 77080 DXA BONE DENSITY AXIAL: CPT

## 2020-05-18 PROCEDURE — 82306 VITAMIN D 25 HYDROXY: CPT

## 2020-05-18 PROCEDURE — 36415 COLL VENOUS BLD VENIPUNCTURE: CPT

## 2020-05-18 RX ORDER — DENOSUMAB 60 MG/ML
60 INJECTION SUBCUTANEOUS ONCE
Qty: 1 ML | Refills: 0 | Status: SHIPPED | OUTPATIENT
Start: 2020-05-18 | End: 2020-07-13 | Stop reason: SDUPTHER

## 2020-05-19 ENCOUNTER — TELEPHONE (OUTPATIENT)
Dept: ENDOCRINOLOGY | Age: 50
End: 2020-05-19

## 2020-05-19 NOTE — TELEPHONE ENCOUNTER
Pt needs a PA done to start prolia and also his insurance stopped covering vitamin D so he needs to know what to do about that.

## 2020-05-21 ENCOUNTER — OFFICE VISIT (OUTPATIENT)
Dept: ORTHOPEDIC SURGERY | Age: 50
End: 2020-05-21
Payer: COMMERCIAL

## 2020-05-21 VITALS — BODY MASS INDEX: 22.2 KG/M2 | WEIGHT: 130 LBS | TEMPERATURE: 95.8 F | HEIGHT: 64 IN

## 2020-05-21 PROCEDURE — 99214 OFFICE O/P EST MOD 30 MIN: CPT | Performed by: ORTHOPAEDIC SURGERY

## 2020-05-21 PROCEDURE — 4004F PT TOBACCO SCREEN RCVD TLK: CPT | Performed by: ORTHOPAEDIC SURGERY

## 2020-05-21 PROCEDURE — G8420 CALC BMI NORM PARAMETERS: HCPCS | Performed by: ORTHOPAEDIC SURGERY

## 2020-05-21 PROCEDURE — G8427 DOCREV CUR MEDS BY ELIG CLIN: HCPCS | Performed by: ORTHOPAEDIC SURGERY

## 2020-05-21 PROCEDURE — 20610 DRAIN/INJ JOINT/BURSA W/O US: CPT | Performed by: ORTHOPAEDIC SURGERY

## 2020-05-21 PROCEDURE — 3017F COLORECTAL CA SCREEN DOC REV: CPT | Performed by: ORTHOPAEDIC SURGERY

## 2020-05-21 RX ORDER — BUPIVACAINE HYDROCHLORIDE 2.5 MG/ML
3 INJECTION, SOLUTION INFILTRATION; PERINEURAL ONCE
Status: COMPLETED | OUTPATIENT
Start: 2020-05-21 | End: 2020-05-21

## 2020-05-21 RX ORDER — IBUPROFEN 800 MG/1
800 TABLET ORAL 3 TIMES DAILY PRN
Qty: 90 TABLET | Refills: 1 | Status: SHIPPED | OUTPATIENT
Start: 2020-05-21 | End: 2020-07-31

## 2020-05-21 RX ORDER — METHYLPREDNISOLONE ACETATE 40 MG/ML
80 INJECTION, SUSPENSION INTRA-ARTICULAR; INTRALESIONAL; INTRAMUSCULAR; SOFT TISSUE ONCE
Status: COMPLETED | OUTPATIENT
Start: 2020-05-21 | End: 2020-05-21

## 2020-05-21 RX ADMIN — METHYLPREDNISOLONE ACETATE 80 MG: 40 INJECTION, SUSPENSION INTRA-ARTICULAR; INTRALESIONAL; INTRAMUSCULAR; SOFT TISSUE at 11:03

## 2020-05-21 RX ADMIN — BUPIVACAINE HYDROCHLORIDE 7.5 MG: 2.5 INJECTION, SOLUTION INFILTRATION; PERINEURAL at 11:03

## 2020-05-21 NOTE — TELEPHONE ENCOUNTER
I placed an appeal letter in [address] [place] [address] Rd, we can send as an appeal as they may not know he has been on fosamax for 5 years which is the same class of medication as reclast

## 2020-05-28 ENCOUNTER — TELEPHONE (OUTPATIENT)
Dept: ENDOCRINOLOGY | Age: 50
End: 2020-05-28

## 2020-05-28 NOTE — TELEPHONE ENCOUNTER
Please advise patient we will be appealing for the Prolia. There is appeal letter in [employer]. Please check for appeal sent to insurance.

## 2020-05-28 NOTE — TELEPHONE ENCOUNTER
Pt called back and he said he got a thing from insurance said they want him to try Zofronic Acid in replacement for prolia. Is this an option?

## 2020-06-04 ENCOUNTER — TELEPHONE (OUTPATIENT)
Dept: ENDOCRINOLOGY | Age: 50
End: 2020-06-04

## 2020-06-04 NOTE — TELEPHONE ENCOUNTER
Patient will be here tomorrow by noon to sign.  He would like to know what the other options are if it is not covered as well

## 2020-06-09 ENCOUNTER — OFFICE VISIT (OUTPATIENT)
Dept: ORTHOPEDIC SURGERY | Age: 50
End: 2020-06-09
Payer: COMMERCIAL

## 2020-06-09 VITALS — WEIGHT: 133 LBS | BODY MASS INDEX: 22.16 KG/M2 | TEMPERATURE: 97.9 F | HEIGHT: 65 IN

## 2020-06-09 PROCEDURE — 20611 DRAIN/INJ JOINT/BURSA W/US: CPT | Performed by: ORTHOPAEDIC SURGERY

## 2020-06-09 RX ORDER — LIDOCAINE HYDROCHLORIDE 10 MG/ML
5 INJECTION, SOLUTION INFILTRATION; PERINEURAL ONCE
Status: COMPLETED | OUTPATIENT
Start: 2020-06-09 | End: 2020-06-09

## 2020-06-09 RX ORDER — METHYLPREDNISOLONE ACETATE 40 MG/ML
80 INJECTION, SUSPENSION INTRA-ARTICULAR; INTRALESIONAL; INTRAMUSCULAR; SOFT TISSUE ONCE
Status: COMPLETED | OUTPATIENT
Start: 2020-06-09 | End: 2020-06-09

## 2020-06-09 RX ADMIN — LIDOCAINE HYDROCHLORIDE 5 ML: 10 INJECTION, SOLUTION INFILTRATION; PERINEURAL at 11:39

## 2020-06-09 RX ADMIN — METHYLPREDNISOLONE ACETATE 80 MG: 40 INJECTION, SUSPENSION INTRA-ARTICULAR; INTRALESIONAL; INTRAMUSCULAR; SOFT TISSUE at 11:40

## 2020-06-16 ENCOUNTER — TELEPHONE (OUTPATIENT)
Dept: ORTHOPEDIC SURGERY | Age: 50
End: 2020-06-16

## 2020-06-16 ENCOUNTER — OFFICE VISIT (OUTPATIENT)
Dept: ORTHOPEDIC SURGERY | Age: 50
End: 2020-06-16
Payer: COMMERCIAL

## 2020-06-16 VITALS — HEIGHT: 65 IN | TEMPERATURE: 99.5 F | WEIGHT: 132.94 LBS | BODY MASS INDEX: 22.15 KG/M2

## 2020-06-16 PROCEDURE — 99213 OFFICE O/P EST LOW 20 MIN: CPT | Performed by: ORTHOPAEDIC SURGERY

## 2020-06-16 PROCEDURE — G8420 CALC BMI NORM PARAMETERS: HCPCS | Performed by: ORTHOPAEDIC SURGERY

## 2020-06-16 PROCEDURE — G8427 DOCREV CUR MEDS BY ELIG CLIN: HCPCS | Performed by: ORTHOPAEDIC SURGERY

## 2020-06-16 PROCEDURE — 3017F COLORECTAL CA SCREEN DOC REV: CPT | Performed by: ORTHOPAEDIC SURGERY

## 2020-06-16 PROCEDURE — 4004F PT TOBACCO SCREEN RCVD TLK: CPT | Performed by: ORTHOPAEDIC SURGERY

## 2020-06-16 NOTE — TELEPHONE ENCOUNTER
Patient was referred to Dr. Dorina Griffith for NECK  recommend physical therapy, home exercises and home traction unit. If pain persists was given Dr. Marsha Pace information for additional conservative treatment. MRI XRAY ONLY  PT ORDERED  MEDICAL HX   Patient is APPROVED to be seen at this time. Message sent to schedulers   Please notify referring physician if denied.

## 2020-06-16 NOTE — PROGRESS NOTES
History of present illness:   Mr. Odilon Nayak is a pleasant 48 y.o. old male with a PMH of left BETO, lumbar fusion and removal of hardware, narcotic dependency, arachnoiditis, osteoporosis, COPD, chronic back pain, and anxiety kindly referred by Dr. Violeta Jaffe for consultation regarding Mr. Jaret Rubio's neck and left arm pain. He states the pain began insidiously about 2 years ago. His pain has steadily increased since then. Was initially evaluated for his left shoulder and had steroid injection for TRISR [facility] joint arthritis. He rates his neck pain 7/10 and shoulder and arm pain 4/10. He describes the pain as shocking and tingling. Pain is worse with cervical motion and slightly better with rest. The arm pain radiates to his left shoulder and triceps. He admits numbness and tingling in the same distribution. He admits generalized weakness of his left arm. Patient denies difficulty with fine motor skills. He denies lower extremity symptoms, gait abnormality and bowel or bladder dysfunction. He has had chiropractic care about 7 years ago. He takes ibuprofen, Zanaflex, and elavil. Physical examination:   Mr. Gregoria Rubio's most recent vitals:  Vitals  Temp: 99.5 °F (37.5 °C)  Height: 5' 5\" (165.1 cm)  Weight: 132 lb 15 oz (60.3 kg)  Body mass index is 22.12 kg/m². General Exam:  He is well-developed and well-nourished, is in obvious pain and alert and oriented to person, place, and time. He demonstrates appropriate mood and affect. He walks with a normal gait. HEENT:   His cervical flexion, extension, and axial rotation are moderately reduced with pain. His skin is warm and dry. He has no tenderness over his cervical spine and no obvious muscle spasm. The skin over his cervical spine is normal without surgical scar.      Upper extremities:  He has 5/5 strength of his interosseous muscles, wrist dorsiflexors and volarflexors, biceps, triceps, deltoids, and internal and external rotators of his shoulders,

## 2020-06-17 ENCOUNTER — TELEPHONE (OUTPATIENT)
Dept: ORTHOPEDIC SURGERY | Age: 50
End: 2020-06-17

## 2020-06-18 ENCOUNTER — TELEPHONE (OUTPATIENT)
Dept: ORTHOPEDIC SURGERY | Age: 50
End: 2020-06-18

## 2020-06-18 NOTE — TELEPHONE ENCOUNTER
Left a message that if she was referring to cervical traction collar, he could try it 1-2 times a day for about 10 minutes, and to call back if any other questions.

## 2020-06-21 ENCOUNTER — HOSPITAL ENCOUNTER (EMERGENCY)
Age: 50
Discharge: HOME | End: 2020-06-21
Payer: COMMERCIAL

## 2020-06-21 VITALS
HEIGHT: 65 IN | BODY MASS INDEX: 22.49 KG/M2 | HEART RATE: 91 BPM | RESPIRATION RATE: 17 BRPM | DIASTOLIC BLOOD PRESSURE: 90 MMHG | WEIGHT: 135 LBS | TEMPERATURE: 98.3 F | OXYGEN SATURATION: 96 % | SYSTOLIC BLOOD PRESSURE: 150 MMHG

## 2020-06-21 PROCEDURE — 99282 EMERGENCY DEPT VISIT SF MDM: CPT

## 2020-06-21 RX ORDER — ERYTHROMYCIN 5 MG/G
OINTMENT OPHTHALMIC
Qty: 3.5 G | Refills: 0 | Status: SHIPPED | OUTPATIENT
Start: 2020-06-21 | End: 2020-07-01

## 2020-06-21 ASSESSMENT — ENCOUNTER SYMPTOMS
SHORTNESS OF BREATH: 0
CONSTIPATION: 0
VOMITING: 0
COLOR CHANGE: 0
EYE ITCHING: 0
ABDOMINAL PAIN: 0
NAUSEA: 0
EYE DISCHARGE: 0
PHOTOPHOBIA: 0
COUGH: 0
EYE REDNESS: 0
RESPIRATORY NEGATIVE: 1
EYE PAIN: 1
DIARRHEA: 0
BACK PAIN: 0

## 2020-06-21 ASSESSMENT — PAIN SCALES - GENERAL: PAINLEVEL_OUTOF10: 6

## 2020-06-21 ASSESSMENT — VISUAL ACUITY
OU: 1
OU: 20/15
OS: 20/20
OD: 20/20

## 2020-06-22 NOTE — ED PROVIDER NOTES
Genitourinary: Negative for difficulty urinating and dysuria. Musculoskeletal: Negative for arthralgias, back pain, myalgias, neck pain and neck stiffness. Skin: Negative for color change, pallor, rash and wound. Neurological: Negative for dizziness, light-headedness and headaches. Positives and Pertinent negatives as per HPI. Except as noted above in the ROS, all other systems were reviewed and negative. PAST MEDICAL HISTORY     Past Medical History:   Diagnosis Date    Anxiety     Chronic back pain     COPD (chronic obstructive pulmonary disease) ([facility] [address]     Depression     Hip pain 09/05/2017    ? CAM possible bilateral superior femoral head-heck junction bump suggestive of femoro acetabular impingement syndrome    History of back surgery 12/26/2016    Localized edema 2/24/2018    Neg abd ultrasound and DVT    Osteoporosis     Seasonal allergies     Spontaneous pneumothorax     1991    Testicular torsion     Tobacco abuse     Wears dentures          SURGICAL HISTORY     Past Surgical History:   Procedure Laterality Date    BACK SURGERY      1st rods, 2nd tens unit 3rd rods and screw removed, only has cages now   [address]      x 2    TOTAL HIP ARTHROPLASTY Left 1/29/2019    TOTAL HIP REPLACEMENT LEFT HIP (ADVANCED) performed by Toño Fountain MD at [facility]       Previous Medications    ALBUTEROL SULFATE  (90 BASE) MCG/ACT INHALER    Inhale 2 puffs into the lungs every 4 hours as needed for Wheezing    AMITRIPTYLINE (ELAVIL) 25 MG TABLET    2 po q HS    ASPIRIN EC 81 MG EC TABLET    Take 1 tablet by mouth 2 times daily Take for DVT blood clot prophylaxis    Please avoid missing doses.     CALCIUM ELEMENTAL (OSCAL) 500 MG TABS TABLET    Take 1 tablet by mouth daily    DENOSUMAB (PROLIA) 60 MG/ML SOSY SC INJECTION    Inject 1 mL into the skin once for 1 dose    DENOSUMAB (PROLIA) 60 MG/ML SOSY SC INJECTION    Inject 1 mL into the skin once for 1 dose    FLUTICASONE FUROATE-VILANTEROL (BREO ELLIPTA) 200-25 MCG/INH AEPB INHALER    Inhale 1 puff into the lungs daily    IBUPROFEN (ADVIL;MOTRIN) 600 MG TABLET    TAKE ONE TABLET BY MOUTH EVERY 8 HOURS AS NEEDED FOR PAIN    IBUPROFEN (ADVIL;MOTRIN) 800 MG TABLET    Take 1 tablet by mouth 3 times daily as needed for Pain Take with food    LORATADINE (CLARITIN) 10 MG TABLET    TAKE 1 TABLET BY MOUTH EVERY DAY IN THE EVENING    MAGNESIUM OXIDE (MAG-OX) 400 MG TABLET    1 po q day    MONTELUKAST (SINGULAIR) 10 MG TABLET    TAKE ONE TABLET BY MOUTH ONCE NIGHTLY    NICOTINE (NICODERM CQ) 14 MG/24HR    Place 1 patch onto the skin every 24 hours    TENS UNIT MISC    by Does not apply route    TIZANIDINE (ZANAFLEX) 4 MG TABLET    TAKE 1 TABLET BY MOUTH THREE TIMES A DAY         ALLERGIES     Bactrim [sulfamethoxazole-trimethoprim]; Diclofenac; Naproxen; Neurontin [gabapentin]; Robaxin [methocarbamol]; Sulfamethoxazole-trimethoprim; Toradol [ketorolac tromethamine]; Azithromycin; Biaxin [clarithromycin]; Celebrex [celecoxib];  Sulfa antibiotics; and Tramadol    FAMILYHISTORY       Family History   Problem Relation Age of Onset    Arthritis Mother     Other Mother     Other Father           SOCIAL HISTORY       Social History     Tobacco Use    Smoking status: Current Every Day Smoker     Packs/day: 0.50     Years: 30.00     Pack years: 15.00     Types: Cigarettes    Smokeless tobacco: Never Used    Tobacco comment: using patches   Substance Use Topics    Alcohol use: No    Drug use: No     Types: Marijuana     Comment: In High School Only       SCREENINGS             PHYSICAL EXAM    (up to 7 for level 4, 8 or more for level 5)     ED Triage Vitals   BP Temp Temp Source Pulse Resp SpO2 Height Weight   06/21/20 1932 06/21/20 1931 06/21/20 1931 06/21/20 1931 06/21/20 1931 06/21/20 1931 06/21/20 1931 06/21/20 1931   (!) 150/90 98.3 °F (36.8 °C) Oral 91 17 96 % 5' 5\" (1.651 m) 135 lb (61.2 kg) Physical Exam  Constitutional:       General: He is not in acute distress. Appearance: Normal appearance. He is well-developed. He is not ill-appearing, toxic-appearing or diaphoretic. HENT:      Head: Normocephalic and atraumatic. Right Ear: External ear normal.      Left Ear: External ear normal.   Eyes:      General: Vision grossly intact. No visual field deficit. Right eye: No foreign body, discharge or hordeolum. Left eye: Hordeolum present. No foreign body or discharge. Extraocular Movements: Extraocular movements intact. Conjunctiva/sclera: Conjunctivae normal.      Right eye: Right conjunctiva is not injected. No chemosis, exudate or hemorrhage. Left eye: Left conjunctiva is not injected. No chemosis, exudate or hemorrhage. Pupils: Pupils are equal, round, and reactive to light. Pupils are equal.      Comments: No conjunctival injection noted. No periorbital edema, ecchymoses, erythema or warmth noted. Does have swelling to the left medial lower eyelid with what appears to be hordeolum internum noted. No conjunctival injection or subconjunctival hemorrhage noted. EOMs intact. PERRLA. Visual acuity visual fields intact at bedside. No temporal artery tenderness. No tender palpation over the globe. Visual acuity 20/15 OU, 20/20 OD and 20/20 OS. Neck:      Musculoskeletal: Normal range of motion and neck supple. Pulmonary:      Effort: Pulmonary effort is normal. No respiratory distress. Breath sounds: No stridor. Musculoskeletal: Normal range of motion. Skin:     General: Skin is warm and dry. Coloration: Skin is not pale. Findings: No erythema or rash. Neurological:      Mental Status: He is alert and oriented to person, place, and time.    Psychiatric:         Behavior: Behavior normal.         DIAGNOSTIC RESULTS   LABS:    Labs Reviewed - No data to display    All other labs were within normal range or not returned as of this

## 2020-07-08 RX ORDER — LORAZEPAM 0.5 MG/1
TABLET ORAL
COMMUNITY
End: 2020-11-04 | Stop reason: ALTCHOICE

## 2020-07-08 RX ORDER — LORAZEPAM 0.5 MG/1
TABLET ORAL
Refills: 0 | OUTPATIENT
Start: 2020-07-08

## 2020-07-08 NOTE — TELEPHONE ENCOUNTER
Patient advised. He does not want to make an appt at this time. He states he has already tried other alternatives, which did not work. Patient states that his therapist is the one that suggested the Ativan. His therapist is not able to prescribe medication.

## 2020-07-08 NOTE — TELEPHONE ENCOUNTER
[facility] received a call from:    Name of Caller: patient    Relationship to patient:self    Organization name: n/a    Best contact number: [phone]    Reason for call: Patient states he spoke with his pain specialist . They told him it is fine that he takes the ativan . Would like it sent to [facility] in [facility].

## 2020-07-08 NOTE — TELEPHONE ENCOUNTER
[facility] received a call from:    Name of Caller: Mary Nobles    Relationship to patient: Self    Organization name: N/A    Best contact number: [phone]    Reason for call: Pt states to disregard the request for the Ativan. Please advise.

## 2020-07-08 NOTE — TELEPHONE ENCOUNTER
Pharmacy / Patient is calling with questions regarding medication     Medication:  ativan  Question or Error: patient is needing this for his anxiety  Date of last visit 4/17/20    Pharmacy confirmed/updated in [facility]

## 2020-07-08 NOTE — TELEPHONE ENCOUNTER
I do not prescribe benzodiazepines  those who are taking chronic narcotic medication.   You may make in office appointment to discuss other options

## 2020-07-13 ENCOUNTER — TELEPHONE (OUTPATIENT)
Dept: ORTHOPEDIC SURGERY | Age: 50
End: 2020-07-13

## 2020-07-13 ENCOUNTER — TELEPHONE (OUTPATIENT)
Dept: ENDOCRINOLOGY | Age: 50
End: 2020-07-13

## 2020-07-13 RX ORDER — DENOSUMAB 60 MG/ML
60 INJECTION SUBCUTANEOUS ONCE
Qty: 1 ML | Refills: 0 | Status: SHIPPED | OUTPATIENT
Start: 2020-07-13 | End: 2021-01-18 | Stop reason: SDUPTHER

## 2020-07-13 NOTE — TELEPHONE ENCOUNTER
Patient called and said he needs to know about his prolia.  He stating [facility] does not handle prolia and he usually uses [facility]. Please call patient to discuss

## 2020-07-13 NOTE — TELEPHONE ENCOUNTER
WANTS TO KNOW IF HE IS TO GET INJECTIONS EVERY THREE MONTHS OR EVERY SIX MONTHS.  REQUESTS CALL TO DISCUSS

## 2020-07-13 NOTE — TELEPHONE ENCOUNTER
I spoke with  Patient and told him that he will have to wait 3 months before he can get another injection. He had his injection on 6/9/20 so he will have to wait until September. He understood.

## 2020-07-15 ENCOUNTER — TELEPHONE (OUTPATIENT)
Dept: FAMILY MEDICINE CLINIC | Age: 50
End: 2020-07-15

## 2020-07-15 NOTE — TELEPHONE ENCOUNTER
Your patient was seen at the [facility] today. A follow up virtual visit with the patient's PCP should be completed in 48 hours. Please schedule the patient for this follow-up. Thank you.

## 2020-07-24 ENCOUNTER — HOSPITAL ENCOUNTER (EMERGENCY)
Age: 50
Discharge: HOME | End: 2020-07-24
Payer: COMMERCIAL

## 2020-07-24 VITALS
TEMPERATURE: 98 F | OXYGEN SATURATION: 95 % | HEIGHT: 64 IN | RESPIRATION RATE: 18 BRPM | SYSTOLIC BLOOD PRESSURE: 139 MMHG | WEIGHT: 130 LBS | HEART RATE: 103 BPM | BODY MASS INDEX: 22.2 KG/M2 | DIASTOLIC BLOOD PRESSURE: 77 MMHG

## 2020-07-24 PROCEDURE — 99282 EMERGENCY DEPT VISIT SF MDM: CPT

## 2020-07-24 PROCEDURE — 6370000000 HC RX 637 (ALT 250 FOR IP): Performed by: PHYSICIAN ASSISTANT

## 2020-07-24 RX ORDER — HYDROCODONE BITARTRATE AND ACETAMINOPHEN 5; 325 MG/1; MG/1
1 TABLET ORAL ONCE
Status: COMPLETED | OUTPATIENT
Start: 2020-07-24 | End: 2020-07-24

## 2020-07-24 RX ORDER — CLINDAMYCIN HYDROCHLORIDE 150 MG/1
300 CAPSULE ORAL ONCE
Status: COMPLETED | OUTPATIENT
Start: 2020-07-24 | End: 2020-07-24

## 2020-07-24 RX ORDER — HYDROCODONE BITARTRATE AND ACETAMINOPHEN 5; 325 MG/1; MG/1
TABLET ORAL
COMMUNITY
End: 2020-11-24 | Stop reason: ALTCHOICE

## 2020-07-24 RX ORDER — CLINDAMYCIN HYDROCHLORIDE 300 MG/1
300 CAPSULE ORAL 4 TIMES DAILY
Qty: 40 CAPSULE | Refills: 0 | Status: SHIPPED | OUTPATIENT
Start: 2020-07-24 | End: 2020-08-03

## 2020-07-24 RX ORDER — IBUPROFEN 600 MG/1
600 TABLET ORAL ONCE
Status: COMPLETED | OUTPATIENT
Start: 2020-07-24 | End: 2020-07-24

## 2020-07-24 RX ADMIN — IBUPROFEN 600 MG: 600 TABLET, FILM COATED ORAL at 18:51

## 2020-07-24 RX ADMIN — CLINDAMYCIN HYDROCHLORIDE 300 MG: 150 CAPSULE ORAL at 18:51

## 2020-07-24 RX ADMIN — HYDROCODONE BITARTRATE AND ACETAMINOPHEN 1 TABLET: 5; 325 TABLET ORAL at 18:52

## 2020-07-24 ASSESSMENT — PAIN SCALES - GENERAL: PAINLEVEL_OUTOF10: 8

## 2020-07-24 NOTE — ED PROVIDER NOTES
[facility]        Pt Name: Analisa Brooke  MRN: [id number]  [place] 1970  Date of evaluation: 7/24/2020  Provider: Yg Baker PA-C  PCP: Corina Garcia MD    Evaluation by [facility]. My supervising physician was available for consultation. CHIEF COMPLAINT       Chief Complaint   Patient presents with    Facial Swelling     pt states that he was eating chicken and pineapple leftovers and started with pain and swelling to the left side of the mouth, pt states that it feels like someone punched him in the face, states that it hurts to talk. denies any sob, cp, able to speak in full sentences. HISTORY OF PRESENT ILLNESS   (Location, Timing/Onset, Context/Setting, Quality, Duration, Modifying Factors, Severity, Associated Signs and Symptoms)  Note limiting factors. Analisa Brooke is a 48 y.o. male that presents to the emergency department with a chief complaint of left-sided facial swelling and pain. He states he was eating some chicken and pineapple leftovers as he was told he began getting some pain and swelling. Denies biting into anything hard or injury or trauma. Patient does not have any teeth and only has dentures. Denies any direct injury or trauma, sore throat, voice change, nausea, vomiting, fevers or any other symptoms. Rates the pain 8 out of 10. States leaning to the right makes the pain better. Nursing Notes were all reviewed and agreed with or any disagreements were addressed in the HPI. REVIEW OF SYSTEMS    (2-9 systems for level 4, 10 or more for level 5)     Review of Systems    Positives and Pertinent negatives as per HPI. Except as noted above in the ROS, all other systems were reviewed and negative.        PAST MEDICAL HISTORY     Past Medical History:   Diagnosis Date    Anxiety     Chronic back pain     COPD (chronic obstructive pulmonary disease) (HCC)     Depression     Hip LORAZEPAM (ATIVAN) 0.5 MG TABLET    lorazepam 0.5 mg tablet    MAGNESIUM OXIDE (MAG-OX) 400 MG TABLET    1 po q day    MONTELUKAST (SINGULAIR) 10 MG TABLET    TAKE ONE TABLET BY MOUTH ONCE NIGHTLY    NICOTINE (NICODERM CQ) 14 MG/24HR    Place 1 patch onto the skin every 24 hours    TENS UNIT MISC    by Does not apply route    TIZANIDINE (ZANAFLEX) 4 MG TABLET    TAKE 1 TABLET BY MOUTH THREE TIMES A DAY         ALLERGIES     Bactrim [sulfamethoxazole-trimethoprim]; Diclofenac; Naproxen; Neurontin [gabapentin]; Robaxin [methocarbamol]; Sulfamethoxazole-trimethoprim; Toradol [ketorolac tromethamine]; Azithromycin; Biaxin [clarithromycin]; Celebrex [celecoxib]; Sulfa antibiotics; and Tramadol    FAMILYHISTORY       Family History   Problem Relation Age of Onset    Arthritis Mother     Other Mother     Other Father           SOCIAL HISTORY       Social History     Tobacco Use    Smoking status: Current Every Day Smoker     Packs/day: 0.50     Years: 30.00     Pack years: 15.00     Types: Cigarettes    Smokeless tobacco: Never Used    Tobacco comment: using patches   Substance Use Topics    Alcohol use: No    Drug use: No     Types: Marijuana     Comment: In High School Only       SCREENINGS             PHYSICAL EXAM    (up to 7 for level 4, 8 or more for level 5)     ED Triage Vitals [07/24/20 1715]   BP Temp Temp Source Pulse Resp SpO2 Height Weight   139/77 98 °F (36.7 °C) Oral 103 18 95 % 5' 4\" (1.626 m) 130 lb (59 kg)       Physical Exam  Vitals signs and nursing note reviewed. Constitutional:       Appearance: He is well-developed. He is not diaphoretic. HENT:      Head: Atraumatic. Nose: Nose normal.      Mouth/Throat:      Mouth: Mucous membranes are moist.      Pharynx: No oropharyngeal exudate or posterior oropharyngeal erythema. Comments: Mild swelling and tenderness over the left parotid gland. Tenderness also over the left upper molars and Stensen duct.   No palpated stone or purulent drainage. No tenderness over the Natalia's duct or swelling under the tongue. Uvula midline, no stridor, no trismus. Eyes:      General:         Right eye: No discharge. Left eye: No discharge. Neck:      Musculoskeletal: Normal range of motion. Cardiovascular:      Rate and Rhythm: Normal rate and regular rhythm. Heart sounds: Normal heart sounds. No murmur. No gallop. Pulmonary:      Effort: Pulmonary effort is normal.      Breath sounds: Normal breath sounds. No stridor. No wheezing, rhonchi or rales. Skin:     General: Skin is warm and dry. Findings: No erythema or rash. Neurological:      Mental Status: He is alert and oriented to person, place, and time. Cranial Nerves: No cranial nerve deficit. Psychiatric:         Behavior: Behavior normal.         DIAGNOSTIC RESULTS   LABS:    Labs Reviewed - No data to display    All other labs were within normal range or not returned as of this dictation. EKG: All EKG's are interpreted by the Emergency Department Physician in the absence of a cardiologist.  Please see their note for interpretation of EKG. RADIOLOGY:   Non-plain film images such as CT, Ultrasound and MRI are read by the radiologist. Plain radiographic images are visualized and preliminarily interpreted by the ED Provider with the below findings:        Interpretation per the Radiologist below, if available at the time of this note:    No orders to display     No results found.         PROCEDURES   Unless otherwise noted below, none     Procedures    CRITICAL CARE TIME   N/A    CONSULTS:  None      EMERGENCY DEPARTMENT COURSE and DIFFERENTIAL DIAGNOSIS/MDM:   Vitals:    Vitals:    07/24/20 1715   BP: 139/77   Pulse: 103   Resp: 18   Temp: 98 °F (36.7 °C)   TempSrc: Oral   SpO2: 95%   Weight: 130 lb (59 kg)   Height: 5' 4\" (1.626 m)       Patient was given the following medications:  Medications   clindamycin (CLEOCIN) capsule 300 mg (has no administration in time range)   HYDROcodone-acetaminophen (NORCO) 5-325 MG per tablet 1 tablet (has no administration in time range)   ibuprofen (ADVIL;MOTRIN) tablet 600 mg (has no administration in time range)           Patient presented with some left-sided facial swelling. Suspect sialadenitis or parotitis. He is nontoxic in appearance. Low suspicion for acute fracture, peritonsillar abscess, facial abscess, cellulitis or other emergent etiology. Will be started on clindamycin. He takes Norco and Motrin chronically for pain at home and has this at home. Do not believe any imaging or further work-up or testing is warranted this time. He will follow-up as an outpatient return here for any worsening of symptoms or problems at home. FINAL IMPRESSION      1.  Sialadenitis          DISPOSITION/PLAN   DISPOSITION Decision To Discharge 07/24/2020 06:35:55 PM      PATIENT REFERREDTO:  Harshal Guzman MD  [address] [place] [address] [employer]  [phone]    Schedule an appointment as soon as possible for a visit in 3 days  For re-check    [facility] Emergency Department  [address] [place] [address]  [phone]    As needed      DISCHARGE MEDICATIONS:  New Prescriptions    CLINDAMYCIN (CLEOCIN) 300 MG CAPSULE    Take 1 capsule by mouth 4 times daily for 10 days May sub Generic and 150 mg capsules and 2x the amount       DISCONTINUED MEDICATIONS:  Discontinued Medications    No medications on file              (Please note that portions of this note were completed with a voice recognition program.  Efforts were made to edit the dictations but occasionally words are mis-transcribed.)    Sonja Mary PA-C (electronically signed)           Sonja Mary PA-C  07/24/20 0213

## 2020-07-24 NOTE — LETTER
[facility] Emergency Department      [address] [facility], [address]            PROOF OF PRESENCE      To Whom It May Concern:    Lindsey Gore was present in the Emergency Department at [facility] on 7/24/2020.                                      Sincerely,        [id number] [facility]

## 2020-07-24 NOTE — ED NOTES
Pt d/c instructions given, v/u. New Scripts for home discussed, pt v/u. Denies needs at this time. A&O with no signs of distress. Pt ambulated to exit.        Merlinda Sneddon, RN  07/24/20 4904

## 2020-07-31 RX ORDER — IBUPROFEN 800 MG/1
TABLET ORAL
Qty: 90 TABLET | Refills: 0 | Status: SHIPPED | OUTPATIENT
Start: 2020-07-31 | End: 2020-09-01

## 2020-08-14 ENCOUNTER — NURSE ONLY (OUTPATIENT)
Dept: ENDOCRINOLOGY | Age: 50
End: 2020-08-14
Payer: COMMERCIAL

## 2020-08-14 PROCEDURE — 96372 THER/PROPH/DIAG INJ SC/IM: CPT | Performed by: INTERNAL MEDICINE

## 2020-08-19 ENCOUNTER — OFFICE VISIT (OUTPATIENT)
Dept: ORTHOPEDIC SURGERY | Age: 50
End: 2020-08-19
Payer: COMMERCIAL

## 2020-08-19 VITALS — BODY MASS INDEX: 22.2 KG/M2 | HEIGHT: 64 IN | TEMPERATURE: 98.4 F | WEIGHT: 130 LBS

## 2020-08-19 PROCEDURE — 20610 DRAIN/INJ JOINT/BURSA W/O US: CPT | Performed by: PHYSICIAN ASSISTANT

## 2020-08-19 PROCEDURE — 99214 OFFICE O/P EST MOD 30 MIN: CPT | Performed by: PHYSICIAN ASSISTANT

## 2020-08-19 PROCEDURE — G8420 CALC BMI NORM PARAMETERS: HCPCS | Performed by: PHYSICIAN ASSISTANT

## 2020-08-19 PROCEDURE — 4004F PT TOBACCO SCREEN RCVD TLK: CPT | Performed by: PHYSICIAN ASSISTANT

## 2020-08-19 PROCEDURE — G8427 DOCREV CUR MEDS BY ELIG CLIN: HCPCS | Performed by: PHYSICIAN ASSISTANT

## 2020-08-19 PROCEDURE — 3017F COLORECTAL CA SCREEN DOC REV: CPT | Performed by: PHYSICIAN ASSISTANT

## 2020-08-19 RX ORDER — METHYLPREDNISOLONE ACETATE 40 MG/ML
80 INJECTION, SUSPENSION INTRA-ARTICULAR; INTRALESIONAL; INTRAMUSCULAR; SOFT TISSUE ONCE
Status: COMPLETED | OUTPATIENT
Start: 2020-08-19 | End: 2020-08-19

## 2020-08-19 RX ORDER — BUPIVACAINE HYDROCHLORIDE 2.5 MG/ML
3 INJECTION, SOLUTION INFILTRATION; PERINEURAL ONCE
Status: COMPLETED | OUTPATIENT
Start: 2020-08-19 | End: 2020-08-19

## 2020-08-19 RX ADMIN — METHYLPREDNISOLONE ACETATE 80 MG: 40 INJECTION, SUSPENSION INTRA-ARTICULAR; INTRALESIONAL; INTRAMUSCULAR; SOFT TISSUE at 11:33

## 2020-08-19 RX ADMIN — BUPIVACAINE HYDROCHLORIDE 7.5 MG: 2.5 INJECTION, SOLUTION INFILTRATION; PERINEURAL at 11:32

## 2020-08-25 ENCOUNTER — OFFICE VISIT (OUTPATIENT)
Dept: ORTHOPEDIC SURGERY | Age: 50
End: 2020-08-25
Payer: COMMERCIAL

## 2020-08-25 VITALS — HEIGHT: 66 IN | BODY MASS INDEX: 21.38 KG/M2 | WEIGHT: 133 LBS | TEMPERATURE: 98.3 F

## 2020-08-25 PROCEDURE — 20611 DRAIN/INJ JOINT/BURSA W/US: CPT | Performed by: ORTHOPAEDIC SURGERY

## 2020-08-25 RX ORDER — METHYLPREDNISOLONE ACETATE 40 MG/ML
80 INJECTION, SUSPENSION INTRA-ARTICULAR; INTRALESIONAL; INTRAMUSCULAR; SOFT TISSUE ONCE
Status: COMPLETED | OUTPATIENT
Start: 2020-08-25 | End: 2020-08-25

## 2020-08-25 RX ORDER — LIDOCAINE HYDROCHLORIDE 10 MG/ML
5 INJECTION, SOLUTION INFILTRATION; PERINEURAL ONCE
Status: COMPLETED | OUTPATIENT
Start: 2020-08-25 | End: 2020-08-25

## 2020-08-25 RX ADMIN — LIDOCAINE HYDROCHLORIDE 5 ML: 10 INJECTION, SOLUTION INFILTRATION; PERINEURAL at 13:30

## 2020-08-25 RX ADMIN — METHYLPREDNISOLONE ACETATE 80 MG: 40 INJECTION, SUSPENSION INTRA-ARTICULAR; INTRALESIONAL; INTRAMUSCULAR; SOFT TISSUE at 13:31

## 2020-08-25 NOTE — PROGRESS NOTES
ULTRASOUND GUIDED HIP INJECTION    Gillian Whitley    August 25, 2020    Chief Complaint   Patient presents with    Follow-up     Rt hip ultrasound injection. referred by Myriam Pastor        Temp 98.3 °F (36.8 °C)   Ht 5' 6\" (1.676 m)   Wt 133 lb (60.3 kg)   BMI 21.47 kg/m²     Abelardo Moncada returns after almost 3 months at the request of Dr. Mateo Cristina requesting a a repeat ultrasound directed intra-articular steroid injection of  his right hip. He does carry a diagnosis of osteoarthritis of his right hip and did undergo a previous injection on June 9, 2020. He states he did good good pain relief for about 2 months before the pain began to return. He now complains of pain 6-7 at rest and up to 10 while turning in bed. He does have difficulty sleeping at night because of his right hip pain. Significant past history does include a left total hip replacement done by Dr. Mateo Cristina on January 29, 2019. Review of systems reviewed from patient history dated on  4/9/2020  and available in the patient's chart under media tab. Physical Exam:   Examination of the righthip shows a positive logroll. No Lesion of the skin is noted over the injection site    Impression:  right hip osteoarthritis. Plan:  1. Injection with cortisone was recommended into  right   hip joint using ultrasound visualization. He understands the risks and benefits of the injection and describes no potential allergies. Time out was performed to verify correct person, correct procedure, and correct site. 2. Ultrasound visualization was first performed utilizing the [facility] Ultrasound unit using an 5/2 MHz Curved Probe. finding the femoral neck head junction. Next the femoral artery and vein were visualized with ultrasound medial to the femoral head. The location of the needle insertion was determined well lateral to the neurovascular structures and the site was anesthetized with 3 mL of 1% Lidocaine.  The site was then prepped with ChloraPrep. A sterile cover was placed over the transducer head and the femoral head neck junction once again visualized. A 20-gauge spinal needle was then introduced under ultrasound control to the head neck junction. Once the needle was intracapsular the hip joint was injected with 2 mL  of 1% Lidocaine mixed with 2 ml of 40 mg Depo Medrol. Jaret tolerated the procedure well and  did report 75% relief of  hip pain within 5 minutes of the injection. 3.  He did have some concerns about the possibility of a labral tear because of catching symptoms. He was told to discuss this with Dr. Madhav Leary at his next office visit. 4.  Smoking cessation was also discussed.     Benny Rocha MD  8/25/2020

## 2020-08-25 NOTE — PATIENT INSTRUCTIONS
Impression:  right hip osteoarthritis. Plan:  1. Injection with cortisone was recommended into  right   hip joint using ultrasound visualization. He understands the risks and benefits of the injection and describes no potential allergies. Time out was performed to verify correct person, correct procedure, and correct site. 2. Ultrasound visualization was first performed utilizing the [facility] Ultrasound unit using an 5/2 MHz Curved Probe. finding the femoral neck head junction. Next the femoral artery and vein were visualized with ultrasound medial to the femoral head. The location of the needle insertion was determined well lateral to the neurovascular structures and the site was anesthetized with 3 mL of 1% Lidocaine. The site was then prepped with ChloraPrep. A sterile cover was placed over the transducer head and the femoral head neck junction once again visualized. A 20-gauge spinal needle was then introduced under ultrasound control to the head neck junction. Once the needle was intracapsular the hip joint was injected with 2 mL  of 1% Lidocaine mixed with 2 ml of 40 mg Depo Medrol. Jaret tolerated the procedure well and  did report 75% relief of  hip pain within 5 minutes of the injection. 3.  He did have some concerns about the possibility of a labral tear because of catching symptoms. He was told to discuss this with Dr. Ryan Benavidez at his next office visit. 4.  Smoking cessation was also discussed.     Corie Tran MD  8/25/2020

## 2020-09-01 RX ORDER — IBUPROFEN 800 MG/1
TABLET ORAL
Qty: 90 TABLET | Refills: 0 | Status: SHIPPED | OUTPATIENT
Start: 2020-09-01 | End: 2020-10-13 | Stop reason: SDUPTHER

## 2020-10-09 ENCOUNTER — OFFICE VISIT (OUTPATIENT)
Dept: ORTHOPEDIC SURGERY | Age: 50
End: 2020-10-09
Payer: COMMERCIAL

## 2020-10-09 VITALS — BODY MASS INDEX: 21.38 KG/M2 | TEMPERATURE: 98.5 F | HEIGHT: 66 IN | WEIGHT: 133 LBS

## 2020-10-09 PROCEDURE — G8427 DOCREV CUR MEDS BY ELIG CLIN: HCPCS | Performed by: ORTHOPAEDIC SURGERY

## 2020-10-09 PROCEDURE — 99214 OFFICE O/P EST MOD 30 MIN: CPT | Performed by: ORTHOPAEDIC SURGERY

## 2020-10-09 PROCEDURE — G8484 FLU IMMUNIZE NO ADMIN: HCPCS | Performed by: ORTHOPAEDIC SURGERY

## 2020-10-09 PROCEDURE — 3017F COLORECTAL CA SCREEN DOC REV: CPT | Performed by: ORTHOPAEDIC SURGERY

## 2020-10-09 PROCEDURE — G8420 CALC BMI NORM PARAMETERS: HCPCS | Performed by: ORTHOPAEDIC SURGERY

## 2020-10-09 PROCEDURE — 4004F PT TOBACCO SCREEN RCVD TLK: CPT | Performed by: ORTHOPAEDIC SURGERY

## 2020-10-09 NOTE — PROGRESS NOTES
Regla Daily  [id number]  October 9, 2020    Chief Complaint   Patient presents with    Follow-up     R hip        History: The patient is a 70-year-old gentleman who is here for follow-up evaluation of his right hip. I did perform a left total hip arthroplasty on the patient back in January 2019. He recovered uneventfully. He was only seen 1 time postoperatively because he had to move down to [facility] for a family death. He is under pain management and takes Norco 7.5 mg 3 times a day. He does take ibuprofen on a regular basis. He is also on tizanidine. He was on Cymbalta but no longer takes this medication. He did receive a another intra-articular injection by Dr. Blade Teague on 8/25/2020. The injection only provided some relief for a few weeks. He is now having severe right groin pain. He states that the right hip issues feel exactly like his left hip issues prior to the surgery. The patient's  past medical history, medications, allergies,  family history, social history, and have been reviewed, and dated and are recorded in the chart. Pertinent items are noted in HPI. Review of systems reviewed from Pertinent History Form dated on 4/9/20 and available in the patient's chart under the Media tab. Vitals:  Temp 98.5 °F (36.9 °C) (Temporal)   Ht 5' 6\" (1.676 m)   Wt 133 lb (60.3 kg)   BMI 21.47 kg/m²     Physical: Physical: Mr. Regla Daiyl appears well, he is in no apparent distress, he demonstrates appropriate mood & affect. He is alert and oriented to person, place and time. He has moderate pain with internal rotation of the right hip. Range of motion of the right hip is : 40 degrees abduction, 30 degrees adduction, 35 degrees of external rotation and 5 degrees of internal rotation. Range of motion of the opposite hip is full. He is non tender laterally about the hips. Trendelenburg test is negative bilaterally. Martinez Blush test is negative bilaterally.  He is non tender about the Sacroiliac joint bilaterally. Leg length discrepancy: none. Examination of the skin reveals no rashes, ulcerations, or lesions, bilaterally in the lower extremities. Sensation to both lower extremities is grossly intact. Exam of both feet reveals pedal pulses intact and brisk cap refill. Patient is able to dorsiflex and wiggle all toes. Deep tendon reflexes of the lower extremities are normal and symmetric. He is non tender to palpation of the lumbar spine. Examination of the left hip reveals no lateral tenderness. He essentially has full range of motion of the left hip. He is neurovascularly intact distally. His scar is well-healed. There is no evidence of erythema or warmth. There is no evidence of swelling. There is no evidence of DVT in the left lower extremity. X-rays: AP pelvis and 2 views of the bilateral hips obtained previously were extensively reviewed. The left total hip prosthesis is well aligned. There is no evidence of loosening or subsidence. There is evidence of moderate right hip osteoarthritis. Impression: right Hip Osteoarthritis #2 status post left total hip arthroplasty       Plan: At this time, the patient will be scheduled for a right total hip arthroplasty. All risks including but not limited to blood loss, infection, persistent pain,stiffness, dislocation, weakness,loosening,deep vein thrombosis,neurovascular injury, and the risks of anesthesia were discussed. The patient understands all risks and benefits of the procedure and agrees to proceed. The patient will see his primary care Angelic Villagomez MD, for medical clearance. If the patient is on NSAIDS or blood thinners these medications will need to be discontinued one week prior to surgery. Will plan on 81mg ASA BID for 35 days post-op.

## 2020-10-09 NOTE — LETTER
[ketorolac tromethamine]; Azithromycin; Biaxin [clarithromycin]; Celebrex [celecoxib]; Sulfa antibiotics; and Tramadol    Ht Readings from Last 1 Encounters:   10/09/20 5' 6\" (1.676 m)      Wt Readings from Last 1 Encounters:   10/09/20 133 lb (60.3 kg)       TOTAL HIP REPLACEMENT PHYSICIANS ORDERS   I hereby authorize the pharmacy, under the formulary system to dispense a different brand of drug identical composition and comparable quality unless the brand name I have prescribed is circled on this order sheet  All orders without checkboxes will be processed automatically unless crossed out. Orders with checkboxes MUST be checked in order to be carried out. PRE-OP Mavis.[email or web address]  [ ] X-ray operative site-standing view  Severianus.Gardiner ] CBC without differential [X] Albumin  Severianus.Gardiner ] BMP      [ ] Coag profile  [X] PT/INR   [ ] Sed rate on revisions of total joints only Severianus.Gardiner ] EKG   Severianus.Gardiner ] Type and screen Severianus.Gardiner ] UAR       [X] A1C  Severianus.Gardiner ] Clean catch urine for routine analysis, if positive for nitrites and/or leukocytes, do urine for C & S  Severianus.Gardiner ] Nasal culture for MRSA  Severianus.Gardiner ] Physical therapy evaluation and teaching  Severianus.Gardiner ] Occupational therapy evaluation and teaching  Severianus.Gardiner ] Inform patient to stop all anti-inflammatory medications including aspirin for 7 days before surgery    DAY OF SURGERY  Severianus.Gardiner ] Cefazolin 2 gram IVPB; if patient weighs > 80 kg and serum creatinine <2.5 mg/dl give 2 gram dose within 1 hour of incision. If patient has a minor allergy to Penicillin, still give this.     OR  If the pre-op nasal culture for MRSA was positive, repeat nasal swab before surgery and give: Vancomycin 1 gram IVPB, reduce dose of Vancomycin to 500 mg IVPB if PT < 55 kg or serum creatinine > 2 mg/dl (Vancomycin must be administered over 1 hour)& Cefazolin 1 gram IVPB; if patient weighs>80 kg and serum creatinine <2.3 mg/dl give 2 gram dose within 1 hour of incision  OR  If patient has a true severe allergy and underwent allergy testing to Penicillin or Cephalosporins give: Clindamycin 900mg IVPB, then administer 2 more doses post op.                Other order: Mobic 15mg pre-op       Orión.Rolando ] Type and screen    T.O: _____________________________/___________________Date:_______Time:_______   Physician    RN    Physician Signature:               Date/Time:  10/12/2020 10:46 AM

## 2020-10-12 ENCOUNTER — PREP FOR PROCEDURE (OUTPATIENT)
Dept: ORTHOPEDIC SURGERY | Age: 50
End: 2020-10-12

## 2020-10-12 ENCOUNTER — TELEPHONE (OUTPATIENT)
Dept: ORTHOPEDIC SURGERY | Age: 50
End: 2020-10-12

## 2020-10-12 NOTE — TELEPHONE ENCOUNTER
CPT: 83418  AUTHORIZATION: approved  BODY PART: right hip    Submitted online 10/12/20    Approved #[id number] inpatient 4 days

## 2020-10-13 RX ORDER — IBUPROFEN 800 MG/1
800 TABLET ORAL 2 TIMES DAILY WITH MEALS
Qty: 60 TABLET | Refills: 0 | Status: SHIPPED | OUTPATIENT
Start: 2020-10-13 | End: 2020-11-04 | Stop reason: ALTCHOICE

## 2020-10-14 ENCOUNTER — TELEPHONE (OUTPATIENT)
Dept: ORTHOPEDIC SURGERY | Age: 50
End: 2020-10-14

## 2020-10-14 NOTE — TELEPHONE ENCOUNTER
Other Lolis with [facility] is calling to let the office know they will ok his post op medications.  ph [phone]

## 2020-11-04 ENCOUNTER — OFFICE VISIT (OUTPATIENT)
Dept: INTERNAL MEDICINE CLINIC | Age: 50
End: 2020-11-04
Payer: COMMERCIAL

## 2020-11-04 VITALS
SYSTOLIC BLOOD PRESSURE: 130 MMHG | HEIGHT: 66 IN | BODY MASS INDEX: 21.38 KG/M2 | HEART RATE: 96 BPM | WEIGHT: 133 LBS | TEMPERATURE: 98.2 F | DIASTOLIC BLOOD PRESSURE: 86 MMHG

## 2020-11-04 PROCEDURE — G8431 POS CLIN DEPRES SCRN F/U DOC: HCPCS | Performed by: INTERNAL MEDICINE

## 2020-11-04 PROCEDURE — G8484 FLU IMMUNIZE NO ADMIN: HCPCS | Performed by: INTERNAL MEDICINE

## 2020-11-04 PROCEDURE — G8420 CALC BMI NORM PARAMETERS: HCPCS | Performed by: INTERNAL MEDICINE

## 2020-11-04 PROCEDURE — 3017F COLORECTAL CA SCREEN DOC REV: CPT | Performed by: INTERNAL MEDICINE

## 2020-11-04 PROCEDURE — 96160 PT-FOCUSED HLTH RISK ASSMT: CPT | Performed by: INTERNAL MEDICINE

## 2020-11-04 PROCEDURE — 99243 OFF/OP CNSLTJ NEW/EST LOW 30: CPT | Performed by: INTERNAL MEDICINE

## 2020-11-04 PROCEDURE — 4004F PT TOBACCO SCREEN RCVD TLK: CPT | Performed by: INTERNAL MEDICINE

## 2020-11-04 RX ORDER — NICOTINE 21 MG/24HR
1 PATCH, TRANSDERMAL 24 HOURS TRANSDERMAL EVERY 24 HOURS
Qty: 30 PATCH | Refills: 5 | Status: SHIPPED
Start: 2020-11-04 | End: 2021-03-19 | Stop reason: HOSPADM

## 2020-11-04 ASSESSMENT — PATIENT HEALTH QUESTIONNAIRE - PHQ9
4. FEELING TIRED OR HAVING LITTLE ENERGY: 3
5. POOR APPETITE OR OVEREATING: 3
SUM OF ALL RESPONSES TO PHQ QUESTIONS 1-9: 17
7. TROUBLE CONCENTRATING ON THINGS, SUCH AS READING THE NEWSPAPER OR WATCHING TELEVISION: 3
8. MOVING OR SPEAKING SO SLOWLY THAT OTHER PEOPLE COULD HAVE NOTICED. OR THE OPPOSITE, BEING SO FIGETY OR RESTLESS THAT YOU HAVE BEEN MOVING AROUND A LOT MORE THAN USUAL: 0
9. THOUGHTS THAT YOU WOULD BE BETTER OFF DEAD, OR OF HURTING YOURSELF: 0
6. FEELING BAD ABOUT YOURSELF - OR THAT YOU ARE A FAILURE OR HAVE LET YOURSELF OR YOUR FAMILY DOWN: 2
SUM OF ALL RESPONSES TO PHQ QUESTIONS 1-9: 17
3. TROUBLE FALLING OR STAYING ASLEEP: 3
SUM OF ALL RESPONSES TO PHQ QUESTIONS 1-9: 17
SUM OF ALL RESPONSES TO PHQ9 QUESTIONS 1 & 2: 3
2. FEELING DOWN, DEPRESSED OR HOPELESS: 2
10. IF YOU CHECKED OFF ANY PROBLEMS, HOW DIFFICULT HAVE THESE PROBLEMS MADE IT FOR YOU TO DO YOUR WORK, TAKE CARE OF THINGS AT HOME, OR GET ALONG WITH OTHER PEOPLE: 2
1. LITTLE INTEREST OR PLEASURE IN DOING THINGS: 1

## 2020-11-04 NOTE — PROGRESS NOTES
Preoperative Consultation      Annette Sheth  YOB: 1970    Date of Service:  11/4/2020    Vitals:    11/04/20 1441   BP: 130/86   Site: Left Upper Arm   Position: Sitting   Cuff Size: Medium Adult   Pulse: 96   Temp: 98.2 °F (36.8 °C)   Weight: 133 lb (60.3 kg)   Height: 5' 6\" (1.676 m)      Wt Readings from Last 2 Encounters:   11/04/20 133 lb (60.3 kg)   10/09/20 133 lb (60.3 kg)     BP Readings from Last 3 Encounters:   11/04/20 130/86   07/24/20 139/77   06/21/20 (!) 150/90        Chief Complaint   Patient presents with    Pre-op Exam     rt hip replacement. Allergies   Allergen Reactions    Bactrim [Sulfamethoxazole-Trimethoprim] Diarrhea    Diclofenac Diarrhea     He can tolerate aspirin    Naproxen Swelling     He can tolerate aspirin    Neurontin [Gabapentin] Swelling     Edema lower extr. Leg swelling    Robaxin [Methocarbamol] Diarrhea     Eyes swell and get bloodshot    Sulfamethoxazole-Trimethoprim Diarrhea    Toradol [Ketorolac Tromethamine] Other (See Comments) and Swelling     Other reaction(s): Headaches  Eyes swell and bloodshot    Azithromycin Nausea And Vomiting    Biaxin [Clarithromycin] Diarrhea and Nausea And Vomiting    Celebrex [Celecoxib] Swelling     Causes severe foot swelling. He can tolerate aspirin.     Sulfa Antibiotics Nausea And Vomiting and Diarrhea    Tramadol Other (See Comments) and Rash     Eyes swell and get bloodshot     Outpatient Medications Marked as Taking for the 11/4/20 encounter (Office Visit) with Matthew Gerard MD   Medication Sig Dispense Refill    nicotine (NICODERM CQ) 14 MG/24HR Place 1 patch onto the skin every 24 hours 30 patch 5    fluticasone-salmeterol (ADVAIR HFA) 45-21 MCG/ACT inhaler Inhale 2 puffs into the lungs 2 times daily 1 Inhaler 3    loratadine (CLARITIN) 10 MG tablet TAKE ONE TABLET BY MOUTH EVERY EVENING 30 tablet 0    tiZANidine (ZANAFLEX) 4 MG tablet TAKE ONE TABLET BY MOUTH THREE TIMES A DAY 90 tablet 0    montelukast (SINGULAIR) 10 MG tablet TAKE ONE TABLET BY MOUTH ONCE NIGHTLY 30 tablet 2    denosumab (PROLIA) 60 MG/ML SOSY SC injection Inject 1 mL into the skin once for 1 dose 1 mL 0    albuterol sulfate  (90 Base) MCG/ACT inhaler Inhale 2 puffs into the lungs every 4 hours as needed for Wheezing 1 Inhaler 2    amitriptyline (ELAVIL) 25 MG tablet 2 po q HS 60 tablet 5    Tens Unit MISC by Does not apply route 1 each 0       This patient presents to the office today for a preoperative consultation at the request of surgeon, Dr. Pérez Cuevas, who plans on performing Right hip arthroplaty on November 30 at [facility].  The current problem began 2 years ago, and symptoms have been worsening with time. Conservative therapy: did physical therapy. Planned anesthesia: General   Known anesthesia problems: None   Bleeding risk: No recent or remote history of abnormal bleeding  Personal or FH of DVT/PE: No    Patient objection to receiving blood products: No  Current Functional Status: unable to decide as he can not walk several hundred feet due to hip pain  Patient Active Problem List   Diagnosis    Hx of pneumothorax    Centrilobular emphysema ([address]    Osteoporosis    Postlaminectomy syndrome, lumbar    DDD (degenerative disc disease), lumbar    Chronic pain syndrome    Anxiety    Shortness of breath    Hip pain    Arachnoiditis    Narcotic dependency, continuous ([address]    Chronic, continuous use of opioids    Osteoarthritis of spine with radiculopathy, lumbar region    Osteoarthritis of left hip    Vitamin D deficiency       Past Medical History:   Diagnosis Date    Anxiety     Chronic back pain     COPD (chronic obstructive pulmonary disease) ([address]     Depression     Hip pain 09/05/2017    ?  CAM possible bilateral superior femoral head-heck junction bump suggestive of femoro acetabular impingement syndrome    History of back surgery 12/26/2016    Localized edema 2/24/2018    Neg abd ultrasound and DVT    Osteoporosis     Seasonal allergies     Spontaneous pneumothorax     1991    Testicular torsion     Tobacco abuse     Wears dentures      Past Surgical History:   Procedure Laterality Date    BACK SURGERY      1st rods, 2nd tens unit 3rd rods and screw removed, only has cages now    TESTICLE TORSION REDUCTION      x 2    TOTAL HIP ARTHROPLASTY Left 1/29/2019    TOTAL HIP REPLACEMENT LEFT HIP (ADVANCED) performed by [facility] Elmore [facility] History   Problem Relation Age of Onset    Arthritis Mother     Other Mother     Other Father      Social History     Socioeconomic History    Marital status:      Spouse name: Not on file    Number of children: Not on file    Years of education: Not on file    Highest education level: Not on file   Occupational History    Occupation: unemployed     Comment: [employer], [employer],   Social Needs    Financial resource strain: Not on file    Food insecurity     Worry: Not on file     Inability: Not on file   [employer] needs     Medical: Not on file     Non-medical: Not on file   Tobacco Use    Smoking status: Current Every Day Smoker     Packs/day: 0.50     Years: 30.00     Pack years: 15.00     Types: Cigarettes    Smokeless tobacco: Never Used    Tobacco comment: using patches   Substance and Sexual Activity    Alcohol use: No    Drug use: No     Types: Marijuana     Comment: In [address] Sexual activity: Never   Lifestyle    Physical activity     Days per week: Not on file     Minutes per session: Not on file    Stress: Not on file   Relationships    Social connections     Talks on phone: Not on file     Gets together: Not on file     Attends Synagogue service: Not on file     Active member of club or organization: Not on file     Attends meetings of clubs or organizations: Not on file     Relationship status: Not on file    Intimate partner violence     Fear of current or ex partner: Not on file     Emotionally abused: Not on file     Physically abused: Not on file     Forced sexual activity: Not on file   Other Topics Concern    Not on file   Social History Narrative    Not on file       Review of Systems  Unable to get Breo Inhalar  Exertional shortness of breath. Patient continues to smoke cigarettes. Does not want to try Chantix. Patient denies chest pain palpitation dizziness. Continue to follow-up with pain management for chronic lower back pain. He continues to have some degree of pain at the left hip. He also get occasional left shoulder pain and follow-up with orthopedics. The patient denies abdominal or flank pain, anorexia, nausea or vomiting, dysphagia, change in bowel habits or black or bloody stools or weight loss. Physical Exam   Constitutional: He is oriented to person, place, and time. He appears well-developed and well-nourished. No distress. HENT: +ve denture  Head: Normocephalic and atraumatic. Mouth/Throat: Uvula is midline, oropharynx is clear and moist and mucous membranes are normal.   Eyes: Conjunctivae and EOM are normal. Pupils are equal, round, and reactive to light. Neck: Trachea normal and normal range of motion. Neck supple. No JVD present. Carotid bruit is not present. No mass and no thyromegaly present. Cardiovascular: Normal rate, regular rhythm, normal heart sounds and intact distal pulses. Exam reveals no gallop and no friction rub. No murmur heard. Pulmonary/Chest:   No respiratory distress. He has no wheezes. He has no rales. Slightly decreased bilateral ear entry. Abdominal: Soft. Normal aorta and bowel sounds are normal. He exhibits no distension and no mass. There is no hepatosplenomegaly. No tenderness. Musculoskeletal: He tenderness at the right lateral and anterior hip. He feels crackling sound inside his knee on knee flexion and extension. Old scar at the lower lumbar spine.   Neurological: He is alert and oriented to person, place, and time. He has normal strength. No cranial nerve deficit or sensory deficit. Skin: Skin is warm and dry. No rash noted. No erythema. Psychiatric: He has a normal mood and affect. His behavior is normal.         Lab Review pending     Assessment:       48 y.o. patient with planned surgery as above. Known risk factors for perioperative complications: COPD  Perioperative risk related to the patient's upcoming surgery is considered low. Pre-op exam was completed on 11/04/2020. Current medications which may produce withdrawal symptoms if withheld perioperatively: hydrocodone     Plan:     1. Preoperative workup as follows: pending lab ordered by surgeon  2. Change in medication regimen before surgery: None  3. Deep vein thrombosis prophylaxis: regimen to be chosen by surgical team  4. No contraindications to planned surgery  5. Smoking cessation counseling. Nicotine patch is started. 6.  New prescription of fluticasone salmeterol. Albuterol as needed. Continue singular.

## 2020-11-11 ENCOUNTER — TELEPHONE (OUTPATIENT)
Dept: ORTHOPEDICS UNIT | Age: 50
End: 2020-11-11

## 2020-11-11 NOTE — TELEPHONE ENCOUNTER
Patient's wife kelly contacted nurse navigator regarding ortho vitals setup and dme questions. Registration link sent t patient's email and discussed how to access jet video. Questions addressed.   Electronically signed by Annie Sweet RN on 11/11/2020 at 3:21 PM

## 2020-11-16 NOTE — CARE COORDINATION
DATE OF JET CLASS INTERVIEW:  11/16/2020  ACCOMPANIED TODAY BY:  Phone Interview w/wife, Babs. Patient was unavailable. FIRST JOINT REPLACEMENT? No  TYPE AND DATE OF LAST JOINT REPLACEMENT? Left hip 2018    TRANSPORTATION:  Francis Virgen, wife  [phone]    STEPS INTO HOME?  2 steps into the house    STEPS TO BATHROOM/BEDROOM?  14 steps to bedroom, but will stay on first level until ready for steps. DME:  Has walker. Needs shower chair. May order raised toilet seat. CHOICES FOR HHC, DME VENDORS AND SKILLED/ REHAB FACILITIES PROVIDED TO PT DURING THIS INTERVIEW. DISCHARGE PLAN:/ INCLUDING WHO WILL BE STAYING WITH YOU AT HOME? Francis Virgen, wife. LENGTH OF STAY HAS BEEN DISCUSSED WITH THE PT, APPROPRIATE TO HIS/ HER PROCEDURE. PT HAS BEEN INFORMED THAT THEY WILL BE DISCHARGED WHEN THE PHYSICIAN DEEMS THEM MEDICALLY READY. MOST PATIENTS CAN EXPECT  TO BE IN THE HOSPITAL ONE NIGHT AS AN OBSERVATION ONLY, OR 1-2 DAYS AS AN ADMISSION FOR THOSE WITH MEDICAL HEALTH  ISSUES/COMPLICATIONS. HOME CARE:  [facility]    SNF/REHAB: n/a        PT AGREEABLE TO MEDS TO BEDS FROM [employer]. Agreeable to meds to beds program.    Contact information for case management provided to pt. Will follow with therapies and social service.     Electronically signed by Jeff Perales on 11/16/2020 at 1:57 PM

## 2020-11-18 ENCOUNTER — HOSPITAL ENCOUNTER (OUTPATIENT)
Age: 50
Discharge: HOME | End: 2020-11-18
Payer: COMMERCIAL

## 2020-11-18 LAB
ABO/RH: NORMAL
ALBUMIN SERPL-MCNC: 4.1 G/DL (ref 3.4–5)
ANION GAP SERPL CALCULATED.3IONS-SCNC: 9 MMOL/L (ref 3–16)
ANTIBODY SCREEN: NORMAL
BASOPHILS ABSOLUTE: 0 K/UL (ref 0–0.2)
BASOPHILS RELATIVE PERCENT: 0.3 %
BILIRUBIN URINE: NEGATIVE
BLOOD, URINE: NEGATIVE
BUN BLDV-MCNC: 7 MG/DL (ref 7–20)
CALCIUM SERPL-MCNC: 9.3 MG/DL (ref 8.3–10.6)
CHLORIDE BLD-SCNC: 103 MMOL/L (ref 99–110)
CLARITY: CLEAR
CO2: 29 MMOL/L (ref 21–32)
COLOR: YELLOW
CREAT SERPL-MCNC: 0.9 MG/DL (ref 0.9–1.3)
EKG ATRIAL RATE: 72 BPM
EKG DIAGNOSIS: NORMAL
EKG P AXIS: 64 DEGREES
EKG P-R INTERVAL: 120 MS
EKG Q-T INTERVAL: 378 MS
EKG QRS DURATION: 88 MS
EKG QTC CALCULATION (BAZETT): 413 MS
EKG R AXIS: 48 DEGREES
EKG T AXIS: 64 DEGREES
EKG VENTRICULAR RATE: 72 BPM
EOSINOPHILS ABSOLUTE: 0.2 K/UL (ref 0–0.6)
EOSINOPHILS RELATIVE PERCENT: 2.2 %
GFR AFRICAN AMERICAN: >60
GFR NON-AFRICAN AMERICAN: >60
GLUCOSE BLD-MCNC: 96 MG/DL (ref 70–99)
GLUCOSE URINE: NEGATIVE MG/DL
HCT VFR BLD CALC: 44.2 % (ref 40.5–52.5)
HEMOGLOBIN: 15.2 G/DL (ref 13.5–17.5)
INR BLD: 0.99 (ref 0.86–1.14)
KETONES, URINE: NEGATIVE MG/DL
LEUKOCYTE ESTERASE, URINE: NEGATIVE
LYMPHOCYTES ABSOLUTE: 2.7 K/UL (ref 1–5.1)
LYMPHOCYTES RELATIVE PERCENT: 30.4 %
MCH RBC QN AUTO: 31.5 PG (ref 26–34)
MCHC RBC AUTO-ENTMCNC: 34.4 G/DL (ref 31–36)
MCV RBC AUTO: 91.6 FL (ref 80–100)
MICROSCOPIC EXAMINATION: NORMAL
MONOCYTES ABSOLUTE: 0.6 K/UL (ref 0–1.3)
MONOCYTES RELATIVE PERCENT: 6.4 %
NEUTROPHILS ABSOLUTE: 5.4 K/UL (ref 1.7–7.7)
NEUTROPHILS RELATIVE PERCENT: 60.7 %
NITRITE, URINE: NEGATIVE
PDW BLD-RTO: 14 % (ref 12.4–15.4)
PH UA: 6 (ref 5–8)
PLATELET # BLD: 331 K/UL (ref 135–450)
PMV BLD AUTO: 7.6 FL (ref 5–10.5)
POTASSIUM SERPL-SCNC: 4.3 MMOL/L (ref 3.5–5.1)
PROTEIN UA: NEGATIVE MG/DL
PROTHROMBIN TIME: 11.5 SEC (ref 10–13.2)
RBC # BLD: 4.82 M/UL (ref 4.2–5.9)
SODIUM BLD-SCNC: 141 MMOL/L (ref 136–145)
SPECIFIC GRAVITY UA: 1.02 (ref 1–1.03)
URINE REFLEX TO CULTURE: NORMAL
URINE TYPE: NORMAL
UROBILINOGEN, URINE: 0.2 E.U./DL
WBC # BLD: 9 K/UL (ref 4–11)

## 2020-11-18 PROCEDURE — 93010 ELECTROCARDIOGRAM REPORT: CPT | Performed by: INTERNAL MEDICINE

## 2020-11-18 PROCEDURE — 82040 ASSAY OF SERUM ALBUMIN: CPT

## 2020-11-18 PROCEDURE — 81003 URINALYSIS AUTO W/O SCOPE: CPT

## 2020-11-18 PROCEDURE — 36415 COLL VENOUS BLD VENIPUNCTURE: CPT

## 2020-11-18 PROCEDURE — 85610 PROTHROMBIN TIME: CPT

## 2020-11-18 PROCEDURE — 93005 ELECTROCARDIOGRAM TRACING: CPT

## 2020-11-18 PROCEDURE — 86900 BLOOD TYPING SEROLOGIC ABO: CPT

## 2020-11-18 PROCEDURE — 85025 COMPLETE CBC W/AUTO DIFF WBC: CPT

## 2020-11-18 PROCEDURE — 80048 BASIC METABOLIC PNL TOTAL CA: CPT

## 2020-11-18 PROCEDURE — 83036 HEMOGLOBIN GLYCOSYLATED A1C: CPT

## 2020-11-18 PROCEDURE — 87641 MR-STAPH DNA AMP PROBE: CPT

## 2020-11-18 PROCEDURE — 86901 BLOOD TYPING SEROLOGIC RH(D): CPT

## 2020-11-18 PROCEDURE — 86850 RBC ANTIBODY SCREEN: CPT

## 2020-11-19 LAB
ESTIMATED AVERAGE GLUCOSE: 116.9 MG/DL
HBA1C MFR BLD: 5.7 %
MRSA SCREEN RT-PCR: NORMAL

## 2020-11-24 ENCOUNTER — OFFICE VISIT (OUTPATIENT)
Dept: PRIMARY CARE CLINIC | Age: 50
End: 2020-11-24
Payer: COMMERCIAL

## 2020-11-24 PROCEDURE — 99211 OFF/OP EST MAY X REQ PHY/QHP: CPT | Performed by: NURSE PRACTITIONER

## 2020-11-24 RX ORDER — HYDROCODONE BITARTRATE AND ACETAMINOPHEN 7.5; 325 MG/1; MG/1
1 TABLET ORAL EVERY 6 HOURS PRN
Status: ON HOLD | COMMUNITY
End: 2020-11-30 | Stop reason: SDUPTHER

## 2020-11-24 RX ORDER — ERGOCALCIFEROL 1.25 MG/1
50000 CAPSULE ORAL WEEKLY
COMMUNITY

## 2020-11-24 NOTE — PROGRESS NOTES

## 2020-11-24 NOTE — PROGRESS NOTES
PRE-OP INSTRUCTIONS     · Do not eat or drink anything after 12:00 midnight prior to surgery. · This includes water, chewing gum, mints and ice chips. · You may brush your teeth and gargle the morning of surgery but DO  NOT SWALLOW THE WATER. Take the following medications with a small sip of water on the morning of procedure. Bill Yu Follow your MD/Surgeons pre-procedure instructions regarding your medications    · You may be asked to stop blood thinners such as:  Coumadin, Plavix, Fragmin and lovenox. Please check with your doctor before stopping these or any other medications. · Aspirin, ibuprofen, advil and naproxen, any anti-inflammatory products should be stopped for a week prior to your surgery. · Do not smoke and do not drink any alcoholic beverages 24 hours prior to your surgery. · Please do not wear any jewelry or body piercings on the day of surgery. · Please wear something simple, loose fitting clothing to the hospital.  Do not wear any make-up(including eye make-up) or nail polish on your fingers and toes. As part of our patient safety program to minimize surgical infections, we ask you to do the following:    Please notify your surgeon if you develop any illness between now and the day of your surgery. This includes a cough, cold, fever, sore  throat, nausea, vomiting, diarrhea, etc.   Please notify your surgeon if you experience dizziness, shortness of  breath or blurred vision between now and the time of your surgery. Please notify your surgeon of any open or redden areas that may look infected       DO NOT shave your operative site 96 hours(four days) prior to surgery. Shower the week before surgery with an antibacterial soap such as:dial,safeguard, etc.       Three(3) days prior to your surgery, cleanse the operative site with Hibiclens(anti-microbial soap).  This soap may dry your skin, please do not apply any oils or lotions     · Please bring your insurance card and picture ID day of surgery    · If you have a living will or durable power of attorny. Please bring in a copy of you advanced directives. · If you have dentures, they will be removed before going to the OR, we will provide you with a container. If you wear contact lenses/glasses, they will be removes, please bring a case    · Have you seen your family doctor for a pre-op history and physical.      · Surgery scheduler will call you 48 hours prior to your surgery to notify you of the time of your surgery and the time you will need to be at hospital...patients are asked to arrive 2 1/2 hours prior to surgery. ·  Please call Pre-Admission testing if you have any further questions.                   [id number] [facility] testing phone number:  [phone]      Thank You for choosing [facility]!!

## 2020-11-25 ENCOUNTER — PREP FOR PROCEDURE (OUTPATIENT)
Dept: ORTHOPEDICS UNIT | Age: 50
End: 2020-11-25

## 2020-11-25 ENCOUNTER — ANESTHESIA EVENT (OUTPATIENT)
Dept: OPERATING ROOM | Age: 50
DRG: 324 | End: 2020-11-25
Payer: COMMERCIAL

## 2020-11-25 LAB — SARS-COV-2: NOT DETECTED

## 2020-11-25 NOTE — PROGRESS NOTES
Pre-Op Video Watched   Video Type Completed Video Video Length(mins) Viewed Time(mins) Completion Date   [address] Education Testing  N  22:20  13:18       Spoke to patient's wife, patient unavailable at this time, patients wife stated she and patient did complete 22 minute jet video within ortho vitals and do not have questions regarding. Ortho vitals  bianca already previously made aware of recording issue within ortho vitals.     Electronically signed by Tati Jara RN on 11/25/2020 at 1:54 PM

## 2020-11-27 NOTE — DISCHARGE INSTR - COC
[facility] ([facility]) Continuity of Care Form    Patient Name:  Fiona Villela  : 1970    MRN:  [id number]    Admit date:  (Not on file)  Discharge date:  20    Code Status Order: Prior  Advance Directives: no    Admitting Physician: Brendan Guillen MD  PCP: Porsche Escobar MD    Discharging Nurse: ERIC HARE [facility] Unit/Room#: No information available for this encounter. Discharging Unit Phone Number: [phone]    Emergency Contact:        Past Surgical History:  Past Surgical History:   Procedure Laterality Date    BACK SURGERY  2016    1st rods, 2nd tens unit 3rd rods and screw removed, only has cages now    TESTICLE TORSION REDUCTION      x 2    TOTAL HIP ARTHROPLASTY Left 2019    TOTAL HIP REPLACEMENT LEFT HIP (ADVANCED) performed by Brendan Guillen MD at [facility] [place] [id number]       Immunization History:   Immunization History   Administered Date(s) Administered    Pneumococcal Polysaccharide (Mgmario[address]o11) 2017       Active Problems:  Active Problems:    * No active hospital problems. *  Resolved Problems:    * No resolved hospital problems. *      Isolation/Infection:       Nurse Assessment:  Last Vital Signs:Ht 5' 6\" (1.676 m)   Wt 133 lb (60.3 kg)   BMI 21.47 kg/m²   Last documented pain score (0-10 scale):    Last Weight:   Wt Readings from Last 1 Encounters:   20 133 lb (60.3 kg)     Mental Status:  oriented and alert     IV Access:  - None    Nursing Mobility/ADLs:  Walking   Assisted  Transfer  Assisted  Bathing  Assisted  Dressing  Assisted  Toileting  Assisted  Feeding  Independent  Med Admin  Independent  Med Delivery   whole    Wound Care Documentation and Therapy:  Keep glued Prineo dressing clean and intact. DO NOT remove. This is waterproof for showering. Please do not use lotion on dressing. The separate lower tan dressing can be removed in 2 days and no dressing is needed on the small wound.    Removal of Prineo dressing will be discussed at postop visit in 2 weeks at office. Elimination:  Urinary Catheter: None   Colostomy/Ileostomy: No  Continence:   · Bowel: Yes  · Bladder: Yes  Date of Last BM: 11/29/2020    Intake/Output Summary (Last 24 hours)   No intake or output data in the 24 hours ending 11/27/20 0961  Safety Concerns: At Risk for Falls    Impairments/Disabilities:      Vision    Nutrition Therapy:  Current Nutrition Therapy: No diet orders on file  Routes of Feeding: Oral  Liquids: No Restrictions  Daily Fluid Restriction: no  Last Modified Barium Swallow with Video (Video Swallowing Test): not done    Treatments at the Time of Hospital Discharge:   Respiratory Treatments:   Oxygen Therapy:  is not on home oxygen therapy. Ventilator:    - No ventilator support    Lab orders for discharge:        Rehab Therapies: Physical Therapy, Occupational Therapy and nursing care. See pt 4-5 times a week for the first week then 3 times a week thereafter. Weight Bearing Status/Restrictions: No weight bearing restrictions, posterior hip precautions. Other Medical Equipment (for information only, NOT a DME order):  Rolling walker  Other Treatments: Anticoagulation medications must be taken as ordered, Bilateral knee high LUIS hose for 6 weeks after surgery. Please review application of LUIS hose with pt. Patient's personal belongings (please select all that are sent with patient):  Glasses, Dentures upper and lower    RN SIGNATURE:  Electronically signed by Joselyn Romano RN on 12/1/20 at 9:20 AM EST    PHYSICIAN SECTION    Prognosis: Good    Condition at Discharge: Stable    Rehab Potential (if transferring to Rehab): Good    Physician Certification: I certify the above orders, information, and transfer of Ilir Yates is necessary for the continuing treatment of the diagnosis listed and that he requires Home care for less 30 days.      Update Admission H&P: No change in H&P    PHYSICIAN SIGNATURE:  Electronically signed by Liam Powell ANNIKA Moyer CNP on 11/30/20 at 9:40 AM EST/ Dr Obdulia Reyes Status Date: 11/30/2020    [facility] Readmission Risk Assessment Score:    Discharging to Facility/ Agency   · Name:  [facility]    · Address: [address] [place] [address], [address] [place] [address]  · Phone: [phone]  · Fax: [phone]    / signature: Electronically signed by Rylee Sargent on 12/1/20 at 9:44 AM EST            DISCHARGE INSTRUCTIONS FOR TOTAL JOINT REPLACEMENT  Activity:   Elevate your leg if swelling occurs in your ankle. Use elastic wraps/hose until swelling decreases.  Continue the exercise program as prescribed by physical therapists.  Take frequent walks.  Use walker, crutches, or cane with weight bearing instructions as indicated by the physical therapists.  Take rest periods often. Elevate leg during rest period. Hip Replacement Only: Follow these instructions for a minimum of 3 months or until instructed by Dr Marcela Pressley.  Avoid sitting in low chairs or toilets without raised seats.  Keep knees apart. Sleep with a pillow between your legs.  Do not cross your legs, especially when putting on shoes and socks.  WOUND CARE:Do not scrub wound. Pat it dry with a soft towel.  Dont apply any lotions or creams to your wound.  Check the incision every day for redness, swelling, or increase in drainage. Diet:   You can resume your normal diet. There are no limits on your diet due to your surgery.  Pain pills and activity changes may lead to constipation. To prevent this, use prune juice or bran cereals liberally. You may need to use a laxative such as Dulcolax, Senokot, or Milk of Magnesia.  Drink plenty of fluids. Medications:   Take pain pills as ordered to maintain comfort.  Never drive while taking pain medicine.  Avoid over the counter medications until checking with your doctor.    Resume previous medications as instructed by your doctor.  Take blood thinners as directed and until completed. Call Your Doctor If:  Arther Mask have increased pain not controlled by medications.  Excessive swelling in your ankle.  You develop numbness, tingling, or decreased movement.  You have a fever greater than 100 degrees for a day or over 101 degrees at any one time.  Your wound becomes more reddened, starts draining, or opens.  If you fall. You have any questions about your recovery. ? Inform your family doctor/dentist or any other doctor who cares for you in the future that you have a joint replacement. They may want to order antibiotics for dental or surgical procedures. ? If you have required the use of insulin to control your blood sugar after surgery, follow up with your family doctor. ? Call your surgeons office to schedule your appointment to be seen 2 to 3 weeks after surgery. ? Make your appointment to continue physical therapy per doctors orders. ?  Smoking cessation assistance can be obtained from your family doctor or by calling [place] @ [phone]    _______________________________   _____   _______________________  ____                Patient Signature              Date      Witness                               Date

## 2020-11-27 NOTE — PROGRESS NOTES
The patients OARRS report has been reviewed online and any prescribing of pain related medications is based on our findings. The patient has been issued narcotics to safely reduce postoperative pain and promote tolerance with physical therapies and ADL's. Reduction in dosing will be addressed with the next narcotic refill request. Dosing is adjusted for patients with history of chronic pain disorders. Has Norco 7.5 at home.  Will continue postop

## 2020-11-30 ENCOUNTER — HOSPITAL ENCOUNTER (INPATIENT)
Age: 50
LOS: 1 days | Discharge: HOME HEALTH | DRG: 324 | End: 2020-12-01
Attending: ORTHOPAEDIC SURGERY | Admitting: ORTHOPAEDIC SURGERY
Payer: COMMERCIAL

## 2020-11-30 ENCOUNTER — TELEPHONE (OUTPATIENT)
Dept: INTERNAL MEDICINE CLINIC | Age: 50
End: 2020-11-30

## 2020-11-30 ENCOUNTER — ANESTHESIA (OUTPATIENT)
Dept: OPERATING ROOM | Age: 50
DRG: 324 | End: 2020-11-30
Payer: COMMERCIAL

## 2020-11-30 ENCOUNTER — APPOINTMENT (OUTPATIENT)
Dept: GENERAL RADIOLOGY | Age: 50
DRG: 324 | End: 2020-11-30
Attending: ORTHOPAEDIC SURGERY
Payer: COMMERCIAL

## 2020-11-30 VITALS
SYSTOLIC BLOOD PRESSURE: 121 MMHG | DIASTOLIC BLOOD PRESSURE: 78 MMHG | TEMPERATURE: 97 F | RESPIRATION RATE: 13 BRPM | OXYGEN SATURATION: 97 %

## 2020-11-30 PROBLEM — M16.11 PRIMARY OSTEOARTHRITIS OF RIGHT HIP: Status: ACTIVE | Noted: 2020-11-30

## 2020-11-30 LAB
ABO/RH: NORMAL
ANTIBODY SCREEN: NORMAL
GLUCOSE BLD-MCNC: 117 MG/DL (ref 70–99)
GLUCOSE BLD-MCNC: 165 MG/DL (ref 70–99)
PERFORMED ON: ABNORMAL
PERFORMED ON: ABNORMAL

## 2020-11-30 PROCEDURE — 3700000001 HC ADD 15 MINUTES (ANESTHESIA): Performed by: ORTHOPAEDIC SURGERY

## 2020-11-30 PROCEDURE — 6360000002 HC RX W HCPCS: Performed by: NURSE ANESTHETIST, CERTIFIED REGISTERED

## 2020-11-30 PROCEDURE — 73501 X-RAY EXAM HIP UNI 1 VIEW: CPT

## 2020-11-30 PROCEDURE — 3700000000 HC ANESTHESIA ATTENDED CARE: Performed by: ORTHOPAEDIC SURGERY

## 2020-11-30 PROCEDURE — 7100000001 HC PACU RECOVERY - ADDTL 15 MIN: Performed by: ORTHOPAEDIC SURGERY

## 2020-11-30 PROCEDURE — 86900 BLOOD TYPING SEROLOGIC ABO: CPT

## 2020-11-30 PROCEDURE — 2709999900 HC NON-CHARGEABLE SUPPLY: Performed by: ORTHOPAEDIC SURGERY

## 2020-11-30 PROCEDURE — 2580000003 HC RX 258: Performed by: ANESTHESIOLOGY

## 2020-11-30 PROCEDURE — 6370000000 HC RX 637 (ALT 250 FOR IP): Performed by: ORTHOPAEDIC SURGERY

## 2020-11-30 PROCEDURE — 3600000005 HC SURGERY LEVEL 5 BASE: Performed by: ORTHOPAEDIC SURGERY

## 2020-11-30 PROCEDURE — 1200000000 HC SEMI PRIVATE

## 2020-11-30 PROCEDURE — 6360000002 HC RX W HCPCS: Performed by: ORTHOPAEDIC SURGERY

## 2020-11-30 PROCEDURE — 7100000000 HC PACU RECOVERY - FIRST 15 MIN: Performed by: ORTHOPAEDIC SURGERY

## 2020-11-30 PROCEDURE — 2580000003 HC RX 258: Performed by: ORTHOPAEDIC SURGERY

## 2020-11-30 PROCEDURE — 88311 DECALCIFY TISSUE: CPT

## 2020-11-30 PROCEDURE — 86901 BLOOD TYPING SEROLOGIC RH(D): CPT

## 2020-11-30 PROCEDURE — 2500000003 HC RX 250 WO HCPCS: Performed by: NURSE ANESTHETIST, CERTIFIED REGISTERED

## 2020-11-30 PROCEDURE — 0SR904Z REPLACEMENT OF RIGHT HIP JOINT WITH CERAMIC ON POLYETHYLENE SYNTHETIC SUBSTITUTE, OPEN APPROACH: ICD-10-PCS | Performed by: ORTHOPAEDIC SURGERY

## 2020-11-30 PROCEDURE — 6360000002 HC RX W HCPCS: Performed by: NURSE PRACTITIONER

## 2020-11-30 PROCEDURE — 3600000015 HC SURGERY LEVEL 5 ADDTL 15MIN: Performed by: ORTHOPAEDIC SURGERY

## 2020-11-30 PROCEDURE — 94760 N-INVAS EAR/PLS OXIMETRY 1: CPT

## 2020-11-30 PROCEDURE — C1776 JOINT DEVICE (IMPLANTABLE): HCPCS | Performed by: ORTHOPAEDIC SURGERY

## 2020-11-30 PROCEDURE — 97530 THERAPEUTIC ACTIVITIES: CPT

## 2020-11-30 PROCEDURE — 6360000002 HC RX W HCPCS: Performed by: ANESTHESIOLOGY

## 2020-11-30 PROCEDURE — 86850 RBC ANTIBODY SCREEN: CPT

## 2020-11-30 PROCEDURE — 97116 GAIT TRAINING THERAPY: CPT

## 2020-11-30 PROCEDURE — 6370000000 HC RX 637 (ALT 250 FOR IP): Performed by: NURSE PRACTITIONER

## 2020-11-30 PROCEDURE — 97162 PT EVAL MOD COMPLEX 30 MIN: CPT

## 2020-11-30 PROCEDURE — 88304 TISSUE EXAM BY PATHOLOGIST: CPT

## 2020-11-30 DEVICE — CUP ACET DIA52MM HIP GRIPTION PRI CEMENTLESS FIX SECT SER: Type: IMPLANTABLE DEVICE | Site: HIP | Status: FUNCTIONAL

## 2020-11-30 DEVICE — LINER ACET OD52MM ID36MM +4MM OFFSET HIP POLYETH MTL ON: Type: IMPLANTABLE DEVICE | Site: HIP | Status: FUNCTIONAL

## 2020-11-30 DEVICE — HEAD FEM DIA36MM +1.5MM OFFSET 12/14 TAPR HIP CERAMIC: Type: IMPLANTABLE DEVICE | Site: HIP | Status: FUNCTIONAL

## 2020-11-30 DEVICE — STEM FEM SZ 6 L107MM 12/14 TAPR STD OFFSET HIP DUOFIX CLLRD: Type: IMPLANTABLE DEVICE | Site: HIP | Status: FUNCTIONAL

## 2020-11-30 RX ORDER — INSULIN GLARGINE 100 [IU]/ML
0.25 INJECTION, SOLUTION SUBCUTANEOUS NIGHTLY
Status: DISCONTINUED | OUTPATIENT
Start: 2020-11-30 | End: 2020-11-30

## 2020-11-30 RX ORDER — SODIUM CHLORIDE 9 MG/ML
INJECTION, SOLUTION INTRAVENOUS CONTINUOUS
Status: DISCONTINUED | OUTPATIENT
Start: 2020-11-30 | End: 2020-11-30

## 2020-11-30 RX ORDER — ALBUTEROL SULFATE 2.5 MG/3ML
2.5 SOLUTION RESPIRATORY (INHALATION) EVERY 6 HOURS PRN
Status: DISCONTINUED | OUTPATIENT
Start: 2020-11-30 | End: 2020-12-01 | Stop reason: HOSPADM

## 2020-11-30 RX ORDER — SODIUM CHLORIDE 0.9 % (FLUSH) 0.9 %
10 SYRINGE (ML) INJECTION PRN
Status: DISCONTINUED | OUTPATIENT
Start: 2020-11-30 | End: 2020-12-01 | Stop reason: HOSPADM

## 2020-11-30 RX ORDER — HYDROCODONE BITARTRATE AND ACETAMINOPHEN 7.5; 325 MG/1; MG/1
1 TABLET ORAL EVERY 6 HOURS PRN
Status: DISCONTINUED | OUTPATIENT
Start: 2020-11-30 | End: 2020-12-01

## 2020-11-30 RX ORDER — SODIUM CHLORIDE 0.9 % (FLUSH) 0.9 %
10 SYRINGE (ML) INJECTION EVERY 12 HOURS SCHEDULED
Status: DISCONTINUED | OUTPATIENT
Start: 2020-11-30 | End: 2020-11-30 | Stop reason: HOSPADM

## 2020-11-30 RX ORDER — ONDANSETRON 2 MG/ML
INJECTION INTRAMUSCULAR; INTRAVENOUS PRN
Status: DISCONTINUED | OUTPATIENT
Start: 2020-11-30 | End: 2020-11-30

## 2020-11-30 RX ORDER — NICOTINE POLACRILEX 4 MG
15 LOZENGE BUCCAL PRN
Status: DISCONTINUED | OUTPATIENT
Start: 2020-11-30 | End: 2020-12-01

## 2020-11-30 RX ORDER — DEXTROSE MONOHYDRATE 50 MG/ML
100 INJECTION, SOLUTION INTRAVENOUS PRN
Status: DISCONTINUED | OUTPATIENT
Start: 2020-11-30 | End: 2020-12-01

## 2020-11-30 RX ORDER — BUDESONIDE AND FORMOTEROL FUMARATE DIHYDRATE 80; 4.5 UG/1; UG/1
2 AEROSOL RESPIRATORY (INHALATION) 2 TIMES DAILY
Status: DISCONTINUED | OUTPATIENT
Start: 2020-11-30 | End: 2020-12-01 | Stop reason: HOSPADM

## 2020-11-30 RX ORDER — LIDOCAINE HYDROCHLORIDE 20 MG/ML
INJECTION, SOLUTION EPIDURAL; INFILTRATION; INTRACAUDAL; PERINEURAL PRN
Status: DISCONTINUED | OUTPATIENT
Start: 2020-11-30 | End: 2020-11-30 | Stop reason: SDUPTHER

## 2020-11-30 RX ORDER — GLYCOPYRROLATE 0.2 MG/ML
INJECTION INTRAMUSCULAR; INTRAVENOUS PRN
Status: DISCONTINUED | OUTPATIENT
Start: 2020-11-30 | End: 2020-11-30 | Stop reason: SDUPTHER

## 2020-11-30 RX ORDER — DEXTROSE MONOHYDRATE 25 G/50ML
12.5 INJECTION, SOLUTION INTRAVENOUS PRN
Status: DISCONTINUED | OUTPATIENT
Start: 2020-11-30 | End: 2020-12-01

## 2020-11-30 RX ORDER — ONDANSETRON 2 MG/ML
INJECTION INTRAMUSCULAR; INTRAVENOUS PRN
Status: DISCONTINUED | OUTPATIENT
Start: 2020-11-30 | End: 2020-11-30 | Stop reason: SDUPTHER

## 2020-11-30 RX ORDER — MAGNESIUM HYDROXIDE 1200 MG/15ML
LIQUID ORAL CONTINUOUS PRN
Status: COMPLETED | OUTPATIENT
Start: 2020-11-30 | End: 2020-11-30

## 2020-11-30 RX ORDER — ONDANSETRON 2 MG/ML
4 INJECTION INTRAMUSCULAR; INTRAVENOUS
Status: DISCONTINUED | OUTPATIENT
Start: 2020-11-30 | End: 2020-11-30 | Stop reason: HOSPADM

## 2020-11-30 RX ORDER — MIDAZOLAM HYDROCHLORIDE 1 MG/ML
INJECTION INTRAMUSCULAR; INTRAVENOUS PRN
Status: DISCONTINUED | OUTPATIENT
Start: 2020-11-30 | End: 2020-11-30 | Stop reason: SDUPTHER

## 2020-11-30 RX ORDER — KETAMINE HCL IN NACL, ISO-OSM 100MG/10ML
SYRINGE (ML) INJECTION PRN
Status: DISCONTINUED | OUTPATIENT
Start: 2020-11-30 | End: 2020-11-30 | Stop reason: SDUPTHER

## 2020-11-30 RX ORDER — AMITRIPTYLINE HYDROCHLORIDE 25 MG/1
25 TABLET, FILM COATED ORAL NIGHTLY PRN
Status: DISCONTINUED | OUTPATIENT
Start: 2020-11-30 | End: 2020-12-01 | Stop reason: HOSPADM

## 2020-11-30 RX ORDER — PROPOFOL 10 MG/ML
INJECTION, EMULSION INTRAVENOUS PRN
Status: DISCONTINUED | OUTPATIENT
Start: 2020-11-30 | End: 2020-11-30 | Stop reason: SDUPTHER

## 2020-11-30 RX ORDER — HYDROCODONE BITARTRATE AND ACETAMINOPHEN 7.5; 325 MG/1; MG/1
1-2 TABLET ORAL EVERY 6 HOURS PRN
Qty: 1 TABLET | Refills: 0 | Status: SHIPPED | OUTPATIENT
Start: 2020-11-30 | End: 2020-12-05

## 2020-11-30 RX ORDER — FENTANYL CITRATE 50 UG/ML
25 INJECTION, SOLUTION INTRAMUSCULAR; INTRAVENOUS EVERY 5 MIN PRN
Status: DISCONTINUED | OUTPATIENT
Start: 2020-11-30 | End: 2020-11-30 | Stop reason: HOSPADM

## 2020-11-30 RX ORDER — SODIUM CHLORIDE 0.9 % (FLUSH) 0.9 %
10 SYRINGE (ML) INJECTION PRN
Status: DISCONTINUED | OUTPATIENT
Start: 2020-11-30 | End: 2020-11-30 | Stop reason: HOSPADM

## 2020-11-30 RX ORDER — OXYCODONE HYDROCHLORIDE 5 MG/1
5 TABLET ORAL EVERY 4 HOURS PRN
Status: DISCONTINUED | OUTPATIENT
Start: 2020-11-30 | End: 2020-11-30

## 2020-11-30 RX ORDER — ACETAMINOPHEN 325 MG/1
650 TABLET ORAL EVERY 6 HOURS
Status: DISCONTINUED | OUTPATIENT
Start: 2020-11-30 | End: 2020-12-01 | Stop reason: HOSPADM

## 2020-11-30 RX ORDER — CETIRIZINE HYDROCHLORIDE 10 MG/1
5 TABLET ORAL DAILY
Status: DISCONTINUED | OUTPATIENT
Start: 2020-11-30 | End: 2020-12-01 | Stop reason: HOSPADM

## 2020-11-30 RX ORDER — FENTANYL CITRATE 50 UG/ML
INJECTION, SOLUTION INTRAMUSCULAR; INTRAVENOUS PRN
Status: DISCONTINUED | OUTPATIENT
Start: 2020-11-30 | End: 2020-11-30 | Stop reason: SDUPTHER

## 2020-11-30 RX ORDER — METOPROLOL TARTRATE 5 MG/5ML
INJECTION INTRAVENOUS PRN
Status: DISCONTINUED | OUTPATIENT
Start: 2020-11-30 | End: 2020-11-30 | Stop reason: SDUPTHER

## 2020-11-30 RX ORDER — SENNA AND DOCUSATE SODIUM 50; 8.6 MG/1; MG/1
1 TABLET, FILM COATED ORAL 2 TIMES DAILY
Status: DISCONTINUED | OUTPATIENT
Start: 2020-11-30 | End: 2020-12-01 | Stop reason: HOSPADM

## 2020-11-30 RX ORDER — DEXAMETHASONE SODIUM PHOSPHATE 4 MG/ML
INJECTION, SOLUTION INTRA-ARTICULAR; INTRALESIONAL; INTRAMUSCULAR; INTRAVENOUS; SOFT TISSUE PRN
Status: DISCONTINUED | OUTPATIENT
Start: 2020-11-30 | End: 2020-11-30 | Stop reason: SDUPTHER

## 2020-11-30 RX ORDER — TIZANIDINE 4 MG/1
4 TABLET ORAL EVERY 8 HOURS PRN
Status: DISCONTINUED | OUTPATIENT
Start: 2020-11-30 | End: 2020-12-01 | Stop reason: HOSPADM

## 2020-11-30 RX ORDER — SODIUM CHLORIDE 0.9 % (FLUSH) 0.9 %
10 SYRINGE (ML) INJECTION EVERY 12 HOURS SCHEDULED
Status: DISCONTINUED | OUTPATIENT
Start: 2020-11-30 | End: 2020-12-01 | Stop reason: HOSPADM

## 2020-11-30 RX ORDER — HYDROCODONE BITARTRATE AND ACETAMINOPHEN 7.5; 325 MG/1; MG/1
2 TABLET ORAL EVERY 6 HOURS PRN
Status: DISCONTINUED | OUTPATIENT
Start: 2020-11-30 | End: 2020-12-01

## 2020-11-30 RX ORDER — ASPIRIN 81 MG/1
81 TABLET ORAL 2 TIMES DAILY
Qty: 60 TABLET | Refills: 0 | Status: SHIPPED | OUTPATIENT
Start: 2020-11-30 | End: 2021-03-12

## 2020-11-30 RX ORDER — LORAZEPAM 2 MG/ML
1 INJECTION INTRAMUSCULAR EVERY 8 HOURS PRN
Status: DISCONTINUED | OUTPATIENT
Start: 2020-11-30 | End: 2020-12-01 | Stop reason: HOSPADM

## 2020-11-30 RX ORDER — ROCURONIUM BROMIDE 10 MG/ML
INJECTION, SOLUTION INTRAVENOUS PRN
Status: DISCONTINUED | OUTPATIENT
Start: 2020-11-30 | End: 2020-11-30 | Stop reason: SDUPTHER

## 2020-11-30 RX ORDER — PROMETHAZINE HYDROCHLORIDE 25 MG/1
12.5 TABLET ORAL EVERY 6 HOURS PRN
Status: DISCONTINUED | OUTPATIENT
Start: 2020-11-30 | End: 2020-12-01 | Stop reason: HOSPADM

## 2020-11-30 RX ORDER — ONDANSETRON 2 MG/ML
4 INJECTION INTRAMUSCULAR; INTRAVENOUS EVERY 6 HOURS PRN
Status: DISCONTINUED | OUTPATIENT
Start: 2020-11-30 | End: 2020-12-01 | Stop reason: HOSPADM

## 2020-11-30 RX ORDER — OXYCODONE HYDROCHLORIDE 10 MG/1
10 TABLET ORAL EVERY 4 HOURS PRN
Status: DISCONTINUED | OUTPATIENT
Start: 2020-11-30 | End: 2020-11-30

## 2020-11-30 RX ORDER — SUCCINYLCHOLINE/SOD CL,ISO/PF 200MG/10ML
SYRINGE (ML) INTRAVENOUS PRN
Status: DISCONTINUED | OUTPATIENT
Start: 2020-11-30 | End: 2020-11-30 | Stop reason: SDUPTHER

## 2020-11-30 RX ORDER — SODIUM CHLORIDE 9 MG/ML
INJECTION, SOLUTION INTRAVENOUS CONTINUOUS
Status: DISCONTINUED | OUTPATIENT
Start: 2020-11-30 | End: 2020-12-01 | Stop reason: HOSPADM

## 2020-11-30 RX ORDER — NICOTINE 21 MG/24HR
1 PATCH, TRANSDERMAL 24 HOURS TRANSDERMAL DAILY
Status: DISCONTINUED | OUTPATIENT
Start: 2020-11-30 | End: 2020-12-01 | Stop reason: HOSPADM

## 2020-11-30 RX ORDER — MONTELUKAST SODIUM 10 MG/1
10 TABLET ORAL NIGHTLY
Status: DISCONTINUED | OUTPATIENT
Start: 2020-11-30 | End: 2020-12-01 | Stop reason: HOSPADM

## 2020-11-30 RX ADMIN — HYDROMORPHONE HYDROCHLORIDE 0.5 MG: 1 INJECTION, SOLUTION INTRAMUSCULAR; INTRAVENOUS; SUBCUTANEOUS at 09:20

## 2020-11-30 RX ADMIN — CEFAZOLIN SODIUM 2 G: 10 INJECTION, POWDER, FOR SOLUTION INTRAVENOUS at 07:28

## 2020-11-30 RX ADMIN — LORAZEPAM 1 MG: 2 INJECTION INTRAMUSCULAR; INTRAVENOUS at 11:52

## 2020-11-30 RX ADMIN — DOCUSATE SODIUM 50 MG AND SENNOSIDES 8.6 MG 1 TABLET: 8.6; 5 TABLET, FILM COATED ORAL at 21:22

## 2020-11-30 RX ADMIN — SUGAMMADEX 100 MG: 100 INJECTION, SOLUTION INTRAVENOUS at 08:53

## 2020-11-30 RX ADMIN — CEFAZOLIN SODIUM 2 G: 10 INJECTION, POWDER, FOR SOLUTION INTRAVENOUS at 15:37

## 2020-11-30 RX ADMIN — METOPROLOL TARTRATE 0.5 MG: 5 INJECTION, SOLUTION INTRAVENOUS at 07:53

## 2020-11-30 RX ADMIN — Medication 10 MG: at 08:59

## 2020-11-30 RX ADMIN — HYDROMORPHONE HYDROCHLORIDE 0.5 MG: 1 INJECTION, SOLUTION INTRAMUSCULAR; INTRAVENOUS; SUBCUTANEOUS at 21:29

## 2020-11-30 RX ADMIN — PROPOFOL 150 MG: 10 INJECTION, EMULSION INTRAVENOUS at 07:33

## 2020-11-30 RX ADMIN — CETIRIZINE HYDROCHLORIDE 5 MG: 10 TABLET, FILM COATED ORAL at 12:37

## 2020-11-30 RX ADMIN — ROCURONIUM BROMIDE 40 MG: 10 INJECTION INTRAVENOUS at 07:45

## 2020-11-30 RX ADMIN — HYDROMORPHONE HYDROCHLORIDE 0.5 MG: 1 INJECTION, SOLUTION INTRAMUSCULAR; INTRAVENOUS; SUBCUTANEOUS at 10:00

## 2020-11-30 RX ADMIN — SODIUM CHLORIDE: 9 INJECTION, SOLUTION INTRAVENOUS at 12:36

## 2020-11-30 RX ADMIN — HYDROMORPHONE HYDROCHLORIDE 0.5 MG: 1 INJECTION, SOLUTION INTRAMUSCULAR; INTRAVENOUS; SUBCUTANEOUS at 09:53

## 2020-11-30 RX ADMIN — HYDROCODONE BITARTRATE AND ACETAMINOPHEN 2 TABLET: 7.5; 325 TABLET ORAL at 16:15

## 2020-11-30 RX ADMIN — CEFAZOLIN SODIUM 2 G: 10 INJECTION, POWDER, FOR SOLUTION INTRAVENOUS at 23:50

## 2020-11-30 RX ADMIN — SUGAMMADEX 100 MG: 100 INJECTION, SOLUTION INTRAVENOUS at 08:55

## 2020-11-30 RX ADMIN — METOPROLOL TARTRATE 0.5 MG: 5 INJECTION, SOLUTION INTRAVENOUS at 07:57

## 2020-11-30 RX ADMIN — SODIUM CHLORIDE: 9 INJECTION, SOLUTION INTRAVENOUS at 08:45

## 2020-11-30 RX ADMIN — Medication 120 MG: at 07:33

## 2020-11-30 RX ADMIN — ONDANSETRON 4 MG: 2 INJECTION INTRAMUSCULAR; INTRAVENOUS at 08:43

## 2020-11-30 RX ADMIN — MONTELUKAST 10 MG: 10 TABLET, FILM COATED ORAL at 21:22

## 2020-11-30 RX ADMIN — Medication 10 ML: at 12:37

## 2020-11-30 RX ADMIN — HYDROMORPHONE HYDROCHLORIDE 0.5 MG: 1 INJECTION, SOLUTION INTRAMUSCULAR; INTRAVENOUS; SUBCUTANEOUS at 09:00

## 2020-11-30 RX ADMIN — Medication 20 MG: at 07:45

## 2020-11-30 RX ADMIN — FENTANYL CITRATE 50 MCG: 50 INJECTION INTRAMUSCULAR; INTRAVENOUS at 07:44

## 2020-11-30 RX ADMIN — ACETAMINOPHEN 650 MG: 325 TABLET ORAL at 18:39

## 2020-11-30 RX ADMIN — ACETAMINOPHEN 650 MG: 325 TABLET ORAL at 12:37

## 2020-11-30 RX ADMIN — HYDROMORPHONE HYDROCHLORIDE 0.5 MG: 1 INJECTION, SOLUTION INTRAMUSCULAR; INTRAVENOUS; SUBCUTANEOUS at 18:39

## 2020-11-30 RX ADMIN — HYDROCODONE BITARTRATE AND ACETAMINOPHEN 2 TABLET: 7.5; 325 TABLET ORAL at 23:50

## 2020-11-30 RX ADMIN — ROCURONIUM BROMIDE 30 MG: 10 INJECTION INTRAVENOUS at 08:26

## 2020-11-30 RX ADMIN — HYDROMORPHONE HYDROCHLORIDE 0.5 MG: 1 INJECTION, SOLUTION INTRAMUSCULAR; INTRAVENOUS; SUBCUTANEOUS at 10:07

## 2020-11-30 RX ADMIN — HYDROMORPHONE HYDROCHLORIDE 0.5 MG: 1 INJECTION, SOLUTION INTRAMUSCULAR; INTRAVENOUS; SUBCUTANEOUS at 10:32

## 2020-11-30 RX ADMIN — ACETAMINOPHEN 650 MG: 325 TABLET ORAL at 23:49

## 2020-11-30 RX ADMIN — MIDAZOLAM 1 MG: 1 INJECTION INTRAMUSCULAR; INTRAVENOUS at 07:28

## 2020-11-30 RX ADMIN — SODIUM CHLORIDE: 9 INJECTION, SOLUTION INTRAVENOUS at 07:28

## 2020-11-30 RX ADMIN — Medication 10 ML: at 21:22

## 2020-11-30 RX ADMIN — SODIUM CHLORIDE: 9 INJECTION, SOLUTION INTRAVENOUS at 06:38

## 2020-11-30 RX ADMIN — FENTANYL CITRATE 50 MCG: 50 INJECTION INTRAMUSCULAR; INTRAVENOUS at 07:51

## 2020-11-30 RX ADMIN — LIDOCAINE HYDROCHLORIDE 4 ML: 20 INJECTION, SOLUTION EPIDURAL; INFILTRATION; INTRACAUDAL; PERINEURAL at 07:44

## 2020-11-30 RX ADMIN — GLYCOPYRROLATE 0.1 MG: 0.2 INJECTION, SOLUTION INTRAMUSCULAR; INTRAVENOUS at 07:28

## 2020-11-30 RX ADMIN — MIDAZOLAM 1 MG: 1 INJECTION INTRAMUSCULAR; INTRAVENOUS at 07:33

## 2020-11-30 RX ADMIN — FENTANYL CITRATE 50 MCG: 50 INJECTION INTRAMUSCULAR; INTRAVENOUS at 07:57

## 2020-11-30 RX ADMIN — DEXAMETHASONE SODIUM PHOSPHATE 8 MG: 4 INJECTION, SOLUTION INTRAMUSCULAR; INTRAVENOUS at 07:44

## 2020-11-30 RX ADMIN — LORAZEPAM 1 MG: 2 INJECTION INTRAMUSCULAR; INTRAVENOUS at 21:22

## 2020-11-30 RX ADMIN — METOPROLOL TARTRATE 0.5 MG: 5 INJECTION, SOLUTION INTRAVENOUS at 08:24

## 2020-11-30 RX ADMIN — FENTANYL CITRATE 50 MCG: 50 INJECTION INTRAMUSCULAR; INTRAVENOUS at 07:28

## 2020-11-30 ASSESSMENT — PULMONARY FUNCTION TESTS
PIF_VALUE: 19
PIF_VALUE: 19
PIF_VALUE: 0
PIF_VALUE: 11
PIF_VALUE: 6
PIF_VALUE: 19
PIF_VALUE: 16
PIF_VALUE: 18
PIF_VALUE: 18
PIF_VALUE: 11
PIF_VALUE: 18
PIF_VALUE: 18
PIF_VALUE: 15
PIF_VALUE: 16
PIF_VALUE: 16
PIF_VALUE: 20
PIF_VALUE: 19
PIF_VALUE: 20
PIF_VALUE: 5
PIF_VALUE: 8
PIF_VALUE: 17
PIF_VALUE: 19
PIF_VALUE: 8
PIF_VALUE: 11
PIF_VALUE: 6
PIF_VALUE: 28
PIF_VALUE: 19
PIF_VALUE: 19
PIF_VALUE: 15
PIF_VALUE: 18
PIF_VALUE: 18
PIF_VALUE: 15
PIF_VALUE: 19
PIF_VALUE: 18
PIF_VALUE: 16
PIF_VALUE: 20
PIF_VALUE: 18
PIF_VALUE: 19
PIF_VALUE: 19
PIF_VALUE: 18
PIF_VALUE: 18
PIF_VALUE: 19
PIF_VALUE: 19
PIF_VALUE: 7
PIF_VALUE: 19
PIF_VALUE: 20
PIF_VALUE: 20
PIF_VALUE: 19
PIF_VALUE: 18
PIF_VALUE: 11
PIF_VALUE: 18
PIF_VALUE: 12
PIF_VALUE: 17
PIF_VALUE: 22
PIF_VALUE: 19
PIF_VALUE: 14
PIF_VALUE: 1
PIF_VALUE: 19
PIF_VALUE: 18
PIF_VALUE: 20
PIF_VALUE: 19
PIF_VALUE: 18
PIF_VALUE: 20
PIF_VALUE: 18
PIF_VALUE: 6
PIF_VALUE: 19
PIF_VALUE: 17
PIF_VALUE: 15
PIF_VALUE: 6
PIF_VALUE: 18
PIF_VALUE: 19
PIF_VALUE: 22
PIF_VALUE: 6
PIF_VALUE: 6
PIF_VALUE: 18
PIF_VALUE: 16
PIF_VALUE: 15
PIF_VALUE: 15
PIF_VALUE: 5
PIF_VALUE: 11
PIF_VALUE: 14
PIF_VALUE: 19
PIF_VALUE: 11
PIF_VALUE: 18
PIF_VALUE: 17
PIF_VALUE: 15
PIF_VALUE: 19
PIF_VALUE: 18
PIF_VALUE: 11
PIF_VALUE: 14
PIF_VALUE: 19
PIF_VALUE: 16
PIF_VALUE: 15
PIF_VALUE: 1
PIF_VALUE: 18
PIF_VALUE: 16
PIF_VALUE: 16

## 2020-11-30 ASSESSMENT — PAIN DESCRIPTION - LOCATION
LOCATION: HIP

## 2020-11-30 ASSESSMENT — PAIN DESCRIPTION - ORIENTATION
ORIENTATION: RIGHT

## 2020-11-30 ASSESSMENT — PAIN DESCRIPTION - FREQUENCY
FREQUENCY: CONTINUOUS

## 2020-11-30 ASSESSMENT — PAIN DESCRIPTION - PROGRESSION
CLINICAL_PROGRESSION: GRADUALLY IMPROVING
CLINICAL_PROGRESSION: NOT CHANGED
CLINICAL_PROGRESSION: GRADUALLY IMPROVING
CLINICAL_PROGRESSION: NOT CHANGED
CLINICAL_PROGRESSION: GRADUALLY IMPROVING
CLINICAL_PROGRESSION: GRADUALLY IMPROVING
CLINICAL_PROGRESSION: GRADUALLY WORSENING
CLINICAL_PROGRESSION: GRADUALLY IMPROVING

## 2020-11-30 ASSESSMENT — PAIN DESCRIPTION - ONSET
ONSET: ON-GOING

## 2020-11-30 ASSESSMENT — PAIN DESCRIPTION - DESCRIPTORS
DESCRIPTORS: THROBBING
DESCRIPTORS: THROBBING
DESCRIPTORS: SHARP
DESCRIPTORS: THROBBING
DESCRIPTORS: SHARP
DESCRIPTORS: THROBBING
DESCRIPTORS: ACHING
DESCRIPTORS: THROBBING
DESCRIPTORS: THROBBING
DESCRIPTORS: SHARP
DESCRIPTORS: THROBBING

## 2020-11-30 ASSESSMENT — PAIN SCALES - GENERAL
PAINLEVEL_OUTOF10: 4
PAINLEVEL_OUTOF10: 6
PAINLEVEL_OUTOF10: 7
PAINLEVEL_OUTOF10: 8
PAINLEVEL_OUTOF10: 8
PAINLEVEL_OUTOF10: 5
PAINLEVEL_OUTOF10: 7
PAINLEVEL_OUTOF10: 10
PAINLEVEL_OUTOF10: 10
PAINLEVEL_OUTOF10: 7
PAINLEVEL_OUTOF10: 10
PAINLEVEL_OUTOF10: 5
PAINLEVEL_OUTOF10: 6

## 2020-11-30 ASSESSMENT — PAIN DESCRIPTION - PAIN TYPE
TYPE: SURGICAL PAIN

## 2020-11-30 ASSESSMENT — PAIN - FUNCTIONAL ASSESSMENT
PAIN_FUNCTIONAL_ASSESSMENT: PREVENTS OR INTERFERES SOME ACTIVE ACTIVITIES AND ADLS
PAIN_FUNCTIONAL_ASSESSMENT: 0-10

## 2020-11-30 ASSESSMENT — ENCOUNTER SYMPTOMS: SHORTNESS OF BREATH: 1

## 2020-11-30 ASSESSMENT — LIFESTYLE VARIABLES: SMOKING_STATUS: 1

## 2020-11-30 NOTE — PROGRESS NOTES
To pacu from OR. Pt asleep with oral airway in place. Dressing to right hip dry and intact. Ice to right hip. Pedal pulses palpable. Abduction pillow in place. IV infusing. Monitor in sinus rhythm.

## 2020-11-30 NOTE — CARE COORDINATION
Theodora/Nando received referral from RN-CM for [address] SE[address] W/RAILS. Will need OT notes and DME Orders. Will verify patient's insurance and follow up with patient to deliver the ordered item(s) prior to discharge.     Thank you for the referral.  Electronically signed by Yessi Maguire on 11/30/2020 at 4:48 PM  Cell ph# [phone]

## 2020-11-30 NOTE — TELEPHONE ENCOUNTER
----- Message from Tosha Donnelly sent at 11/27/2020  1:41 PM EST -----  Subject: Refill Request    QUESTIONS  Name of Medication? montelukast (SINGULAIR) 10 MG tablet  Patient-reported dosage and instructions? 1xaday   How many days do you have left? 0  Preferred Pharmacy? [facility] [address] [phone]  Pharmacy phone number (if available)? [phone]  Additional Information for Provider? patient needs this medication filled   before this Monday   ---------------------------------------------------------------------------  --------------  CALL BACK INFO  What is the best way for the office to contact you? OK to leave message on   voicemail  Preferred Call Back Phone Number?  [phone]

## 2020-11-30 NOTE — PROGRESS NOTES
Met with patient at bedside, patient is in bed alert and orientedx4. discussed role of nurse navigator and gave contact information. Reviewed reasons to call with questions or concerns, importance of TEDS, Incentive spirometer, pain medication, and physical and occupational therapy. 2/4 bed rails up, bed in lowest position, fall precautions in place, call light within reach. Pulses present bilaterally +2 pedal, no drainage or odor noted at surgical dressing right hip. dry Dressing clean, dry, and intact. Ice in place. Blas and scds on BLEs. Neurovascular checks performed and WNLs, patient denies numbness or tingling. DC Plan: home with wife and [facility]hc. wife to transport patient.   DME needs:sukh    [facility]  Orthopedic Nurse Navigator  Phone number: [phone]    Future Appointments   Date Time Provider Raymond Kramer   12/15/2020 11:00 AM Rudy Mercado MD [facility]   2/17/2021  9:15 AM SCHEDULE, [facility]     Electronically signed by Annie Sweet RN on 11/30/2020 at 4:32 PM

## 2020-11-30 NOTE — PLAN OF CARE
Problem: Falls - Risk of:  Goal: Will remain free from falls  Description: Will remain free from falls  Outcome: Ongoing   Will remain free from falls. Fall precautions are in place. Call light, telephone and bedside table are within reach. Problem: Falls - Risk of:  Goal: Absence of physical injury  Description: Absence of physical injury  Outcome: Ongoing     Problem: Tobacco Use:  Goal: Inpatient tobacco use cessation counseling participation  Description: Inpatient tobacco use cessation counseling participation  Outcome: Ongoing     Problem: Gas Exchange - Impaired:  Goal: Levels of oxygenation will improve  Description: Levels of oxygenation will improve  Outcome: Ongoing     Problem: Discharge Planning:  Goal: Knowledge of discharge instructions  Description: Knowledge of discharge instructions  Outcome: Ongoing     Problem: Infection - Surgical Site:  Goal: Signs of wound healing will improve  Description: Signs of wound healing will improve  Outcome: Ongoing     Problem: Mobility - Impaired:  Goal: Achieve maximum mobility level  Description: Achieve maximum mobility level  Outcome: Ongoing     Problem: Pain - Acute:  Goal: Pain level will decrease  Description: Pain level will decrease  Outcome: Ongoing     Problem: Skin Integrity:  Goal: Will show no infection signs and symptoms  Description: Will show no infection signs and symptoms  Outcome: Ongoing     Problem: Skin Integrity:  Goal: Absence of new skin breakdown  Description: Absence of new skin breakdown  Outcome: Ongoing     Problem: Pain:  Goal: Pain level will decrease  Description: Pain level will decrease  Outcome: Ongoing   Pain level will decrease  Problem: Pain:  Goal: Control of acute pain  Description: Control of acute pain  Outcome: Ongoing   Patient educated on acute pain. Taught patient to use call light to ask for pain medication. PRN pain medication given for acute pain. Will continue to monitor pain per unit protocol. Problem: Pain:  Goal: Control of chronic pain  Description: Control of chronic pain  Outcome: Ongoing

## 2020-11-30 NOTE — ANESTHESIA PRE PROCEDURE
Inject 60 mg into the skin See Admin Instructions Every 6 months--next dose in Feb 7/13/20 11/24/20  Ervin Brooks MD       Current medications:    Current Facility-Administered Medications   Medication Dose Route Frequency Provider Last Rate Last Dose    0.9 % sodium chloride infusion   Intravenous Continuous Yair Pink MD 75 mL/hr at 11/30/20 8320      sodium chloride flush 0.9 % injection 10 mL  10 mL Intravenous 2 times per day Yair Pink MD        sodium chloride flush 0.9 % injection 10 mL  10 mL Intravenous PRN Yair Pink MD        ceFAZolin (ANCEF) 2 g in dextrose 5 % 100 mL IVPB  2 g Intravenous Once Adriana Pickering MD           Allergies: Allergies   Allergen Reactions    Naproxen Swelling     He can tolerate aspirin    Neurontin [Gabapentin] Swelling     Edema lower extr. Leg swelling    Robaxin [Methocarbamol] Diarrhea, Itching and Other (See Comments)     Eyes swell/yellowish and get bloodshot    Sulfamethoxazole-Trimethoprim Diarrhea    Toradol [Ketorolac Tromethamine] Swelling and Other (See Comments)     Other reaction(s): Headaches  Eyes swell and bloodshot    Azithromycin Nausea And Vomiting    Bactrim [Sulfamethoxazole-Trimethoprim] Diarrhea    Biaxin [Clarithromycin] Diarrhea and Nausea And Vomiting    Celebrex [Celecoxib] Swelling     Causes severe foot swelling. He can tolerate aspirin.     Diclofenac Diarrhea     He can tolerate aspirin    Sulfa Antibiotics Diarrhea and Nausea And Vomiting    Tramadol Rash and Other (See Comments)     Eyes swell and get bloodshot--pt states can take in small doses       Problem List:    Patient Active Problem List   Diagnosis Code    Hx of pneumothorax Z87.09    Centrilobular emphysema (HCC) J43.2    Osteoporosis M81.0    Postlaminectomy syndrome, lumbar M96.1    DDD (degenerative disc disease), lumbar M51.36    Chronic pain syndrome G89.4    Anxiety F41.9    Shortness of breath R06.02    Hip pain M25.559  Arachnoiditis G03.9    Narcotic dependency, continuous (HCC) F11.20    Chronic, continuous use of opioids F11.90    Osteoarthritis of spine with radiculopathy, lumbar region M47.26    Osteoarthritis of left hip M16.12    Vitamin D deficiency E55.9       Past Medical History:        Diagnosis Date    Allergic sinusitis     Anxiety and depression     currently under care of pyschiatrist    COPD (chronic obstructive pulmonary disease) ([facility] [address]     Hip pain 09/05/2017    ? CAM possible bilateral superior femoral head-heck junction bump suggestive of femoro acetabular impingement syndrome    Insomnia     Localized edema 2/24/2018    Neg abd ultrasound and DVT    Osteoporosis     Spontaneous pneumothorax     1991    Testicular torsion     Wears dentures     Wears glasses        Past Surgical History:        Procedure Laterality Date    BACK SURGERY  12/26/2016    1st rods, 2nd tens unit 3rd rods and screw removed, only has cages now   [address]      x 2    TOTAL HIP ARTHROPLASTY Left 1/29/2019    TOTAL HIP REPLACEMENT LEFT HIP (ADVANCED) performed by Destiny Cedeño MD at [address] [place] [address] History:    Social History     Tobacco Use    Smoking status: Current Every Day Smoker     Packs/day: 0.50     Years: 30.00     Pack years: 15.00     Types: Cigarettes    Smokeless tobacco: Never Used    Tobacco comment: using patches   Substance Use Topics    Alcohol use:  No                                Ready to quit: Not Answered  Counseling given: Not Answered  Comment: using patches      Vital Signs (Current):   Vitals:    11/24/20 1107 11/30/20 0629   BP:  125/87   Pulse:  75   Resp:  15   Temp:  97 °F (36.1 °C)   TempSrc:  Temporal   SpO2:  97%   Weight: 133 lb (60.3 kg) 136 lb (61.7 kg)   Height: 5' 6\" (1.676 m) 5' 6\" (1.676 m)                                              BP Readings from Last 3 Encounters:   11/30/20 125/87   11/04/20 130/86   07/24/20 139/77       NPO Status: Time of last liquid consumption: 2330                        Time of last solid consumption: 2100                        Date of last liquid consumption: 11/29/20                        Date of last solid food consumption: 11/29/20    BMI:   Wt Readings from Last 3 Encounters:   11/30/20 136 lb (61.7 kg)   11/04/20 133 lb (60.3 kg)   10/09/20 133 lb (60.3 kg)     Body mass index is 21.95 kg/m². CBC:   Lab Results   Component Value Date    WBC 9.0 11/18/2020    RBC 4.82 11/18/2020    HGB 15.2 11/18/2020    HCT 44.2 11/18/2020    MCV 91.6 11/18/2020    RDW 14.0 11/18/2020     11/18/2020       CMP:   Lab Results   Component Value Date     11/18/2020    K 4.3 11/18/2020     11/18/2020    CO2 29 11/18/2020    BUN 7 11/18/2020    CREATININE 0.9 11/18/2020    GFRAA >60 11/18/2020    AGRATIO 1.7 06/24/2015    LABGLOM >60 11/18/2020    GLUCOSE 96 11/18/2020    PROT 7.0 11/14/2018    CALCIUM 9.3 11/18/2020    BILITOT 0.4 11/14/2018    ALKPHOS 119 11/14/2018    AST 20 11/14/2018    ALT 27 11/14/2018       POC Tests: No results for input(s): POCGLU, POCNA, POCK, POCCL, POCBUN, POCHEMO, POCHCT in the last 72 hours.     Coags:   Lab Results   Component Value Date    PROTIME 11.5 11/18/2020    INR 0.99 11/18/2020    APTT 31.9 01/16/2019       HCG (If Applicable): No results found for: PREGTESTUR, PREGSERUM, HCG, HCGQUANT     ABGs: No results found for: PHART, PO2ART, WBM9PMZ, VAO7QEZ, BEART, T7JWDVDR     Type & Screen (If Applicable):  No results found for: LABABO, LABRH    Drug/Infectious Status (If Applicable):  No results found for: HIV, HEPCAB    COVID-19 Screening (If Applicable):   Lab Results   Component Value Date    COVID19 Not Detected 11/24/2020         Anesthesia Evaluation  Patient summary reviewed no history of anesthetic complications:   Airway: Mallampati: II  TM distance: >3 FB     Mouth opening: > = 3 FB Dental:    (+) edentulous      Pulmonary: breath sounds clear to auscultation  (+) COPD: shortness of breath:  current smoker          Patient smoked on day of surgery. Cardiovascular:        (-) hypertension, valvular problems/murmurs, past MI, CAD, CABG/stent, dysrhythmias,  angina,  CHF and murmur    ECG reviewed  Rhythm: regular  Rate: normal                    Neuro/Psych:   (+) neuromuscular disease:, psychiatric history:depression/anxiety    (-) seizures, TIA and CVA           GI/Hepatic/Renal:        (-) GERD, PUD, hepatitis, liver disease, no renal disease and bowel prep       Endo/Other:    (+) : arthritis:., .    (-) diabetes mellitus, hypothyroidism, hyperthyroidism, blood dyscrasia               Abdominal:           Vascular:                                      Anesthesia Plan      general     ASA 3       Induction: intravenous. MIPS: Postoperative opioids intended and Prophylactic antiemetics administered. Anesthetic plan and risks discussed with patient and spouse. Plan discussed with CRNA. This pre-anesthesia assessment may be used as a history and physical.    DOS STAFF ADDENDUM:    Pt seen and examined, chart reviewed (including anesthesia, drug and allergy history). No interval changes to history and physical examination. Anesthetic plan, risks, benefits, alternatives, and personnel involved discussed with patient. Patient verbalized an understanding and agrees to proceed.       Evelyn Castillo MD  November 30, 2020  6:48 AM      Evelyn Castillo MD   11/30/2020

## 2020-11-30 NOTE — PROGRESS NOTES
Pt moaning at intervals, doesn't open eyes or follow commands at present. Safe environment maintained.

## 2020-11-30 NOTE — PROGRESS NOTES
Physical Therapy    Facility/Department: [address] [facility]ED[facility]  Initial Assessment  This note serves as patient discharge summary if pt discharges prior to next PT visit    NAME: Randy Garcia  : 1970  MRN: [id number]    Date of Service: 2020  Discharge Recommendations:  Randy Garcia scored a 16/24 on the AM-PAC short mobility form. Current research shows that an AM-PAC score of 18 or greater is typically associated with a discharge to the patient's home setting. Based on the patient's AM-PAC score and their current functional mobility deficits, it is recommended that the patient have 2-3 sessions per week of Physical Therapy at d/c to increase the patient's independence. At this time, this patient demonstrates the endurance and safety to discharge home with home services and a follow up treatment frequency of 2-3x/wk. Please see assessment section for further patient specific details. 24 hour supervision or assist, Home with Home health PT, 2-3 sessions per week   PT Equipment Recommendations  Equipment Needed: No(Will need to discuss bathroom equipment with OT)    Assessment   Assessment: Pt is a 48 y.o. male who underwent L BETO in  and R BETO 2020, both by Dr. Michael Tovar. PTA, pt living at home with spouse, ramped entry and flight of stairs to get upstairs to regular bathroom/bedroom, but able to live on main floor for the time being and plans to sleep on the couch. Has RW from previous L BETO, discussed potential need for raised toilet seat. Current status 2020: bed mobility CGA increased time and effortful d/t R hip pain, transfers CGA/min A with RW, ambulation in room with AD CGA/min A, did not assess stairs at this time. Pt demonstrated understanding of hip precautions, stating he knew what to expect from his previous hip surgery.  At this time, anticipate pt would be appropriate to d/c home upon medical approval with initial 24 hr supervision/assist and would benefit from continued skilled therapy intervention 2-3x per week in order to improve functional mobility and to maximize safety/independence. No DME needs noted at this time. Treatment Diagnosis: Impaired functional mobility  Specific instructions for Next Treatment: Progress ambulation as able, review HEP, can trial steps if pt prefers  Prognosis: Good  Decision Making: Low Complexity  History: As noted  Exam: As noted  Clinical Presentation: Stable  PT Education: Goals;PT Role;Plan of Care;Precautions;Transfer Training  Barriers to Learning: Increased pain  REQUIRES PT FOLLOW UP: Yes  Activity Tolerance  Activity Tolerance: Patient limited by pain  Activity Tolerance: Pt willing to ambulate from EOB to chair, motivated to get moving again. However, reports significant R hip/back pain causing him to take multiple standing rest breaks during ambulation. Patient Diagnosis(es): The encounter diagnosis was Osteoarthritis of right hip, unspecified osteoarthritis type. has a past medical history of Allergic sinusitis, Anxiety and depression, COPD (chronic obstructive pulmonary disease) (HCC), Hip pain, Insomnia, Localized edema, Osteoporosis, Spontaneous pneumothorax, Testicular torsion, Wears dentures, and Wears glasses. has a past surgical history that includes Testicle torsion reduction; Total hip arthroplasty (Left, 1/29/2019); back surgery (12/26/2016); and Total hip arthroplasty (Right, 11/30/2020).     Restrictions  Restrictions/Precautions  Restrictions/Precautions: Weight Bearing, Fall Risk  Lower Extremity Weight Bearing Restrictions  Right Lower Extremity Weight Bearing: Weight Bearing As Tolerated  Position Activity Restriction  Hip Precautions: No hip flexion > 90 degrees, No ADduction, No hip internal rotation  Other position/activity restrictions: Posterior hip precautions, IV R arm  Subjective  General  Chart Reviewed: Yes  Patient assessed for rehabilitation services?: Yes  Additional Pertinent Hx: Pt is a 48 y.o. male who underwent L BETO in 2019 and R BETO 11/30/2020, both by Dr. Pérez Cuevas. Response To Previous Treatment: Not applicable  Family / Caregiver Present: Yes  Referring Practitioner: Pérez Cuevas MD  Referral Date : 11/30/20  Follows Commands: Within Functional Limits  Subjective  Subjective: Pt supine in bed, awake, alert, and agreeable to therapy upon PT arrival. Pt reports R hip pain and R low back pain. Pt states his body is teling him to \"get up and walk\". Pt ambulated EOB to chair with wh walker and was left in chair with all immediate needs met. Pain Screening  Patient Currently in Pain: Yes  Orientation  Orientation  Overall Orientation Status: Within Functional Limits  Social/Functional History  Social/Functional History  Lives With: Spouse  Type of Home: House  Home Layout: Multi-level, Able to Live on Main level with bedroom/bathroom  Home Access: Ramped entrance  Bathroom Toilet: Standard(Discussed potential need for raised toilet seat)  Home Equipment: Rolling walker  Objective  AROM RLE (degrees)  RLE AROM: WFL  RLE General AROM: Observed to be appropriate for transfers/ambulation  AROM LLE (degrees)  LLE AROM : WFL  LLE General AROM: Observed to be appropriate for transfers/ambulation  Strength RLE  Strength RLE: WFL  Comment: Observed via functional movement  Strength LLE  Strength LLE: WFL  Comment: Observed via functional movement  Sensation  Overall Sensation Status: Impaired(States \"tingling in toes, numbness in R anterior thigh)  Bed mobility  Supine to Sit: Contact guard assistance(Assist primarily for RLE management off bed)  Sit to Supine: Unable to assess  Scooting: Contact guard assistance  Comment: HOB elevated, increased time to complete task, effortful d/t R hip and back pain. Pt able to lift buttocks off bed entirely to scoot toward EOB on heels.   Transfers  Sit to Stand: Minimal Assistance(from EOB)  Stand to sit: Contact guard assistance(to chair)  Bed to Chair: Contact guard assistance;Minimal assistance(with RW)  Comment: Verbal cues provided to pt to ensure posterior hip precautions, using stable surface over walker when transferring. Ambulation  Ambulation?: Yes  WB Status: RLE WBAT  More Ambulation?: No  Ambulation 1  Surface: level tile  Device: Rolling Walker  Assistance: Contact guard assistance;Minimal assistance  Quality of Gait: Antalgic, step to, decreased step length and height, decreased gait speed  Distance: ~20' EOB to chair  Comments: Pt with forward flexed posture and multiple standing rest breaks d/t pain in R hip/back and R anterior thigh numbness. Balance  Posture: Fair  Sitting - Static: Good  Sitting - Dynamic: Good  Standing - Static: Good;-(@ RW)  Standing - Dynamic: Good;-(@ RW)  Comments: Pt able to maintain midline sitting EOB and able to maintain midline in frame of RW without LOB. Plan   Plan  Times per week: 1-4 visits as needed  Specific instructions for Next Treatment: Progress ambulation as able, review HEP, can trial steps if pt prefers  Safety Devices  Type of devices: Call light within reach, Chair alarm in place, Gait belt, Left in chair  Restraints  Initially in place: No  AM-PAC Score  AM-PAC Inpatient Mobility Raw Score : 16 (11/30/20 1530)  AM-PAC Inpatient T-Scale Score : 40.78 (11/30/20 1530)  Mobility Inpatient CMS 0-100% Score: 54.16 (11/30/20 1530)  Mobility Inpatient CMS G-Code Modifier : CK (11/30/20 1530)  Goals  Short term goals  Time Frame for Short term goals: By acute d/c  Short term goal 1: Bed mobility SBA  Short term goal 2: Transfers SBA  Short term goal 3: Ambulation with AD household distances CGA  Short term goal 4: Able to tolerate at least 10 reps of all HEP exercises  Patient Goals   Patient goals :  To get up and get moving again    Therapy Time   Individual Concurrent Group Co-treatment   Time In 1450         Time Out 1530         Minutes 40           Anant Seth, student physical therapist

## 2020-11-30 NOTE — CARE COORDINATION
11/30  CM spoke with patient and confirmed JET Class plan to return home with [facility] and the support of spouse Babs. Sw made referral to Jessica Palomino with [facility] .   Electronically signed by Kacie Rowland on 11/30/2020 at 3:32 PM

## 2020-11-30 NOTE — ANESTHESIA POSTPROCEDURE EVALUATION
Department of Anesthesiology  Postprocedure Note    Patient: Cherie Camargo  MRN: [id number]  YOB: 1970  Date of evaluation: 11/30/2020  Time:  11:26 AM     Procedure Summary     Date:  11/30/20 Room / Location:  [address] [place] [address]    Anesthesia Start:  1038 Anesthesia Stop:  7633    Procedure:  TOTAL RIGHT HIP REPLACEMENT (Right Hip) Diagnosis:       Osteoarthritis of right hip, unspecified osteoarthritis type      (OSTEOARTHRITIS RIGHT HIP)    Surgeon:  Janelle Salgado MD Responsible Provider:  Sheila Aceves MD    Anesthesia Type:  general ASA Status:  3          Anesthesia Type: general    Bro Phase I: Bro Score: 8    Bro Phase II:      Last vitals: Reviewed and per EMR flowsheets.        Anesthesia Post Evaluation    Patient location during evaluation: PACU  Patient participation: complete - patient participated  Level of consciousness: awake and alert  Airway patency: patent  Nausea & Vomiting: no nausea and no vomiting  Complications: no  Cardiovascular status: hemodynamically stable  Respiratory status: acceptable  Hydration status: stable

## 2020-11-30 NOTE — PROGRESS NOTES
4 Eyes Skin Assessment     NAME:  Clint Cardenas  YOB: 1970  MEDICAL RECORD NUMBER:  [id number]    The patient is being assess for  Post-Op Surgical    I agree that 2 RN's have performed a thorough Head to Toe Skin Assessment on the patient. ALL assessment sites listed below have been assessed. Areas assessed by both nurses: Yusuf Butts and [facility], Face, Ears, Shoulders, Back, Chest, Arms, Elbows, Hands, Sacrum. Buttock, Coccyx, Ischium and Legs. Feet and Heels        Does the Patient have a Wound?  No noted wound(s)       Doc Prevention initiated:  No   Wound Care Orders initiated:  No    Pressure Injury (Stage 3,4, Unstageable, DTI, NWPT, and Complex wounds) if present place consult order under [de-identified] No    New and Established Ostomies if present place consult order under : No      Nurse 1 eSignature: Electronically signed by Marcellus Gonzalez RN on 11/30/20 at 11:39 AM EST    **SHARE this note so that the co-signing nurse is able to place an eSignature**    Nurse 2 eSignature: Electronically signed by Chase Perez RN on 11/30/20 at 7:12 PM EST

## 2020-11-30 NOTE — OP NOTE
Operative Note      Patient: Nehemiah Haque  YOB: 1970  MRN: [id number]    Date of Procedure: 11/30/2020    Pre-Op Diagnosis: OSTEOARTHRITIS RIGHT HIP    Post-Op Diagnosis: Same       Procedure(s):  TOTAL RIGHT HIP REPLACEMENT    Surgeon(s):  Rachel Oconnell MD    Assistant:   * No surgical staff found *    Anesthesia: General    Estimated Blood Loss (mL): 316     Complications: None    Specimens:   * No specimens in log *    Implants:  * No implants in log *      Drains: * No LDAs found *    Findings: moderate to severe OA right hip    Detailed Description of Procedure:      PATIENT NAME:         Nehemiah Haque  YOB: 1970   MEDICAL RECORD#         [id number]  SURGERY DATE:         11/30/2020  SURGEON:                 Rachel Oconnell          PREOPERATIVE DIAGNOSIS: Severe end-stage osteoarthritis Right Hip. POSTOPERATIVE DIAGNOSIS: Severe end-stage osteoarthritis Right hip. PROCEDURE: Right total hip arthroplasty. SURGEON: Dr. Alexandro Orta: General anesthesia. IV FLUIDS: Crystalloid. ESTIMATED BLOOD LOSS: 300ml . COMPLICATIONS: None. The patient tolerated the procedure quite well. COMPONENTS: A Depuy Actis size 6 standard body standard offset stem, a size 52 Cisco Gription metal cup, a size 36 +1.5 femoral head, and a standard highly crosslinked + 4 neutral polyethylene liner with vitamin E.     INDICATIONS:  The patient is a 48 y.o. male with a long-standing   history of right hip pain. X-rays confirmed severe osteoarthritis. Despite conservative measures, the patient had severe persistent pain. Due to this   fact, the patient was ultimately cleared and scheduled for total hip arthroplasty. REPORT: The patient was identified preoperatively. The patient was then   taken to the operating room, and general anesthesia was administered by   Anesthesia.  Appropriate preoperative antibiotics were administered per SCIP measures. The patient was   then positioned in the lateral decubitus position on the pegboard. All bony   prominences were padded. We then ChloraPrepped the hip, pelvis, and   lower extremity in standard fashion. We then draped out in standard fashion. Our incision was then marked accordingly. We used a standard posterolateral   skin incision. We then sealed off the skin with an Ioban drape. We then were   ready for skin incision. Using a standard posterolateral skin incision, we   incised skin and coagulated all bleeders with Bovie electrocautery. We then   dissected down and identified the gluteofemoral fascia. This was split   longitudinally in line with the muscle fibers. We then used our Charnley   retractor and retracted both anteriorly and posteriorly. The sciatic nerve   was identified and protected at all times. We then retracted the abductors   anteriorly. We took down the piriformis. This was tagged and released. We   then took down the remainder of the short external rotators. The capsule was   then Td. We then expressed the hip out of the wound and dislocated the hip. This worked out extremely well. We then were ready for our femoral cuts. Using our neck cutting guide, we then went ahead and made a neck cut   approximately 1 cm proximal to the lesser trochanter based on templating. This worked out extremely well. We then used our boxed osteotome to remove the bone   laterally. We then used our trochanteric router and reamed the bone   laterally. We then sequentially broached up to 6 mm. Appropriate anteversion was maintained throughout the broaching process. This gave us a nice fit and fill of the canal. We then directed our attention towards the acetabulum. We removed the labrum   in its entirety. We then split the capsule inferiorly. We   then retracted both anteriorly and posteriorly. We then were ready for   reaming. We first gently medialized with a size 46 reamer.  We then went   ahead and reamed sequentially up to 51 mm, maintaining approximately   45 degrees of abduction and approximately 30 degrees of anteversion. This   worked out extremely well. We were satisfied with the preparation of the   acetabulum. We were satisfied with the nice bleeding bed of bone with good   bone. We irrigated rather copiously. We then were ready for insertion of the actual Continuum cup. We maintained appropriate anteversion and abduction. The cup was seated without difficulty. We then inserted a trial   Liner. We then went ahead and inserted our trial stem. The trial head was attached and the hip was reduced. The hip was extremely stable and leg lengths were symmetric with the 36 +1.5 head. The hip was extremely stable both anteriorly and posteriorly. We were able to flex the hip up to 90 degrees and internally rotate to approximately 80 degrees. The hip was extremely stable both anteriorly and posteriorly. We then went ahead and removed all trial components. We removed the trial liner. We then went ahead and seated our ultrahigh molecular weight polyethylene liner with vitamin E. This worked out extremely well. We irrigated the   femoral canal rather copiously. We then went ahead and inserted our actual stem. We then went ahead and attached our 36 +1.5 ceramic head. This worked out extremely well. We reduced the hip. Again, the hip was stable. We irrigated all layers rather copiously. We then irrigated all layers with the Irrisept solution. We closed the capsule with interrupted figure-of-eight #1 Vicryl stitches. We   then reattached the piriformis with interrupted vertical mattress #1 Vicryl   stitches. We then closed the gluteofemoral fascia with interrupted   figure-of-eight #1 Vicryl stitches. We then closed the subcutaneous tissues   with running strata fix. We then closed the skin with the Prineo. Sterile dressings were applied. The patient was put in an abductor pillow. There were no complications. Electronically signed by Deven Burgos MD on 11/30/2020 at 7:05 AM

## 2020-11-30 NOTE — PROGRESS NOTES
[facility] Orthopedic Surgery   Progress Note      S/P :  SUBJECTIVE  Pt very restless in bed turning side to side with complaint of right hip and low back pain. States back pain is not new. Pain is   described in low back as severe and right hip as moderate  Pain is described as aching, shooting. Family at bedside. Note pt does take Norco at home for pain prior to this surgery      OBJECTIVE              Physical                      VITALS:  /79   Pulse 78   Temp 97.3 °F (36.3 °C) (Oral)   Resp 16   Ht 5' 6\" (1.676 m)   Wt 136 lb (61.7 kg)   SpO2 97%   BMI 21.95 kg/m²                     MUSCULOSKELETAL:  right foot NVI. Wiggles toes to command. Pedal pulses are palpable. NEUROLOGIC:                                  Sensory:  Touch:  Right Lower Extremity:  normal                                                 Surgical wound appears clean and dry right hip. Ice pack on.      Data       CBC:   Lab Results   Component Value Date    WBC 9.0 11/18/2020    RBC 4.82 11/18/2020    HGB 15.2 11/18/2020    HCT 44.2 11/18/2020    MCV 91.6 11/18/2020    MCH 31.5 11/18/2020    MCHC 34.4 11/18/2020    RDW 14.0 11/18/2020     11/18/2020    MPV 7.6 11/18/2020        WBC:    Lab Results   Component Value Date    WBC 9.0 11/18/2020        Hemoglobin/Hematocrit:    Lab Results   Component Value Date    HGB 15.2 11/18/2020    HCT 44.2 11/18/2020        PT/INR:    Lab Results   Component Value Date    PROTIME 11.5 11/18/2020    INR 0.99 11/18/2020              Current Inpatient Medications             Current Facility-Administered Medications: albuterol (PROVENTIL) nebulizer solution 2.5 mg, 2.5 mg, Nebulization, Q6H PRN  amitriptyline (ELAVIL) tablet 25 mg, 25 mg, Oral, Nightly PRN  budesonide-formoterol (SYMBICORT) 80-4.5 MCG/ACT inhaler 2 puff, 2 puff, Inhalation, BID  cetirizine (ZYRTEC) tablet 5 mg, 5 mg, Oral, Daily  montelukast (SINGULAIR) tablet 10 mg, 10 mg, Oral, Nightly  tiZANidine (ZANAFLEX) tablet 4 mg, 4 mg, Oral, Q8H PRN  0.9 % sodium chloride infusion, , Intravenous, Continuous  sodium chloride flush 0.9 % injection 10 mL, 10 mL, Intravenous, 2 times per day  sodium chloride flush 0.9 % injection 10 mL, 10 mL, Intravenous, PRN  acetaminophen (TYLENOL) tablet 650 mg, 650 mg, Oral, Q6H  HYDROmorphone (DILAUDID) injection 0.25 mg, 0.25 mg, Intravenous, Q3H PRN **OR** HYDROmorphone (DILAUDID) injection 0.5 mg, 0.5 mg, Intravenous, Q3H PRN  sennosides-docusate sodium (SENOKOT-S) 8.6-50 MG tablet 1 tablet, 1 tablet, Oral, BID  magnesium hydroxide (MILK OF MAGNESIA) 400 MG/5ML suspension 30 mL, 30 mL, Oral, Daily PRN  insulin lispro (HUMALOG) injection vial 0-6 Units, 0-6 Units, Subcutaneous, TID WC  insulin lispro (HUMALOG) injection vial 0-3 Units, 0-3 Units, Subcutaneous, Nightly  glucose (GLUTOSE) 40 % oral gel 15 g, 15 g, Oral, PRN  dextrose 50 % IV solution, 12.5 g, Intravenous, PRN  glucagon (rDNA) injection 1 mg, 1 mg, Intramuscular, PRN  dextrose 5 % solution, 100 mL/hr, Intravenous, PRN  ceFAZolin (ANCEF) 2 g in dextrose 5 % 100 mL IVPB, 2 g, Intravenous, Q8H  promethazine (PHENERGAN) tablet 12.5 mg, 12.5 mg, Oral, Q6H PRN **OR** ondansetron (ZOFRAN) injection 4 mg, 4 mg, Intravenous, Q6H PRN  [START ON 12/1/2020] enoxaparin (LOVENOX) injection 40 mg, 40 mg, Subcutaneous, Daily  HYDROcodone-acetaminophen (NORCO) 7.5-325 MG per tablet 1 tablet, 1 tablet, Oral, Q6H PRN  HYDROcodone-acetaminophen (NORCO) 7.5-325 MG per tablet 2 tablet, 2 tablet, Oral, Q6H PRN  LORazepam (ATIVAN) injection 1 mg, 1 mg, Intravenous, Q8H PRN    ASSESSMENT AND PLAN      Post right BETO, stable exam  Chronic pain, low back and right hip. Severe at present and very restless.  Added Ativan prn  DVT prophylaxis ordered, Lovenox as inpt then switch to ASA 81mg twice at day for 30 days for DVT prophylaxis  PT OT for ADL's and ambulation as tolerated, Posterior hip precautions  SS for DC planning, home with home care tomorrow  IV or PO pain med as ordered    Vaishnavi Herr  11/30/2020  11:53 AM

## 2020-11-30 NOTE — PROGRESS NOTES
Patient arrived to the unit from PACU with transport. Patient is resting in bed, awake and quiet. Patient is on 2L of O2 NC. Side rails are up x3. Fall precautions are in place. Bed alarm on. Bed is in lowest position. Call light, telephone and bedside table are within reach. VSS taken. AM meds given. Shift assessment completed. Dressing to right hip is clean, dry and intact. Will continue to monitor patient per unit protocols.  Electronically signed by Gilberto Freeman RN on 11/30/2020 at 4:31 PM

## 2020-11-30 NOTE — H&P
The patient was interviewed and examined and there have been no changes since the documented History and Physical.  The patient was counseled at length about the risks of afshan Covid-19 during their perioperative period and any recovery window from their procedure. The patient was made aware that afshan Covid-19  may worsen their prognosis for recovering from their procedure  and lend to a higher morbidity and/or mortality risk. All material risks, benefits, and reasonable alternatives including postponing the procedure were discussed. The patient does wish to proceed with the procedure at this time.       Electronically signed by Mendel Ruddle, MD on 11/30/2020 at 7:02 AM

## 2020-11-30 NOTE — PROGRESS NOTES
Patient is resting in bed, awake and quiet. Patient is on 2L of O2 NC. Side rails are up x3. Fall precautions are in place. Bed alarm on. Bed is in lowest position. Call light, telephone and bedside table are within reach. Needs met. Will continue to monitor patient per unit protocols.  Electronically signed by Violet Ricci RN on 11/30/2020 at 5:37 PM

## 2020-12-01 ENCOUNTER — TELEPHONE (OUTPATIENT)
Dept: INTERNAL MEDICINE CLINIC | Age: 50
End: 2020-12-01

## 2020-12-01 VITALS
DIASTOLIC BLOOD PRESSURE: 75 MMHG | OXYGEN SATURATION: 96 % | HEIGHT: 66 IN | WEIGHT: 133.6 LBS | TEMPERATURE: 98.2 F | HEART RATE: 82 BPM | RESPIRATION RATE: 16 BRPM | SYSTOLIC BLOOD PRESSURE: 121 MMHG | BODY MASS INDEX: 21.47 KG/M2

## 2020-12-01 LAB
ESTIMATED AVERAGE GLUCOSE: 108.3 MG/DL
GLUCOSE BLD-MCNC: 122 MG/DL (ref 70–99)
HBA1C MFR BLD: 5.4 %
HCT VFR BLD CALC: 36.6 % (ref 40.5–52.5)
HEMOGLOBIN: 12.5 G/DL (ref 13.5–17.5)
PERFORMED ON: ABNORMAL

## 2020-12-01 PROCEDURE — 97530 THERAPEUTIC ACTIVITIES: CPT

## 2020-12-01 PROCEDURE — 85018 HEMOGLOBIN: CPT

## 2020-12-01 PROCEDURE — 97166 OT EVAL MOD COMPLEX 45 MIN: CPT

## 2020-12-01 PROCEDURE — 36415 COLL VENOUS BLD VENIPUNCTURE: CPT

## 2020-12-01 PROCEDURE — 94760 N-INVAS EAR/PLS OXIMETRY 1: CPT

## 2020-12-01 PROCEDURE — 6370000000 HC RX 637 (ALT 250 FOR IP): Performed by: ORTHOPAEDIC SURGERY

## 2020-12-01 PROCEDURE — 6370000000 HC RX 637 (ALT 250 FOR IP): Performed by: NURSE PRACTITIONER

## 2020-12-01 PROCEDURE — 83036 HEMOGLOBIN GLYCOSYLATED A1C: CPT

## 2020-12-01 PROCEDURE — 85014 HEMATOCRIT: CPT

## 2020-12-01 PROCEDURE — 97116 GAIT TRAINING THERAPY: CPT

## 2020-12-01 PROCEDURE — 6360000002 HC RX W HCPCS: Performed by: ORTHOPAEDIC SURGERY

## 2020-12-01 PROCEDURE — 2580000003 HC RX 258: Performed by: ORTHOPAEDIC SURGERY

## 2020-12-01 PROCEDURE — 97110 THERAPEUTIC EXERCISES: CPT

## 2020-12-01 RX ORDER — OXYCODONE HYDROCHLORIDE 10 MG/1
10 TABLET ORAL EVERY 4 HOURS PRN
Status: DISCONTINUED | OUTPATIENT
Start: 2020-12-01 | End: 2020-12-01

## 2020-12-01 RX ORDER — HYDROCODONE BITARTRATE AND ACETAMINOPHEN 7.5; 325 MG/1; MG/1
1 TABLET ORAL EVERY 6 HOURS PRN
Status: DISCONTINUED | OUTPATIENT
Start: 2020-12-01 | End: 2020-12-01 | Stop reason: HOSPADM

## 2020-12-01 RX ORDER — HYDROCODONE BITARTRATE AND ACETAMINOPHEN 7.5; 325 MG/1; MG/1
2 TABLET ORAL EVERY 6 HOURS PRN
Status: DISCONTINUED | OUTPATIENT
Start: 2020-12-01 | End: 2020-12-01 | Stop reason: HOSPADM

## 2020-12-01 RX ORDER — MONTELUKAST SODIUM 10 MG/1
10 TABLET ORAL NIGHTLY
Qty: 30 TABLET | Refills: 2 | Status: SHIPPED | OUTPATIENT
Start: 2020-12-01 | End: 2021-02-16 | Stop reason: SDUPTHER

## 2020-12-01 RX ORDER — OXYCODONE HYDROCHLORIDE 5 MG/1
5 TABLET ORAL EVERY 4 HOURS PRN
Status: DISCONTINUED | OUTPATIENT
Start: 2020-12-01 | End: 2020-12-01

## 2020-12-01 RX ADMIN — Medication 10 ML: at 08:29

## 2020-12-01 RX ADMIN — OXYCODONE HYDROCHLORIDE 10 MG: 10 TABLET ORAL at 07:21

## 2020-12-01 RX ADMIN — CETIRIZINE HYDROCHLORIDE 5 MG: 10 TABLET, FILM COATED ORAL at 08:28

## 2020-12-01 RX ADMIN — DOCUSATE SODIUM 50 MG AND SENNOSIDES 8.6 MG 1 TABLET: 8.6; 5 TABLET, FILM COATED ORAL at 08:28

## 2020-12-01 RX ADMIN — HYDROMORPHONE HYDROCHLORIDE 0.5 MG: 1 INJECTION, SOLUTION INTRAMUSCULAR; INTRAVENOUS; SUBCUTANEOUS at 02:24

## 2020-12-01 RX ADMIN — ACETAMINOPHEN 650 MG: 325 TABLET ORAL at 05:18

## 2020-12-01 RX ADMIN — HYDROMORPHONE HYDROCHLORIDE 0.5 MG: 1 INJECTION, SOLUTION INTRAMUSCULAR; INTRAVENOUS; SUBCUTANEOUS at 05:18

## 2020-12-01 RX ADMIN — HYDROCODONE BITARTRATE AND ACETAMINOPHEN 2 TABLET: 7.5; 325 TABLET ORAL at 10:07

## 2020-12-01 RX ADMIN — TIZANIDINE 4 MG: 4 TABLET ORAL at 05:18

## 2020-12-01 RX ADMIN — ENOXAPARIN SODIUM 40 MG: 40 INJECTION SUBCUTANEOUS at 08:28

## 2020-12-01 ASSESSMENT — PAIN SCALES - GENERAL
PAINLEVEL_OUTOF10: 7
PAINLEVEL_OUTOF10: 0
PAINLEVEL_OUTOF10: 7
PAINLEVEL_OUTOF10: 6
PAINLEVEL_OUTOF10: 5
PAINLEVEL_OUTOF10: 0
PAINLEVEL_OUTOF10: 7
PAINLEVEL_OUTOF10: 8

## 2020-12-01 ASSESSMENT — PAIN DESCRIPTION - DESCRIPTORS
DESCRIPTORS: SHARP
DESCRIPTORS: THROBBING
DESCRIPTORS: SHARP
DESCRIPTORS: THROBBING

## 2020-12-01 ASSESSMENT — PAIN DESCRIPTION - PROGRESSION
CLINICAL_PROGRESSION: GRADUALLY IMPROVING
CLINICAL_PROGRESSION: GRADUALLY WORSENING

## 2020-12-01 ASSESSMENT — PAIN DESCRIPTION - ORIENTATION
ORIENTATION: RIGHT

## 2020-12-01 ASSESSMENT — PAIN DESCRIPTION - PAIN TYPE
TYPE: SURGICAL PAIN

## 2020-12-01 ASSESSMENT — PAIN DESCRIPTION - LOCATION
LOCATION: HIP

## 2020-12-01 ASSESSMENT — PAIN DESCRIPTION - ONSET
ONSET: ON-GOING

## 2020-12-01 ASSESSMENT — PAIN DESCRIPTION - FREQUENCY
FREQUENCY: CONTINUOUS

## 2020-12-01 ASSESSMENT — PAIN - FUNCTIONAL ASSESSMENT
PAIN_FUNCTIONAL_ASSESSMENT: PREVENTS OR INTERFERES SOME ACTIVE ACTIVITIES AND ADLS
PAIN_FUNCTIONAL_ASSESSMENT: PREVENTS OR INTERFERES SOME ACTIVE ACTIVITIES AND ADLS

## 2020-12-01 NOTE — PLAN OF CARE
Problem: Falls - Risk of:  Goal: Will remain free from falls  Description: Will remain free from falls  11/30/2020 1955 by Rosangela Townsend RN  Outcome: Ongoing  Note: Fall risk assessment completed. Fall precautions in place. Call light within reach. Pt educated on calling for assistance before getting up. Walkway free of clutter. Will continue to monitor. 11/30/2020 1258 by Theodore Ruvalcaba RN  Outcome: Ongoing  Goal: Absence of physical injury  Description: Absence of physical injury  11/30/2020 1955 by Rosangela Townsend RN  Outcome: Ongoing  11/30/2020 1258 by Theodore Ruvalcaba RN  Outcome: Ongoing     Problem: Tobacco Use:  Goal: Inpatient tobacco use cessation counseling participation  Description: Inpatient tobacco use cessation counseling participation  11/30/2020 1955 by Rosangela Townsend RN  Outcome: Ongoing  11/30/2020 1258 by Theodore Ruvalcaba RN  Outcome: Ongoing     Problem: Gas Exchange - Impaired:  Goal: Levels of oxygenation will improve  Description: Levels of oxygenation will improve  11/30/2020 1955 by Rosangela Townsend RN  Outcome: Ongoing  Note: Will monitor oxygenation levels. 11/30/2020 1258 by Theodore Ruvalcaba RN  Outcome: Ongoing     Problem: Discharge Planning:  Goal: Knowledge of discharge instructions  Description: Knowledge of discharge instructions  11/30/2020 1955 by Rosangela Townsend RN  Outcome: Ongoing  11/30/2020 1258 by Theodore Ruvalcaba RN  Outcome: Ongoing     Problem: Infection - Surgical Site:  Goal: Signs of wound healing will improve  Description: Signs of wound healing will improve  11/30/2020 1955 by Rosangela Townsend RN  Outcome: Ongoing  Note: Will monitor incision site.   11/30/2020 1258 by Theodore Ruvalcaba RN  Outcome: Ongoing     Problem: Mobility - Impaired:  Goal: Achieve maximum mobility level  Description: Achieve maximum mobility level  11/30/2020 1955 by Rosangela Townsend RN  Outcome: Ongoing  11/30/2020 1258 by Theodore Ruvalcaba RN  Outcome: Ongoing     Problem: Pain - Acute:  Goal: Pain level will decrease  Description: Pain level will decrease  11/30/2020 1955 by Franck Reese RN  Outcome: Ongoing  Note: Pt assessed for pain. Pt in pain and assessed with 0-10 pain rating scale. Pt given prescribed analgesic for pain. (See eMar) Pt satisfied with pain relief thus far. Will reassess and continue to monitor. 11/30/2020 1258 by Olesya Ramirez RN  Outcome: Ongoing     Problem: Skin Integrity:  Goal: Will show no infection signs and symptoms  Description: Will show no infection signs and symptoms  11/30/2020 1955 by Franck Reese RN  Outcome: Ongoing  Note: Will monitor for infection. 11/30/2020 1258 by Olesya Ramirez RN  Outcome: Ongoing  Goal: Absence of new skin breakdown  Description: Absence of new skin breakdown  11/30/2020 1955 by Franck Reese RN  Outcome: Ongoing  11/30/2020 1258 by Olesya Ramirez RN  Outcome: Ongoing     Problem: Pain:  Goal: Pain level will decrease  Description: Pain level will decrease  11/30/2020 1955 by Franck Reese RN  Outcome: Ongoing  Note: Pt assessed for pain. Pt in pain and assessed with 0-10 pain rating scale. Pt given prescribed analgesic for pain. (See eMar) Pt satisfied with pain relief thus far. Will reassess and continue to monitor.      11/30/2020 1258 by Olesya Ramirez RN  Outcome: Ongoing  Goal: Control of acute pain  Description: Control of acute pain  11/30/2020 1955 by Franck Reese RN  Outcome: Ongoing  11/30/2020 1258 by Olesya Ramirez RN  Outcome: Ongoing  Goal: Control of chronic pain  Description: Control of chronic pain  11/30/2020 1955 by Franck Reese RN  Outcome: Ongoing  11/30/2020 1258 by Olesya Ramirez RN  Outcome: Ongoing

## 2020-12-01 NOTE — TELEPHONE ENCOUNTER
----- Message from Emerald Ocasio sent at 11/27/2020  1:41 PM EST -----  Subject: Refill Request    QUESTIONS  Name of Medication? montelukast (SINGULAIR) 10 MG tablet  Patient-reported dosage and instructions? 1xaday   How many days do you have left? 0  Preferred Pharmacy? [facility] [address] [phone]  Pharmacy phone number (if available)? [phone]  Additional Information for Provider? patient needs this medication filled   before this Monday   ---------------------------------------------------------------------------  --------------  CALL BACK INFO  What is the best way for the office to contact you? OK to leave message on   voicemail  Preferred Call Back Phone Number?  [phone]

## 2020-12-01 NOTE — PLAN OF CARE
Problem: Falls - Risk of:  Goal: Will remain free from falls  Description: Will remain free from falls  12/1/2020 0953 by Daysi Lema RN  Outcome: Completed  11/30/2020 1955 by Lan Moore RN  Outcome: Ongoing  Note: Fall risk assessment completed. Fall precautions in place. Call light within reach. Pt educated on calling for assistance before getting up. Walkway free of clutter. Will continue to monitor. Goal: Absence of physical injury  Description: Absence of physical injury  12/1/2020 0953 by Daysi Lema RN  Outcome: Completed  Educated on tobacco cessation. 11/30/2020 1955 by Lan Moore RN  Outcome: Ongoing     Problem: Tobacco Use:  Goal: Inpatient tobacco use cessation counseling participation  Description: Inpatient tobacco use cessation counseling participation  12/1/2020 [phone] by Daysi Lema RN  Outcome: Completed  11/30/2020 1955 by Lan Moore RN  Outcome: Ongoing     Problem: Gas Exchange - Impaired:  Goal: Levels of oxygenation will improve  Description: Levels of oxygenation will improve  12/1/2020 0953 by Daysi Lema RN  Outcome: Completed  11/30/2020 1955 by Lan Moore RN  Outcome: Ongoing  Note: Will monitor oxygenation levels. Problem: Discharge Planning:  Goal: Knowledge of discharge instructions  Description: Knowledge of discharge instructions  12/1/2020 0953 by Daysi Lema RN  Outcome: Completed  11/30/2020 1955 by Lan Moore RN  Outcome: Ongoing     Problem: Infection - Surgical Site:  Goal: Signs of wound healing will improve  Description: Signs of wound healing will improve  12/1/2020 0953 by Daysi Lema RN  Outcome: Completed  11/30/2020 1955 by Lan Moore RN  Outcome: Ongoing  Note: Will monitor incision site.      Problem: Mobility - Impaired:  Goal: Achieve maximum mobility level  Description: Achieve maximum mobility level  12/1/2020 0953 by Daysi Lema RN  Outcome: Completed  11/30/2020 1955 by Lan Mooer RN  Outcome: Ongoing Problem: Pain - Acute:  Goal: Pain level will decrease  Description: Pain level will decrease  12/1/2020 0953 by Deepika Kumari RN  Outcome: Completed  11/30/2020 1955 by Tammi Berrios RN  Outcome: Ongoing  Note: Pt assessed for pain. Pt in pain and assessed with 0-10 pain rating scale. Pt given prescribed analgesic for pain. (See eMar) Pt satisfied with pain relief thus far. Will reassess and continue to monitor. Problem: Skin Integrity:  Goal: Will show no infection signs and symptoms  Description: Will show no infection signs and symptoms  12/1/2020 0953 by Deepika Kumari RN  Outcome: Completed  11/30/2020 1955 by Tammi Berrios RN  Outcome: Ongoing  Note: Will monitor for infection. Goal: Absence of new skin breakdown  Description: Absence of new skin breakdown  12/1/2020 0953 by Deepika Kumari RN  Outcome: Completed  11/30/2020 1955 by Tammi Berrios RN  Outcome: Ongoing     Problem: Pain:  Goal: Pain level will decrease  Description: Pain level will decrease  12/1/2020 0953 by Deepika Kumari RN  Outcome: Completed  11/30/2020 1955 by Tammi Berrios RN  Outcome: Ongoing  Note: Pt assessed for pain. Pt in pain and assessed with 0-10 pain rating scale. Pt given prescribed analgesic for pain. (See eMar) Pt satisfied with pain relief thus far. Will reassess and continue to monitor.      Goal: Control of acute pain  Description: Control of acute pain  12/1/2020 0953 by Deepika Kumari RN  Outcome: Completed  11/30/2020 1955 by Tammi Berrios RN  Outcome: Ongoing  Goal: Control of chronic pain  Description: Control of chronic pain  12/1/2020 0953 by Deepika Kumari RN  Outcome: Completed  11/30/2020 1955 by Tammi Berrios RN  Outcome: Ongoing   Electronically signed by Deepika Kumari RN on 12/1/2020 at 9:53 AM

## 2020-12-01 NOTE — PROGRESS NOTES
Clinical Pharmacy Note  Medication Counseling    Reviewed new medications started during hospital admission: ASA 81mg BID x 30 days. Indications and side effects were emphasized during counseling. All medication-related questions addressed. Patient verbalized understanding of education. Should the patient express any additional questions or concerns regarding their medications, please do not hesitate to contact the pharmacy department. Patient/caregiver aware they may refuse medications during hospital stay. 5 minutes spent educating patient regarding medications.   Chinmay Chandra, [address] 12/1/2020 10:51 AM

## 2020-12-01 NOTE — CARE COORDINATION
[facility]  Received referral from case management Liliane Sanchez RN   Faxed orders to [address] care to see patient by   12/3/2020  Glennon Dancer LPN    [facility] CTN Cell [phone], Fax [phone]

## 2020-12-01 NOTE — PROGRESS NOTES
Pt discharged to home. Transported via wheelchair. Transported in personal vehicle. Discharge instructions and personal belongings given to pt. Explanation of discharge medications and instructions understood by verbal statement. No questions, comments or concerns at this time.  Electronically signed by Arpit Mendoza RN on 12/1/2020

## 2020-12-01 NOTE — PROGRESS NOTES
Data- discharge order received, patient verbalized agreement to discharge, disposition to previous residence, needs noted for HHC/DME and informed Danell Dakins NP. Action- discharge instructions prepared and given to patient and wife, patient verbalized understanding. Medication information packet given r/t NEW and/or CHANGED prescriptions emphasizing name/purpose/side effects, pt verbalized understanding. Discharge instruction summary: Diet- general, Activity- wbat, Primary Care Physician as follows: Porsche Escobar -[phone]. f/u appointment with orthopedic office noted below, immunizations reviewed and discussed with patient, prescription medications to be filled by retail pharmacy and then delivered. Inpatient surgical procedure precautions reviewed: posterior hip precautions. Neurovascular check performed and patient is WNLs, denies numbness/tingling in extremties. Incision site  prineo glue dressing assessed and is  clean,dry, and intact, no signs of redness, drainage, or odor noted. patient's bedside RN Everette Avelar notified of patient completing discharge instructions and iv removal.    Response- IV removed. Medications to be delivered to patient via meds to bed program. Disposition is home with [facility] (DME to be delivered to patient by Albert Carbone with [facility]), to be transported with family, no complications.      Future Appointments   Date Time Provider Raymond Kramer   12/15/2020 11:00 AM Rudy Cruz MD [facility]   2/17/2021  9:15 AM SCHEDULE, [facility]           Electronically signed by Smita Gordon RN on 12/1/2020 at 9:46 AM

## 2020-12-01 NOTE — PROGRESS NOTES
Provided aspirin rx to patient per his request wants to get filled at [facility] instead.  rx for norco with frequency changes provided to patient to take to his pain management specialist. Electronically signed by Amos Bergeron RN on 12/1/2020 at 11:06 AM

## 2020-12-01 NOTE — PROGRESS NOTES
Patient set up with orthovitals and understands when and how to use. Kit and user guide provided to patient.  Electronically signed by Annie Sweet RN on 12/1/2020 at 10:01 AM

## 2020-12-01 NOTE — PROGRESS NOTES
Huddle performed this morning including Nurse navigator Clement Puentes, Physical therapist mary, and Occupational therapist Kim Martin. Discussed plan of care, discharge plan, and dme needs if applicable for orthopedic total joint patient.   Electronically signed by Mikel Cedeno RN on 12/1/2020 at 8:33 AM

## 2020-12-01 NOTE — PROGRESS NOTES
Patient A&O in the bed. Patient tolerating PO intake well. PM medications given without complications. Patient denies nausea or vomiting. Patient complains of pain, PRN pain medication given - see MAR. Dressing to R hip is clean, dry, intact. Patient refusing abductor pillow - using regular pillow instead. Call light within reach, able to make needs known, fall precautions in place. Will check on patient Q2 hours.

## 2020-12-01 NOTE — PROGRESS NOTES
Patient doing well. Minimal pain s/p right BETO. He did undergo a left BETO last year and did well. X-rays: prosthesis well aligned. No fracture. Lab Results   Component Value Date    HGB 12.5 (L) 12/01/2020   Getting high amounts of acetaminophen. Change pain meds to Oxycodone. FU in 2 weeks.

## 2020-12-01 NOTE — ACP (ADVANCE CARE PLANNING)
Pt takes Norco 7.5mg at home and per [employer] would have about 60 tabs left. Will increase freq of med postop.  No new rx issued

## 2020-12-01 NOTE — PROGRESS NOTES
Occupational Therapy   Occupational Therapy Initial Assessment  Date: 2020   Patient Name: Fiona Villela  MRN: [id number]     : 1970    Date of Service: 2020    Assessment: Pt is 48 y.o. M who presents with arthritis of R hip. Pt is s/p R BETO and is WBAT with posterolateral precautions. PTA pt lives with spouse in one story home with ramp entrance. Pt reports independence in self-care tasks, homemaking responsibilities, and functional mobility. Currently, pt presents with ROM/strength in [facility] for self-care and transfers. Pt completed sit <> stand transfers with SBA and increased time. Pt completed functional mobility with RW around hospital room for several minutes with SBA. Pt dressed upon arrival to room, declined completing self-care tasks. Discussed use of AE for LB dressing, likely need assist for LUIS hose and hip precautions in regards to ADLs. Pt and wife voiced understanding. Anticipate pt safe to d/c home this date with  supervision/assist and home health OT. Discharge Recommendations:  24 hour supervision or assist, Home with Home health OT, S Level 3  OT Equipment Recommendations  Equipment Needed: Yes  Mobility Devices: ADL Assistive Devices  ADL Assistive Devices: Toileting - Raised Toilet Seat with arms; Shower Chair with back  Other: Pt will require use of shower chair and raised toilet seat with rails for safety and to maintain hip precautions during bathing and toileting. Previous DME has been irreparably damaged and will require new for safe transfers and ADLs.       HOME HEALTH CARE: LEVEL 3 SAFETY     - Initial home health evaluation to occur within 24-48 hours, in patient home   - Therapy evaluations in home within 24-48 hours of discharge; including DME and home safety   - Frontload therapy 5 days, then 3x a week   - Therapy to evaluate if patient has [address] needs for personal care   -  evaluation within 24-48 hours, includes evaluation of resources and insurance to determine [place], [place], LTC, and Medicaid options     Assessment   Performance deficits / Impairments: Decreased functional mobility ; Decreased balance;Decreased ADL status; Decreased endurance;Decreased strength  Assessment: Pt is 48 y.o. M who presents with arthritis of R hip. Pt is s/p R BETO and is WBAT with posterolateral precautions. PTA pt lives with spouse in one story home with ramp entrance. Pt reports independence in self-care tasks, homemaking responsibilities, and functional mobility. Currently, pt presents with ROM/strength in [facility] for self-care and transfers. Pt completed sit <> stand transfers with SBA and increased time. Pt completed functional mobility with RW around hospital room for several minutes with SBA. Pt dressed upon arrival to room, declined completing self-care tasks. Discussed use of AE for LB dressing, likely need assist for LUIS hose and hip precautions in regards to ADLs. Pt and wife voiced understanding. Anticipate pt safe to d/c home this date with 24/7 supervision/assist and home health OT. Prognosis: Good  Decision Making: Medium Complexity  History: PMH: L BETO, COPD, anxiety, depression  Exam: ADLs, transfers, func mob, bed mob  Assistance / Modification: SBA for mobility, min A for ADLs  OT Education: OT Role;Transfer Training;Plan of Care;ADL Adaptive Strategies;Precautions  Patient Education: Hip precautions, safety with RW, use of AE  REQUIRES OT FOLLOW UP: Yes  Activity Tolerance  Activity Tolerance: Patient Tolerated treatment well  Safety Devices  Safety Devices in place: Yes(Left in care of PT Yamila Ernst)           Patient Diagnosis(es): The encounter diagnosis was Osteoarthritis of right hip, unspecified osteoarthritis type.      has a past medical history of Allergic sinusitis, Anxiety and depression, COPD (chronic obstructive pulmonary disease) (HCC), Hip pain, Insomnia, Localized edema, Osteoporosis, Spontaneous pneumothorax, Testicular torsion, Wears dentures, and Wears glasses. has a past surgical history that includes Testicle torsion reduction; Total hip arthroplasty (Left, 1/29/2019); back surgery (12/26/2016); and Total hip arthroplasty (Right, 11/30/2020). Restrictions  Restrictions/Precautions  Restrictions/Precautions: Weight Bearing, Fall Risk  Lower Extremity Weight Bearing Restrictions  Right Lower Extremity Weight Bearing: Weight Bearing As Tolerated  Position Activity Restriction  Hip Precautions: No hip flexion > 90 degrees, No ADduction, No hip internal rotation  Other position/activity restrictions: Posterior hip precautions, IV R arm    Subjective   General  Chart Reviewed: Yes  Patient assessed for rehabilitation services?: Yes  Additional Pertinent Hx: Pt is 48 y.o. M who presents with arthritis of R hip. Pt is s/p R BETO and is WBAT with posterolateral precautions. PMH: L BETO, COPD, anxiety, depression  Family / Caregiver Present: Yes(Wife)  Referring Practitioner: Khanh Armando MD  Diagnosis: Arthritis of R hip  Subjective  Subjective: Pt met bedside, agreeable for therapy evaluation and OOB activity. Patient Currently in Pain: (Reporting mild/moderate pain)  Pain Assessment  Pain Assessment: 0-10  Pain Level: 5  Patient's Stated Pain Goal: 3  Pain Type: Surgical pain  Pain Location: Hip  Pain Orientation: Right  Pain Descriptors:  Throbbing  Pain Frequency: Continuous  Pain Onset: On-going  Clinical Progression: Gradually improving  Vital Signs  Temp: 98.2 °F (36.8 °C)  Temp Source: Oral  Pulse: 82  Heart Rate Source: Monitor  Resp: 16  BP: 121/75  BP Location: Left Arm  BP Upper/Lower: Upper  MAP (mmHg): 90  Level of Consciousness: Alert  MEWS Score: 1  Patient Currently in Pain: (Reporting mild/moderate pain)  Oxygen Therapy  SpO2: 96 %  Pulse Oximeter Device Mode: Intermittent  Pulse Oximeter Device Location: Finger  O2 Device: None (Room air)      Social/Functional History  Social/Functional History  Lives With: Spouse  Type of Home: House  Home Layout: Multi-level, Able to Live on Main level with bedroom/bathroom  Home Access: Ramped entrance  Bathroom Toilet: Standard(Discussed potential need for raised toilet seat)  Bathroom Accessibility: Walker accessible  Home Equipment: Rolling walker  ADL Assistance: Independent  Homemaking Assistance: Independent  Ambulation Assistance: Independent  Transfer Assistance: Independent       Objective   Vision: Within Functional Limits  Hearing: Within functional limits      Orientation  Overall Orientation Status: Within Functional Limits     Balance  Sitting Balance: Supervision  Standing Balance: Stand by assistance  Standing Balance  Time: ~5-6 minutes  Activity: Func mob, transfers    Functional Mobility  Functional - Mobility Device: Rolling Walker  Activity: Other  Assist Level: Stand by assistance  Functional Mobility Comments: Pt completed several laps around hospital room with RW with SBA, no overt LOB. Reports felt better when up moving. ADL  Additional Comments: PTA pt reports independence in self-care tasks. Anticipate pt to require assist for socks/shoes, otherwise with use of AE pt will have adequate assist from wife. Pt declined completing self-care at this time. Discussed all AE for hip precautions. Wife assisted with LUIS hose and gym shoes.      Tone RUE  RUE Tone: Normotonic  Tone LUE  LUE Tone: Normotonic  Coordination  Movements Are Fluid And Coordinated: Yes        Transfers  Sit to stand: Stand by assistance  Stand to sit: Stand by assistance  Transfer Comments: SBA for sit <> stand to/from recliner chair with increased time, did well with hip precautions likely d/t pain     Cognition  Overall Cognitive Status: WFL        Sensation  Overall Sensation Status: (Appears WFL for light touch)        LUE AROM (degrees)  LUE AROM : WFL  Left Hand AROM (degrees)  Left Hand AROM: WFL  RUE AROM (degrees)  RUE AROM : WFL  Right Hand AROM (degrees)  Right Hand AROM: [facility] LUE Strength  Gross LUE Strength: WFL  RUE Strength  Gross RUE Strength: WFL          Plan   Plan  Plan Comment: Pt plans to d/c home this date with 24/7 supervision/assist and home health OT. AM-PAC Score    Sean Fajardo scored a 19/24 on the -[facility] ADL Inpatient form. Current research shows that an AM-PAC score of 18 or greater is typically associated with a discharge to the patient's home setting. Based on the patient's AM-PAC score, and their current ADL deficits, it is recommended that the patient have 2-3 sessions per week of Occupational Therapy at d/c to increase the patient's independence. At this time, this patient demonstrates the endurance and safety to discharge home with home health OT (home vs OP services) and a follow up treatment frequency of 2-3x/wk. Please see assessment section for further patient specific details. If patient discharges prior to next session this note will serve as a discharge summary. Please see below for the latest assessment towards goals. [facility] Inpatient Daily Activity Raw Score: 19 (12/01/20 0924)  AM-PAC Inpatient ADL T-Scale Score : 40.22 (12/01/20 0056)  ADL Inpatient CMS 0-100% Score: 42.8 (12/01/20 0924)  ADL Inpatient CMS G-Code Modifier : CK (12/01/20 0924)    Goals  Short term goals  Time Frame for Short term goals: Pt plans to d/c this date with 24/7 supervision/assist and home health OT. Therapy Time   Individual Concurrent Group Co-treatment   Time In 0830         Time Out 0910         Minutes 40         Timed Code Treatment Minutes: 25 Minutes(15 min eval)     If pt is discharged prior to next OT session, this note will serve as the discharge summary.     Cecy Marsh, ANDRÉS/P#[id number]

## 2020-12-01 NOTE — PROGRESS NOTES
Patient A&O. Tolerating PO. Call light within reach. Will continue to monitor and reassess.  Electronically signed by Shira Saavedra RN on 12/1/2020

## 2020-12-01 NOTE — PROGRESS NOTES
[facility] Orthopedic Surgery   Progress Note      S/P :  SUBJECTIVE  Up in chair. Alert and oriented. Wife at bedside. Pain is   described in right hip and with the intensity of moderate. Pain is described as aching. No back pain this AM      OBJECTIVE              Physical                      VITALS:  /75   Pulse 82   Temp 98.2 °F (36.8 °C) (Oral)   Resp 16   Ht 5' 6\" (1.676 m)   Wt 133 lb 9.6 oz (60.6 kg)   SpO2 95%   BMI 21.56 kg/m²                     MUSCULOSKELETAL:  right foot NVI. Wiggles toes to command. Pedal pulses are palpable. NEUROLOGIC:                                  Sensory:  Touch:  Right Lower Extremity:  normal                                                 Surgical wound appears clean and dry right  Hip with Prineo dressing. Ice applied. LUIS hose on.      Data       CBC:   Lab Results   Component Value Date    WBC 9.0 11/18/2020    RBC 4.82 11/18/2020    HGB 12.5 12/01/2020    HCT 36.6 12/01/2020    MCV 91.6 11/18/2020    MCH 31.5 11/18/2020    MCHC 34.4 11/18/2020    RDW 14.0 11/18/2020     11/18/2020    MPV 7.6 11/18/2020        WBC:    Lab Results   Component Value Date    WBC 9.0 11/18/2020        Hemoglobin/Hematocrit:    Lab Results   Component Value Date    HGB 12.5 12/01/2020    HCT 36.6 12/01/2020        PT/INR:    Lab Results   Component Value Date    PROTIME 11.5 11/18/2020    INR 0.99 11/18/2020              Current Inpatient Medications             Current Facility-Administered Medications: oxyCODONE (ROXICODONE) immediate release tablet 5 mg, 5 mg, Oral, Q4H PRN  oxyCODONE HCl (OXY-IR) immediate release tablet 10 mg, 10 mg, Oral, Q4H PRN  albuterol (PROVENTIL) nebulizer solution 2.5 mg, 2.5 mg, Nebulization, Q6H PRN  amitriptyline (ELAVIL) tablet 25 mg, 25 mg, Oral, Nightly PRN  budesonide-formoterol (SYMBICORT) 80-4.5 MCG/ACT inhaler 2 puff, 2 puff, Inhalation, BID  cetirizine (ZYRTEC) tablet 5 mg, 5 mg, Oral, Daily  montelukast (SINGULAIR) tablet 10 mg, 10 mg, Oral, Nightly  tiZANidine (ZANAFLEX) tablet 4 mg, 4 mg, Oral, Q8H PRN  0.9 % sodium chloride infusion, , Intravenous, Continuous  sodium chloride flush 0.9 % injection 10 mL, 10 mL, Intravenous, 2 times per day  sodium chloride flush 0.9 % injection 10 mL, 10 mL, Intravenous, PRN  acetaminophen (TYLENOL) tablet 650 mg, 650 mg, Oral, Q6H  HYDROmorphone (DILAUDID) injection 0.25 mg, 0.25 mg, Intravenous, Q3H PRN **OR** HYDROmorphone (DILAUDID) injection 0.5 mg, 0.5 mg, Intravenous, Q3H PRN  sennosides-docusate sodium (SENOKOT-S) 8.6-50 MG tablet 1 tablet, 1 tablet, Oral, BID  magnesium hydroxide (MILK OF MAGNESIA) 400 MG/5ML suspension 30 mL, 30 mL, Oral, Daily PRN  promethazine (PHENERGAN) tablet 12.5 mg, 12.5 mg, Oral, Q6H PRN **OR** ondansetron (ZOFRAN) injection 4 mg, 4 mg, Intravenous, Q6H PRN  enoxaparin (LOVENOX) injection 40 mg, 40 mg, Subcutaneous, Daily  LORazepam (ATIVAN) injection 1 mg, 1 mg, Intravenous, Q8H PRN  nicotine (NICODERM CQ) 21 MG/24HR 1 patch, 1 patch, Transdermal, Daily    ASSESSMENT AND PLAN      Post right BETO, stable exam  DVT prophylaxis ordered, Lovenox as inpt then switch to ASA 81mg twice at day for 30 days for DVT prophylaxis  Reviewed with patient importance of LUIS hose on daily/ off at  Night for 2 weeks as well and continue anticoagulant medication at home as ordered to reduce risk of postoperative DVT or PE clots. Pt verbalized understanding. PT OT for ADL's and ambulation as tolerated, posterior hip precautions  SS for DC planning, home with home care today  IV or PO pain med as ordered. Discussed with pt he shoulde have about 60 tabs of Norco at home and can take according to my new instructions 1-2 every 6h prn. He verbalized understanding. Also OK to take Calin Perry he has at home as well.      Martell Ramirez  12/1/2020  8:57 AM

## 2020-12-01 NOTE — PROGRESS NOTES
Physical Therapy    Facility/Department: [facility] 3 ORTHOPEDICS    Treatment note    NAME: Romero Schaffer  : 1970  MRN: [id number]    Date of Service: 2020    Assessment / Discharge Recommendations:    -progressing well for discharge to home today with family assist and Home PT  -reviewed hip precautions and he is able to verbalize them properly  -has needed equipment for home   -declined need to practice the car transfers as familiar from last year      Body structures, Functions, Activity limitations: Decreased ADL status; Decreased functional mobility ; Decreased ROM; Decreased strength;Decreased balance  Activity Tolerance  Activity Tolerance: Patient Tolerated treatment well       Patient Diagnosis(es): The encounter diagnosis was Osteoarthritis of right hip, unspecified osteoarthritis type. has a past medical history of Allergic sinusitis, Anxiety and depression, COPD (chronic obstructive pulmonary disease) (HCC), Hip pain, Insomnia, Localized edema, Osteoporosis, Spontaneous pneumothorax, Testicular torsion, Wears dentures, and Wears glasses. has a past surgical history that includes Testicle torsion reduction; Total hip arthroplasty (Left, 2019); back surgery (2016); and Total hip arthroplasty (Right, 2020). Restrictions  Restrictions/Precautions  Restrictions/Precautions: Weight Bearing, Fall Risk  Lower Extremity Weight Bearing Restrictions  Right Lower Extremity Weight Bearing: Weight Bearing As Tolerated  Position Activity Restriction  Hip Precautions: No hip flexion > 90 degrees, No ADduction, No hip internal rotation  Other position/activity restrictions: Posterior hip precautions, IV R arm  Vision/Hearing        Subjective  General  Chart Reviewed: Yes  Patient assessed for rehabilitation services?: Yes  Additional Pertinent Hx: Pt is a 48 y.o. male who underwent L BETO in  and R BETO 2020, both by Dr. Laurence Gitelman.   Response To Previous Treatment: Patient with no alarm in place, Gait belt, Left in chair      Goals  Short term goals  Time Frame for Short term goals: By acute d/c  Short term goal 1: Bed mobility SBA  Short term goal 2: Transfers SBA  Short term goal 3: Ambulation with AD household distances CGA  Short term goal 4: Able to tolerate at least 10 reps of all HEP exercises  Patient Goals   Patient goals :  To get up and get moving again       Therapy Time   Individual Concurrent Group Co-treatment   Time In 0900         Time Out 0925         Minutes 48 Lilli Cunha, PT

## 2020-12-01 NOTE — CARE COORDINATION
[facility] delivered requested Shower Chair & Raised Toilet Seat w/Rails to patient and reviewed insurance coverage and equipment set up with patient. Notified RN & Ortho Moiz RN.     Thank you for the referral.  Electronically signed by Elisabeth Weems on 12/1/2020 at 10:43 AM Cell ph# [phone]

## 2020-12-02 ENCOUNTER — TELEPHONE (OUTPATIENT)
Dept: ORTHOPEDIC SURGERY | Age: 50
End: 2020-12-02

## 2020-12-02 RX ORDER — OXYCODONE HYDROCHLORIDE 5 MG/1
5-10 TABLET ORAL EVERY 6 HOURS PRN
Qty: 40 TABLET | Refills: 0 | Status: SHIPPED | OUTPATIENT
Start: 2020-12-02 | End: 2020-12-08 | Stop reason: SDUPTHER

## 2020-12-02 NOTE — TELEPHONE ENCOUNTER
Per Dr. Laurence Gitelman, he will give a one time prescription of Oxycodone 5 mg 1-2 q 6 hours prn pain. The patient needs to stop the Norco. Once the Oxycodone is finished, he should take the Castillo Xu was informed.

## 2020-12-02 NOTE — TELEPHONE ENCOUNTER
Prescription Refill     Medication Name:  Requesting something for pain.  States Vicodin is not working   Pharmacy: [facility] [address] [place]   Patient Contact Number:  [phone]

## 2020-12-03 ENCOUNTER — TELEPHONE (OUTPATIENT)
Dept: ORTHOPEDIC SURGERY | Age: 50
End: 2020-12-03

## 2020-12-03 NOTE — TELEPHONE ENCOUNTER
Medical Facility Question     Facility Name: [address]  Name: Butch Cee Number: [phone]    Request or Information: Verbal for homecare

## 2020-12-03 NOTE — PROGRESS NOTES
Call from ER on 10/18/2017 at 1755 hrs. Patient in ER requesting pain medication and told them that he was instructed to go to ER for pain medication. Informed charge nurse Mayda Menchaca that he has a prescription for MS Contin ready for  in office since 10/16/2017, which he may  any time. English Satisfactory

## 2020-12-04 NOTE — DISCHARGE SUMMARY
Physician Discharge Summary     Patient ID:  Julianne Garcia  [id number]  40 y.o.  1970    Admit date: 11/30/2020    Discharge date and time: 12/1/2020 10:58 AM     Admitting Physician: Kirstie Brooks MD     Discharge Physician: Judah Castro    Admission Diagnoses: Osteoarthritis of right hip, unspecified osteoarthritis type [M16.11]  Primary osteoarthritis of right hip [M16.11]    Discharge Diagnoses: right hip OA    Admission Condition: good    Discharged Condition: good    Indication for Admission: Failed conservative treatment as outpatient for joint pain including PT and pain meds. This patient was then electively scheduled for total joint replacement surgery    Surgical procedure: right BETO    Consults: PT OT SS    This patient had no postoperative complications. They has PT and OT for ADL's . IV and PO pain med for pain control and was eventually DC in stable condition    Treatments: analgesia,  therapies: PT OT,  and surgery      Disposition: home    Patient Instructions:   [unfilled]  Activity: activity as tolerated  Diet: regular diet  Wound Care: keep wound clean and dry    Follow-up with Judah Castro in 2 weeks.     Kanika Moyer  12/4/2020  1:30 PM

## 2020-12-07 ENCOUNTER — TELEPHONE (OUTPATIENT)
Dept: ORTHOPEDIC SURGERY | Age: 50
End: 2020-12-07

## 2020-12-07 ENCOUNTER — TELEPHONE (OUTPATIENT)
Dept: ORTHOPEDICS UNIT | Age: 50
End: 2020-12-07

## 2020-12-07 NOTE — TELEPHONE ENCOUNTER
Attempted to contact patient. Left hippa compliant voicemail for patient stating purpose and call back number.    Joao Leo  Orthopedic Nurse Navigator  Phone number: [phone]  Future Appointments   Date Time Provider Raymond Kramer   12/15/2020 11:00 AM Rudy Hein MD [facility]   2/17/2021  9:15 AM SCHEDULE, [facility]       Electronically signed by Robert Connell RN on 12/7/2020 at 3:02 PM

## 2020-12-07 NOTE — TELEPHONE ENCOUNTER
I left a message to inform the patient/patients wife that per Dr. Rhiannon Garvin he will refill this medication one last time. They were informed in the message that this will be sent tomorrow.

## 2020-12-07 NOTE — TELEPHONE ENCOUNTER
Prescription Refill     Medication Name:  Oxycodone  Pharmacy: [facility] in [place]  Patient Contact Number: [phone]

## 2020-12-08 RX ORDER — OXYCODONE HYDROCHLORIDE 5 MG/1
5-10 TABLET ORAL
Qty: 40 TABLET | Refills: 0 | Status: SHIPPED | OUTPATIENT
Start: 2020-12-08 | End: 2020-12-15

## 2020-12-10 ENCOUNTER — TELEPHONE (OUTPATIENT)
Dept: ORTHOPEDIC SURGERY | Age: 50
End: 2020-12-10

## 2020-12-10 NOTE — TELEPHONE ENCOUNTER
Medical Facility Question     Facility Name: [address] Name: [address] [facility] THERAPIST  Contact Number: [phone]  Request or Information: SEND REFERRAL TO [facility] SO PATIENT CAN SCHEDULE HIS APPOINTMENT

## 2020-12-15 ENCOUNTER — TELEPHONE (OUTPATIENT)
Dept: ORTHOPEDIC SURGERY | Age: 50
End: 2020-12-15

## 2020-12-15 ENCOUNTER — OFFICE VISIT (OUTPATIENT)
Dept: ORTHOPEDIC SURGERY | Age: 50
End: 2020-12-15

## 2020-12-15 VITALS — WEIGHT: 133 LBS | HEIGHT: 66 IN | BODY MASS INDEX: 21.38 KG/M2 | TEMPERATURE: 98.6 F

## 2020-12-15 PROCEDURE — 99024 POSTOP FOLLOW-UP VISIT: CPT | Performed by: ORTHOPAEDIC SURGERY

## 2020-12-15 NOTE — TELEPHONE ENCOUNTER
Medical Facility Question     Facility Name: [facility] PHYSICAL THERAPY  Contact Name: Radha VIEIRA [facility] THERAPIST  Contact Number: [phone]  Request or Information: PATIENT IS DUE START OUT PATIENT PHYSICAL THERAPY THIS Friday 12-18-20

## 2020-12-15 NOTE — PROGRESS NOTES
Tessy Suazo  [id number]  December 15, 2020    Chief Complaint   Patient presents with    Post-Op Check     right total hip arthroplasty 11/30/2020             History: The patient is a 51-year-old gentleman who is here in follow-up regarding his right hip. He underwent a right total hip arthroplasty approximately 2 weeks ago. The patient's  past medical history, medications, allergies,  family history, social history, and review of systems have been reviewed, and dated and are recorded in the chart. Temp 98.6 °F (37 °C) (Infrared)   Ht 5' 6\" (1.676 m)   Wt 133 lb (60.3 kg)   BMI 21.47 kg/m²     Physical: Mr. Tessy Suazo appears well, he is in no apparent distress, he demonstrates appropriate mood & affect. He is alert and oriented to person, place and time. He has mild swelling. There is No evidence of DVT seen on physical exam. He is neurovascularly intact distally. The incision is clean, dry and intact and without erythema. Range of motion is: 40 degrees abduction, 90 degrees flexion, 35 degrees internal rotation and 35 degrees external rotation. He has mild pain with range of motion of the hip. Leg length discrepancy:none. X-Rays: AP pelvis and 2 views of the right hip were obtained and reviewed. The prosthesis is well aligned. There is no evidence of loosening or subsidence. Impression: status post right Total Hip Arthroplasty, Doing well postoperatively. Plan: At this time, the patient will continue physical therapy. The patient will continue posterior hip precautions. Follow up will be in 4 week(s).   We will begin to defer his pain medication to his pain specialist.

## 2020-12-18 ENCOUNTER — HOSPITAL ENCOUNTER (OUTPATIENT)
Dept: PHYSICAL THERAPY | Age: 50
Setting detail: THERAPIES SERIES
Discharge: HOME | End: 2020-12-18
Payer: COMMERCIAL

## 2020-12-18 NOTE — PROGRESS NOTES
[address] [facility]    Physical Therapy  Cancellation/No-show Note  Patient Name:  Ilir Yates  :  1970   Date:  2020    Cancelled visits to date: 1  No-shows to date: 0    For today's appointment patient:  [x]  Cancelled eval on   [x]  Rescheduled appointment eval to next  []  No-show     Reason given by patient:  []  Patient ill  []  Conflicting appointment  []  No transportation    []  Conflict with work  [x]  No reason given  []  Other:     Comments:      Phone call information:   []  Phone call made today to patient at _ time at number provided:      []  Patient answered, conversation as follows:    []  Patient did not answer, message left as follows:  []  Phone call not made today  [x]  Phone call not needed - pt contacted us to cancel and provided reason for cancellation.      Electronically signed by:  Pradeep Ray PT

## 2020-12-22 ENCOUNTER — TELEPHONE (OUTPATIENT)
Dept: ORTHOPEDIC SURGERY | Age: 50
End: 2020-12-22

## 2020-12-22 NOTE — TELEPHONE ENCOUNTER
Celso Babinski was informed that he needs to wait a few more weeks and to make sure he follows posterior hip precautions.

## 2020-12-24 ENCOUNTER — HOSPITAL ENCOUNTER (OUTPATIENT)
Dept: PHYSICAL THERAPY | Age: 50
Setting detail: THERAPIES SERIES
Discharge: HOME | End: 2020-12-24
Payer: COMMERCIAL

## 2020-12-24 PROCEDURE — 97110 THERAPEUTIC EXERCISES: CPT

## 2020-12-24 PROCEDURE — 97161 PT EVAL LOW COMPLEX 20 MIN: CPT

## 2020-12-24 PROCEDURE — 97140 MANUAL THERAPY 1/> REGIONS: CPT

## 2020-12-24 NOTE — PLAN OF CARE
NT due to hip precaustion   Hip Abduction 4+/5 2/5   Hip Adduction     Hip ER NT NT   Hip IR NT NT   Quads (L2-4) 5/5 4+/5   Hamstrings 5/5 4/5 Pain with resistance   Ankle Dorsiflexion (L4-5) 5/5 5/5   Ankle Plantarflexion (S1-2) 5/5 5/5   Ankle Inversion 5/5 5/5   Ankle Eversion (S1-2) 5/5 5/5   Great Toe Extension (L5) 5/5 5/5        Flexibility     Hamstrings (90/90) -38 -45   ITB (Donnell) 5\" 7\"   Quads (Ely's) 120 120   Hip Flexor (Spike) 10 0        Girth     Mid patella     Suprapatellar     Figure 8     Transmalleolar     Metatarsal Heads         Joint mobility:    []Normal    [x]Hypo   []Hyper    Orthopaedic Special Tests  Positive  Negative  NT Comments    Hip       AME / Karlos's       FADIR       Scour       Trendelenburg              Knee       Lachman's / Anterior Drawer       Posterior Drawer       Varus Stress       Valgus Stress       Giselle's        Appley's       Thessaly's       Patellar Tracking X   R patella in Lateral rotation          Ankle       Anterior Drawer       Talar Tilt       Domingo       Georgi's                   Balance; Tandem R 10\" with mild balance disturbance; R SLS unable , L SLS 10\"                         [x] Patient history, allergies, meds reviewed. Medical chart reviewed. See intake form. Review Of Systems (ROS):  [x]Performed Review of systems (Integumentary, CardioPulmonary, Neurological) by intake and observation. Intake form has been scanned into medical record. Patient has been instructed to contact their primary care physician regarding ROS issues if not already being addressed at this time.       Co-morbidities/Complexities (which will affect course of rehabilitation)  []None           Arthritic conditions   []Rheumatoid arthritis (M05.9)  [x]Osteoarthritis (M19.91)   Cardiovascular conditions   []Hypertension (I10)  []Hyperlipidemia (E78.5)  []Angina pectoris (I20)  []Atherosclerosis (I70)   Musculoskeletal conditions   []Disc pathology   []Congenital spine pathologies   [x]Prior surgical intervention  [x]Osteoporosis (M81.8)  []Osteopenia (M85.8)   Endocrine conditions   []Hypothyroid (E03.9)  []Hyperthyroid Gastrointestinal conditions   []Constipation (M10.24)   Metabolic conditions   []Morbid obesity (E66.01)  []Diabetes type 1(E10.65) or 2 (E11.65)   []Neuropathy (G60.9)     Pulmonary conditions   [x]Asthma (J45)  []Coughing   []COPD (J44.9)   Psychological Disorders  [x]Anxiety (F41.9)  [x]Depression (F32.9)   []Other:   [x]Other:   2018 L THR, 2013/2007 Back surgery with rods       Barriers to/and or personal factors that will affect rehab potential:              []Age  []Sex    [x]Smoker              []Motivation/Lack of Motivation                        [x]Co-Morbidities              []Cognitive Function, education/learning barriers              []Environmental, home barriers              []profession/work barriers  []past PT/medical experience  []other:  Alaine Najjar may require extended PT POC due to previous back surgeries as well as other comordities and smoking     Falls Risk Assessment (30 days)  [x] Falls Risk assessed and no intervention required. [] Falls Risk assessed and Patient requires intervention due to being higher risk   TUG score (>12s at risk):     [] Falls education provided, including        ASSESSMENT: Patient presents with decreased R hip strength in gluteus medius and arina as well as poor activation of his Quad in functional closed chain activities. Patient has a painful  R proximal HS myofascial band likely due to overuse of HS and underactivation of Quad on stairs/transfers. Patient will benefit from skilled PT to address his impaimrents and functional limitations and restore him to his pLOF.     Functional Impairments:     []Noted lumbar/proximal hip/LE joint hypomobility   [x]Decreased LE functional ROM   [x]Decreased core/proximal hip strength and neuromuscular control   [x]Decreased LE functional strength [x]Reduced balance/proprioceptive control   []other:      Functional Activity Limitations (from functional questionnaire and intake)   [x]Reduced ability to tolerate prolonged functional positions   [x]Reduced ability or difficulty with changes of positions or transfers between positions   []Reduced ability to maintain good posture and demonstrate good body mechanics with sitting, bending, and lifting   [x]Reduced ability to sleep   [] Reduced ability or tolerance with driving and/or computer work   []Reduced ability to perform lifting, carrying tasks   [x]Reduced ability to squat   []Reduced ability to forward bend   [x]Reduced ability to ambulate prolonged functional periods/distances/surfaces   []Reduced ability to ascend/descend stairs   []Reduced ability to run, hop, cut or jump   []other:    Participation Restrictions   []Reduced participation in self care activities   [x]Reduced participation in home management activities   []Reduced participation in work activities   [x]Reduced participation in social activities. []Reduced participation in sport/recreation activities. Classification :    [x]Signs/symptoms consistent with post-surgical status including decreased ROM, strength and function.    []Signs/symptoms consistent with joint sprain/strain   []Signs/symptoms consistent with patella-femoral syndrome   []Signs/symptoms consistent with knee OA/hip OA   []Signs/symptoms consistent with internal derangement of knee/Hip   []Signs/symptoms consistent with functional hip weakness/NMR control      []Signs/symptoms consistent with tendinitis/tendinosis    []signs/symptoms consistent with pathology which may benefit from Dry needling      []other:      Prognosis/Rehab Potential:      []Excellent   [x]Good    []Fair   []Poor    Tolerance of evaluation/treatment:    []Excellent   [x]Good    []Fair   []Poor    Physical Therapy Evaluation Complexity Justification  [x] A history of present problem with:  [] no

## 2020-12-24 NOTE — FLOWSHEET NOTE
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 Ramiro Aguirre of care signed (Y/N): []  Yes [x]  No     Date of Patient follow up with Physician:      Progress Report: [x]  Yes eVAL  []  No     Date Range for reporting period:  Beginnin2020  Ending:     Progress report due (10 Rx/or 30 days whichever is less): visit #42     Recertification due (POC duration/ or 90 days whichever is less): 3/24/2021  Visit # Insurance Allowable Auth required?  Date Range    [employer] []  Yes  [x]  No NA     Latex Allergy:  [x]NO      []YES  Preferred Language for Healthcare:   [x]English       []other:    Functional Scale:        Date assessed:  LEFS: raw score = 17; dysfunction = 78.78%   2020    Pain level: 4/10     SUBJECTIVE:  See eval    OBJECTIVE: See eval      RESTRICTIONS/PRECAUTIONS:Posterior hip Precautions    Exercises/Interventions:     Therapeutic Exercise (07713)  Resistance / level Sets/sec Reps Notes / Cues   Quad sets  \" 10    Gluteal isos  \" 10    [place]okkline opp marches to 90 degrees  \" 20    Supine R Hip AB  2 10    Hookline B Bent knee fall out [place] band  1 10    Hookline unilateral bent knee fall out [place] band 1 10 L/R    Hip ADD isos hookline  \" 10                         Therapeutic Activities (76891)       20 Pt was educated on PT POC, Diagnosis, Prognosis, pathomechanics as well as frequency and duration of scheduling future physical therapy appointments. Time was also taken on this day to answer all patient questions and participation in PT. X5' reviewed posterior hip precautions                          Neuromuscular Re-ed (11309)                            Manual Intervention [id number])       Knee mobs/PROM       Tib/Fem Mobs       Patella Mobs Medial glides after lateral rotation correction      Ankle mobs       MET to correct  R posterior innominate SI ; L SI anterior innominate      Manual stretches  R quad 20\" X4, MFR/STM R TFL and proximal/medial HS                               Pt. Education:  -pt educated on diagnosis, prognosis and expectations for rehab  -all pt questions were answered    Home Exercise Program:    Access Code: [email or web address]   URL: [email or web address]   Date: 12/24/2020   Prepared by:  [facility]     Exercises   Supine Gluteal Sets - 10 reps - 3 sets - 1x daily - 7x weekly   Supine Quadricep Sets - 10 reps - 3 sets - 1x daily - 7x weekly   Hooklying Isometric Hip Flexion - 10 reps - 3 sets - 1x daily - 7x weekly   Hooklying Clamshell with Resistance - 10 reps - 3 sets - 1x daily - 7x weekly   Hooklying Isometric Clamshell - 10 reps - 3 sets - 1x daily - 7x weekly     Therapeutic Exercise and NMR EXR  [x] (82949) Provided verbal/tactile cueing for activities related to strengthening, flexibility, endurance, ROM for improvements in LE, proximal hip, and core control with self care, mobility, lifting, ambulation.  [] (97895) Provided verbal/tactile cueing for activities related to improving balance, coordination, kinesthetic sense, posture, motor skill, proprioception  to assist with LE, proximal hip, and core control in self care, mobility, lifting, ambulation and eccentric single leg control.   [] (64119) Therapist is in constant attendance of 2 or more patients providing skilled therapy interventions, but not providing any significant amount of measurable one-on-one time to either patient, for improvements in LE, proximal hip, and core control in self care, mobility, lifting, ambulation and eccentric single leg control. NMR and Therapeutic Activities:    [x] (01276 or 94526) Provided verbal/tactile cueing for activities related to improving balance, coordination, kinesthetic sense, posture, motor skill, proprioception and motor activation to allow for proper function of core, proximal hip and LE with self care and ADLs  [] (76434) Gait Re-education- Provided training and instruction to the patient for proper LE, core and proximal hip recruitment and positioning and eccentric body weight control with ambulation re-education including up and down stairs     Home Exercise Program:    [x] (99932) Reviewed/Progressed HEP activities related to strengthening, flexibility, endurance, ROM of core, proximal hip and LE for functional self-care, mobility, lifting and ambulation/stair navigation   [] (92059)Reviewed/Progressed HEP activities related to improving balance, coordination, kinesthetic sense, posture, motor skill, proprioception of core, proximal hip and LE for self care, mobility, lifting, and ambulation/stair navigation      Manual Treatments:  PROM / STM / Oscillations-Mobs:  G-I, II, III, IV (PA's, Inf., Post.)  [x] (32822) Provided manual therapy to mobilize LE, proximal hip and/or LS spine soft tissue/joints for the purpose of modulating pain, promoting relaxation,  increasing ROM, reducing/eliminating soft tissue swelling/inflammation/restriction, improving soft tissue extensibility and allowing for proper ROM for normal function with self care, mobility, lifting and ambulation.      Modalities:  [] (23418) Vasopneumatic compression: Utilized vasopneumatic compression to decrease edema / swelling for the purpose of improving mobility and quad tone / recruitment which will allow for increased overall function including but not limited to self-care, transfers, ambulation, and ascending / descending stairs. Modalities:      Charges:  Timed Code Treatment Minutes: 24   Total Treatment Minutes: 45     [x] EVAL - LOW (81872)   [] EVAL - MOD (11756)  [] EVAL - HIGH (06003)  [] RE-EVAL (29804)  [x] TH(56999) x  1     [] Ionto  [] NMR (50703) x       [] Vaso  [x] Manual (25073) x  1     [] Ultrasound  [] TA x        [] Mech Traction (36027)  [] Aquatic Therapy x     [] ES (un) (19264):   [] Home Management Training x  [] ES(attended) (35277)   [] Dry Needling 1-2 muscles (85343):  [] Dry Needling 3+ muscles (164124  [] Group:      [] Other:     GOALS:   GOALS:  Patient stated goal:decrease my HS pain so I can walk without pain  [] Progressing: [] Met: [] Not Met: [] Adjusted    Therapist goals for Patient:   Short Term Goals: To be achieved in: 2 weeks  1. Independent in HEP and progression per patient tolerance, in order to prevent re-injury. [] Progressing: [] Met: [] Not Met: [] Adjusted  2. Patient will have a decrease in pain to facilitate improvement in movement, function, and ADLs as indicated by Functional Deficits. [] Progressing: [] Met: [] Not Met: [] Adjusted    Long Term Goals: To be achieved in: 6-8weeks  1. Disability index score of 60% or less for the LEFS to assist with reaching prior level of function. [] Progressing: [] Met: [] Not Met: [] Adjusted  2. Patient will demonstrate increased AROM to 40 degrees of active Hip AB in supine I  to allow for proper joint functioning as indicated by patients ability to perform transfers without pain or A device. [] Progressing: [] Met: [] Not Met: [] Adjusted  3. Patient will demonstrate an increase in Strength to at least 4-4+/5 as well as good proximal hip strength and control to allow for proper functional mobility as indicated by patients ability to ascend/descend stairs with 1 rail reciprocally. [] Progressing: [] Met: [] Not Met: [] Adjusted  4.  Patient will return to functional activities including being able to stand up to patient does not return for scheduled/ recommended follow up visits, this note will serve as a discharge from care along with most recent update on progress.

## 2020-12-28 ENCOUNTER — HOSPITAL ENCOUNTER (OUTPATIENT)
Dept: PHYSICAL THERAPY | Age: 50
Setting detail: THERAPIES SERIES
Discharge: HOME | End: 2020-12-28
Payer: COMMERCIAL

## 2020-12-28 NOTE — PROGRESS NOTES
Physical Therapy  Cancellation/No-show Note  Patient Name:  Sunday Spine  :  1970   Date:  2020  Cancelled visits to date: 0  No-shows to date: 1    Patient status for today's appointment patient:  []  Cancelled  []  Rescheduled appointment  [x]  No-show    Reason given by patient:  []  Patient ill  []  Conflicting appointment  []  No transportation    []  Conflict with work  [x]  No reason given  []  Other:     Comments:      Phone call information:   [x]  Phone call made today to patient [phone] time at number provided:      []  Patient answered, conversation as follows:    [x]  Patient did not answer, unable to leave a message due to mailbox full  []  Phone call not made today    Electronically signed by:  Tri Robins, PT

## 2020-12-30 ENCOUNTER — HOSPITAL ENCOUNTER (OUTPATIENT)
Dept: PHYSICAL THERAPY | Age: 50
Setting detail: THERAPIES SERIES
Discharge: HOME | End: 2020-12-30
Payer: COMMERCIAL

## 2021-01-04 ENCOUNTER — HOSPITAL ENCOUNTER (OUTPATIENT)
Dept: PHYSICAL THERAPY | Age: 51
Setting detail: THERAPIES SERIES
Discharge: HOME | End: 2021-01-04
Payer: COMMERCIAL

## 2021-01-04 NOTE — PROGRESS NOTES
Physical Therapy  Cancellation/No-show Note  Patient Name:  Roxnane Tolbert  :  1970   Date:  2021  Cancelled visits to date: 1  No-shows to date: 2    Patient status for today's appointment patient:  [x]  Cancelled 2021[]  Rescheduled appointment  []  No-show ,   Reason given by patient:  []  Patient ill  []  Conflicting appointment  []  No transportation    []  Conflict with work  []  No reason given  [x]  Other:     Comments:  vernont's spouse called in at 36 to see when patient had his next visit which was in 8'; she stated that patient would not be able to make that time slot. Patient's wife said they had some issues with their phones but now everything is back on track and patient will make his next scheduled appointment.   Phone call information:   [x]  Phone call made today to patient  time at number provided:      []  Patient answered, conversation as follows:    [x]  Patient did not answer, unable to leave a message due to mailbox full; if no shows again on 2021 will discharge remaining visits  []  Phone call not made today    Electronically signed by:  Freddie Fonseca, PT

## 2021-01-06 ENCOUNTER — HOSPITAL ENCOUNTER (OUTPATIENT)
Dept: PHYSICAL THERAPY | Age: 51
Setting detail: THERAPIES SERIES
Discharge: HOME | End: 2021-01-06
Payer: COMMERCIAL

## 2021-01-06 NOTE — PROGRESS NOTES
Physical Therapy  Cancellation/No-show Note  Patient Name:  Ignacia Keating  :  1970   Date:  2021  Cancelled visits to date: 1  No-shows to date: 3    Patient status for today's appointment patient:  []  Cancelled 2021[]  Rescheduled appointment  [x]  No-show , ,    Reason given by patient:  []  Patient ill  []  Conflicting appointment  []  No transportation    []  Conflict with work  []  No reason given  [x]  Other:     Comments:  vernont's spouse called in at 36 to see when patient had his next visit which was in 8'; she stated that patient would not be able to make that time slot. Patient's wife said they had some issues with their phones but now everything is back on track and patient will make his next scheduled appointment.     Phone call information:   [x]  Phone call made today to patient a 11:00 amat number provided:      []  Patient answered, conversation as follows:    [x]  Patient did not answer, unable to leave a message; if does not show up at next visit, will D/C remaining visits     []  Phone call not made today    Electronically signed by:  Varghese Carter, PT

## 2021-01-12 ENCOUNTER — HOSPITAL ENCOUNTER (OUTPATIENT)
Dept: PHYSICAL THERAPY | Age: 51
Setting detail: THERAPIES SERIES
Discharge: HOME | End: 2021-01-12
Payer: COMMERCIAL

## 2021-01-12 NOTE — PROGRESS NOTES
Physical Therapy  Cancellation/No-show Note  Patient Name:  Lyla Maravilla  :  1970   Date:  2021  Cancelled visits to date: 1  No-shows to date: 4    Patient status for today's appointment patient:  []  Cancelled 2021  []  Rescheduled appointment  [x]  No-show , , ,     Reason given by patient:  []  Patient ill  []  Conflicting appointment  []  No transportation    []  Conflict with work  [x]  No reason given  []  Other:     Comments    Phone call information:    [x]  Phone call made today to patient a 12:20pm at number provided:   [phone]    []  Patient answered, conversation as follows:    [x]  Patient did not answer, left voicemail: : Informed of 4 no shows and missed appts since eval, last scheduled appt on  at 11:15am. If does not show will need to see MD before scheduling more appts.  Left office number to call with any questions/concerns     []  Phone call not made today    Electronically signed by:  Mode Lim, PT, DPT

## 2021-01-14 ENCOUNTER — HOSPITAL ENCOUNTER (OUTPATIENT)
Dept: PHYSICAL THERAPY | Age: 51
Setting detail: THERAPIES SERIES
Discharge: HOME | End: 2021-01-14
Payer: COMMERCIAL

## 2021-01-14 NOTE — PROGRESS NOTES
Physical Therapy  Cancellation/No-show Note  Patient Name:  Dhaval Raman  :  1970   Date:  2021  Cancelled visits to date: 1  No-shows to date: 5    Patient status for today's appointment patient:  []  Cancelled 2021  []  Rescheduled appointment  [x]  No-show , , , ,    Reason given by patient:  []  Patient ill  []  Conflicting appointment  []  No transportation    []  Conflict with work  [x]  No reason given  []  Other:     Comments    Phone call information:    []  Phone call made today to patient at _____at number provided:   [phone]    []  Patient answered, conversation as follows:    []  Patient did not answer, left voicemail: : Informed of 4 no shows and missed appts since eval, last scheduled appt on  at 11:15am. If does not show will need to see MD before scheduling more appts. Left office number to call with any questions/concerns     [x]  Phone call not made today- as pt was attempted to be contacted on 2021 and has since not called OP PT to schedule more visits. Pt will need to be re-evaluated before returning to skilled therapy.      Electronically signed by:  Trey Patel PTA, [id number]

## 2021-01-18 ENCOUNTER — TELEPHONE (OUTPATIENT)
Dept: ENDOCRINOLOGY | Age: 51
End: 2021-01-18

## 2021-01-18 RX ORDER — DENOSUMAB 60 MG/ML
60 INJECTION SUBCUTANEOUS ONCE
Qty: 1 ML | Refills: 0 | Status: SHIPPED | OUTPATIENT
Start: 2021-01-18 | End: 2021-04-28 | Stop reason: ALTCHOICE

## 2021-01-18 NOTE — TELEPHONE ENCOUNTER
Medication:   Requested Prescriptions     Pending Prescriptions Disp Refills    denosumab (PROLIA) 60 MG/ML SOSY SC injection 1 mL 0     Sig: Inject 1 mL into the skin once for 1 dose       Last Filled:      Patient Phone Number: [phone] (home)     Last appt: 5/6/2020   Next appt: Visit date not found    Last Labs DM:   Lab Results   Component Value Date    LABA1C 5.4 12/01/2020     Last Lipid:   Lab Results   Component Value Date    CHOL 154 12/13/2017    TRIG 122 12/13/2017    HDL 34 12/13/2017    LDLCALC 96 12/13/2017     Last PSA:   Lab Results   Component Value Date    PSA 1.1 07/25/2018     Last Thyroid: No results found for: TSH, FT3, F2GEYAA, Dorma Bevels, I1UGBAP

## 2021-01-19 ENCOUNTER — OFFICE VISIT (OUTPATIENT)
Dept: ORTHOPEDIC SURGERY | Age: 51
End: 2021-01-19

## 2021-01-19 VITALS — HEIGHT: 66 IN | BODY MASS INDEX: 21.38 KG/M2 | WEIGHT: 133 LBS

## 2021-01-19 DIAGNOSIS — Z96.641 STATUS POST TOTAL REPLACEMENT OF RIGHT HIP: Primary | ICD-10-CM

## 2021-01-19 PROCEDURE — 99024 POSTOP FOLLOW-UP VISIT: CPT | Performed by: ORTHOPAEDIC SURGERY

## 2021-01-19 NOTE — PROGRESS NOTES
Dez Dey  [id number]  January 19, 2021    Chief Complaint   Patient presents with    Follow-up     right total hip arthroplasty 11/30/2020             History: The patient is a 54-year-old gentleman who is here in follow-up regarding his right hip. He underwent a right total hip arthroplasty approximately 2 months ago. He reports mild right hip pain. He has resumed most activities. He occasionally uses a cane for long walks outside of his home. The patient's  past medical history, medications, allergies,  family history, social history, and review of systems have been reviewed, and dated and are recorded in the chart. Ht 5' 6\" (1.676 m)   Wt 133 lb (60.3 kg)   BMI 21.47 kg/m²     Physical: Mr. Dez Dey appears well, he is in no apparent distress, he demonstrates appropriate mood & affect. He is alert and oriented to person, place and time. He has no swelling. There is No evidence of DVT seen on physical exam. He is neurovascularly intact distally. The incision is clean, dry and intact and without erythema. Range of motion is: 40 degrees abduction, 90 degrees flexion, 35 degrees internal rotation and 35 degrees external rotation. He has no pain with range of motion of the hip. Leg length discrepancy:none. Impression: status post right Total Hip Arthroplasty, Doing well postoperatively. Plan: At this time, the patient will continue his home exercises. He will continue to work on his general balance, gait and strengthening. The patient gradually begin to flex past 90 degrees. The patient will follow up with me in 2 months and we will reassess him then. At follow-up, an AP pelvis and 2 views of the right hip will be obtained. The patient was given a 6-month handicap placard.

## 2021-02-16 DIAGNOSIS — J44.9 COPD, MILD (HCC): ICD-10-CM

## 2021-02-16 DIAGNOSIS — J30.1 SEASONAL ALLERGIC RHINITIS DUE TO POLLEN: ICD-10-CM

## 2021-02-16 RX ORDER — MONTELUKAST SODIUM 10 MG/1
10 TABLET ORAL NIGHTLY
Qty: 30 TABLET | Refills: 2 | Status: SHIPPED | OUTPATIENT
Start: 2021-02-16 | End: 2021-03-19 | Stop reason: SDUPTHER

## 2021-02-16 RX ORDER — ALBUTEROL SULFATE 90 UG/1
2 AEROSOL, METERED RESPIRATORY (INHALATION) EVERY 4 HOURS PRN
Qty: 1 INHALER | Refills: 2 | Status: SHIPPED | OUTPATIENT
Start: 2021-02-16 | End: 2021-04-28 | Stop reason: SDUPTHER

## 2021-02-17 ENCOUNTER — TELEPHONE (OUTPATIENT)
Dept: ENDOCRINOLOGY | Age: 51
End: 2021-02-17

## 2021-02-17 NOTE — TELEPHONE ENCOUNTER
Called and spoke wife informed that we hve sent the PA and will call them as soon as we get it back.

## 2021-02-26 NOTE — TELEPHONE ENCOUNTER
Patient came in, I handled this. Refaxed to both numbers and scheduled pt for follow up appt. Also gave him copy of paper work. General Abdominal pain

## 2021-02-28 ENCOUNTER — APPOINTMENT (OUTPATIENT)
Dept: GENERAL RADIOLOGY | Age: 51
End: 2021-02-28
Payer: COMMERCIAL

## 2021-02-28 ENCOUNTER — HOSPITAL ENCOUNTER (EMERGENCY)
Age: 51
Discharge: HOME | End: 2021-02-28
Payer: COMMERCIAL

## 2021-02-28 VITALS
SYSTOLIC BLOOD PRESSURE: 130 MMHG | HEIGHT: 66 IN | OXYGEN SATURATION: 98 % | TEMPERATURE: 97.5 F | BODY MASS INDEX: 20.89 KG/M2 | DIASTOLIC BLOOD PRESSURE: 71 MMHG | RESPIRATION RATE: 15 BRPM | WEIGHT: 130 LBS | HEART RATE: 99 BPM

## 2021-02-28 DIAGNOSIS — M25.551 ACUTE RIGHT HIP PAIN: Primary | ICD-10-CM

## 2021-02-28 DIAGNOSIS — S70.01XA CONTUSION OF RIGHT HIP, INITIAL ENCOUNTER: ICD-10-CM

## 2021-02-28 PROCEDURE — 6360000002 HC RX W HCPCS: Performed by: PHYSICIAN ASSISTANT

## 2021-02-28 PROCEDURE — 96372 THER/PROPH/DIAG INJ SC/IM: CPT

## 2021-02-28 PROCEDURE — 99282 EMERGENCY DEPT VISIT SF MDM: CPT

## 2021-02-28 PROCEDURE — 73502 X-RAY EXAM HIP UNI 2-3 VIEWS: CPT

## 2021-02-28 RX ORDER — LIDOCAINE 50 MG/G
1 PATCH TOPICAL DAILY
Qty: 30 PATCH | Refills: 0 | Status: SHIPPED | OUTPATIENT
Start: 2021-02-28 | End: 2022-10-25

## 2021-02-28 RX ORDER — FENTANYL CITRATE 50 UG/ML
25 INJECTION, SOLUTION INTRAMUSCULAR; INTRAVENOUS ONCE
Status: COMPLETED | OUTPATIENT
Start: 2021-02-28 | End: 2021-02-28

## 2021-02-28 RX ADMIN — FENTANYL CITRATE 25 MCG: 50 INJECTION, SOLUTION INTRAMUSCULAR; INTRAVENOUS at 13:56

## 2021-02-28 ASSESSMENT — ENCOUNTER SYMPTOMS
COLOR CHANGE: 0
SHORTNESS OF BREATH: 0
COUGH: 0
BACK PAIN: 0
ABDOMINAL PAIN: 0

## 2021-02-28 NOTE — ED PROVIDER NOTES
[facility]        Pt Name: Miguelina Rowland  MRN: [id number]  [place] 1970  Date of evaluation: 2/28/2021  Provider: Jose Alejandro Bella PA-C  PCP: Claudetta Hug, MD    OTIS. I have evaluated this patient. My supervising physician was available for consultation. CHIEF COMPLAINT       Chief Complaint   Patient presents with    Hip Pain     Had right hip replaced 11/30. Chyrl Rend off bed onto right hip last night, states the pain has been increasing since then. See Dr Tyesha Nguyen for pain management. HISTORY OF PRESENT ILLNESS   (Location, Timing/Onset, Context/Setting, Quality, Duration, Modifying Factors, Severity, Associated Signs and Symptoms)  Note limiting factors. Miguelina Rowland is a 46 y.o. male who takes Vicodin for chronic pain and his pain specialist is Dr. Shira Pavon. He presents today with right hip pain status post mechanical fall which occurred last night at 7 PM.  Patient states that he was sitting on the side of bed and accidentally fell off and landed directly on the right hip. He had his right hip replaced by Dr. Ny Caban, orthopedic surgeon, on 11/30/2020. He uses a cane to ambulate. Nursing Notes were all reviewed and agreed with or any disagreements were addressed in the HPI. REVIEW OF SYSTEMS    (2-9 systems for level 4, 10 or more for level 5)     Review of Systems   Constitutional: Negative for chills and fever. HENT: Negative. Eyes: Negative for visual disturbance. Respiratory: Negative for cough and shortness of breath. Cardiovascular: Negative for chest pain, palpitations and leg swelling. Gastrointestinal: Negative for abdominal pain. Endocrine: Negative. Genitourinary: Negative. Musculoskeletal: Positive for myalgias. Negative for back pain, neck pain and neck stiffness. Skin: Negative for color change, pallor, rash and wound. Neurological: Negative. Psychiatric/Behavioral: Negative for confusion. All other systems reviewed and are negative. Positives and Pertinent negatives as per HPI. Except as noted above in the ROS, all other systems were reviewed and negative. PAST MEDICAL HISTORY     Past Medical History:   Diagnosis Date    Allergic sinusitis     Anxiety and depression     currently under care of pyschiatrist    COPD (chronic obstructive pulmonary disease) ([facility] [address]     Hip pain 09/05/2017    ? CAM possible bilateral superior femoral head-heck junction bump suggestive of femoro acetabular impingement syndrome    Insomnia     Localized edema 2/24/2018    Neg abd ultrasound and DVT    Osteoporosis     Spontaneous pneumothorax     1991    Testicular torsion     Wears dentures     Wears glasses          SURGICAL HISTORY     Past Surgical History:   Procedure Laterality Date    BACK SURGERY  12/26/2016    1st rods, 2nd tens unit 3rd rods and screw removed, only has cages now   [address]      x 2    TOTAL HIP ARTHROPLASTY Left 1/29/2019    TOTAL HIP REPLACEMENT LEFT HIP (ADVANCED) performed by Helio Sullivan MD at [email or web address] Right 11/30/2020    TOTAL RIGHT HIP REPLACEMENT performed by Helio Sullivan MD at [facility]       Previous Medications    ALBUTEROL SULFATE  (90 BASE) MCG/ACT INHALER    Inhale 2 puffs into the lungs every 4 hours as needed for Wheezing    AMITRIPTYLINE (ELAVIL) 25 MG TABLET    2 po q HS    ASPIRIN EC 81 MG EC TABLET    Take 1 tablet by mouth 2 times daily Take for DVT blood clot prophylaxis. Please avoid missing doses.     DENOSUMAB (PROLIA) 60 MG/ML SOSY SC INJECTION    Inject 1 mL into the skin once for 1 dose    FLUTICASONE-SALMETEROL (ADVAIR HFA) 45-21 MCG/ACT INHALER    Inhale 2 puffs into the lungs 2 times daily    LORATADINE (CLARITIN) 10 MG TABLET    TAKE ONE TABLET BY MOUTH EVERY EVENING    MONTELUKAST (SINGULAIR) 10 MG TABLET    Take 1 tablet by mouth nightly    NICOTINE (NICODERM CQ) 14 MG/24HR    Place 1 patch onto the skin every 24 hours    TENS UNIT MISC    by Does not apply route    TIZANIDINE (ZANAFLEX) 4 MG TABLET    1 tab q 8 hours as needed    VITAMIN D (ERGOCALCIFEROL) 1.25 MG (84788 UT) CAPS CAPSULE    Take 50,000 Units by mouth once a week         ALLERGIES     Naproxen, Neurontin [gabapentin], Robaxin [methocarbamol], Sulfamethoxazole-trimethoprim, Toradol [ketorolac tromethamine], Azithromycin, Bactrim [sulfamethoxazole-trimethoprim], Biaxin [clarithromycin], Celebrex [celecoxib], Diclofenac, Sulfa antibiotics, and Tramadol    FAMILYHISTORY       Family History   Problem Relation Age of Onset    Arthritis Mother     Other Mother     Other Father           SOCIAL HISTORY       Social History     Tobacco Use    Smoking status: Current Every Day Smoker     Packs/day: 0.75     Years: 30.00     Pack years: 22.50     Types: Cigarettes    Smokeless tobacco: Never Used    Tobacco comment: using patches   Substance Use Topics    Alcohol use: No    Drug use: No     Types: Marijuana     Comment: In High School Only       SCREENINGS             PHYSICAL EXAM    (up to 7 for level 4, 8 or more for level 5)     ED Triage Vitals [02/28/21 1334]   BP Temp Temp Source Pulse Resp SpO2 Height Weight   130/71 97.5 °F (36.4 °C) Oral 106 15 98 % 5' 6\" (1.676 m) 130 lb (59 kg)       Physical Exam  Vitals signs and nursing note reviewed. Constitutional:       Appearance: Normal appearance. He is well-developed. He is not toxic-appearing or diaphoretic. HENT:      Head: Normocephalic and atraumatic. Right Ear: External ear normal.      Left Ear: External ear normal.      Nose: Nose normal.      Mouth/Throat:      Mouth: Mucous membranes are moist.      Pharynx: Oropharynx is clear. Eyes:      General: No scleral icterus. Right eye: No discharge. Left eye: No discharge.       Extraocular Movements: Extraocular movements intact. Conjunctiva/sclera: Conjunctivae normal.      Pupils: Pupils are equal, round, and reactive to light. Neck:      Musculoskeletal: Normal range of motion. Cardiovascular:      Rate and Rhythm: Normal rate. Pulses:           Femoral pulses are 2+ on the right side and 2+ on the left side. Dorsalis pedis pulses are 2+ on the right side and 2+ on the left side. Posterior tibial pulses are 2+ on the right side and 2+ on the left side. Pulmonary:      Effort: Pulmonary effort is normal.      Breath sounds: Normal breath sounds. Abdominal:      General: Bowel sounds are normal. There is no distension or abdominal bruit. Palpations: Abdomen is soft. There is no pulsatile mass. Tenderness: There is no abdominal tenderness. There is no right CVA tenderness, left CVA tenderness, guarding or rebound. Negative signs include Burnham's sign, Rovsing's sign, McBurney's sign, psoas sign and obturator sign. Musculoskeletal:      Right hip: He exhibits decreased range of motion and tenderness. He exhibits normal strength, no bony tenderness, no swelling, no crepitus, no deformity and no laceration. Right knee: Normal.      Right ankle: Normal. Achilles tendon normal.      Cervical back: Normal.      Thoracic back: Normal.      Lumbar back: Normal.      Right upper leg: Normal.      Right lower leg: Normal.      Right foot: Normal.      Comments: No midline vertebral tenderness or step-off deformity. Negative straight leg raise. No saddle paresthesia or foot drop. Able to heel and toe walk without difficulty or deficit. 5/5 strength in all four extremities without focal weakness, paresthesia or radiculopathy   Skin:     General: Skin is warm and dry. Capillary Refill: Capillary refill takes less than 2 seconds. Coloration: Skin is not jaundiced or pale. Findings: No bruising, erythema, lesion or rash.    Neurological:      Mental Status: He is alert and oriented to person, place, and time. Mental status is at baseline. Sensory: No sensory deficit. Motor: No weakness. Deep Tendon Reflexes: Reflexes normal.   Psychiatric:         Mood and Affect: Mood normal.         Behavior: Behavior normal.         DIAGNOSTIC RESULTS   LABS:    Labs Reviewed - No data to display    All other labs were within normal range or not returned as of this dictation. EKG: All EKG's are interpreted by the Emergency Department Physician in the absence of a cardiologist.  Please see their note for interpretation of EKG. RADIOLOGY:   Non-plain film images such as CT, Ultrasound and MRI are read by the radiologist. Plain radiographic images are visualized and preliminarily interpreted by the ED Provider with the below findings:        Interpretation per the Radiologist below, if available at the time of this note:    XR HIP RIGHT (2-3 VIEWS)   Final Result   Soft tissue swelling without fracture. PROCEDURES   Unless otherwise noted below, none     Procedures    CRITICAL CARE TIME   N/A    CONSULTS:  None      EMERGENCY DEPARTMENT COURSE and DIFFERENTIAL DIAGNOSIS/MDM:   Vitals:    Vitals:    02/28/21 1334 02/28/21 1426   BP: 130/71    Pulse: 106 99   Resp: 15    Temp: 97.5 °F (36.4 °C)    TempSrc: Oral    SpO2: 98%    Weight: 130 lb (59 kg)    Height: 5' 6\" (1.676 m)        Patient was given the following medications:  Medications   fentaNYL (SUBLIMAZE) injection 25 mcg (25 mcg Intramuscular Given 2/28/21 1356)           This patient presents with right hip pain status post blunt trauma from mechanical fall last night. X-ray shows no disruption of the prior hip replacement or acute osseous abnormality. He is able to ambulate with use of his cane. He already has narcotic medication and a pain specialist at home. He received fentanyl injection here and tolerated this well without immediate complications or emesis.   He is otherwise stable for discharge. My suspicion is low for SCFE, avascular necrosis, Osgood-Schlatter syndrome, prosthetic complication, cauda equina, central cord syndrome, acute spinefracture or dislocation, epidural abscess or hematoma, discitis, meningitis, encephalitis, transverse myelitis, pyelonephritis, perinephric abscess, urosepsis, kidney stone, AAA, dissection, shingles, subungual hematoma, paronychia, eponychia, felon, flexor tenosynovitis,  foreign body, tendon rupture, compartment syndrome, acute fracture, dislocation, DVT, arterial compromise or occlusion, limb ischemia, gout, septic joint, abscess, cellulitis, osteomyelitis, or other concerning pathology. We have addressed concerns and expectations. FINAL IMPRESSION      1. Acute right hip pain    2. Contusion of right hip, initial encounter          DISPOSITION/PLAN   DISPOSITION Decision To Discharge 02/28/2021 02:26:27 PM      PATIENT REFERREDTO:  Zonia Mccartney MD  [address] [employer]  [phone]    In 3 days      [facility] Emergency Department  [address] [facility]  [phone]    If symptoms worsen    Eldon Oliveira MD  [address] [facility]  [address] [place] [address]  [phone]      follow up with your orthopedist as needed    follow up with your pain specialist            DISCHARGE MEDICATIONS:  New Prescriptions    LIDOCAINE (LIDODERM) 5 %    Place 1 patch onto the skin daily 12 hours on, 12 hours off.        DISCONTINUED MEDICATIONS:  Discontinued Medications    No medications on file              (Please note that portions of this note were completed with a voice recognition program.  Efforts were made to edit the dictations but occasionally words are mis-transcribed.)    Jen Bowling PA-C (electronically signed)            Jen Bowling PA-C  02/28/21 3723

## 2021-02-28 NOTE — ED NOTES
Pt discharged in stable condition, VSS, no signs of distress. Discharge instructions and meds reviewed. Pt verbalizes understanding and states no further questions or concerns unaddressed.        Katiana Holm RN  02/28/21 6100

## 2021-03-08 ENCOUNTER — OFFICE VISIT (OUTPATIENT)
Dept: ENDOCRINOLOGY | Age: 51
End: 2021-03-08
Payer: COMMERCIAL

## 2021-03-08 VITALS
RESPIRATION RATE: 14 BRPM | DIASTOLIC BLOOD PRESSURE: 89 MMHG | HEART RATE: 90 BPM | HEIGHT: 66 IN | SYSTOLIC BLOOD PRESSURE: 148 MMHG | BODY MASS INDEX: 21.86 KG/M2 | WEIGHT: 136 LBS | TEMPERATURE: 97.6 F

## 2021-03-08 DIAGNOSIS — M81.0 AGE-RELATED OSTEOPOROSIS WITHOUT CURRENT PATHOLOGICAL FRACTURE: Primary | ICD-10-CM

## 2021-03-08 DIAGNOSIS — Z72.0 TOBACCO ABUSE: ICD-10-CM

## 2021-03-08 PROCEDURE — G8427 DOCREV CUR MEDS BY ELIG CLIN: HCPCS | Performed by: INTERNAL MEDICINE

## 2021-03-08 PROCEDURE — 3017F COLORECTAL CA SCREEN DOC REV: CPT | Performed by: INTERNAL MEDICINE

## 2021-03-08 PROCEDURE — 4004F PT TOBACCO SCREEN RCVD TLK: CPT | Performed by: INTERNAL MEDICINE

## 2021-03-08 PROCEDURE — 99214 OFFICE O/P EST MOD 30 MIN: CPT | Performed by: INTERNAL MEDICINE

## 2021-03-08 PROCEDURE — G8420 CALC BMI NORM PARAMETERS: HCPCS | Performed by: INTERNAL MEDICINE

## 2021-03-08 PROCEDURE — G8484 FLU IMMUNIZE NO ADMIN: HCPCS | Performed by: INTERNAL MEDICINE

## 2021-03-08 RX ORDER — DENOSUMAB 60 MG/ML
60 INJECTION SUBCUTANEOUS ONCE
Qty: 1 ML | Refills: 0 | Status: SHIPPED | OUTPATIENT
Start: 2021-03-08 | End: 2021-04-28 | Stop reason: ALTCHOICE

## 2021-03-08 RX ORDER — ACETAMINOPHEN AND CODEINE PHOSPHATE 300; 30 MG/1; MG/1
TABLET ORAL
COMMUNITY
End: 2021-03-12

## 2021-03-08 RX ORDER — HYDROCODONE BITARTRATE AND ACETAMINOPHEN 10; 325 MG/1; MG/1
TABLET ORAL
COMMUNITY

## 2021-03-08 NOTE — LETTER
[facility]) [facility] End[facility]  [address]  Phone: [phone]  Fax: [phone]    Ibeth Reddy MD        March 8, 2021     Patient: Reza Fonseca   YOB: 1970   Date of Visit: 3/8/2021       To Whom It May Concern:    I see Mr. Tania Closs for osteoporosis. He has been on fosamax   In the past for 5 years which is a bisphosphanate. He has persistent low BMD.I would advise a different class of medication  Prolia was approved and he had been on it, no new fractures   Last Dexa 5/20 showed stable Bone mineral density. Not due to for Dexa now. It is my medical opinion that Tania Closs be allowed the prolia  If you have any questions or concerns, please don't hesitate to call.     Sincerely,          Ibeth Reddy MD

## 2021-03-12 ENCOUNTER — VIRTUAL VISIT (OUTPATIENT)
Dept: INTERNAL MEDICINE CLINIC | Age: 51
End: 2021-03-12
Payer: COMMERCIAL

## 2021-03-12 ENCOUNTER — TELEPHONE (OUTPATIENT)
Dept: INTERNAL MEDICINE CLINIC | Age: 51
End: 2021-03-12

## 2021-03-12 DIAGNOSIS — F41.1 GAD (GENERALIZED ANXIETY DISORDER): Primary | ICD-10-CM

## 2021-03-12 DIAGNOSIS — F33.9 EPISODE OF RECURRENT MAJOR DEPRESSIVE DISORDER, UNSPECIFIED DEPRESSION EPISODE SEVERITY (HCC): ICD-10-CM

## 2021-03-12 PROCEDURE — G8427 DOCREV CUR MEDS BY ELIG CLIN: HCPCS | Performed by: INTERNAL MEDICINE

## 2021-03-12 PROCEDURE — 99213 OFFICE O/P EST LOW 20 MIN: CPT | Performed by: INTERNAL MEDICINE

## 2021-03-12 PROCEDURE — 3017F COLORECTAL CA SCREEN DOC REV: CPT | Performed by: INTERNAL MEDICINE

## 2021-03-12 RX ORDER — SERTRALINE HYDROCHLORIDE 25 MG/1
25 TABLET, FILM COATED ORAL DAILY
Qty: 30 TABLET | Refills: 3 | Status: SHIPPED | OUTPATIENT
Start: 2021-03-12 | End: 2021-03-19

## 2021-03-12 RX ORDER — LORAZEPAM 0.5 MG/1
0.5 TABLET ORAL EVERY 8 HOURS PRN
Qty: 7 TABLET | Refills: 0 | Status: SHIPPED | OUTPATIENT
Start: 2021-03-12 | End: 2021-03-12 | Stop reason: SDUPTHER

## 2021-03-12 RX ORDER — LORAZEPAM 0.5 MG/1
0.5 TABLET ORAL NIGHTLY PRN
Qty: 7 TABLET | Refills: 0 | Status: SHIPPED | OUTPATIENT
Start: 2021-03-12 | End: 2021-03-19 | Stop reason: SDUPTHER

## 2021-03-12 ASSESSMENT — PATIENT HEALTH QUESTIONNAIRE - PHQ9
SUM OF ALL RESPONSES TO PHQ QUESTIONS 1-9: 2
1. LITTLE INTEREST OR PLEASURE IN DOING THINGS: 1
SUM OF ALL RESPONSES TO PHQ QUESTIONS 1-9: 2
SUM OF ALL RESPONSES TO PHQ QUESTIONS 1-9: 2

## 2021-03-12 ASSESSMENT — ENCOUNTER SYMPTOMS
COUGH: 0
SHORTNESS OF BREATH: 0
TROUBLE SWALLOWING: 0
VOICE CHANGE: 0

## 2021-03-12 ASSESSMENT — ANXIETY QUESTIONNAIRES
6. BECOMING EASILY ANNOYED OR IRRITABLE: 3-NEARLY EVERY DAY
7. FEELING AFRAID AS IF SOMETHING AWFUL MIGHT HAPPEN: 3-NEARLY EVERY DAY
4. TROUBLE RELAXING: 3-NEARLY EVERY DAY

## 2021-03-12 NOTE — TELEPHONE ENCOUNTER
Pt. Asking if there is anything sooner than 4/28/21 for Dr. Hendricks Every. Pt. Said doctor Tri wanted him seen asap.

## 2021-03-12 NOTE — PROGRESS NOTES
3/12/2021    TELEHEALTH EVALUATION -- Audio/Visual (During ETQQX-84 public health emergency)    HPI:    Cheyenne Lerma (:  1970) has requested an audio/video evaluation for the following concern(s):    Patient has been complaining of worsening multiple generalized anxiety symptoms over the last 1 week. He never had this kind of episode since his mother  in . He has been following up with psychologist however doing biofeedback relaxation technique has not been helping. His JUANCHO-7 score is 18 today. He is unable to sleep at night. His brain has been wandering. He is no longer taking amitriptyline. Patient denies using any illicit drugs. Does not use alcohol. He has been compliant with his follow-up with chronic pain management    He have chronic pain affecting his hip joint back. He denies any suicidal homicidal ideation. Review of Systems   Constitutional: Negative for diaphoresis and unexpected weight change. HENT: Negative for trouble swallowing and voice change. Respiratory: Negative for cough and shortness of breath. He feels only Breo inhaler helped him. However his insurance will not cover that medicine. He is not using Advair as it did not help   Cardiovascular: Negative for palpitations. Neurological: Negative for dizziness and light-headedness. Psychiatric/Behavioral: Positive for decreased concentration and sleep disturbance. Negative for hallucinations and suicidal ideas. The patient is nervous/anxious. Prior to Visit Medications    Medication Sig Taking? Authorizing Provider   sertraline (ZOLOFT) 25 MG tablet Take 1 tablet by mouth daily Yes Shoshana Khan MD   LORazepam (ATIVAN) 0.5 MG tablet Take 1 tablet by mouth nightly as needed for Anxiety for up to 7 days.  Yes Shoshana Khan MD   HYDROcodone-acetaminophen (NORCO)  MG per tablet hydrocodone 10 mg-acetaminophen 325 mg tablet Yes Historical Provider, MD   montelukast (SINGULAIR) 10 MG tablet Take 1 tablet by mouth nightly Yes Aj Brower MD   tiZANidine (ZANAFLEX) 4 MG tablet 1 tab q 8 hours as needed Yes Aj Brower MD   albuterol sulfate  (90 Base) MCG/ACT inhaler Inhale 2 puffs into the lungs every 4 hours as needed for Wheezing Yes Aj Brower MD   vitamin D (ERGOCALCIFEROL) 1.25 MG (31519 UT) CAPS capsule Take 50,000 Units by mouth once a week Yes Historical Provider, MD   loratadine (CLARITIN) 10 MG tablet TAKE ONE TABLET BY MOUTH EVERY EVENING Yes Aj Brower MD   Tens Unit MISC by Does not apply route Yes Alphonssanjuanita Dykes, APRN - CNP   denosumab (PROLIA) 60 MG/ML SOSY SC injection Inject 1 mL into the skin once for 1 dose  Fly Briseno MD   lidocaine (LIDODERM) 5 % Place 1 patch onto the skin daily 12 hours on, 12 hours off. Jeniffer García PA-C   denosumab (PROLIA) 60 MG/ML SOSY SC injection Inject 1 mL into the skin once for 1 dose  Fly Briseno MD   nicotine (NICODERM CQ) 14 MG/24HR Place 1 patch onto the skin every 24 hours  Aj Brower MD       Social History     Tobacco Use    Smoking status: Current Every Day Smoker     Packs/day: 0.75     Years: 30.00     Pack years: 22.50     Types: Cigarettes    Smokeless tobacco: Never Used    Tobacco comment: using patches   Substance Use Topics    Alcohol use: No    Drug use: No     Types: Marijuana     Comment: In High School Only        Allergies   Allergen Reactions    Naproxen Swelling     He can tolerate aspirin    Neurontin [Gabapentin] Swelling     Edema lower extr.   Leg swelling    Robaxin [Methocarbamol] Diarrhea, Itching and Other (See Comments)     Eyes swell/yellowish and get bloodshot    Sulfamethoxazole-Trimethoprim Diarrhea    Toradol [Ketorolac Tromethamine] Swelling and Other (See Comments)     Other reaction(s): Headaches  Eyes swell and bloodshot    Azithromycin Nausea And Vomiting    Bactrim [Sulfamethoxazole-Trimethoprim] Diarrhea    Biaxin [Clarithromycin] INSTRUCTIONS:  \"[x]\" Indicates a positive item  \"[]\" Indicates a negative item  -- DELETE ALL ITEMS NOT EXAMINED]  Vital Signs: (As obtained by patient/caregiver or practitioner observation)      Constitutional: [x] Appears well-developed and well-nourished [x] No apparent distress      [] Abnormal-   Mental status  [x] Alert and awake  [x] Oriented to person/place/time [x]Able to follow commands      Eyes:  EOM    []  Normal  [] Abnormal-  Sclera  [x]  Normal  [] Abnormal -         Discharge [x]  None visible  [] Abnormal -    HENT:   [] Normocephalic, atraumatic. [] Abnormal   [] Mouth/Throat: Mucous membranes are moist.     External Ears [] Normal  [] Abnormal-     Neck: [] No visualized mass     Pulmonary/Chest: [x] Respiratory effort normal.  [x] No visualized signs of difficulty breathing or respiratory distress        [] Abnormal-      Musculoskeletal:   [x] Normal gait with no signs of ataxia         [] Normal range of motion of neck        [] Abnormal-       Neurological:        [x] No Facial Asymmetry (Cranial nerve 7 motor function) (limited exam to video visit)          [] No gaze palsy        [] Abnormal-         Skin:        [] No significant exanthematous lesions or discoloration noted on facial skin         [] Abnormal-            Psychiatric:       [] Normal Affect [] No Hallucinations        [] Abnormal-     Other pertinent observable physical exam findings-   The current medical regimen is effective;  continue present plan and medications. Controlled Substance Monitoring:    Acute and Chronic Pain Monitoring:   RX Monitoring 3/12/2021   Attestation -   Periodic Controlled Substance Monitoring Possible medication side effects, risk of tolerance/dependence & alternative treatments discussed. ;No signs of potential drug abuse or diversion identified.    -         ASSESSMENT/PLAN:  1. JUANCHO (generalized anxiety disorder)  And panic attack    - LORazepam (ATIVAN) 0.5 MG tablet;   Po qhs  Anxiety for up to 7 days. Dispense: 7 tablet; Refill: 0  - Ambulatory referral to Psychiatry  - sertraline (ZOLOFT) 25 MG tablet; Take 1 tablet by mouth daily  Dispense: 30 tablet; Refill: 3    2. Episode of recurrent major depressive disorder, unspecified depression episode severity (HCC)    - LORazepam (ATIVAN) 0.5 MG tablet; Anxiety for up to 7 days. Dispense: 7 tablet; Refill: 0  - Ambulatory referral to Psychiatry  - sertraline (ZOLOFT) 25 MG tablet; Take 1 tablet by mouth daily  Dispense: 30 tablet; Refill: 3      Return in about 2 weeks (around 3/26/2021) for anxiety, depression. Lyla Maravilla, was evaluated through a synchronous (real-time) audio-video encounter. The patient (or guardian if applicable) is aware that this is a billable service. Verbal consent to proceed has been obtained within the past 12 months. The visit was conducted pursuant to the emergency declaration under the [address] [place] [address] authority and the [employer] and [employer] General Act. Patient identification was verified, and a caregiver was present when appropriate. The patient was located in a state where the provider was credentialed to provide care. Total time spent on this encounter: Not billed by time    --Alicia Lake MD on 3/12/2021 at 11:35 AM    An electronic signature was used to authenticate this note.

## 2021-03-19 ENCOUNTER — OFFICE VISIT (OUTPATIENT)
Dept: INTERNAL MEDICINE CLINIC | Age: 51
End: 2021-03-19
Payer: COMMERCIAL

## 2021-03-19 VITALS
SYSTOLIC BLOOD PRESSURE: 122 MMHG | HEIGHT: 66 IN | TEMPERATURE: 96.7 F | DIASTOLIC BLOOD PRESSURE: 80 MMHG | HEART RATE: 72 BPM | WEIGHT: 132 LBS | BODY MASS INDEX: 21.21 KG/M2

## 2021-03-19 DIAGNOSIS — J30.1 SEASONAL ALLERGIC RHINITIS DUE TO POLLEN: ICD-10-CM

## 2021-03-19 DIAGNOSIS — F41.1 GAD (GENERALIZED ANXIETY DISORDER): ICD-10-CM

## 2021-03-19 DIAGNOSIS — J43.2 CENTRILOBULAR EMPHYSEMA (HCC): ICD-10-CM

## 2021-03-19 DIAGNOSIS — Z91.89 COLON CANCER HIGH RISK: ICD-10-CM

## 2021-03-19 DIAGNOSIS — R00.2 PALPITATIONS: ICD-10-CM

## 2021-03-19 DIAGNOSIS — F33.9 EPISODE OF RECURRENT MAJOR DEPRESSIVE DISORDER, UNSPECIFIED DEPRESSION EPISODE SEVERITY (HCC): ICD-10-CM

## 2021-03-19 DIAGNOSIS — F41.1 GAD (GENERALIZED ANXIETY DISORDER): Primary | ICD-10-CM

## 2021-03-19 DIAGNOSIS — F11.20 NARCOTIC DEPENDENCY, CONTINUOUS (HCC): ICD-10-CM

## 2021-03-19 LAB
FOLATE: 8.82 NG/ML (ref 4.78–24.2)
TSH REFLEX: 1.98 UIU/ML (ref 0.27–4.2)
VITAMIN B-12: 365 PG/ML (ref 211–911)

## 2021-03-19 PROCEDURE — G8420 CALC BMI NORM PARAMETERS: HCPCS | Performed by: INTERNAL MEDICINE

## 2021-03-19 PROCEDURE — G8427 DOCREV CUR MEDS BY ELIG CLIN: HCPCS | Performed by: INTERNAL MEDICINE

## 2021-03-19 PROCEDURE — G8926 SPIRO NO PERF OR DOC: HCPCS | Performed by: INTERNAL MEDICINE

## 2021-03-19 PROCEDURE — 4004F PT TOBACCO SCREEN RCVD TLK: CPT | Performed by: INTERNAL MEDICINE

## 2021-03-19 PROCEDURE — G8484 FLU IMMUNIZE NO ADMIN: HCPCS | Performed by: INTERNAL MEDICINE

## 2021-03-19 PROCEDURE — 3023F SPIROM DOC REV: CPT | Performed by: INTERNAL MEDICINE

## 2021-03-19 PROCEDURE — 3017F COLORECTAL CA SCREEN DOC REV: CPT | Performed by: INTERNAL MEDICINE

## 2021-03-19 PROCEDURE — 99214 OFFICE O/P EST MOD 30 MIN: CPT | Performed by: INTERNAL MEDICINE

## 2021-03-19 RX ORDER — MONTELUKAST SODIUM 10 MG/1
10 TABLET ORAL NIGHTLY
Qty: 30 TABLET | Refills: 5 | Status: SHIPPED | OUTPATIENT
Start: 2021-03-19 | End: 2021-09-26 | Stop reason: SDUPTHER

## 2021-03-19 RX ORDER — LORATADINE 10 MG/1
TABLET ORAL
Qty: 30 TABLET | Refills: 5 | Status: SHIPPED | OUTPATIENT
Start: 2021-03-19 | End: 2022-09-01 | Stop reason: SDUPTHER

## 2021-03-19 RX ORDER — LORAZEPAM 0.5 MG/1
0.5 TABLET ORAL NIGHTLY PRN
Qty: 30 TABLET | Refills: 0 | Status: SHIPPED | OUTPATIENT
Start: 2021-03-19 | End: 2021-04-26 | Stop reason: SDUPTHER

## 2021-03-19 RX ORDER — NICOTINE 21 MG/24HR
1 PATCH, TRANSDERMAL 24 HOURS TRANSDERMAL DAILY
Qty: 42 PATCH | Refills: 0 | Status: SHIPPED | OUTPATIENT
Start: 2021-03-19 | End: 2021-10-25 | Stop reason: SDUPTHER

## 2021-03-19 ASSESSMENT — ENCOUNTER SYMPTOMS
CHEST TIGHTNESS: 0
ABDOMINAL PAIN: 0
VOMITING: 0
VOICE CHANGE: 0
TROUBLE SWALLOWING: 0
SHORTNESS OF BREATH: 1
PHOTOPHOBIA: 0
NAUSEA: 0
WHEEZING: 1

## 2021-03-19 NOTE — PROGRESS NOTES
Hugo Alvarez  1970  male  46 y.o. SUBJECTIVE:       Chief Complaint   Patient presents with    Other     1 week f/u       HPI:  Follow-up visit. Patient continues to suffer from significant generalized anxiety symptoms and panic attack. At times he feels he cannot breathe at night. He have to walk around for an hour to relax. He has made appointment with psychiatrist but not until the end of April. He denies abusing any illicit drugs. Continue to suffer from chronic pains affecting lower back and hip joints and following with pain management. Continue to smoke cigarettes. Currently use albuterol as needed. He is requesting alternate long-term maintenance inhalers. He is motivated to restart nicotine patches and to quit smoking. Past Medical History:   Diagnosis Date    Allergic sinusitis     Anxiety and depression     currently under care of pyschiatrist    COPD (chronic obstructive pulmonary disease) ([facility] [address] [facility]     Hip pain 09/05/2017    ?  CAM possible bilateral superior femoral head-heck junction bump suggestive of femoro acetabular impingement syndrome    Insomnia     Localized edema 2/24/2018    Neg abd ultrasound and DVT    Osteoporosis     Spontaneous pneumothorax     1991    Testicular torsion     Wears dentures     Wears glasses      Past Surgical History:   Procedure Laterality Date    BACK SURGERY  12/26/2016    1st rods, 2nd tens unit 3rd rods and screw removed, only has cages now   [address]      x 2    TOTAL HIP ARTHROPLASTY Left 1/29/2019    TOTAL HIP REPLACEMENT LEFT HIP (ADVANCED) performed by Antoine Myrick MD at [address] [place] [address] Right 11/30/2020    TOTAL RIGHT HIP REPLACEMENT performed by Antoine Myrick MD at [place] [address] History     Socioeconomic History    Marital status:      Spouse name: None    Number of children: None    Years of education: None    Highest education level: None   Occupational History    Occupation: unemployed     Comment: [employer], [employer],   Social Needs    Financial resource strain: None    Food insecurity     Worry: None     Inability: None    Transportation needs     Medical: None     Non-medical: None   Tobacco Use    Smoking status: Current Every Day Smoker     Packs/day: 1.00     Years: 30.00     Pack years: 30.00     Types: Cigarettes    Smokeless tobacco: Never Used    Tobacco comment: using patches   Substance and Sexual Activity    Alcohol use: No    Drug use: No     Types: Marijuana     Comment: In High School Only    Sexual activity: Never   Lifestyle    Physical activity     Days per week: None     Minutes per session: None    Stress: None   Relationships    Social connections     Talks on phone: None     Gets together: None     Attends Rastafari service: None     Active member of club or organization: None     Attends meetings of clubs or organizations: None     Relationship status: None    Intimate partner violence     Fear of current or ex partner: None     Emotionally abused: None     Physically abused: None     Forced sexual activity: None   Other Topics Concern    None   Social History Narrative    None     Family History   Problem Relation Age of Onset    Arthritis Mother     Other Mother     Other Father        Review of Systems   Constitutional: Negative for diaphoresis and unexpected weight change. HENT: Negative for trouble swallowing and voice change. Eyes: Negative for photophobia and visual disturbance. Respiratory: Positive for shortness of breath and wheezing. Negative for chest tightness. Cardiovascular: Negative for chest pain and palpitations. Occasional palpitation. Gastrointestinal: Negative for abdominal pain, nausea and vomiting. Neurological: Negative for dizziness and light-headedness.        OBJECTIVE:  Pulse Readings from Last 4 Encounters:   03/19/21 72   03/08/21 90   02/28/21 99   12/01/20 82     Wt Readings from Last 4 Encounters:   03/19/21 132 lb (59.9 kg)   03/08/21 136 lb (61.7 kg)   02/28/21 130 lb (59 kg)   01/19/21 133 lb (60.3 kg)     BP Readings from Last 4 Encounters:   03/19/21 122/80   03/08/21 (!) 148/89   02/28/21 130/71   12/01/20 121/75     Physical Exam  Vitals signs and nursing note reviewed. Constitutional:       General: He is not in acute distress. Appearance: He is not diaphoretic. Eyes:      Conjunctiva/sclera: Conjunctivae normal.   Cardiovascular:      Rate and Rhythm: Normal rate and regular rhythm. Pulses: Normal pulses. Heart sounds: Normal heart sounds. Pulmonary:      Effort: Pulmonary effort is normal.      Breath sounds: No wheezing, rhonchi or rales. Neurological:      General: No focal deficit present. Mental Status: He is alert and oriented to person, place, and time.          CBC:   Lab Results   Component Value Date    WBC 9.0 11/18/2020    HGB 12.5 12/01/2020    HCT 36.6 12/01/2020     11/18/2020     CMP:  Lab Results   Component Value Date     11/18/2020    K 4.3 11/18/2020     11/18/2020    CO2 29 11/18/2020    ANIONGAP 9 11/18/2020    GLUCOSE 96 11/18/2020    BUN 7 11/18/2020    CREATININE 0.9 11/18/2020    GFRAA >60 11/18/2020    CALCIUM 9.3 11/18/2020    PROT 7.0 11/14/2018    LABALBU 4.1 11/18/2020    AGRATIO 1.7 06/24/2015    BILITOT 0.4 11/14/2018    ALKPHOS 119 11/14/2018    ALT 27 11/14/2018    AST 20 11/14/2018    GLOB 2.5 06/24/2015     URINALYSIS:  Lab Results   Component Value Date    GLUCOSEU Negative 11/18/2020    KETUA Negative 11/18/2020    SPECGRAV 1.023 11/18/2020    BLOODU Negative 11/18/2020    PHUR 6.0 11/18/2020    PROTEINU Negative 11/18/2020    NITRU Negative 11/18/2020    LEUKOCYTESUR Negative 11/18/2020    LABMICR Not Indicated 11/18/2020    URINETYPE NotGiven 11/18/2020     HBA1C:   Lab Results   Component Value Date    LABA1C 5.4 12/01/2020    .3 12/01/2020     MICRO/ALB:   Lab Results Component Value Date    LABMICR Not Indicated 11/18/2020    LABCREA 39.6 11/14/2018     LIPID:  Lab Results   Component Value Date    CHOL 154 12/13/2017    TRIG 122 12/13/2017    HDL 34 12/13/2017    LDLCALC 96 12/13/2017    LABVLDL 24 12/13/2017     TSH:   Lab Results   Component Value Date    TSHREFLEX 2.42 06/24/2015          ASSESSMENT/PLAN:  Assessment/Plan:  Raimundo Khoury was seen today for other. Diagnoses and all orders for this visit:  We will increase his Zoloft to 50 mg/day. He will keep his appointment with psychiatrist.  We will continue low-dose Ativan at night. JUANCHO (generalized anxiety disorder)  -     Drug Panel-PM-HI Res-UR Interp-A; Future  -     sertraline (ZOLOFT) 50 MG tablet; Take 1 tablet by mouth daily  -     TSH with Reflex; Future  -     VITAMIN B12 & FOLATE; Future  -     LORazepam (ATIVAN) 0.5 MG tablet; Take 1 tablet by mouth nightly as needed for Anxiety for up to 30 days. Seasonal allergic rhinitis due to pollen  -     loratadine (CLARITIN) 10 MG tablet; TAKE ONE TABLET BY MOUTH EVERY EVENING  -     montelukast (SINGULAIR) 10 MG tablet; Take 1 tablet by mouth nightly    Episode of recurrent major depressive disorder, unspecified depression episode severity ([facility]  -     Drug Panel-PM-HI Res-UR Interp-A; Future  -     sertraline (ZOLOFT) 50 MG tablet; Take 1 tablet by mouth daily  -     TSH with Reflex; Future  -     VITAMIN B12 & FOLATE; Future  -     LORazepam (ATIVAN) 0.5 MG tablet; Take 1 tablet by mouth nightly as needed for Anxiety for up to 30 days. Centrilobular emphysema (HCC)  -     fluticasone-salmeterol (ADVAIR HFA) 115-21 MCG/ACT inhaler; Inhale 2 puffs into the lungs 2 times daily  Albuterol as needed. Prescription nicotine patch. Narcotic dependency, continuous ([facility]  We will continue to follow-up with pain management specialist.  Colon cancer high risk  -     POCT Fecal Immunochemical Test (FIT); Future    Palpitations and occasional dyspnea.   Patient never pursue echocardiogram in the past which was ordered by his previous physician.  -     Echocardiogram complete; Future    Other orders  -     nicotine (NICODERM CQ) 21 MG/24HR; Place 1 patch onto the skin daily            Orders Placed This Encounter   Procedures    Drug Panel-PM-HI Res-UR Interp-A     Standing Status:   Future     Number of Occurrences:   1     Standing Expiration Date:   3/19/2022    TSH with Reflex     Standing Status:   Future     Number of Occurrences:   1     Standing Expiration Date:   3/19/2022    VITAMIN B12 & FOLATE     Standing Status:   Future     Number of Occurrences:   1     Standing Expiration Date:   3/19/2022    POCT Fecal Immunochemical Test (FIT)     Standing Status:   Future     Standing Expiration Date:   3/19/2022    Echocardiogram complete     Standing Status:   Future     Standing Expiration Date:   5/18/2021     Order Specific Question:   Reason for exam:     Answer:   recurremt palpitations, dyspnea     Current Outpatient Medications   Medication Sig Dispense Refill    loratadine (CLARITIN) 10 MG tablet TAKE ONE TABLET BY MOUTH EVERY EVENING 30 tablet 5    montelukast (SINGULAIR) 10 MG tablet Take 1 tablet by mouth nightly 30 tablet 5    nicotine (NICODERM CQ) 21 MG/24HR Place 1 patch onto the skin daily 42 patch 0    sertraline (ZOLOFT) 50 MG tablet Take 1 tablet by mouth daily 30 tablet 2    fluticasone-salmeterol (ADVAIR HFA) 115-21 MCG/ACT inhaler Inhale 2 puffs into the lungs 2 times daily 1 Inhaler 3    LORazepam (ATIVAN) 0.5 MG tablet Take 1 tablet by mouth nightly as needed for Anxiety for up to 30 days. 30 tablet 0    HYDROcodone-acetaminophen (NORCO)  MG per tablet hydrocodone 10 mg-acetaminophen 325 mg tablet      lidocaine (LIDODERM) 5 % Place 1 patch onto the skin daily 12 hours on, 12 hours off.  30 patch 0    tiZANidine (ZANAFLEX) 4 MG tablet 1 tab q 8 hours as needed 90 tablet 0    albuterol sulfate  (90 Base) MCG/ACT inhaler Inhale 2 puffs into the lungs every 4 hours as needed for Wheezing 1 Inhaler 2    vitamin D (ERGOCALCIFEROL) 1.25 MG (84589 UT) CAPS capsule Take 50,000 Units by mouth once a week      Tens Unit MISC by Does not apply route 1 each 0    denosumab (PROLIA) 60 MG/ML SOSY SC injection Inject 1 mL into the skin once for 1 dose 1 mL 0    denosumab (PROLIA) 60 MG/ML SOSY SC injection Inject 1 mL into the skin once for 1 dose 1 mL 0     No current facility-administered medications for this visit. Return in about 4 weeks (around 4/16/2021). An After Visit Summary was printed and given to the patient. Documentation was done using voice recognition dragon software. Every effort was made to ensure accuracy; however, inadvertent  Unintentional computerized transcription errors may be present.

## 2021-03-19 NOTE — PATIENT INSTRUCTIONS
[address] [place] [address]  [phone], option 2, then option 1    Bring FIT test kit with you.

## 2021-03-22 LAB
6-ACETYLMORPHINE: NOT DETECTED
7-AMINOCLONAZEPAM: NOT DETECTED
ALPHA-OH-ALPRAZOLAM: NOT DETECTED
ALPHA-OH-MIDAZOLAM, URINE: NOT DETECTED
ALPRAZOLAM: NOT DETECTED
AMPHETAMINE: NOT DETECTED
BARBITURATES: NOT DETECTED
BENZOYLECGONINE: NOT DETECTED
BUPRENORPHINE: NOT DETECTED
CARISOPRODOL: NOT DETECTED
CLONAZEPAM: NOT DETECTED
CODEINE: NOT DETECTED
CREATININE URINE: 40.8 MG/DL (ref 20–400)
DIAZEPAM: NOT DETECTED
DRUGS EXPECTED: NORMAL
EER PAIN MGT DRUG PANEL, HIGH RES/EMIT U: NORMAL
ETHYL GLUCURONIDE: NOT DETECTED
FENTANYL: NOT DETECTED
GABAPENTIN: NOT DETECTED
HYDROCODONE: PRESENT
HYDROMORPHONE: PRESENT
LORAZEPAM: PRESENT
MARIJUANA METABOLITE: NOT DETECTED
MDA: NOT DETECTED
MDEA: NOT DETECTED
MDMA URINE: NOT DETECTED
MEPERIDINE: NOT DETECTED
METHADONE: NOT DETECTED
METHAMPHETAMINE: NOT DETECTED
METHYLPHENIDATE: NOT DETECTED
MIDAZOLAM: NOT DETECTED
MORPHINE: NOT DETECTED
NALOXONE: NOT DETECTED
NORBUPRENORPHINE, FREE: NOT DETECTED
NORDIAZEPAM: NOT DETECTED
NORFENTANYL: NOT DETECTED
NORHYDROCODONE, URINE: PRESENT
NOROXYCODONE: NOT DETECTED
NOROXYMORPHONE, URINE: NOT DETECTED
OXAZEPAM: NOT DETECTED
OXYCODONE: NOT DETECTED
OXYMORPHONE: NOT DETECTED
PAIN MANAGEMENT DRUG PANEL: NORMAL
PAIN MANAGEMENT DRUG PANEL: NORMAL
PCP: NOT DETECTED
PHENTERMINE: NOT DETECTED
PREGABALIN: NOT DETECTED
TAPENTADOL, URINE: NOT DETECTED
TAPENTADOL-O-SULFATE, URINE: NOT DETECTED
TEMAZEPAM: NOT DETECTED
TRAMADOL: NOT DETECTED
ZOLPIDEM: NOT DETECTED

## 2021-03-23 ENCOUNTER — OFFICE VISIT (OUTPATIENT)
Dept: ORTHOPEDIC SURGERY | Age: 51
End: 2021-03-23
Payer: COMMERCIAL

## 2021-03-23 VITALS — WEIGHT: 132 LBS | TEMPERATURE: 98.2 F | HEIGHT: 66 IN | BODY MASS INDEX: 21.21 KG/M2

## 2021-03-23 DIAGNOSIS — S70.01XA CONTUSION OF RIGHT HIP, INITIAL ENCOUNTER: ICD-10-CM

## 2021-03-23 DIAGNOSIS — M96.1 LUMBAR POST-LAMINECTOMY SYNDROME: ICD-10-CM

## 2021-03-23 DIAGNOSIS — Z96.641 STATUS POST TOTAL REPLACEMENT OF RIGHT HIP: Primary | ICD-10-CM

## 2021-03-23 DIAGNOSIS — M51.36 LUMBAR DEGENERATIVE DISC DISEASE: ICD-10-CM

## 2021-03-23 PROCEDURE — G8428 CUR MEDS NOT DOCUMENT: HCPCS | Performed by: ORTHOPAEDIC SURGERY

## 2021-03-23 PROCEDURE — G8484 FLU IMMUNIZE NO ADMIN: HCPCS | Performed by: ORTHOPAEDIC SURGERY

## 2021-03-23 PROCEDURE — 3017F COLORECTAL CA SCREEN DOC REV: CPT | Performed by: ORTHOPAEDIC SURGERY

## 2021-03-23 PROCEDURE — 4004F PT TOBACCO SCREEN RCVD TLK: CPT | Performed by: ORTHOPAEDIC SURGERY

## 2021-03-23 PROCEDURE — 99213 OFFICE O/P EST LOW 20 MIN: CPT | Performed by: ORTHOPAEDIC SURGERY

## 2021-03-23 PROCEDURE — G8420 CALC BMI NORM PARAMETERS: HCPCS | Performed by: ORTHOPAEDIC SURGERY

## 2021-03-23 RX ORDER — METHYLPREDNISOLONE 4 MG/1
TABLET ORAL
Qty: 1 KIT | Refills: 0 | Status: SHIPPED | OUTPATIENT
Start: 2021-03-23 | End: 2021-03-29

## 2021-03-23 NOTE — PROGRESS NOTES
Lyla Maravilla  [id number]  March 23, 2021    Chief Complaint   Patient presents with    Follow-up     right total hip arthroplasty 11/30/2020             History: The patient is a 66-year-old gentleman who is here in follow-up regarding his right hip. He underwent a right total hip arthroplasty approximately 3-1/2 months ago. He was doing rather well. He reportedly fell off his bed approximately 5 to 6 weeks ago and he noted right hip pain. The pain has been rather persistent. Most of it is laterally. He did go to the emergency room at that time. The patient does have a past medical history remarkable for narcotic dependency, post laminectomy syndrome and anxiety. The patient's  past medical history, medications, allergies,  family history, social history, and review of systems have been reviewed, and dated and are recorded in the chart. Temp 98.2 °F (36.8 °C) (Temporal)   Ht 5' 6\" (1.676 m)   Wt 132 lb (59.9 kg)   BMI 21.31 kg/m²     Physical: Mr. Lyla Maravilla appears well, he is in no apparent distress, he demonstrates appropriate mood & affect. He is alert and oriented to person, place and time. He has mild lateral hip swelling. He does have mild to moderate tenderness to palpation over the right greater trochanter. There is No evidence of DVT seen on physical exam. He is neurovascularly intact distally. The incision is clean, dry and intact and without erythema. Range of motion is: 40 degrees abduction, 90 degrees flexion, 35 degrees internal rotation and 35 degrees external rotation. He has no pain with range of motion of the hip. Leg length discrepancy:none. X-rays: AP pelvis and 2 views of the right hip obtained in the office today were extensively reviewed. The x-rays confirm bilateral total hip arthroplasties. The prosthesis is well aligned bilaterally. There is no evidence of fracture or subsidence with regards to the right hip.     Impression: status post right Total Hip Arthroplasty 2 right hip contusion #3 lumbar degenerative disc disease #4 post laminectomy syndrome lumbar spine    Plan: At this time, the patient will continue his home exercises. He will continue to work on his general balance, gait and strengthening. The patient was given a Medrol Dosepak. He was encouraged to modify his activities. He may ice the right hip is much as possible. He will follow-up with me in 6 weeks and we will reassess him then. If he continues to have the severe right hip pain, we will consider an injection.   If he continues to have the radicular symptoms, we certainly could refer him back to the spine team.

## 2021-03-24 ENCOUNTER — TELEPHONE (OUTPATIENT)
Dept: ENDOCRINOLOGY | Age: 51
End: 2021-03-24

## 2021-04-05 RX ORDER — DESVENLAFAXINE 25 MG/1
TABLET, EXTENDED RELEASE ORAL
Qty: 60 TABLET | Refills: 0 | Status: SHIPPED
Start: 2021-04-05 | End: 2021-04-28 | Stop reason: DRUGHIGH

## 2021-04-07 ENCOUNTER — TELEPHONE (OUTPATIENT)
Dept: ENDOCRINOLOGY | Age: 51
End: 2021-04-07

## 2021-04-15 ENCOUNTER — NURSE ONLY (OUTPATIENT)
Dept: ENDOCRINOLOGY | Age: 51
End: 2021-04-15
Payer: MEDICARE

## 2021-04-15 DIAGNOSIS — M81.0 SENILE OSTEOPOROSIS: ICD-10-CM

## 2021-04-15 PROCEDURE — 96372 THER/PROPH/DIAG INJ SC/IM: CPT | Performed by: INTERNAL MEDICINE

## 2021-04-26 DIAGNOSIS — F33.9 EPISODE OF RECURRENT MAJOR DEPRESSIVE DISORDER, UNSPECIFIED DEPRESSION EPISODE SEVERITY (HCC): ICD-10-CM

## 2021-04-26 DIAGNOSIS — F41.1 GAD (GENERALIZED ANXIETY DISORDER): ICD-10-CM

## 2021-04-26 RX ORDER — LORAZEPAM 0.5 MG/1
0.5 TABLET ORAL NIGHTLY PRN
Qty: 30 TABLET | Refills: 0 | Status: SHIPPED | OUTPATIENT
Start: 2021-04-26 | End: 2021-04-28 | Stop reason: SDUPTHER

## 2021-04-28 ENCOUNTER — OFFICE VISIT (OUTPATIENT)
Dept: INTERNAL MEDICINE CLINIC | Age: 51
End: 2021-04-28
Payer: MEDICARE

## 2021-04-28 ENCOUNTER — OFFICE VISIT (OUTPATIENT)
Dept: PSYCHIATRY | Age: 51
End: 2021-04-28
Payer: MEDICARE

## 2021-04-28 VITALS
HEIGHT: 66 IN | SYSTOLIC BLOOD PRESSURE: 122 MMHG | WEIGHT: 131 LBS | DIASTOLIC BLOOD PRESSURE: 80 MMHG | HEART RATE: 96 BPM | BODY MASS INDEX: 21.05 KG/M2

## 2021-04-28 VITALS
BODY MASS INDEX: 21.05 KG/M2 | SYSTOLIC BLOOD PRESSURE: 122 MMHG | WEIGHT: 131 LBS | DIASTOLIC BLOOD PRESSURE: 80 MMHG | HEIGHT: 66 IN | HEART RATE: 96 BPM

## 2021-04-28 DIAGNOSIS — F33.9 EPISODE OF RECURRENT MAJOR DEPRESSIVE DISORDER, UNSPECIFIED DEPRESSION EPISODE SEVERITY (HCC): ICD-10-CM

## 2021-04-28 DIAGNOSIS — J44.9 COPD, MILD (HCC): ICD-10-CM

## 2021-04-28 DIAGNOSIS — J43.2 CENTRILOBULAR EMPHYSEMA (HCC): ICD-10-CM

## 2021-04-28 DIAGNOSIS — F41.1 GAD (GENERALIZED ANXIETY DISORDER): Primary | ICD-10-CM

## 2021-04-28 DIAGNOSIS — Z12.11 SCREEN FOR COLON CANCER: ICD-10-CM

## 2021-04-28 DIAGNOSIS — F41.1 GAD (GENERALIZED ANXIETY DISORDER): ICD-10-CM

## 2021-04-28 LAB
CONTROL: POSITIVE
HEMOCCULT STL QL: NEGATIVE

## 2021-04-28 PROCEDURE — G8420 CALC BMI NORM PARAMETERS: HCPCS | Performed by: INTERNAL MEDICINE

## 2021-04-28 PROCEDURE — 99213 OFFICE O/P EST LOW 20 MIN: CPT | Performed by: INTERNAL MEDICINE

## 2021-04-28 PROCEDURE — 4004F PT TOBACCO SCREEN RCVD TLK: CPT | Performed by: INTERNAL MEDICINE

## 2021-04-28 PROCEDURE — 3017F COLORECTAL CA SCREEN DOC REV: CPT | Performed by: INTERNAL MEDICINE

## 2021-04-28 PROCEDURE — G8420 CALC BMI NORM PARAMETERS: HCPCS | Performed by: PSYCHIATRY & NEUROLOGY

## 2021-04-28 PROCEDURE — 99205 OFFICE O/P NEW HI 60 MIN: CPT | Performed by: PSYCHIATRY & NEUROLOGY

## 2021-04-28 PROCEDURE — 4004F PT TOBACCO SCREEN RCVD TLK: CPT | Performed by: PSYCHIATRY & NEUROLOGY

## 2021-04-28 PROCEDURE — 82274 ASSAY TEST FOR BLOOD FECAL: CPT | Performed by: INTERNAL MEDICINE

## 2021-04-28 PROCEDURE — 3023F SPIROM DOC REV: CPT | Performed by: INTERNAL MEDICINE

## 2021-04-28 PROCEDURE — G8926 SPIRO NO PERF OR DOC: HCPCS | Performed by: INTERNAL MEDICINE

## 2021-04-28 PROCEDURE — G8427 DOCREV CUR MEDS BY ELIG CLIN: HCPCS | Performed by: PSYCHIATRY & NEUROLOGY

## 2021-04-28 PROCEDURE — G8427 DOCREV CUR MEDS BY ELIG CLIN: HCPCS | Performed by: INTERNAL MEDICINE

## 2021-04-28 PROCEDURE — 3017F COLORECTAL CA SCREEN DOC REV: CPT | Performed by: PSYCHIATRY & NEUROLOGY

## 2021-04-28 RX ORDER — DESVENLAFAXINE 50 MG/1
50 TABLET, EXTENDED RELEASE ORAL DAILY
Qty: 30 TABLET | Refills: 2 | Status: SHIPPED | OUTPATIENT
Start: 2021-04-28 | End: 2021-07-09 | Stop reason: SDUPTHER

## 2021-04-28 RX ORDER — ALBUTEROL SULFATE 90 UG/1
2 AEROSOL, METERED RESPIRATORY (INHALATION) EVERY 4 HOURS PRN
Qty: 1 INHALER | Refills: 2 | Status: SHIPPED | OUTPATIENT
Start: 2021-04-28 | End: 2021-10-25 | Stop reason: SDUPTHER

## 2021-04-28 RX ORDER — LORAZEPAM 0.5 MG/1
0.5 TABLET ORAL NIGHTLY PRN
Qty: 30 TABLET | Refills: 0 | Status: SHIPPED | OUTPATIENT
Start: 2021-05-26 | End: 2021-06-21 | Stop reason: SDUPTHER

## 2021-04-28 ASSESSMENT — PATIENT HEALTH QUESTIONNAIRE - PHQ9
SUM OF ALL RESPONSES TO PHQ9 QUESTIONS 1 & 2: 2
SUM OF ALL RESPONSES TO PHQ QUESTIONS 1-9: 2
1. LITTLE INTEREST OR PLEASURE IN DOING THINGS: 1
SUM OF ALL RESPONSES TO PHQ QUESTIONS 1-9: 2

## 2021-04-28 ASSESSMENT — ENCOUNTER SYMPTOMS
VOICE CHANGE: 0
TROUBLE SWALLOWING: 0
SHORTNESS OF BREATH: 0
WHEEZING: 0

## 2021-04-28 NOTE — PROGRESS NOTES
Elvis Heath  1970  male  46 y.o. SUBJECTIVE:       Chief Complaint   Patient presents with    1 Month Follow-Up    Anxiety     less evident, still on same dose of pristiq       HPI:  Follow-up visit. Patient feels overall his generalized anxiety symptoms and depression slowly getting better with Pristiq 25 mg/day. His home blood pressure also coming down his pulse is also coming down. He continued to struggle without taking low-dose lorazepam at night. History of COPD and asthma. Counseling to quit smoking given. He is using Advair inhaler regularly. Albuterol as needed. Past Medical History:   Diagnosis Date    Allergic sinusitis     Anxiety and depression     currently under care of pyschiatrist    COPD [facility]ch[facility] disease[facility] ([facility]     Hip pain 09/05/2017    ?  CAM possible bilateral superior femoral head-heck junction bump suggestive of femoro acetabular impingement syndrome    Insomnia     Localized edema 2/24/2018    Neg abd ultrasound and DVT    Osteoporosis     Spontaneous pneumothorax     1991    Testicular torsion     Wears dentures     Wears glasses      Past Surgical History:   Procedure Laterality Date    BACK SURGERY  12/26/2016    1st rods, 2nd tens unit 3rd rods and screw removed, only has cages now   [address]      x 2    TOTAL HIP ARTHROPLASTY Left 1/29/2019    TOTAL HIP REPLACEMENT LEFT HIP (ADVANCED) performed by Blanca Kenny MD at [address] [place] [address] Right 11/30/2020    TOTAL RIGHT HIP REPLACEMENT performed by Blanca Kenny MD at [place] [address] History     Socioeconomic History    Marital status:      Spouse name: None    Number of children: None    Years of education: None    Highest education level: None   Occupational History    Occupation: unemployed     Comment: [employer], [employer],   Social Needs    Financial resource strain: None    Food insecurity     Worry: None Inability: None    Transportation needs     Medical: None     Non-medical: None   Tobacco Use    Smoking status: Current Every Day Smoker     Packs/day: 1.00     Years: 30.00     Pack years: 30.00     Types: Cigarettes    Smokeless tobacco: Never Used    Tobacco comment: using patches   Substance and Sexual Activity    Alcohol use: No    Drug use: No     Types: Marijuana     Comment: In High School Only    Sexual activity: Never   Lifestyle    Physical activity     Days per week: None     Minutes per session: None    Stress: None   Relationships    Social connections     Talks on phone: None     Gets together: None     Attends Yazidism service: None     Active member of club or organization: None     Attends meetings of clubs or organizations: None     Relationship status: None    Intimate partner violence     Fear of current or ex partner: None     Emotionally abused: None     Physically abused: None     Forced sexual activity: None   Other Topics Concern    None   Social History Narrative    None     Family History   Problem Relation Age of Onset    Arthritis Mother     Other Mother     Other Father        Review of Systems   Constitutional: Negative for diaphoresis and unexpected weight change. HENT: Negative for trouble swallowing and voice change. Respiratory: Negative for shortness of breath and wheezing. Cardiovascular: Negative for chest pain and palpitations. Neurological: Negative for dizziness and light-headedness. OBJECTIVE:  Pulse Readings from Last 4 Encounters:   04/28/21 96   03/19/21 72   03/08/21 90   02/28/21 99     Wt Readings from Last 4 Encounters:   04/28/21 131 lb (59.4 kg)   03/23/21 132 lb (59.9 kg)   03/19/21 132 lb (59.9 kg)   03/08/21 136 lb (61.7 kg)     BP Readings from Last 4 Encounters:   04/28/21 122/80   03/19/21 122/80   03/08/21 (!) 148/89   02/28/21 130/71     Physical Exam  Constitutional:       Appearance: He is not ill-appearing. Cardiovascular:      Rate and Rhythm: Normal rate and regular rhythm. Pulses: Normal pulses. Heart sounds: Normal heart sounds. Pulmonary:      Effort: Pulmonary effort is normal.      Breath sounds: Normal breath sounds. Abdominal:      General: Bowel sounds are normal.   Musculoskeletal:      Right lower leg: No edema. Left lower leg: No edema. Neurological:      General: No focal deficit present. Mental Status: He is alert and oriented to person, place, and time.          CBC:   Lab Results   Component Value Date    WBC 9.0 11/18/2020    HGB 12.5 12/01/2020    HCT 36.6 12/01/2020     11/18/2020     CMP:  Lab Results   Component Value Date     11/18/2020    K 4.3 11/18/2020     11/18/2020    CO2 29 11/18/2020    ANIONGAP 9 11/18/2020    GLUCOSE 96 11/18/2020    BUN 7 11/18/2020    CREATININE 0.9 11/18/2020    GFRAA >60 11/18/2020    CALCIUM 9.3 11/18/2020    PROT 7.0 11/14/2018    LABALBU 4.1 11/18/2020    AGRATIO 1.7 06/24/2015    BILITOT 0.4 11/14/2018    ALKPHOS 119 11/14/2018    ALT 27 11/14/2018    AST 20 11/14/2018    GLOB 2.5 06/24/2015     URINALYSIS:  Lab Results   Component Value Date    GLUCOSEU Negative 11/18/2020    KETUA Negative 11/18/2020    SPECGRAV 1.023 11/18/2020    BLOODU Negative 11/18/2020    PHUR 6.0 11/18/2020    PROTEINU Negative 11/18/2020    NITRU Negative 11/18/2020    LEUKOCYTESUR Negative 11/18/2020    LABMICR Not Indicated 11/18/2020    URINETYPE NotGiven 11/18/2020     HBA1C:   Lab Results   Component Value Date    LABA1C 5.4 12/01/2020    .3 12/01/2020     MICRO/ALB:   Lab Results   Component Value Date    LABMICR Not Indicated 11/18/2020    LABCREA 39.6 11/14/2018     LIPID:  Lab Results   Component Value Date    CHOL 154 12/13/2017    TRIG 122 12/13/2017    HDL 34 12/13/2017    LDLCALC 96 12/13/2017    LABVLDL 24 12/13/2017     TSH:   Lab Results   Component Value Date    TSHREFLEX 1.98 03/19/2021     PSA:   Lab Results Component Value Date    PSA 1.1 07/25/2018        ASSESSMENT/PLAN:  Assessment/Plan:  Kristin James was seen today for 1 month follow-up and anxiety. Diagnoses and all orders for this visit:    JUANCHO (generalized anxiety disorder)    Episode of recurrent major depressive disorder, unspecified depression episode severity ([address]  We will increase Pristiq to 50 mg/day. He is going to follow-up with psychiatrist today  Hopefully he will be soon off lorazepam  Centrilobular emphysema (HCC)  COPD, mild (HCC)  -     albuterol sulfate  (90 Base) MCG/ACT inhaler; Inhale 2 puffs into the lungs every 4 hours as needed for Wheezing  Counseling to quit smoking. Continue Advair daily albuterol as needed  Screen for colon cancer  -     POCT Fecal Immunochemical Test (FIT); Future          Orders Placed This Encounter   Procedures    POCT Fecal Immunochemical Test (FIT)     Standing Status:   Future     Standing Expiration Date:   4/28/2022     Current Outpatient Medications   Medication Sig Dispense Refill    desvenlafaxine succinate (PRISTIQ) 50 MG TB24 extended release tablet Take 1 tablet by mouth daily 30 tablet 2    albuterol sulfate  (90 Base) MCG/ACT inhaler Inhale 2 puffs into the lungs every 4 hours as needed for Wheezing 1 Inhaler 2    LORazepam (ATIVAN) 0.5 MG tablet Take 1 tablet by mouth nightly as needed for Anxiety for up to 30 days.  30 tablet 0    loratadine (CLARITIN) 10 MG tablet TAKE ONE TABLET BY MOUTH EVERY EVENING 30 tablet 5    montelukast (SINGULAIR) 10 MG tablet Take 1 tablet by mouth nightly 30 tablet 5    nicotine (NICODERM CQ) 21 MG/24HR Place 1 patch onto the skin daily 42 patch 0    fluticasone-salmeterol (ADVAIR HFA) 115-21 MCG/ACT inhaler Inhale 2 puffs into the lungs 2 times daily 1 Inhaler 3    HYDROcodone-acetaminophen (NORCO)  MG per tablet hydrocodone 10 mg-acetaminophen 325 mg tablet      lidocaine (LIDODERM) 5 % Place 1 patch onto the skin daily 12 hours on, 12 hours off. 30 patch 0    tiZANidine (ZANAFLEX) 4 MG tablet 1 tab q 8 hours as needed 90 tablet 0    vitamin D (ERGOCALCIFEROL) 1.25 MG (39288 UT) CAPS capsule Take 50,000 Units by mouth once a week      Tens Unit MISC by Does not apply route 1 each 0    denosumab (PROLIA) 60 MG/ML SOSY SC injection Inject 1 mL into the skin once for 1 dose 1 mL 0    denosumab (PROLIA) 60 MG/ML SOSY SC injection Inject 1 mL into the skin once for 1 dose 1 mL 0     No current facility-administered medications for this visit. Return in about 3 months (around 7/28/2021). An After Visit Summary was printed and given to the patient. Documentation was done using voice recognition dragon software. Every effort was made to ensure accuracy; however, inadvertent  Unintentional computerized transcription errors may be present.

## 2021-04-28 NOTE — PROGRESS NOTES
Rodolfo, eventually becoming an assistant management leader. modifying factors:   Main stressors have been a series of physical issues that started back in 2007. Had back surgery in 2007, back hardware that went loose in 2013 at which time he was diagnosed with osteoporosis, left hip replacement in 2018, and right hip replacement in November 2020. Mother passed away in 2008, father passed away in 2011. He has had longstanding conflict with his younger sister who he believes has a \"God complex\". He had a hard time having to live with her in the face of not being able to work prior to 2018. He last attempted to work in 2018 for 6 months but was unable to. He moved to [facility] to live with wife's family for a period of time, before returning back to [place] in 2019. He just got approved for SSI in January, and has been able to now live on his own with his wife in an apartment since February which has been a positive change. Timing: Chronic  duration: Worse since 2008  severity: Moderate    Outside records: Reviewed records in chart. ROS:   GEN: No fevers or chills HEENT: No vision or hearing problems CV: No chest pain or palpitations RESP: Occasional dyspnea : No dysuria MSK: + Back and hip pain GI: No nausea vomiting skin: No rashes NEURO: No tremors ENDO: No weight changes    Past Psychiatric History:   Hosp: No prior hospitalizations  Diagnoses: Generalized anxiety disorder, recurrent major depressive disorder  Med trials: Around 2544-2694 was tried on several medications to help with anxiety and sleep.   Duloxetine 60 mg daily (per chart noted some benefit, however patient reports no benefit today and that it causes leg spasms), hydroxyzine 25 to 50 mg, quetiapine 25 to 50 mg nightly (cause a lot of mood problems), sertraline 50 mg (caused brain fog), buspirone as needed (patient does not recall effect), amitriptyline 25 mg nightly (stopped after feeling this caused more irritability especially combined with desvenlafaxine), mirtazapine 15 mg nightly, Wellbutrin for smoking cessation and mood (not helpful). Lorazepam 0.5 mg nightly as needed. Outpt: Psychologist Dr. Nasreen Hedrick for the last 4 years       Past Medical History:   Diagnosis Date    Allergic sinusitis     Anxiety and depression     currently under care of pyschiatrist    COPD (chronic obstructive pulmonary disease) ([facility] [address]     Hip pain 09/05/2017    ? CAM possible bilateral superior femoral head-heck junction bump suggestive of femoro acetabular impingement syndrome    Insomnia     Localized edema 2/24/2018    Neg abd ultrasound and DVT    Osteoporosis     Spontaneous pneumothorax     1991    Testicular torsion     Wears dentures     Wears glasses      Past Surgical History:   Procedure Laterality Date    BACK SURGERY  12/26/2016    1st rods, 2nd tens unit 3rd rods and screw removed, only has cages now   [address]      x 2    TOTAL HIP ARTHROPLASTY Left 1/29/2019    TOTAL HIP REPLACEMENT LEFT HIP (ADVANCED) performed by Kai Lerma MD at [address] [place] [address] Right 11/30/2020    TOTAL RIGHT HIP REPLACEMENT performed by Kai Lerma MD at [facility] history:  Grew up in [address]. Parents are very supportive and he had a good relationship with both of them growing up, although he was much closer to his mother and describes himself as very protective of her. Living situation: Lives in [address] with wife in an apartment since February 2021. Prior to this was living with jeffrey, or with his sister. Siblings: Younger sister they has a difficult relationship with. Marital:  for the last 6 years. Was  once in 2005 for less than 6 months. 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   Has been on SSDI since January 2021  Abuse history: Denies    Substance abuse history:  Alcohol: Denies  Marijuana: Denies  Illicits: Denies  Tobacco: 1 pack/day in addition to using 21 mg/day nicotine patch. Previously was smoking 2 packs/day. Opposed to trial of Chantix due to a friend's severe reaction to it (appears to have been prescribed once in 8812-3038)      Family History   Problem Relation Age of Onset    Arthritis Mother     Other Mother     Neurofibromatosis Mother     Other Father      Allergies   Allergen Reactions    Naproxen Swelling     He can tolerate aspirin    Neurontin [Gabapentin] Swelling     Edema lower extr. Leg swelling    Robaxin [Methocarbamol] Diarrhea, Itching and Other (See Comments)     Eyes swell/yellowish and get bloodshot    Sulfamethoxazole-Trimethoprim Diarrhea    Toradol [Ketorolac Tromethamine] Swelling and Other (See Comments)     Other reaction(s): Headaches  Eyes swell and bloodshot    Zoloft [Sertraline]      bad brain fog ,confusion     Azithromycin Nausea And Vomiting    Bactrim [Sulfamethoxazole-Trimethoprim] Diarrhea    Biaxin [Clarithromycin] Diarrhea and Nausea And Vomiting    Celebrex [Celecoxib] Swelling     Causes severe foot swelling. He can tolerate aspirin.     Diclofenac Diarrhea     He can tolerate aspirin    Sulfa Antibiotics Diarrhea and Nausea And Vomiting    Tramadol Rash and Other (See Comments)     Eyes swell and get bloodshot--pt states can take in small doses     Current Outpatient Medications on File Prior to Visit   Medication Sig Dispense Refill    loratadine (CLARITIN) 10 MG tablet TAKE ONE TABLET BY MOUTH EVERY EVENING 30 tablet 5    montelukast (SINGULAIR) 10 MG tablet Take 1 tablet by mouth nightly 30 tablet 5    nicotine (NICODERM CQ) 21 MG/24HR Place 1 patch onto the skin daily 42 patch 0    fluticasone-salmeterol (ADVAIR HFA) 115-21 MCG/ACT inhaler Inhale 2 puffs into the lungs 2 times daily 1 Inhaler 3    HYDROcodone-acetaminophen (NORCO)  MG per tablet hydrocodone 10 mg-acetaminophen 325 mg tablet      lidocaine (LIDODERM) 5 % Place 1 patch onto the skin daily 12 hours on, 12 hours off. 30 patch 0    tiZANidine (ZANAFLEX) 4 MG tablet 1 tab q 8 hours as needed 90 tablet 0    vitamin D (ERGOCALCIFEROL) 1.25 MG (52473 UT) CAPS capsule Take 50,000 Units by mouth once a week      Tens Unit MISC by Does not apply route 1 each 0     No current facility-administered medications on file prior to visit. OBJECTIVE:  Vitals:    21 0952   BP: 122/80   Pulse: 96   Weight: 131 lb (59.4 kg)   Height: 5' 6\" (1.676 m)       MSE:   Appearance casually dressed  Motor: No abnormal movements, tics or mannerisms.   Speech    normal rate rhythm and volume  Language   no aphasia  Mood/Affect   anxious/fair motility and range  Thought Process    linear logical mostly, occasionally tangential  Thought Content    no delusions, no suicidal or homicidal ideation, future oriented  Associations   linear  Attention/Concentration   intact  Orientation    alert and oriented x4  Memory   grossly intact to recent and remote content discussed  Fund of Knowledge    intact  Insight/Judgement   good/good    Labs:     Lab Results   Component Value Date    CHOL 154 2017     Lab Results   Component Value Date    TRIG 122 2017     Lab Results   Component Value Date    HDL 34 (L) 2017     Lab Results   Component Value Date    LDLCALC 96 2017     Lab Results   Component Value Date    LABVLDL 24 2017     No results found for: [facility]    Lab Results   Component Value Date    LABA1C 5.4 2020     Lab Results   Component Value Date    .3 2020     TSH 3/19/2021: 1.98    Lab Results   Component Value Date    UDJEDQEL49 365 2021     Lab Results   Component Value Date    FOLATE 8.82 2021       EK2020: Rate 72 bpm, normal sinus rhythm,  ms        Juni Coates MD   Psychiatry

## 2021-06-21 DIAGNOSIS — F41.1 GAD (GENERALIZED ANXIETY DISORDER): ICD-10-CM

## 2021-06-21 DIAGNOSIS — F33.9 EPISODE OF RECURRENT MAJOR DEPRESSIVE DISORDER, UNSPECIFIED DEPRESSION EPISODE SEVERITY (HCC): ICD-10-CM

## 2021-06-21 RX ORDER — LORAZEPAM 0.5 MG/1
0.5 TABLET ORAL NIGHTLY PRN
Qty: 30 TABLET | Refills: 0 | Status: SHIPPED | OUTPATIENT
Start: 2021-06-25 | End: 2021-07-09 | Stop reason: SDUPTHER

## 2021-07-09 ENCOUNTER — OFFICE VISIT (OUTPATIENT)
Dept: PSYCHIATRY | Age: 51
End: 2021-07-09
Payer: MEDICARE

## 2021-07-09 VITALS
HEIGHT: 66 IN | HEART RATE: 92 BPM | BODY MASS INDEX: 21.05 KG/M2 | OXYGEN SATURATION: 96 % | WEIGHT: 131 LBS | SYSTOLIC BLOOD PRESSURE: 128 MMHG | TEMPERATURE: 97 F | DIASTOLIC BLOOD PRESSURE: 80 MMHG

## 2021-07-09 DIAGNOSIS — F41.9 ANXIETY DISORDER, UNSPECIFIED TYPE: ICD-10-CM

## 2021-07-09 DIAGNOSIS — F33.1 MODERATE EPISODE OF RECURRENT MAJOR DEPRESSIVE DISORDER (HCC): Primary | ICD-10-CM

## 2021-07-09 PROCEDURE — 99214 OFFICE O/P EST MOD 30 MIN: CPT | Performed by: PSYCHIATRY & NEUROLOGY

## 2021-07-09 PROCEDURE — G8420 CALC BMI NORM PARAMETERS: HCPCS | Performed by: PSYCHIATRY & NEUROLOGY

## 2021-07-09 PROCEDURE — 3017F COLORECTAL CA SCREEN DOC REV: CPT | Performed by: PSYCHIATRY & NEUROLOGY

## 2021-07-09 PROCEDURE — 4004F PT TOBACCO SCREEN RCVD TLK: CPT | Performed by: PSYCHIATRY & NEUROLOGY

## 2021-07-09 PROCEDURE — G8427 DOCREV CUR MEDS BY ELIG CLIN: HCPCS | Performed by: PSYCHIATRY & NEUROLOGY

## 2021-07-09 RX ORDER — DESVENLAFAXINE 50 MG/1
50 TABLET, EXTENDED RELEASE ORAL DAILY
Qty: 30 TABLET | Refills: 2 | Status: SHIPPED | OUTPATIENT
Start: 2021-07-09 | End: 2021-10-25 | Stop reason: SDUPTHER

## 2021-07-09 RX ORDER — LORAZEPAM 0.5 MG/1
0.5 TABLET ORAL NIGHTLY PRN
Qty: 30 TABLET | Refills: 2 | Status: SHIPPED | OUTPATIENT
Start: 2021-07-25 | End: 2021-09-26 | Stop reason: SDUPTHER

## 2021-07-09 RX ORDER — CLONIDINE HYDROCHLORIDE 0.1 MG/1
0.1 TABLET ORAL NIGHTLY
Qty: 30 TABLET | Refills: 1 | Status: SHIPPED
Start: 2021-07-09 | End: 2021-10-25 | Stop reason: ALTCHOICE

## 2021-07-09 NOTE — PROGRESS NOTES
PSYCHIATRY PROGRESS NOTE    Rojas Woods  1970 07/09/21  PCP: Tanner Talavera MD    CC:   Chief Complaint   Patient presents with    Follow-up         S:   Frustrated over the last 1.5 months with issues with his wife. She quit her job, didn't tell him. She keeps allowing her son to come back to the home, but pt is very upset with this and won't allow it because he has stolen from them before. It causes a lot of conflict between them. He can get very anxious and worked up about the situation, have a hard time controlling his thoughts and feeling more calm, and this can also impact his sleep. One time he was so frustrated he couldn't sleep for 2 days and was pulling his hair. Lorazepam helps a little at night, but not all the time. Positive things include getting a boat with his brother. He enjoys fishing and tries to get away from his wife when he can and this relaxes him. Still seeing therapist which has been helpful. Did couples therapy in the past.     ROS: +back pain        Current Outpatient Medications   Medication Sig Dispense Refill    cloNIDine (CATAPRES) 0.1 MG tablet Take 1 tablet by mouth nightly 30 tablet 1    desvenlafaxine succinate (PRISTIQ) 50 MG TB24 extended release tablet Take 1 tablet by mouth daily 30 tablet 2    LORazepam (ATIVAN) 0.5 MG tablet Take 1 tablet by mouth nightly as needed for Anxiety for up to 30 days.  Do not fill before 6/25/2021 30 tablet 0    albuterol sulfate  (90 Base) MCG/ACT inhaler Inhale 2 puffs into the lungs every 4 hours as needed for Wheezing 1 Inhaler 2    loratadine (CLARITIN) 10 MG tablet TAKE ONE TABLET BY MOUTH EVERY EVENING 30 tablet 5    montelukast (SINGULAIR) 10 MG tablet Take 1 tablet by mouth nightly 30 tablet 5    fluticasone-salmeterol (ADVAIR HFA) 115-21 MCG/ACT inhaler Inhale 2 puffs into the lungs 2 times daily 1 Inhaler 3    HYDROcodone-acetaminophen (NORCO)  MG per tablet hydrocodone 10 mg-acetaminophen 325 mg tablet      tiZANidine (ZANAFLEX) 4 MG tablet 1 tab q 8 hours as needed 90 tablet 0    vitamin D (ERGOCALCIFEROL) 1.25 MG (02933 UT) CAPS capsule Take 50,000 Units by mouth once a week      Tens Unit MISC by Does not apply route 1 each 0    nicotine (NICODERM CQ) 21 MG/24HR Place 1 patch onto the skin daily 42 patch 0    lidocaine (LIDODERM) 5 % Place 1 patch onto the skin daily 12 hours on, 12 hours off. (Patient not taking: Reported on 7/9/2021) 30 patch 0     No current facility-administered medications for this visit. O:  Wt Readings from Last 3 Encounters:   07/09/21 131 lb (59.4 kg)   04/28/21 131 lb (59.4 kg)   04/28/21 131 lb (59.4 kg)     Temp Readings from Last 3 Encounters:   07/09/21 97 °F (36.1 °C)   03/23/21 98.2 °F (36.8 °C) (Temporal)   03/19/21 96.7 °F (35.9 °C)     BP Readings from Last 3 Encounters:   07/09/21 128/80   04/28/21 122/80   04/28/21 122/80     Pulse Readings from Last 3 Encounters:   07/09/21 92   04/28/21 96   04/28/21 96       Mental Status Exam:   Appearance    alert, cooperative  Motor:  No abnormal movements, tics or mannerisms. Speech    spontaneous, rate and quantity are higher  Mood/Affect    irritated / congruent to mood  Thought Process    linear and goal directed  Thought Content    intact , no suicidal ideation  Associations    logical connections  Attention/Concentration    intact  Memory    recent and remote memory intact  Insight/Judgement    Good / Intact    Labs:   Office Visit on 04/28/2021   Component Date Value Ref Range Status    OCCULT BLOOD FECAL 04/28/2021 negative   Final    Control 04/28/2021 positive   Final       A:  45 yo M with depression and anxiety. Main stressor his marriage which at times is ok, but at times causes significant irritability / anxiety / frustration. 1. Major depressive disorder, recurrent, moderate  2. Unspecified anxiety disorder    P:   1.  Will try clonidine to see if this controls some anger/frustration issues and sleep. Clonidine 0.1mg qhs. Discussed r/b/se  2. Continue pristiq 50mg daily  3. Continue lorazepam 0.5mg qhs   4. In agreement with patients request to have a dog at his residence. This may help with some of his symptoms.  Provided a letter of support for his landlord    Follow-up: RTC in 6 weeks      Sandra Moon MD  Psychiatrist

## 2021-07-09 NOTE — LETTER
[facility]  [address] [address]  Phone: [phone]  Fax: [phone]    Benito Alejo MD        July 9, 2021     Patient: Romero Schaffer   YOB: 1970   Date of Visit: 7/9/2021       To Whom it May Concern:    Karma Sanon is currently under my care for mental health treatment. He has been diagnosed with Major depressive disorder. He may benefit from a small dog to help with some of his depressive symptoms. If you have any questions or concerns, please don't hesitate to call.     Sincerely,         Benito Alejo MD

## 2021-07-10 ENCOUNTER — HOSPITAL ENCOUNTER (EMERGENCY)
Age: 51
Discharge: HOME | End: 2021-07-10
Attending: EMERGENCY MEDICINE
Payer: MEDICARE

## 2021-07-10 VITALS
DIASTOLIC BLOOD PRESSURE: 85 MMHG | TEMPERATURE: 98.2 F | SYSTOLIC BLOOD PRESSURE: 121 MMHG | OXYGEN SATURATION: 97 % | BODY MASS INDEX: 20.89 KG/M2 | HEIGHT: 66 IN | WEIGHT: 130 LBS | RESPIRATION RATE: 16 BRPM | HEART RATE: 89 BPM

## 2021-07-10 DIAGNOSIS — S39.840A PENILE FRACTURE, INITIAL ENCOUNTER: Primary | ICD-10-CM

## 2021-07-10 LAB
A/G RATIO: 1.6 (ref 1.1–2.2)
ALBUMIN SERPL-MCNC: 4.3 G/DL (ref 3.4–5)
ALP BLD-CCNC: 131 U/L (ref 40–129)
ALT SERPL-CCNC: 22 U/L (ref 10–40)
ANION GAP SERPL CALCULATED.3IONS-SCNC: 8 MMOL/L (ref 3–16)
AST SERPL-CCNC: 19 U/L (ref 15–37)
BASOPHILS ABSOLUTE: 0 K/UL (ref 0–0.2)
BASOPHILS RELATIVE PERCENT: 0.6 %
BILIRUB SERPL-MCNC: <0.2 MG/DL (ref 0–1)
BILIRUBIN URINE: NEGATIVE
BLOOD, URINE: NEGATIVE
BUN BLDV-MCNC: 5 MG/DL (ref 7–20)
CALCIUM SERPL-MCNC: 9 MG/DL (ref 8.3–10.6)
CHLORIDE BLD-SCNC: 99 MMOL/L (ref 99–110)
CLARITY: ABNORMAL
CO2: 28 MMOL/L (ref 21–32)
COLOR: YELLOW
COMMENT UA: NORMAL
CREAT SERPL-MCNC: 0.7 MG/DL (ref 0.9–1.3)
EOSINOPHILS ABSOLUTE: 0.1 K/UL (ref 0–0.6)
EOSINOPHILS RELATIVE PERCENT: 1.9 %
GFR AFRICAN AMERICAN: >60
GFR NON-AFRICAN AMERICAN: >60
GLOBULIN: 2.7 G/DL
GLUCOSE BLD-MCNC: 121 MG/DL (ref 70–99)
GLUCOSE URINE: NEGATIVE MG/DL
HCT VFR BLD CALC: 46.3 % (ref 40.5–52.5)
HEMOGLOBIN: 15.8 G/DL (ref 13.5–17.5)
KETONES, URINE: NEGATIVE MG/DL
LEUKOCYTE ESTERASE, URINE: NEGATIVE
LYMPHOCYTES ABSOLUTE: 2.3 K/UL (ref 1–5.1)
LYMPHOCYTES RELATIVE PERCENT: 33.5 %
MCH RBC QN AUTO: 31.5 PG (ref 26–34)
MCHC RBC AUTO-ENTMCNC: 34.2 G/DL (ref 31–36)
MCV RBC AUTO: 92.1 FL (ref 80–100)
MICROSCOPIC EXAMINATION: YES
MONOCYTES ABSOLUTE: 0.4 K/UL (ref 0–1.3)
MONOCYTES RELATIVE PERCENT: 6.5 %
NEUTROPHILS ABSOLUTE: 4 K/UL (ref 1.7–7.7)
NEUTROPHILS RELATIVE PERCENT: 57.5 %
NITRITE, URINE: NEGATIVE
PDW BLD-RTO: 14.5 % (ref 12.4–15.4)
PH UA: 6.5 (ref 5–8)
PLATELET # BLD: 326 K/UL (ref 135–450)
PMV BLD AUTO: 7 FL (ref 5–10.5)
POTASSIUM REFLEX MAGNESIUM: 4.2 MMOL/L (ref 3.5–5.1)
PROTEIN UA: NEGATIVE MG/DL
RBC # BLD: 5.02 M/UL (ref 4.2–5.9)
RBC UA: NORMAL /HPF (ref 0–4)
SODIUM BLD-SCNC: 135 MMOL/L (ref 136–145)
SPECIFIC GRAVITY UA: <1.005 (ref 1–1.03)
TOTAL PROTEIN: 7 G/DL (ref 6.4–8.2)
URINE REFLEX TO CULTURE: ABNORMAL
URINE TYPE: ABNORMAL
UROBILINOGEN, URINE: 0.2 E.U./DL
WBC # BLD: 6.9 K/UL (ref 4–11)
WBC UA: NORMAL /HPF (ref 0–5)

## 2021-07-10 PROCEDURE — 2580000003 HC RX 258: Performed by: PHYSICIAN ASSISTANT

## 2021-07-10 PROCEDURE — 96374 THER/PROPH/DIAG INJ IV PUSH: CPT

## 2021-07-10 PROCEDURE — 6370000000 HC RX 637 (ALT 250 FOR IP): Performed by: EMERGENCY MEDICINE

## 2021-07-10 PROCEDURE — 80053 COMPREHEN METABOLIC PANEL: CPT

## 2021-07-10 PROCEDURE — 81001 URINALYSIS AUTO W/SCOPE: CPT

## 2021-07-10 PROCEDURE — 85025 COMPLETE CBC W/AUTO DIFF WBC: CPT

## 2021-07-10 PROCEDURE — 36415 COLL VENOUS BLD VENIPUNCTURE: CPT

## 2021-07-10 PROCEDURE — 6360000002 HC RX W HCPCS: Performed by: PHYSICIAN ASSISTANT

## 2021-07-10 PROCEDURE — 99283 EMERGENCY DEPT VISIT LOW MDM: CPT

## 2021-07-10 RX ORDER — SODIUM CHLORIDE 9 MG/ML
1000 INJECTION, SOLUTION INTRAVENOUS CONTINUOUS
Status: DISCONTINUED | OUTPATIENT
Start: 2021-07-10 | End: 2021-07-10 | Stop reason: HOSPADM

## 2021-07-10 RX ORDER — MORPHINE SULFATE 4 MG/ML
4 INJECTION, SOLUTION INTRAMUSCULAR; INTRAVENOUS
Status: DISCONTINUED | OUTPATIENT
Start: 2021-07-10 | End: 2021-07-10 | Stop reason: HOSPADM

## 2021-07-10 RX ORDER — NICOTINE 21 MG/24HR
1 PATCH, TRANSDERMAL 24 HOURS TRANSDERMAL DAILY
Status: DISCONTINUED | OUTPATIENT
Start: 2021-07-10 | End: 2021-07-10 | Stop reason: HOSPADM

## 2021-07-10 RX ADMIN — SODIUM CHLORIDE 1000 ML: 9 INJECTION, SOLUTION INTRAVENOUS at 12:59

## 2021-07-10 RX ADMIN — MORPHINE SULFATE 4 MG: 4 INJECTION, SOLUTION INTRAMUSCULAR; INTRAVENOUS at 13:00

## 2021-07-10 ASSESSMENT — ENCOUNTER SYMPTOMS
ABDOMINAL PAIN: 0
NAUSEA: 0
SHORTNESS OF BREATH: 0
DIARRHEA: 0
CHEST TIGHTNESS: 0
COLOR CHANGE: 1
VOMITING: 0

## 2021-07-10 ASSESSMENT — PAIN SCALES - GENERAL
PAINLEVEL_OUTOF10: 10
PAINLEVEL_OUTOF10: 4
PAINLEVEL_OUTOF10: 10

## 2021-07-10 NOTE — PROGRESS NOTES
Note dictated  Imp   prob small penile fracture  No gross hematuria and ua neg  Voiding ok  Min swelling and brusing on exam  Plan   No need for surgical intervention at this point   Discussed with pt poss of penile curve with erections down the road       Ice for 24 to 48 hours    No sexual activity.   Will get in office early next week

## 2021-07-10 NOTE — CONSULTS
[address]                     [facility], [address]                                  CONSULTATION    PATIENT NAME: Antione Gonzalez                   :        1970  MED REC NO:   [id number]                          ROOM:       [id number]  ACCOUNT NO:   [de-identified]                           ADMIT DATE: 07/10/2021  PROVIDER:     Baljeet Kohli MD    CONSULT DATE:  07/10/2021    CONSULTING PHYSICIAN:  Nubia Marinelli MD, [facility] Emergency  Room. REASON FOR CONSULTATION:  Possible penile fracture. HISTORY OF PRESENT ILLNESS:  The patient is a 59-year-old man who  presented to emergency room early in the afternoon of 07/10/2021 for  penile pain and swelling. He was having sexual activity earlier today  and had pain and then had some swelling and deformity and because of  this presented to the emergency room for evaluation. He has had no  gross hematuria and was able to urinate  without any difficulty. His  urinalysis was clear. No fever or any other signs of infection. PAST MEDICAL HISTORY:  Includes anxiety, depression, COPD, chronic hip  pain, insomnia, osteoporosis. He is currently on disability. PAST SURGICAL HISTORY:  Includes testicular torsion reduction in the  past, bilateral hip surgery and back surgery. MEDICATIONS:  Please see his medicine list from the emergency room list.  Does not appear to be taking any anticoagulation. ALLERGIES:  Also see his allergy list.  There are 13 allergies listed. REVIEW OF SYSTEMS:  As listed above. No gross hematuria. No difficulty  voiding. No fever, no chills. PHYSICAL EXAMINATION:  The patient was seen and examined in the  emergency room around 2 p.m. on 07/10/2021. GENERAL:  The patient is resting comfortably. Alert and oriented,  totally cooperative. VITAL SIGNS:  He is afebrile. Vital signs are all within normal limits. ABDOMEN:  His abdomen is soft.   He has no ecchymosis. No signs of any  bruising. GENITOURINARY:  His penis does have some slight edema, may be some  little bruising near the base and a little ventrally. No signs of any  large hematoma identified. No evidence of any cellulitis. No blood at  the meatus. Scrotum has a little bit of bruising at the superior aspect  of the scrotum, but no large hematoma. No signs of any large active  bleeding. No evidence of any bleeding in the perineal area. RECTAL:  Exam not performed. LABORATORY EVALUATION:  Shows normal creatinine. Urinalysis listed  above. IMPRESSION:  The patient probably has a small penile fracture, but I do  not think this is to require any surgical intervention. Should respond  well just to time, ice, and nonsteroidals. He already has chronic pain  medication, can use that as needed. We will have him call if he notices  any significant large increase in the swelling, but should be able to be  discharged home and then will follow up in the office early next week. I have instructed the patient to avoid any sexual activity for at least  two weeks or until seen in the office as well. The patient also  described that the possibility from this could result in a small penile  curve down the road as well.         Danette Bonilla MD    D: 07/10/2021 14:24:06       T: 07/10/2021 14:46:58     [email or web address]  Job#: [id number]     Doc#: [id number]    CC:

## 2021-07-10 NOTE — ED PROVIDER NOTES
[facility]        Pt Name: Edi Wilkerson  MRN: [id number]  [place] 1970  Date of evaluation: 7/10/2021  Provider: Jackie Hicks PA-C  PCP: Nate Looney MD  Note Started: 12:40 PM EDT       OTIS. I have evaluated this patient. My supervising physician was available for consultation. CHIEF COMPLAINT       Chief Complaint   Patient presents with    Groin Swelling     Pt concerned that he fractured his penis during sex this am around 36, pt states immediately swelling and states deformity present. HISTORY OF PRESENT ILLNESS   (Location, Timing/Onset, Context/Setting, Quality, Duration, Modifying Factors, Severity, Associated Signs and Symptoms)  Note limiting factors. Chief Complaint: Penis pain    Edi Wilkerson is a 46 y.o. male who presents to the emergency department with reports of acute onset of pain and discomfort that began this morning approximately 1130. Patient states that he was having sexual relations with his girlfriend. He states that as he was thrusting that the positioning is not what he had thought and states that he struck the area of his girlfriends groin and thigh. He states he had acute onset of pain and discomfort that was severe in nature. He states he had treated this in the home environment with ice as well as his regular usual Vicodin to no avail. He states the pain is increasing in intensity. He states he has both bruised as well as swollen and presents the ED for evaluation and treatment of the above-mentioned. Patient states he has never had difficulties with this in the past.  He states he has mild nausea associated with it but is not having any vomiting diarrhea or significant abdominal pain. He states he has had no additional GI or  complaints and with this presents the ED for evaluation and treatment.     Nursing Notes were all reviewed and agreed with or any disagreements were addressed in the HPI. REVIEW OF SYSTEMS    (2-9 systems for level 4, 10 or more for level 5)     Review of Systems   Constitutional: Negative for activity change, chills and fever. Respiratory: Negative for chest tightness and shortness of breath. Cardiovascular: Negative for chest pain. Gastrointestinal: Negative for abdominal pain, diarrhea, nausea and vomiting. Genitourinary: Positive for penile pain and penile swelling. Negative for dysuria, flank pain, scrotal swelling and testicular pain. Skin: Positive for color change. Negative for wound. Neurological: Negative for seizures and headaches. Positives and Pertinent negatives as per HPI. Except as noted above in the ROS, all other systems were reviewed and negative. PAST MEDICAL HISTORY     Past Medical History:   Diagnosis Date    Allergic sinusitis     Anxiety and depression     currently under care of pyschiatrist    COPD (chronic obstructive pulmonary disease) ([facility] [address]     Hip pain 09/05/2017    ?  CAM possible bilateral superior femoral head-heck junction bump suggestive of femoro acetabular impingement syndrome    Insomnia     Localized edema 2/24/2018    Neg abd ultrasound and DVT    Osteoporosis     Spontaneous pneumothorax     1991    Testicular torsion     Wears dentures     Wears glasses          SURGICAL HISTORY     Past Surgical History:   Procedure Laterality Date    BACK SURGERY  12/26/2016    1st rods, 2nd tens unit 3rd rods and screw removed, only has cages now   [address]      x 2    TOTAL HIP ARTHROPLASTY Left 1/29/2019    TOTAL HIP REPLACEMENT LEFT HIP (ADVANCED) performed by Nickie Del Castillo MD at [address] [place] [address] [address] 11/30/2020    TOTAL RIGHT HIP REPLACEMENT performed by Nickie Del Castillo MD at [address]       Previous Medications    ALBUTEROL SULFATE  (90 BASE) MCG/ACT INHALER    Inhale 2 puffs into the lungs every 4 hours as needed for Wheezing    CLONIDINE (CATAPRES) 0.1 MG TABLET    Take 1 tablet by mouth nightly    DESVENLAFAXINE SUCCINATE (PRISTIQ) 50 MG TB24 EXTENDED RELEASE TABLET    Take 1 tablet by mouth daily    FLUTICASONE-SALMETEROL (ADVAIR HFA) 115-21 MCG/ACT INHALER    Inhale 2 puffs into the lungs 2 times daily    HYDROCODONE-ACETAMINOPHEN (NORCO)  MG PER TABLET    hydrocodone 10 mg-acetaminophen 325 mg tablet    LIDOCAINE (LIDODERM) 5 %    Place 1 patch onto the skin daily 12 hours on, 12 hours off. LORATADINE (CLARITIN) 10 MG TABLET    TAKE ONE TABLET BY MOUTH EVERY EVENING    LORAZEPAM (ATIVAN) 0.5 MG TABLET    Take 1 tablet by mouth nightly as needed for Anxiety for up to 90 days.  Do not fill before 7/25/2021    MONTELUKAST (SINGULAIR) 10 MG TABLET    Take 1 tablet by mouth nightly    NICOTINE (NICODERM CQ) 21 MG/24HR    Place 1 patch onto the skin daily    TENS UNIT MISC    by Does not apply route    TIZANIDINE (ZANAFLEX) 4 MG TABLET    1 tab q 8 hours as needed    VITAMIN D (ERGOCALCIFEROL) 1.25 MG (37676 UT) CAPS CAPSULE    Take 50,000 Units by mouth once a week         ALLERGIES     Naproxen, Neurontin [gabapentin], Robaxin [methocarbamol], Sulfamethoxazole-trimethoprim, Toradol [ketorolac tromethamine], Zoloft [sertraline], Azithromycin, Bactrim [sulfamethoxazole-trimethoprim], Biaxin [clarithromycin], Celebrex [celecoxib], Diclofenac, Sulfa antibiotics, and Tramadol    FAMILYHISTORY       Family History   Problem Relation Age of Onset    Arthritis Mother     Other Mother     Neurofibromatosis Mother     Other Father           SOCIAL HISTORY       Social History     Tobacco Use    Smoking status: Current Every Day Smoker     Packs/day: 1.00     Years: 30.00     Pack years: 30.00     Types: Cigarettes    Smokeless tobacco: Never Used    Tobacco comment: using patches   Vaping Use    Vaping Use: Never used   Substance Use Topics    Alcohol use: No    Drug use: No     Types: Marijuana     Comment: In High School Only       SCREENINGS             PHYSICAL EXAM    (up to 7 for level 4, 8 or more for level 5)     ED Triage Vitals [07/10/21 1224]   BP Temp Temp Source Pulse Resp SpO2 Height Weight   (!) 141/78 98.2 °F (36.8 °C) Oral 89 16 97 % 5' 6\" (1.676 m) 130 lb (59 kg)       Physical Exam  Vitals and nursing note reviewed. Constitutional:       General: He is not in acute distress. Appearance: Normal appearance. He is well-developed. He is not diaphoretic. HENT:      Head: Normocephalic and atraumatic. Right Ear: External ear normal.      Left Ear: External ear normal.   Eyes:      General: No scleral icterus. Right eye: No discharge. Left eye: No discharge. Conjunctiva/sclera: Conjunctivae normal.   Neck:      Vascular: No JVD. Cardiovascular:      Rate and Rhythm: Normal rate and regular rhythm. Heart sounds: No murmur heard. No friction rub. No gallop. Pulmonary:      Effort: Pulmonary effort is normal. No accessory muscle usage or respiratory distress. Breath sounds: Normal breath sounds. No wheezing, rhonchi or rales. Abdominal:      General: There is no distension. Palpations: Abdomen is soft. Abdomen is not rigid. There is no mass. Tenderness: There is no abdominal tenderness. There is no guarding or rebound. Genitourinary:     Penis: Circumcised. Tenderness and swelling present. No discharge or lesions. Comments: Visual appearance of the penis which is flaccid and state at present demonstrates a midshaft deformity with the distal portion of the penis pointing to the left at 90 degrees. There is associated ecchymosis as well as swelling noted from the base of the penis through the region of the glans. Significant discomfort noted. Testicle and scrotal otherwise unremarkable. Musculoskeletal:      Cervical back: Normal range of motion. Skin:     General: Skin is warm and dry.    Neurological:      Mental Status: He is alert and oriented to person, place, and time. GCS: GCS eye subscore is 4. GCS verbal subscore is 5. GCS motor subscore is 6. Cranial Nerves: No cranial nerve deficit. Sensory: No sensory deficit. Coordination: Coordination normal.   Psychiatric:         Behavior: Behavior normal.         DIAGNOSTIC RESULTS   LABS:    Labs Reviewed   COMPREHENSIVE METABOLIC PANEL W/ REFLEX TO MG FOR LOW K - Abnormal; Notable for the following components:       Result Value    Sodium 135 (*)     Glucose 121 (*)     BUN 5 (*)     CREATININE 0.7 (*)     Alkaline Phosphatase 131 (*)     All other components within normal limits    Narrative:     Performed at:  [facility] [address], [facility], [address] [employer]   Phone [phone]   URINE RT REFLEX TO CULTURE - Abnormal; Notable for the following components:    Clarity, UA CLOUDY (*)     All other components within normal limits    Narrative:     Performed at:  [facility] [address], [facility], [address] [employer] [employer]   Phone [phone]   CBC WITH AUTO DIFFERENTIAL    Narrative:     Performed at:  [facility] [address], [facility], [address] [employer] [employer]   Phone [phone]   MICROSCOPIC URINALYSIS       When ordered only abnormal lab results are displayed. All other labs were within normal range or not returned as of this dictation. EKG: When ordered, EKG's are interpreted by the Emergency Department Physician in the absence of a cardiologist.  Please see their note for interpretation of EKG. RADIOLOGY:   Non-plain film images such as CT, Ultrasound and MRI are read by the radiologist. Plain radiographic images are visualized and preliminarily interpreted by the ED Provider with the below findings:        Interpretation per the Radiologist below, if available at the time of this note:    No orders to display     No results found.         PROCEDURES   Unless torsion, epididymitis, Kim's gangrene, prostatitis or appendiceal torsion. I estimate there is low risk for acute appendicitis, bowel obstruction, acute cholecystitis, acute choledocholithiasis and/or cholangitis, diverticulitis incarcerated hernia, pancreatitis, perforated bowel, and or ulcer disease thus I consider the discharge disposition reasonable. Also, there is no evidence or peritonitis, sepsis, or toxicity. The patient and/or family and I have discussed the diagnosis and risks, and we agree with discharging home to follow-up with their primary doctor. We also discussed returning to the Emergency Department immediately if new or worsening symptoms occur. We have discussed the symptoms which are most concerning (e.g., bloody stool, fever, changing or worsening pain, vomiting) that necessitate immediate return. FINAL IMPRESSION      1.  Penile fracture, initial encounter          DISPOSITION/PLAN   DISPOSITION: Discharged to home      PATIENT REFERRED TO:  Elisa Monroe MD  [address] [facility] [place]  [phone]      As needed    Kris Muñoz Quintanilla [facility]  [phone]      Schedule a follow-up appointment as discussed with you today by Dr. Radha Huerta Emergency Department  [address] [place] [address]  [phone]    If symptoms worsen      DISCHARGE MEDICATIONS:  New Prescriptions    No medications on file       DISCONTINUED MEDICATIONS:  Discontinued Medications    No medications on file            (Please note that portions of this note were completed with a voice recognition program.  Efforts were made to edit the dictations but occasionally words are mis-transcribed.)    Lamberto Vigil PA-C (electronically signed)           Donnie Dawkins PA-C  07/10/21 1651

## 2021-07-10 NOTE — ED PROVIDER NOTES
[facility] Emergency Department      Pt Name: Kristie Palomino  MRN: [id number]  [place] 1970  Date of evaluation: 7/10/2021  Provider: Scooby Carrington MD  I independently performed a history and physical on Kristie Palomino. All diagnostic, treatment, and disposition decisions were made by myself in conjunction with the advanced practice provider. HPI: Kristie Palomino presented with   Chief Complaint   Patient presents with    Groin Swelling     Pt concerned that he fractured his penis during sex this am around 36, pt states immediately swelling and states deformity present. Kristie Palomino has a past medical history of Allergic sinusitis, Anxiety and depression, COPD (chronic obstructive pulmonary disease) ([facility] [address] Hip pain (09/05/2017), Insomnia, Localized edema (2/24/2018), Osteoporosis, Spontaneous pneumothorax, Testicular torsion, Wears dentures, and Wears glasses. He has a past surgical history that includes Testicle torsion reduction; Total hip arthroplasty (Left, 1/29/2019); back surgery (12/26/2016); and Total hip arthroplasty (Right, 11/30/2020). No current facility-administered medications on file prior to encounter. Current Outpatient Medications on File Prior to Encounter   Medication Sig Dispense Refill    cloNIDine (CATAPRES) 0.1 MG tablet Take 1 tablet by mouth nightly 30 tablet 1    desvenlafaxine succinate (PRISTIQ) 50 MG TB24 extended release tablet Take 1 tablet by mouth daily 30 tablet 2    [START ON 7/25/2021] LORazepam (ATIVAN) 0.5 MG tablet Take 1 tablet by mouth nightly as needed for Anxiety for up to 90 days.  Do not fill before 7/25/2021 30 tablet 2    albuterol sulfate  (90 Base) MCG/ACT inhaler Inhale 2 puffs into the lungs every 4 hours as needed for Wheezing 1 Inhaler 2    loratadine (CLARITIN) 10 MG tablet TAKE ONE TABLET BY MOUTH EVERY EVENING 30 tablet 5    montelukast (SINGULAIR) 10 MG tablet Take 1 tablet by mouth nightly 30 tablet 5  nicotine (NICODERM CQ) 21 MG/24HR Place 1 patch onto the skin daily 42 patch 0    fluticasone-salmeterol (ADVAIR HFA) 115-21 MCG/ACT inhaler Inhale 2 puffs into the lungs 2 times daily 1 Inhaler 3    HYDROcodone-acetaminophen (NORCO)  MG per tablet hydrocodone 10 mg-acetaminophen 325 mg tablet      lidocaine (LIDODERM) 5 % Place 1 patch onto the skin daily 12 hours on, 12 hours off. (Patient not taking: Reported on 7/9/2021) 30 patch 0    tiZANidine (ZANAFLEX) 4 MG tablet 1 tab q 8 hours as needed 90 tablet 0    vitamin D (ERGOCALCIFEROL) 1.25 MG (96408 UT) CAPS capsule Take 50,000 Units by mouth once a week      Tens Unit MISC by Does not apply route 1 each 0     PHYSICAL EXAM  Vitals: BP (!) 141/78   Pulse 89   Temp 98.2 °F (36.8 °C) (Oral)   Resp 16   Ht 5' 6\" (1.676 m)   Wt 130 lb (59 kg)   SpO2 97%   BMI 20.98 kg/m²   Constitutional:  46 y.o. male alert  HENT:  Atraumatic, oral mucosa moist  Neck:  No visible JVD, supple  Chest/Lungs:  Respiratory effort normal   Abdomen:  Non-distended, soft, NT  sts of penis, bruising, angulation of the penis about 90 degrees anterior, no blood at the meatus  Back:  No gross deformity  Extremities:  Normal tone and perfusion    Medical Decision Making and Plan: Briefly, this is an 46 y.o.male who presented with penis injury. He has clinical findings suggestive of fracture from trauma. Urology has been consulted and will evaluate the patient in the ED. For further details of [facility] Emergency Department encounter, please see documentation by advanced practice provider Corinna Abdalla PA-C.      Labs Reviewed   COMPREHENSIVE METABOLIC PANEL W/ REFLEX TO MG FOR LOW K - Abnormal; Notable for the following components:       Result Value    Sodium 135 (*)     Glucose 121 (*)     BUN 5 (*)     CREATININE 0.7 (*)     Alkaline Phosphatase 131 (*)     All other components within normal limits    Narrative:     Performed at:  [facility]  [address],  [place], [email or web address]   Phone [phone]   CBC WITH AUTO DIFFERENTIAL    Narrative:     Performed at:  [facility]  [address] [facility],  [place], [email or web address]   Phone [phone]   URINE RT REFLEX TO CULTURE        Rafa Mercado MD  07/10/21 1077

## 2021-07-10 NOTE — ED NOTES
Pt discharged in stable condition, VSS, no signs of distress. Discharge instructions and meds reviewed. Pt verbalizes understanding and states no further questions or concerns unaddressed.        Shirin Davey RN  07/10/21 8234

## 2021-07-26 ENCOUNTER — OFFICE VISIT (OUTPATIENT)
Dept: INTERNAL MEDICINE CLINIC | Age: 51
End: 2021-07-26
Payer: MEDICARE

## 2021-07-26 VITALS
WEIGHT: 127.4 LBS | HEIGHT: 66 IN | DIASTOLIC BLOOD PRESSURE: 72 MMHG | OXYGEN SATURATION: 97 % | BODY MASS INDEX: 20.48 KG/M2 | SYSTOLIC BLOOD PRESSURE: 128 MMHG | HEART RATE: 82 BPM

## 2021-07-26 DIAGNOSIS — J20.9 ACUTE BRONCHITIS, UNSPECIFIED ORGANISM: ICD-10-CM

## 2021-07-26 DIAGNOSIS — J43.2 CENTRILOBULAR EMPHYSEMA (HCC): ICD-10-CM

## 2021-07-26 DIAGNOSIS — R05.9 COUGH: Primary | ICD-10-CM

## 2021-07-26 PROCEDURE — G8420 CALC BMI NORM PARAMETERS: HCPCS | Performed by: INTERNAL MEDICINE

## 2021-07-26 PROCEDURE — 4004F PT TOBACCO SCREEN RCVD TLK: CPT | Performed by: INTERNAL MEDICINE

## 2021-07-26 PROCEDURE — 3017F COLORECTAL CA SCREEN DOC REV: CPT | Performed by: INTERNAL MEDICINE

## 2021-07-26 PROCEDURE — G8427 DOCREV CUR MEDS BY ELIG CLIN: HCPCS | Performed by: INTERNAL MEDICINE

## 2021-07-26 PROCEDURE — G8926 SPIRO NO PERF OR DOC: HCPCS | Performed by: INTERNAL MEDICINE

## 2021-07-26 PROCEDURE — 3023F SPIROM DOC REV: CPT | Performed by: INTERNAL MEDICINE

## 2021-07-26 PROCEDURE — 99213 OFFICE O/P EST LOW 20 MIN: CPT | Performed by: INTERNAL MEDICINE

## 2021-07-26 RX ORDER — DOXYCYCLINE HYCLATE 100 MG
100 TABLET ORAL 2 TIMES DAILY
Qty: 20 TABLET | Refills: 0 | Status: SHIPPED | OUTPATIENT
Start: 2021-07-26 | End: 2021-08-05

## 2021-07-26 RX ORDER — PREDNISONE 20 MG/1
20 TABLET ORAL 2 TIMES DAILY
Qty: 10 TABLET | Refills: 0 | Status: SHIPPED | OUTPATIENT
Start: 2021-07-26 | End: 2021-07-31

## 2021-07-26 ASSESSMENT — ENCOUNTER SYMPTOMS
PHOTOPHOBIA: 0
SORE THROAT: 1
COUGH: 1
ABDOMINAL PAIN: 0
RHINORRHEA: 1

## 2021-07-26 NOTE — PATIENT INSTRUCTIONS
[facility] [address]  Phone: [phone]  Fax: [phone]

## 2021-07-26 NOTE — PROGRESS NOTES
Hellen Dugan (:  1970) is a 46 y.o. male,Established patient, here for evaluation of the following chief complaint(s):  Cough (sinus congestin, )         ASSESSMENT/PLAN:  1. Cough  -     doxycycline hyclate (VIBRA-TABS) 100 MG tablet; Take 1 tablet by mouth 2 times daily for 10 days, Disp-20 tablet, R-0Normal  -     predniSONE (DELTASONE) 20 MG tablet; Take 1 tablet by mouth 2 times daily for 5 days, Disp-10 tablet, R-0Normal  -     XR CHEST (2 VW); Future  -     Covid-19 Ambulatory; Future  2. Acute bronchitis, unspecified organism  -     doxycycline hyclate (VIBRA-TABS) 100 MG tablet; Take 1 tablet by mouth 2 times daily for 10 days, Disp-20 tablet, R-0Normal  -     predniSONE (DELTASONE) 20 MG tablet; Take 1 tablet by mouth 2 times daily for 5 days, Disp-10 tablet, R-0Normal  3. Centrilobular emphysema ([address]  Refill continue albuterol as needed  ref-     fluticasone-salmeterol (ADVAIR DISKUS) 100-50 MCG/DOSE diskus inhaler; Inhale 1 puff into the lungs 2 times daily, Disp-60 each, R-2Normal      Return in about 3 months (around 10/26/2021), or if symptoms worsen or fail to improve, for AWV, chronic medication management. Subjective   SUBJECTIVE/OBJECTIVE:  Cough  This is a new problem. The current episode started in the past 7 days. The problem has been waxing and waning. The cough is productive of sputum. Associated symptoms include nasal congestion, postnasal drip, rhinorrhea and a sore throat. Pertinent negatives include no chest pain, chills, ear congestion, fever, headaches, rash or sweats. Nothing aggravates the symptoms. He has tried nothing for the symptoms. The treatment provided no relief. His past medical history is significant for asthma and bronchitis. Review of Systems   Constitutional: Negative for chills and fever. HENT: Positive for postnasal drip, rhinorrhea and sore throat. Eyes: Negative for photophobia and visual disturbance.    Respiratory: Positive for

## 2021-08-02 ENCOUNTER — TELEPHONE (OUTPATIENT)
Dept: INTERNAL MEDICINE CLINIC | Age: 51
End: 2021-08-02

## 2021-09-10 ENCOUNTER — VIRTUAL VISIT (OUTPATIENT)
Dept: INTERNAL MEDICINE CLINIC | Age: 51
End: 2021-09-10
Payer: MEDICARE

## 2021-09-10 ENCOUNTER — TELEPHONE (OUTPATIENT)
Dept: INTERNAL MEDICINE CLINIC | Age: 51
End: 2021-09-10

## 2021-09-10 DIAGNOSIS — J06.9 VIRAL URI WITH COUGH: Primary | ICD-10-CM

## 2021-09-10 PROCEDURE — 3017F COLORECTAL CA SCREEN DOC REV: CPT | Performed by: NURSE PRACTITIONER

## 2021-09-10 PROCEDURE — G8427 DOCREV CUR MEDS BY ELIG CLIN: HCPCS | Performed by: NURSE PRACTITIONER

## 2021-09-10 PROCEDURE — 99214 OFFICE O/P EST MOD 30 MIN: CPT | Performed by: NURSE PRACTITIONER

## 2021-09-10 RX ORDER — BENZONATATE 100 MG/1
100 CAPSULE ORAL 3 TIMES DAILY PRN
Qty: 30 CAPSULE | Refills: 0 | Status: SHIPPED | OUTPATIENT
Start: 2021-09-10 | End: 2021-09-17

## 2021-09-10 NOTE — PROGRESS NOTES
9/10/2021    TELEHEALTH EVALUATION -- Audio/Visual (During [facility] public [facility])    HPI:    Sunday Pineda (:  1970) has requested an audio/video evaluation for the following concern(s):    VV for URI symptoms   Started 4 days ago   Complains of cough (dry and productive at times), sinus congestion, sinus pressure, sore throat, fatigue   Denies fever, chills, body aches, dyspnea   Taking delsym, cough drops, ibuprfoen   Not using advair - reports changed to breo and is using this   Taking claritin and singulair  Not using nasal sprays   Using albuterol as needed - has used a few times since onset of symptoms   Has had covid vaccines   Denies any known exposures     Similar symptoms where treated with steroids and doxy in July     Review of Systems   Negative other than HPI     Prior to Visit Medications    Medication Sig Taking? Authorizing Provider   benzonatate (TESSALON) 100 MG capsule Take 1 capsule by mouth 3 times daily as needed for Cough Yes ANNIKA Cain - SHERIF   fluticasone-salmeterol (ADVAIR DISKUS) 100-50 MCG/DOSE diskus inhaler Inhale 1 puff into the lungs 2 times daily Yes Ghada Hale MD   cloNIDine (CATAPRES) 0.1 MG tablet Take 1 tablet by mouth nightly Yes Hugo Castro MD   desvenlafaxine succinate (PRISTIQ) 50 MG TB24 extended release tablet Take 1 tablet by mouth daily Yes Hugo Castro MD   LORazepam (ATIVAN) 0.5 MG tablet Take 1 tablet by mouth nightly as needed for Anxiety for up to 90 days.  Do not fill before 2021 Yes Hugo Castro MD   albuterol sulfate  (90 Base) MCG/ACT inhaler Inhale 2 puffs into the lungs every 4 hours as needed for Wheezing Yes Ghada Hale MD   loratadine (CLARITIN) 10 MG tablet TAKE ONE TABLET BY MOUTH EVERY EVENING Yes Ghada Hale MD   montelukast (SINGULAIR) 10 MG tablet Take 1 tablet by mouth nightly Yes Ghada Hale MD   HYDROcodone-acetaminophen (NORCO)  MG per tablet hydrocodone 10 mg-acetaminophen 325 mg tablet Yes Historical Provider, MD   lidocaine (LIDODERM) 5 % Place 1 patch onto the skin daily 12 hours on, 12 hours off. Yes Caryle Louis, PA-C   tiZANidine (ZANAFLEX) 4 MG tablet 1 tab q 8 hours as needed Yes Mervat Lam MD   vitamin D (ERGOCALCIFEROL) 1.25 MG (66947 UT) CAPS capsule Take 50,000 Units by mouth once a week Yes Historical Provider, MD   Tens Unit MISC by Does not apply route Yes Tani Esparza APRN - CNP   nicotine (NICODERM CQ) 21 MG/24HR Place 1 patch onto the skin daily  Mervat Lam MD       Social History     Tobacco Use    Smoking status: Current Every Day Smoker     Packs/day: 1.00     Years: 30.00     Pack years: 30.00     Types: Cigarettes    Smokeless tobacco: Never Used    Tobacco comment: using patches   Vaping Use    Vaping Use: Never used   Substance Use Topics    Alcohol use: No    Drug use: No     Types: Marijuana     Comment: In High School Only            PHYSICAL EXAMINATION:  [ INSTRUCTIONS:  \"[x]\" Indicates a positive item  \"[]\" Indicates a negative item  -- DELETE ALL ITEMS NOT EXAMINED]  Vital Signs: (As obtained by patient/caregiver or practitioner observation)    Patient-Reported Vitals 9/10/2021   Patient-Reported Weight 130lb   Patient-Reported Height 5'6   Patient-Reported Systolic 211   Patient-Reported Diastolic 314   Patient-Reported Pulse 105         Physical Exam  Constitutional:       General: He is not in acute distress. Appearance: Normal appearance. He is not ill-appearing. HENT:      Head: Normocephalic and atraumatic. Pulmonary:      Effort: Pulmonary effort is normal. No respiratory distress. Comments: Dry cough without any conversational dyspnea   Neurological:      General: No focal deficit present. Mental Status: He is alert and oriented to person, place, and time. Mental status is at baseline.    Psychiatric:         Mood and Affect: Mood normal.         Behavior: Behavior normal.

## 2021-09-26 DIAGNOSIS — J30.1 SEASONAL ALLERGIC RHINITIS DUE TO POLLEN: ICD-10-CM

## 2021-09-26 DIAGNOSIS — F41.1 GAD (GENERALIZED ANXIETY DISORDER): Primary | ICD-10-CM

## 2021-09-27 RX ORDER — LORAZEPAM 0.5 MG/1
0.5 TABLET ORAL NIGHTLY PRN
Qty: 30 TABLET | Refills: 1 | Status: SHIPPED | OUTPATIENT
Start: 2021-09-27 | End: 2021-11-26

## 2021-09-27 RX ORDER — MONTELUKAST SODIUM 10 MG/1
10 TABLET ORAL NIGHTLY
Qty: 30 TABLET | Refills: 5 | Status: SHIPPED | OUTPATIENT
Start: 2021-09-27 | End: 2022-08-19

## 2021-10-20 ENCOUNTER — NURSE ONLY (OUTPATIENT)
Dept: ENDOCRINOLOGY | Age: 51
End: 2021-10-20
Payer: MEDICARE

## 2021-10-20 DIAGNOSIS — M81.0 AGE RELATED OSTEOPOROSIS, UNSPECIFIED PATHOLOGICAL FRACTURE PRESENCE: ICD-10-CM

## 2021-10-20 PROCEDURE — 96372 THER/PROPH/DIAG INJ SC/IM: CPT | Performed by: INTERNAL MEDICINE

## 2021-10-20 NOTE — PATIENT INSTRUCTIONS
Informed patient if any signs of redness,rash,swelling or unusual symptoms occur, please contact the office. Prolia given per physician order.     Prolia shot supplied by office

## 2021-10-25 ENCOUNTER — OFFICE VISIT (OUTPATIENT)
Dept: PSYCHIATRY | Age: 51
End: 2021-10-25
Payer: MEDICARE

## 2021-10-25 ENCOUNTER — OFFICE VISIT (OUTPATIENT)
Dept: INTERNAL MEDICINE CLINIC | Age: 51
End: 2021-10-25
Payer: MEDICARE

## 2021-10-25 VITALS
OXYGEN SATURATION: 97 % | DIASTOLIC BLOOD PRESSURE: 80 MMHG | SYSTOLIC BLOOD PRESSURE: 120 MMHG | BODY MASS INDEX: 20.98 KG/M2 | WEIGHT: 130 LBS | HEART RATE: 90 BPM

## 2021-10-25 DIAGNOSIS — F33.42 RECURRENT MAJOR DEPRESSIVE DISORDER, IN FULL REMISSION (HCC): Primary | ICD-10-CM

## 2021-10-25 DIAGNOSIS — F41.9 ANXIETY DISORDER, UNSPECIFIED TYPE: ICD-10-CM

## 2021-10-25 DIAGNOSIS — Z87.891 PERSONAL HISTORY OF TOBACCO USE: ICD-10-CM

## 2021-10-25 DIAGNOSIS — J43.2 CENTRILOBULAR EMPHYSEMA (HCC): ICD-10-CM

## 2021-10-25 DIAGNOSIS — J44.9 COPD, MILD (HCC): ICD-10-CM

## 2021-10-25 DIAGNOSIS — Z00.00 ROUTINE GENERAL MEDICAL EXAMINATION AT A HEALTH CARE FACILITY: ICD-10-CM

## 2021-10-25 PROCEDURE — 3017F COLORECTAL CA SCREEN DOC REV: CPT | Performed by: INTERNAL MEDICINE

## 2021-10-25 PROCEDURE — 99214 OFFICE O/P EST MOD 30 MIN: CPT | Performed by: PSYCHIATRY & NEUROLOGY

## 2021-10-25 PROCEDURE — G8428 CUR MEDS NOT DOCUMENT: HCPCS | Performed by: PSYCHIATRY & NEUROLOGY

## 2021-10-25 PROCEDURE — G0402 INITIAL PREVENTIVE EXAM: HCPCS | Performed by: INTERNAL MEDICINE

## 2021-10-25 PROCEDURE — 4004F PT TOBACCO SCREEN RCVD TLK: CPT | Performed by: PSYCHIATRY & NEUROLOGY

## 2021-10-25 PROCEDURE — G8420 CALC BMI NORM PARAMETERS: HCPCS | Performed by: PSYCHIATRY & NEUROLOGY

## 2021-10-25 PROCEDURE — 3017F COLORECTAL CA SCREEN DOC REV: CPT | Performed by: PSYCHIATRY & NEUROLOGY

## 2021-10-25 PROCEDURE — G8484 FLU IMMUNIZE NO ADMIN: HCPCS | Performed by: PSYCHIATRY & NEUROLOGY

## 2021-10-25 RX ORDER — GUANFACINE 1 MG/1
1 TABLET ORAL NIGHTLY
Qty: 30 TABLET | Refills: 2 | Status: SHIPPED | OUTPATIENT
Start: 2021-10-25 | End: 2022-03-02 | Stop reason: SDUPTHER

## 2021-10-25 RX ORDER — DESVENLAFAXINE 50 MG/1
50 TABLET, EXTENDED RELEASE ORAL DAILY
Qty: 30 TABLET | Refills: 5 | Status: SHIPPED | OUTPATIENT
Start: 2021-10-25 | End: 2022-03-02 | Stop reason: SDUPTHER

## 2021-10-25 RX ORDER — NICOTINE 21 MG/24HR
1 PATCH, TRANSDERMAL 24 HOURS TRANSDERMAL DAILY
Qty: 42 PATCH | Refills: 0 | Status: SHIPPED | OUTPATIENT
Start: 2021-10-25 | End: 2021-12-06

## 2021-10-25 RX ORDER — LORAZEPAM 0.5 MG/1
0.5 TABLET ORAL NIGHTLY PRN
Qty: 30 TABLET | Refills: 2 | Status: SHIPPED | OUTPATIENT
Start: 2021-11-26 | End: 2021-12-23 | Stop reason: SDUPTHER

## 2021-10-25 RX ORDER — ALBUTEROL SULFATE 90 UG/1
2 AEROSOL, METERED RESPIRATORY (INHALATION) EVERY 4 HOURS PRN
Qty: 18 G | Refills: 2 | Status: SHIPPED | OUTPATIENT
Start: 2021-10-25

## 2021-10-25 SDOH — ECONOMIC STABILITY: FOOD INSECURITY: WITHIN THE PAST 12 MONTHS, THE FOOD YOU BOUGHT JUST DIDN'T LAST AND YOU DIDN'T HAVE MONEY TO GET MORE.: OFTEN TRUE

## 2021-10-25 SDOH — ECONOMIC STABILITY: FOOD INSECURITY: WITHIN THE PAST 12 MONTHS, YOU WORRIED THAT YOUR FOOD WOULD RUN OUT BEFORE YOU GOT MONEY TO BUY MORE.: OFTEN TRUE

## 2021-10-25 ASSESSMENT — SOCIAL DETERMINANTS OF HEALTH (SDOH): HOW HARD IS IT FOR YOU TO PAY FOR THE VERY BASICS LIKE FOOD, HOUSING, MEDICAL CARE, AND HEATING?: HARD

## 2021-10-25 ASSESSMENT — PATIENT HEALTH QUESTIONNAIRE - PHQ9
SUM OF ALL RESPONSES TO PHQ QUESTIONS 1-9: 2
SUM OF ALL RESPONSES TO PHQ9 QUESTIONS 1 & 2: 2
2. FEELING DOWN, DEPRESSED OR HOPELESS: 1
SUM OF ALL RESPONSES TO PHQ QUESTIONS 1-9: 2
1. LITTLE INTEREST OR PLEASURE IN DOING THINGS: 1
SUM OF ALL RESPONSES TO PHQ QUESTIONS 1-9: 2
SUM OF ALL RESPONSES TO PHQ QUESTIONS 1-9: 2
2. FEELING DOWN, DEPRESSED OR HOPELESS: 1
SUM OF ALL RESPONSES TO PHQ9 QUESTIONS 1 & 2: 2
SUM OF ALL RESPONSES TO PHQ QUESTIONS 1-9: 2
1. LITTLE INTEREST OR PLEASURE IN DOING THINGS: 1
SUM OF ALL RESPONSES TO PHQ QUESTIONS 1-9: 2

## 2021-10-25 ASSESSMENT — LIFESTYLE VARIABLES: HOW OFTEN DO YOU HAVE A DRINK CONTAINING ALCOHOL: 0

## 2021-10-25 NOTE — PATIENT INSTRUCTIONS
Personalized Preventive Plan for Natalie Harper - 10/25/2021  Medicare offers a range of preventive health benefits. Some of the tests and screenings are paid in full while other may be subject to a deductible, co-insurance, and/or copay. Some of these benefits include a comprehensive review of your medical history including lifestyle, illnesses that may run in your family, and various assessments and screenings as appropriate. After reviewing your medical record and screening and assessments performed today your provider may have ordered immunizations, labs, imaging, and/or referrals for you. A list of these orders (if applicable) as well as your Preventive Care list are included within your After Visit Summary for your review. Other Preventive Recommendations:    · A preventive eye exam performed by an eye specialist is recommended every 1-2 years to screen for glaucoma; cataracts, macular degeneration, and other eye disorders. · A preventive dental visit is recommended every 6 months. · Try to get at least 150 minutes of exercise per week or 10,000 steps per day on a pedometer . · Order or download the FREE \"Exercise & Physical Activity: Your Everyday Guide\" from [employer]. Call 7-289.855.2227 or search [employer] online. · You need 5761-1117 mg of calcium and 6884-8112 IU of vitamin D per day. It is possible to meet your calcium requirement with diet alone, but a vitamin D supplement is usually necessary to meet this goal.  · When exposed to the sun, use a sunscreen that protects against both UVA and UVB radiation with an SPF of 30 or greater. Reapply every 2 to 3 hours or after sweating, drying off with a towel, or swimming. · Always wear a seat belt when traveling in a car. Always wear a helmet when riding a bicycle or motorcycle.

## 2021-10-25 NOTE — PROGRESS NOTES
Medicare Annual Wellness Visit  Name: George Arzola Date: 10/25/2021   MRN: [id number] Sex: Male   Age: 46 y.o. Ethnicity: Non- / Non    : 1970 Race: White (non-)      Gloria Crump is here for Medicare AWV    Screenings for behavioral, psychosocial and functional/safety risks, and cognitive dysfunction are all negative except as indicated below. These results, as well as other patient data from the [address] form, are documented in Flowsheets linked to this Encounter. Also follow-up visit for chronic problems. History of COPD. Patient continues to smoke cigarettes. Using Advair inhaler only once a day. Allergies   Allergen Reactions    Naproxen Swelling     He can tolerate aspirin    Neurontin [Gabapentin] Swelling     Edema lower extr. Leg swelling    Robaxin [Methocarbamol] Diarrhea, Itching and Other (See Comments)     Eyes swell/yellowish and get bloodshot    Sulfamethoxazole-Trimethoprim Diarrhea    Toradol [Ketorolac Tromethamine] Swelling and Other (See Comments)     Other reaction(s): Headaches  Eyes swell and bloodshot    Zoloft [Sertraline]      bad brain fog ,confusion     Azithromycin Nausea And Vomiting    Bactrim [Sulfamethoxazole-Trimethoprim] Diarrhea    Biaxin [Clarithromycin] Diarrhea and Nausea And Vomiting    Celebrex [Celecoxib] Swelling     Causes severe foot swelling. He can tolerate aspirin.  Diclofenac Diarrhea     He can tolerate aspirin    Sulfa Antibiotics Diarrhea and Nausea And Vomiting    Tramadol Rash and Other (See Comments)     Eyes swell and get bloodshot--pt states can take in small doses         Prior to Visit Medications    Medication Sig Taking?  Authorizing Provider   fluticasone-salmeterol (ADVAIR HFA) 115-21 MCG/ACT inhaler Inhale 2 puffs into the lungs 2 times daily Yes Betsy Jones MD   albuterol sulfate  (90 Base) MCG/ACT inhaler Inhale 2 puffs into the lungs every 4 hours as needed for Wheezing Yes Breezy Clay MD   nicotine (NICODERM CQ) 21 MG/24HR Place 1 patch onto the skin daily Yes Breezy Clay MD   guanFACINE (TENEX) 1 MG tablet Take 1 tablet by mouth nightly  Hugo Castro MD   montelukast (SINGULAIR) 10 MG tablet Take 1 tablet by mouth nightly  Breezy Clay MD   LORazepam (ATIVAN) 0.5 MG tablet Take 1 tablet by mouth nightly as needed for Anxiety for up to 60 days. Karma Amado MD   desvenlafaxine succinate (PRISTIQ) 50 MG TB24 extended release tablet Take 1 tablet by mouth daily  Hugo Castro MD   loratadine (CLARITIN) 10 MG tablet TAKE ONE TABLET BY MOUTH EVERY EVENING  M Bridget Bartlett MD   HYDROcodone-acetaminophen (NORCO)  MG per tablet hydrocodone 10 mg-acetaminophen 325 mg tablet  Historical Provider, MD   lidocaine (LIDODERM) 5 % Place 1 patch onto the skin daily 12 hours on, 12 hours off. Angelica Garcia PA-C   tiZANidine (ZANAFLEX) 4 MG tablet 1 tab q 8 hours as needed  Breezy Clay MD   vitamin D (ERGOCALCIFEROL) 1.25 MG (45417 UT) CAPS capsule Take 50,000 Units by mouth once a week  Historical Provider, MD   Tens Unit MISC by Does not apply route  ANNIKA Lazaro - CNP         Past Medical History:   Diagnosis Date    Allergic sinusitis     Anxiety and depression     currently under care of pyschiatrist    COPD (chronic obstructive pulmonary disease) ([facility] [address]     Hip pain 09/05/2017    ?  CAM possible bilateral superior femoral head-heck junction bump suggestive of femoro acetabular impingement syndrome    Insomnia     Localized edema 2/24/2018    Neg abd ultrasound and DVT    Osteoporosis     Spontaneous pneumothorax     1991    Testicular torsion     Wears dentures     Wears glasses        Past Surgical History:   Procedure Laterality Date    BACK SURGERY  12/26/2016    1st rods, 2nd tens unit 3rd rods and screw removed, only has cages now    TESTICLE TORSION REDUCTION      x 2    TOTAL HIP ARTHROPLASTY organomegaly    Neurologic: reflexes normal and symmetric, no cranial nerve deficit, gait, coordination and speech normal    Patient's complete Health Risk Assessment and screening values have been reviewed and are found in Flowsheets. The following problems were reviewed today and where indicated follow up appointments were made and/or referrals ordered. Positive Risk Factor Screenings with Interventions:     Fall Risk:  Timed Up and Go Test > 12 seconds? (Complete if either Fall Risk answers are Yes): (!) yes  2 or more falls in past year?: no  Fall with injury in past year?: no  Fall Risk Interventions:    · Home safety tips provided  · Home exercises provided to promote strength and balance       Substance History:  Social History     Tobacco History     Smoking Status  Current Every Day Smoker Smoking Frequency  1 pack/day for 30 years (30 pk yrs) Smoking Tobacco Type  Cigarettes    Smokeless Tobacco Use  Never Used    Tobacco Comment  using patches          Alcohol History     Alcohol Use Status  No          Drug Use     Drug Use Status  No Comment  In High School Only          Sexual Activity     Sexually Active  Never               Alcohol Screening:       A score of 8 or more is associated with harmful or hazardous drinking. A score of 13 or more in women, and 15 or more in men, is likely to indicate alcohol dependence. Substance Abuse Interventions:  · Tobacco abuse:  patient agrees to avoid triggers and negative influences, patient agrees to try the following tobacco cessation strategies:  nicotine replacement: 21, risks and benefits of this medication discussed- patient will call for any significant adverse effects    General Health and ACP:  General  In general, how would you say your health is?: Fair  In the past 7 days, have you experienced any of the following?  New or Increased Pain, New or Increased Fatigue, Loneliness, Social Isolation, Stress or Anger?: (!) Anger, New or Increased Fatigue  Do you get the social and emotional support that you need?: Yes  Do you have a Living Will?: (!) No  Advance Directives     Power of Ev [place] Will ACP-Advance Directive ACP-Power of     Not on File Not on File [facility] Interventions:  · No Living Will: done    Health Habits/Nutrition:  Health Habits/Nutrition  Do you exercise for at least 20 minutes 2-3 times per week?: (!) No  Have you lost any weight without trying in the past 3 months?: (!) Yes  Do you eat only one meal per day?: No  Have you seen the dentist within the past year?: (!) No     Health Habits/Nutrition Interventions:  · Inadequate physical activity:  patient agrees to exercise for at least 150 minutes/week    Hearing/Vision:  No exam data present  Hearing/Vision  Do you or your family notice any trouble with your hearing that hasn't been managed with hearing aids?: No  Do you have difficulty driving, watching TV, or doing any of your daily activities because of your eyesight?: (!) Yes  Have you had an eye exam within the past year?: Yes  Hearing/Vision Interventions:  · Hearing concerns:  occasional concern    Safety:  Safety  Do you have working smoke detectors?: Yes  Have all throw rugs been removed or fastened?: (!) No  Do you have non-slip mats or surfaces in all bathtubs/showers?: Yes  Do all of your stairways have a railing or banister?: Yes  Are your doorways, halls and stairs free of clutter?: Yes  Do you always fasten your seatbelt when you are in a car?: Yes  Safety Interventions:  · Home safety tips provided    ADL:  ADLs  In the past 7 days, did you need help from others to perform any of the following everyday activities? Eating, dressing, grooming, bathing, toileting, or walking/balance?: (!) Dressing  In the past 7 days, did you need help from others to take care of any of the following?  Laundry, housekeeping, banking/finances, shopping, telephone use, food preparation, transportation, or taking medications?: None  ADL Interventions:  · Patient declines any further evaluation/treatment for this issue    Personalized Preventive Plan   Current Health Maintenance Status  Immunization History   Administered Date(s) Administered    Pneumococcal Polysaccharide ([id number]) 08/30/2017        Health Maintenance   Topic Date Due    HIV screen  Never done    DTaP/Tdap/Td vaccine (1 - Tdap) Never done    Shingles Vaccine (1 of 2) Never done    Low dose CT lung screening  01/29/2020    Annual Wellness Visit (AWV)  Never done    Flu vaccine (1) Never done    Colon Cancer Screen FIT/FOBT  04/28/2022    Lipid screen  12/13/2022    Pneumococcal 0-64 years Vaccine (2 of 2 - PPSV23) 01/29/2035    COVID-19 Vaccine  Completed    Hepatitis C screen  Completed    Hepatitis A vaccine  Aged Out    Hepatitis B vaccine  Aged Out    Hib vaccine  Aged Out    Meningococcal (ACWY) vaccine  Aged Out     Recommendations for [employer] Due: see orders and patient instructions/AVS.  . Recommended screening schedule for the next 5-10 years is provided to the patient in written form: see Patient Juju Garcia was seen today for medicare awv. Diagnoses and all orders for this visit:    Centrilobular emphysema ([address]  -     fluticasone-salmeterol (ADVAIR HFA) 115-21 MCG/ACT inhaler; Inhale 2 puffs into the lungs 2 times daily  -     albuterol sulfate  (90 Base) MCG/ACT inhaler; Inhale 2 puffs into the lungs every 4 hours as needed for Wheezing        Personal history of tobacco use  -     CT Lung Screening; Future    Routine general medical examination at a health care facility    Nicotine dependence. -     nicotine (NICODERM CQ) 21 MG/24HR;  Place 1 patch onto the skin daily

## 2021-10-25 NOTE — PROGRESS NOTES
PSYCHIATRY PROGRESS NOTE    Evelia Sams  1970  10/25/21  PCP: Mya Anderson MD    CC:   Chief Complaint   Patient presents with    Depression         S:   Feeling better since last visit. Couple factors have helped. His wife is working and is more dedicated to maintaining her employment which he feels good about. Their relationship is better. He got a dog which has helped him stay occupied and engaged in an activity he enjoys each day. He has been playing video games which he also feels is a healthy distraction. He feels he frequently struggles with focus and physically feeling restless. May jump from one thing to another without completing tasks. There is a family hx of ADHD. Clonidine does help him sleep but he feels a little dizziness after taking it and he feels groggy in the morning. ROS: +back pain        Current Outpatient Medications   Medication Sig Dispense Refill    guanFACINE (TENEX) 1 MG tablet Take 1 tablet by mouth nightly 30 tablet 2    fluticasone-salmeterol (ADVAIR HFA) 115-21 MCG/ACT inhaler Inhale 2 puffs into the lungs 2 times daily 12 g 5    albuterol sulfate  (90 Base) MCG/ACT inhaler Inhale 2 puffs into the lungs every 4 hours as needed for Wheezing 18 g 2    nicotine (NICODERM CQ) 21 MG/24HR Place 1 patch onto the skin daily 42 patch 0    montelukast (SINGULAIR) 10 MG tablet Take 1 tablet by mouth nightly 30 tablet 5    LORazepam (ATIVAN) 0.5 MG tablet Take 1 tablet by mouth nightly as needed for Anxiety for up to 60 days. 30 tablet 1    desvenlafaxine succinate (PRISTIQ) 50 MG TB24 extended release tablet Take 1 tablet by mouth daily 30 tablet 2    loratadine (CLARITIN) 10 MG tablet TAKE ONE TABLET BY MOUTH EVERY EVENING 30 tablet 5    HYDROcodone-acetaminophen (NORCO)  MG per tablet hydrocodone 10 mg-acetaminophen 325 mg tablet      lidocaine (LIDODERM) 5 % Place 1 patch onto the skin daily 12 hours on, 12 hours off.  30 patch 0    tiZANidine (ZANAFLEX) 4 MG tablet 1 tab q 8 hours as needed 90 tablet 0    vitamin D (ERGOCALCIFEROL) 1.25 MG (05827 UT) CAPS capsule Take 50,000 Units by mouth once a week      Tens Unit MISC by Does not apply route 1 each 0     No current facility-administered medications for this visit. O:  Wt Readings from Last 3 Encounters:   10/25/21 130 lb (59 kg)   07/26/21 127 lb 6.4 oz (57.8 kg)   07/10/21 130 lb (59 kg)     Temp Readings from Last 3 Encounters:   07/10/21 98.2 °F (36.8 °C) (Oral)   07/09/21 97 °F (36.1 °C)   03/23/21 98.2 °F (36.8 °C) (Temporal)     BP Readings from Last 3 Encounters:   10/25/21 120/80   07/26/21 128/72   07/10/21 121/85     Pulse Readings from Last 3 Encounters:   10/25/21 90   07/26/21 82   07/10/21 89       Mental Status Exam:   Appearance    alert, cooperative  Motor:  No abnormal movements, tics or mannerisms. Speech    spontaneous, normal rate.  Interrupting at times  Mood/Affect   better / full quality, good motility and range  Thought Process    linear and goal directed  Thought Content    intact , no suicidal ideation  Associations    logical connections  Attention/Concentration    Fair, he does seem distracted at times   Memory    recent and remote memory intact  Insight/Judgement    Good / Intact    Labs:     Admission on 07/10/2021, Discharged on 07/10/2021   Component Date Value Ref Range Status    WBC 07/10/2021 6.9  4.0 - 11.0 K/uL Final    RBC 07/10/2021 5.02  4.20 - 5.90 M/uL Final    Hemoglobin 07/10/2021 15.8  13.5 - 17.5 g/dL Final    Hematocrit 07/10/2021 46.3  40.5 - 52.5 % Final    MCV 07/10/2021 92.1  80.0 - 100.0 fL Final    MCH 07/10/2021 31.5  26.0 - 34.0 pg Final    MCHC 07/10/2021 34.2  31.0 - 36.0 g/dL Final    RDW 07/10/2021 14.5  12.4 - 15.4 % Final    Platelets 17/82/6299 326  135 - 450 K/uL Final    MPV 07/10/2021 7.0  5.0 - 10.5 fL Final    Neutrophils % 07/10/2021 57.5  % Final    Lymphocytes % 07/10/2021 33.5  % Final    Monocytes % 07/10/2021 6.5  % Final    Eosinophils % 07/10/2021 1.9  % Final    Basophils % 07/10/2021 0.6  % Final    Neutrophils Absolute 07/10/2021 4.0  1.7 - 7.7 K/uL Final    Lymphocytes Absolute 07/10/2021 2.3  1.0 - 5.1 K/uL Final    Monocytes Absolute 07/10/2021 0.4  0.0 - 1.3 K/uL Final    Eosinophils Absolute 07/10/2021 0.1  0.0 - 0.6 K/uL Final    Basophils Absolute 07/10/2021 0.0  0.0 - 0.2 K/uL Final    Sodium 07/10/2021 135* 136 - 145 mmol/L Final    Potassium reflex Magnesium 07/10/2021 4.2  3.5 - 5.1 mmol/L Final    Chloride 07/10/2021 99  99 - 110 mmol/L Final    CO2 07/10/2021 28  21 - 32 mmol/L Final    Anion Gap 07/10/2021 8  3 - 16 Final    Glucose 07/10/2021 121* 70 - 99 mg/dL Final    BUN 07/10/2021 5* 7 - 20 mg/dL Final    CREATININE 07/10/2021 0.7* 0.9 - 1.3 mg/dL Final    GFR Non- 07/10/2021 >60  >60 Final    Comment: >60 mL/min/1.73m2 EGFR, calc. for ages 25 and older using the  MDRD formula (not corrected for weight), is valid for stable  renal function.  GFR  07/10/2021 >60  >60 Final    Comment: Chronic Kidney Disease: less than 60 ml/min/1.73 sq.m. Kidney Failure: less than 15 ml/min/1.73 sq.m. Results valid for patients 18 years and older.       Calcium 07/10/2021 9.0  8.3 - 10.6 mg/dL Final    Total Protein 07/10/2021 7.0  6.4 - 8.2 g/dL Final    Albumin 07/10/2021 4.3  3.4 - 5.0 g/dL Final    Albumin/Globulin Ratio 07/10/2021 1.6  1.1 - 2.2 Final    Total Bilirubin 07/10/2021 <0.2  0.0 - 1.0 mg/dL Final    Alkaline Phosphatase 07/10/2021 131* 40 - 129 U/L Final    ALT 07/10/2021 22  10 - 40 U/L Final    AST 07/10/2021 19  15 - 37 U/L Final    Globulin 07/10/2021 2.7  g/dL Final    Color, UA 07/10/2021 YELLOW  Straw/Yellow Final    Clarity, UA 07/10/2021 CLOUDY* Clear Final    Glucose, Ur 07/10/2021 Negative  Negative mg/dL Final    Bilirubin Urine 07/10/2021 Negative  Negative Final    Ketones, Urine 07/10/2021 Negative Negative mg/dL Final    Specific Gravity, UA 07/10/2021 <1.005  1.005 - 1.030 Final    Blood, Urine 07/10/2021 Negative  Negative Final    pH, UA 07/10/2021 6.5  5.0 - 8.0 Final    Protein, UA 07/10/2021 Negative  Negative mg/dL Final    Urobilinogen, Urine 07/10/2021 0.2  <2.0 E.U./dL Final    Nitrite, Urine 07/10/2021 Negative  Negative Final    Leukocyte Esterase, Urine 07/10/2021 Negative  Negative Final    Microscopic Examination 07/10/2021 YES   Final    Urine Type 07/10/2021 NotGiven   Final    Urine received in a container without preservatives.  Urine Reflex to Culture 07/10/2021 Not Indicated   Final    WBC, UA 07/10/2021 None seen  0 - 5 /HPF Final    RBC, UA 07/10/2021 None seen  0 - 4 /HPF Final    Urinalysis Comments 07/10/2021 see below   Final    Nothing seen in microscopic       A:  45 yo M with depression and anxiety. Main stress of his marriage is now better. I do question if he has attention deficit given observations on MSE, personal and family hx.     1. Major depressive disorder, recurrent, in remission  2. Unspecified anxiety disorder    P:   1. Will d/c clonidine and try tenex 1mg qhs. If tolerated/helpful, we may try intuniv to see if helps with some of the hyperactivity during the daytime. 2. Continue pristiq 50mg daily  3. Continue lorazepam 0.5mg qhs   4. Will discussed further ADHD and evaluate further on subsequent visits.      Follow-up: RTC in 6 weeks      Kevin New MD  Psychiatrist

## 2021-12-22 ENCOUNTER — TELEPHONE (OUTPATIENT)
Dept: INTERNAL MEDICINE CLINIC | Age: 51
End: 2021-12-22

## 2021-12-22 NOTE — TELEPHONE ENCOUNTER
Patient's wife, Kay Lagos is calling regarding prescription for LORazepam (ATIVAN) 0.5 MG tablet. She states [address] will not fill until 12/26/21. She states they are leaving tomorrow morning very early to go out of town and they will not be back until after the 26th. Kay Lagos is requesting for Dr Taye Nunez to call pharmacy and okay fill for today but the prescription is written by Dr Felicity Bamberger. Please advise.

## 2022-01-28 ENCOUNTER — PATIENT MESSAGE (OUTPATIENT)
Dept: EMERGENCY DEPT | Age: 52
End: 2022-01-28

## 2022-03-02 ENCOUNTER — OFFICE VISIT (OUTPATIENT)
Dept: INTERNAL MEDICINE CLINIC | Age: 52
End: 2022-03-02
Payer: MEDICARE

## 2022-03-02 ENCOUNTER — OFFICE VISIT (OUTPATIENT)
Dept: PSYCHIATRY | Age: 52
End: 2022-03-02
Payer: MEDICARE

## 2022-03-02 VITALS
BODY MASS INDEX: 20.38 KG/M2 | DIASTOLIC BLOOD PRESSURE: 80 MMHG | OXYGEN SATURATION: 98 % | WEIGHT: 126.8 LBS | HEIGHT: 66 IN | SYSTOLIC BLOOD PRESSURE: 135 MMHG | HEART RATE: 106 BPM

## 2022-03-02 DIAGNOSIS — F41.9 ANXIETY DISORDER, UNSPECIFIED TYPE: ICD-10-CM

## 2022-03-02 DIAGNOSIS — Z79.899 ON LONG TERM DRUG THERAPY: ICD-10-CM

## 2022-03-02 DIAGNOSIS — F33.0 MILD EPISODE OF RECURRENT MAJOR DEPRESSIVE DISORDER (HCC): Primary | ICD-10-CM

## 2022-03-02 DIAGNOSIS — F11.20 NARCOTIC DEPENDENCY, CONTINUOUS (HCC): ICD-10-CM

## 2022-03-02 DIAGNOSIS — R06.02 SHORTNESS OF BREATH: ICD-10-CM

## 2022-03-02 DIAGNOSIS — J43.2 CENTRILOBULAR EMPHYSEMA (HCC): Primary | ICD-10-CM

## 2022-03-02 PROCEDURE — 3023F SPIROM DOC REV: CPT | Performed by: INTERNAL MEDICINE

## 2022-03-02 PROCEDURE — 99214 OFFICE O/P EST MOD 30 MIN: CPT | Performed by: INTERNAL MEDICINE

## 2022-03-02 PROCEDURE — 3017F COLORECTAL CA SCREEN DOC REV: CPT | Performed by: PSYCHIATRY & NEUROLOGY

## 2022-03-02 PROCEDURE — 3017F COLORECTAL CA SCREEN DOC REV: CPT | Performed by: INTERNAL MEDICINE

## 2022-03-02 PROCEDURE — G8428 CUR MEDS NOT DOCUMENT: HCPCS | Performed by: PSYCHIATRY & NEUROLOGY

## 2022-03-02 PROCEDURE — G8427 DOCREV CUR MEDS BY ELIG CLIN: HCPCS | Performed by: INTERNAL MEDICINE

## 2022-03-02 PROCEDURE — G8484 FLU IMMUNIZE NO ADMIN: HCPCS | Performed by: INTERNAL MEDICINE

## 2022-03-02 PROCEDURE — G8420 CALC BMI NORM PARAMETERS: HCPCS | Performed by: INTERNAL MEDICINE

## 2022-03-02 PROCEDURE — G8484 FLU IMMUNIZE NO ADMIN: HCPCS | Performed by: PSYCHIATRY & NEUROLOGY

## 2022-03-02 PROCEDURE — 4004F PT TOBACCO SCREEN RCVD TLK: CPT | Performed by: INTERNAL MEDICINE

## 2022-03-02 PROCEDURE — 99214 OFFICE O/P EST MOD 30 MIN: CPT | Performed by: PSYCHIATRY & NEUROLOGY

## 2022-03-02 PROCEDURE — G8420 CALC BMI NORM PARAMETERS: HCPCS | Performed by: PSYCHIATRY & NEUROLOGY

## 2022-03-02 PROCEDURE — 4004F PT TOBACCO SCREEN RCVD TLK: CPT | Performed by: PSYCHIATRY & NEUROLOGY

## 2022-03-02 RX ORDER — GUANFACINE 1 MG/1
1 TABLET ORAL NIGHTLY
Qty: 30 TABLET | Refills: 5 | Status: SHIPPED | OUTPATIENT
Start: 2022-03-02

## 2022-03-02 RX ORDER — LORAZEPAM 0.5 MG/1
0.5 TABLET ORAL NIGHTLY PRN
Qty: 30 TABLET | Refills: 2 | Status: SHIPPED | OUTPATIENT
Start: 2022-03-18 | End: 2022-06-16 | Stop reason: SDUPTHER

## 2022-03-02 RX ORDER — DESVENLAFAXINE 50 MG/1
50 TABLET, EXTENDED RELEASE ORAL DAILY
Qty: 30 TABLET | Refills: 5 | Status: SHIPPED | OUTPATIENT
Start: 2022-03-02

## 2022-03-02 RX ORDER — DESVENLAFAXINE 25 MG/1
25 TABLET, EXTENDED RELEASE ORAL EVERY MORNING
Qty: 30 TABLET | Refills: 2 | Status: SHIPPED | OUTPATIENT
Start: 2022-03-02 | End: 2022-09-01

## 2022-03-02 ASSESSMENT — ENCOUNTER SYMPTOMS
VOICE CHANGE: 0
ABDOMINAL PAIN: 0
SHORTNESS OF BREATH: 1
NAUSEA: 0
TROUBLE SWALLOWING: 0
VOMITING: 0

## 2022-03-02 NOTE — PATIENT INSTRUCTIONS
[facility] Scheduling  Schedule CXR and CT lung screen  [phone], option 2, then option 1    Patient Education        Learning About Benefits From Quitting Smoking  How does quitting smoking make you healthier? If you're thinking about quitting smoking, you may have a few reasons to be smoke-free. Your health may be one of them. · When you quit smoking, you lower your risks for cancer, lung disease, heart attack, stroke, blood vessel disease, and blindness from macular degeneration. · When you're smoke-free, you get sick less often, and you heal faster. You are less likely to get colds, flu, bronchitis, and pneumonia. · As a nonsmoker, you may find that your mood is better and you are less stressed. When and how will you feel healthier? Quitting has real health benefits that start from day 1 of being smoke-free. And the longer you stay smoke-free, the healthier you get and the better you feel. The first hours  · After just 20 minutes, your blood pressure and heart rate go down. That means there's less stress on your heart and blood vessels. · Within 12 hours, the level of carbon monoxide in your blood drops back to normal. That makes room for more oxygen. With more oxygen in your body, you may notice that you have more energy than when you smoked. After 2 weeks  · Your lungs start to work better. · Your risk of heart attack starts to drop. After 1 month  · When your lungs are clear, you cough less and breathe deeper, so it's easier to be active. · Your sense of taste and smell return. That means you can enjoy food more than you have since you started smoking. Over the years  · Over the years, your risks of heart disease, heart attack, and stroke are lower. · After 10 years, your risk of dying from lung cancer is cut by about half. And your risk for many other types of cancer is lower too. How would quitting help others in your life?   When you quit smoking, you improve the health of everyone who now breathes in your smoke. · Their heart, lung, and cancer risks drop, much like yours. · They are sick less. For babies and small children, living smoke-free means they're less likely to have ear infections, pneumonia, and bronchitis. · If you're a woman who is or will be pregnant someday, quitting smoking means a healthier . · Children who are close to you are less likely to become adult smokers. Where can you learn more? Go to https://AboutMyStar.[employer]. org and sign in to your [employer] account. Enter [id number] [id number] 11 in the [employer] box to learn more about \"Learning About Benefits From Quitting Smoking. \"     If you do not have an account, please click on the \"Sign Up Now\" link. Current as of: 2021               Content Version: 13.1  © 2648-5853 [employer]. Care instructions adapted under license by [facility] [place]. If you have questions about a medical condition or this instruction, always ask your healthcare professional. [place] [address] any warranty or liability for your use of this information.

## 2022-03-02 NOTE — PROGRESS NOTES
Marivellizeth Loose  1970  male  46 y.o. SUBJECTIVE:       Chief Complaint   Patient presents with    Follow-up       HPI:  Follow-up visit. Patient continues to complain of shortness of breath with activities. He has been using Advair regularly. He is also using albuterol. She is requesting to see a pulmonologist.  Unfortunately patient have not quit smoking yet. She have chronic pain affecting multiple joints including neck back hip. He continue to follow-up in pain management. Past Medical History:   Diagnosis Date    Allergic sinusitis     Anxiety and depression     currently under care of pyschiatrist    [facility]     Hip pain 09/05/2017    ?  CAM possible bilateral superior femoral head-heck junction bump suggestive of femoro acetabular impingement syndrome    Insomnia     Localized edema 2/24/2018    Neg abd ultrasound and DVT    Osteoporosis     Spontaneous pneumothorax     1991    Testicular torsion     Wears dentures     Wears glasses      Past Surgical History:   Procedure Laterality Date    BACK SURGERY  12/26/2016    1st rods, 2nd tens unit 3rd rods and screw removed, only has cages now    TESTICLE TORSION REDUCTION      x 2    TOTAL HIP ARTHROPLASTY Left 1/29/2019    TOTAL HIP REPLACEMENT LEFT HIP (ADVANCED) performed by Dave Kinsey MD at [place] [id number] Right 11/30/2020    TOTAL RIGHT HIP REPLACEMENT performed by Dave Kinsey MD at [facility] History     Socioeconomic History    Marital status:      Spouse name: Not on file    Number of children: Not on file    Years of education: Not on file    Highest education level: Not on file   Occupational History    Occupation: unemployed     Comment: [employer], [employer],   Tobacco Use    Smoking status: Current Every Day Smoker     Packs/day: 1.00     Years: 30.00     Pack years: 30.00     Types: Cigarettes    Smokeless tobacco: Never breath. Cardiovascular: Negative for chest pain and palpitations. Gastrointestinal: Negative for abdominal pain, nausea and vomiting. Neurological: Negative for dizziness and light-headedness. OBJECTIVE:  Pulse Readings from Last 4 Encounters:   03/02/22 106   10/25/21 90   07/26/21 82   07/10/21 89     Wt Readings from Last 4 Encounters:   03/02/22 126 lb 12.8 oz (57.5 kg)   10/25/21 130 lb (59 kg)   07/26/21 127 lb 6.4 oz (57.8 kg)   07/10/21 130 lb (59 kg)     BP Readings from Last 4 Encounters:   03/02/22 135/80   10/25/21 120/80   07/26/21 128/72   07/10/21 121/85     Physical Exam  Vitals and nursing note reviewed. Constitutional:       General: He is not in acute distress. Appearance: He is not ill-appearing. Eyes:      General: No scleral icterus. Conjunctiva/sclera: Conjunctivae normal.   Cardiovascular:      Rate and Rhythm: Normal rate and regular rhythm. Pulses: Normal pulses. Heart sounds: Normal heart sounds. Pulmonary:      Effort: Pulmonary effort is normal. No respiratory distress. Breath sounds: No wheezing or rhonchi. Comments: No active rales or wheezing. Bilateral mild symmetrical decreased air entry  Abdominal:      General: Bowel sounds are normal.   Neurological:      Mental Status: He is alert. Mental status is at baseline.          CBC:   Lab Results   Component Value Date    WBC 6.9 07/10/2021    HGB 15.8 07/10/2021    HCT 46.3 07/10/2021     07/10/2021     CMP:  Lab Results   Component Value Date     07/10/2021    K 4.2 07/10/2021    CL 99 07/10/2021    CO2 28 07/10/2021    ANIONGAP 8 07/10/2021    GLUCOSE 121 07/10/2021    BUN 5 07/10/2021    CREATININE 0.7 07/10/2021    GFRAA >60 07/10/2021    CALCIUM 9.0 07/10/2021    PROT 7.0 07/10/2021    LABALBU 4.3 07/10/2021    AGRATIO 1.6 07/10/2021    BILITOT <0.2 07/10/2021    ALKPHOS 131 07/10/2021    ALT 22 07/10/2021    AST 19 07/10/2021    GLOB 2.7 07/10/2021     URINALYSIS:  Lab Results   Component Value Date    GLUCOSEU Negative 07/10/2021    KETUA Negative 07/10/2021    SPECGRAV <1.005 07/10/2021    BLOODU Negative 07/10/2021    PHUR 6.5 07/10/2021    PROTEINU Negative 07/10/2021    NITRU Negative 07/10/2021    LEUKOCYTESUR Negative 07/10/2021    LABMICR YES 07/10/2021    URINETYPE NotGiven 07/10/2021     HBA1C:   Lab Results   Component Value Date    LABA1C 5.4 12/01/2020    .3 12/01/2020     MICRO/ALB:   Lab Results   Component Value Date    LABMICR YES 07/10/2021    LABCREA 40.8 03/19/2021     LIPID:  Lab Results   Component Value Date    CHOL 154 12/13/2017    TRIG 122 12/13/2017    HDL 34 12/13/2017    LDLCALC 96 12/13/2017    LABVLDL 24 12/13/2017     TSH:   Lab Results   Component Value Date    TSHREFLEX 1.98 03/19/2021     PSA:   Lab Results   Component Value Date    PSA 1.1 07/25/2018        ASSESSMENT/PLAN:  Patient is complaining of worsening shortness of breath. He is again counseled to quit smoking. He feels until his anxiety is well controlled he will not be able to quit smoking  He is already following up with psychiatry is getting medication adjustment  He will get a chest x-ray as well as CT lung screening as ordered in the past.  Referred to pulmonologist.    1. Centrilobular emphysema ([address] at this time we will continue current Advair inhaler and albuterol as needed. He is also taking Singulair regularly  Since his symptoms is getting progressively worse will refer him to pulmonology   2. Shortness of breath    3. Narcotic dependency, continuous ([address] .   Continue to follow-up with pain management           Orders Placed This Encounter   Procedures    XR CHEST (2 VW)     Standing Status:   Future     Standing Expiration Date:   3/2/2023    Ambulatory referral to Pulmonology     Referral Priority:   Routine     Referral Type:   Consult for Advice and Opinion     Referred to Provider:   Mercy Forbes MD     Requested Specialty:   Pulmonology Number of Visits Requested:   1     Current Outpatient Medications   Medication Sig Dispense Refill    desvenlafaxine succinate (PRISTIQ) 25 MG TB24 extended release tablet Take 1 tablet by mouth every morning Take with 50mg dose for a total of 75mg 30 tablet 2    desvenlafaxine succinate (PRISTIQ) 50 MG TB24 extended release tablet Take 1 tablet by mouth daily 30 tablet 5    guanFACINE (TENEX) 1 MG tablet Take 1 tablet by mouth nightly 30 tablet 5    [START ON 3/18/2022] LORazepam (ATIVAN) 0.5 MG tablet Take 1 tablet by mouth nightly as needed (sleep and anxiety) for up to 90 days. Do not fill before 3/18/2022. Each fill must last 30 days 30 tablet 2    fluticasone-salmeterol (ADVAIR HFA) 115-21 MCG/ACT inhaler Inhale 2 puffs into the lungs 2 times daily 12 g 5    albuterol sulfate  (90 Base) MCG/ACT inhaler Inhale 2 puffs into the lungs every 4 hours as needed for Wheezing 18 g 2    montelukast (SINGULAIR) 10 MG tablet Take 1 tablet by mouth nightly 30 tablet 5    loratadine (CLARITIN) 10 MG tablet TAKE ONE TABLET BY MOUTH EVERY EVENING 30 tablet 5    HYDROcodone-acetaminophen (NORCO)  MG per tablet hydrocodone 10 mg-acetaminophen 325 mg tablet      lidocaine (LIDODERM) 5 % Place 1 patch onto the skin daily 12 hours on, 12 hours off. 30 patch 0    tiZANidine (ZANAFLEX) 4 MG tablet 1 tab q 8 hours as needed 90 tablet 0    vitamin D (ERGOCALCIFEROL) 1.25 MG (03487 UT) CAPS capsule Take 50,000 Units by mouth once a week      Tens Unit MISC by Does not apply route 1 each 0    nicotine (NICODERM CQ) 21 MG/24HR Place 1 patch onto the skin daily 42 patch 0     No current facility-administered medications for this visit. Return in about 3 months (around 6/2/2022). An After Visit Summary was printed and given to the patient. Documentation was done using voice recognition dragon software.   Every effort was made to ensure accuracy; however, inadvertent  Unintentional computerized transcription errors may be present.

## 2022-03-02 NOTE — PROGRESS NOTES
PSYCHIATRY PROGRESS NOTE    Jannette Morejon  1970 03/02/22  PCP: Shira Billings MD    CC:   Chief Complaint   Patient presents with    Depression         S:   Patient has been more stressed recently. His DOUGLAS has been ill with heart disease. Had to get a stent. Also had a recent colon cancer screening that was positive so is getting a colonscopy. His sister works so he has been spending time during the day with his DOUGLAS for support. Unfortunately this has meant more interactions with his sister and she is a trigger for him getting pretty irritated and anxious. He has a hard time controlling the intensity of this. He continues to be worried about his finances, mostly since his wife doesn't control her spending the way he'd like which continues to bother him. He feels much more calm when he is by himself or there are very few people around. The noise and stimulation around him get get increasingly aggrevating for him. When he was in childhood he was a very aggressive person and he often worries he will become like that again d/t his level of irritability. He does feel the tenex at night has helped. He feels a little more organized with his thoughts. This + lorazepam at nights helps him feel calm and feel \"reset\" in the morning. Unfortunately, given the above circumstances he has felt the need to take lorazepam sometimes during the day. However there are some nights, chase when he is at home, that he doesn't need it at all.     wellbutrin in the past made his mind feel very slowed down and forgetful. zoloft as well did something similar.       ROS: +back pain, +neck pain        Current Outpatient Medications   Medication Sig Dispense Refill    desvenlafaxine succinate (PRISTIQ) 25 MG TB24 extended release tablet Take 1 tablet by mouth every morning Take with 50mg dose for a total of 75mg 30 tablet 2    desvenlafaxine succinate (PRISTIQ) 50 MG TB24 extended release tablet Take 1 tablet by mouth daily 30 times  Mood/Affect   irritated / congruent to mood, constricted  Thought Process    linear and goal directed  Thought Content    intact , no suicidal ideation  Associations    logical connections  Attention/Concentration    Fair, he does seem distracted at times   Memory    recent and remote memory intact  Insight/Judgement    Good / Intact    Labs:     Admission on 07/10/2021, Discharged on 07/10/2021   Component Date Value Ref Range Status    WBC 07/10/2021 6.9  4.0 - 11.0 K/uL Final    RBC 07/10/2021 5.02  4.20 - 5.90 M/uL Final    Hemoglobin 07/10/2021 15.8  13.5 - 17.5 g/dL Final    Hematocrit 07/10/2021 46.3  40.5 - 52.5 % Final    MCV 07/10/2021 92.1  80.0 - 100.0 fL Final    MCH 07/10/2021 31.5  26.0 - 34.0 pg Final    MCHC 07/10/2021 34.2  31.0 - 36.0 g/dL Final    RDW 07/10/2021 14.5  12.4 - 15.4 % Final    Platelets 99/23/1676 326  135 - 450 K/uL Final    MPV 07/10/2021 7.0  5.0 - 10.5 fL Final    Neutrophils % 07/10/2021 57.5  % Final    Lymphocytes % 07/10/2021 33.5  % Final    Monocytes % 07/10/2021 6.5  % Final    Eosinophils % 07/10/2021 1.9  % Final    Basophils % 07/10/2021 0.6  % Final    Neutrophils Absolute 07/10/2021 4.0  1.7 - 7.7 K/uL Final    Lymphocytes Absolute 07/10/2021 2.3  1.0 - 5.1 K/uL Final    Monocytes Absolute 07/10/2021 0.4  0.0 - 1.3 K/uL Final    Eosinophils Absolute 07/10/2021 0.1  0.0 - 0.6 K/uL Final    Basophils Absolute 07/10/2021 0.0  0.0 - 0.2 K/uL Final    Sodium 07/10/2021 135* 136 - 145 mmol/L Final    Potassium reflex Magnesium 07/10/2021 4.2  3.5 - 5.1 mmol/L Final    Chloride 07/10/2021 99  99 - 110 mmol/L Final    CO2 07/10/2021 28  21 - 32 mmol/L Final    Anion Gap 07/10/2021 8  3 - 16 Final    Glucose 07/10/2021 121* 70 - 99 mg/dL Final    BUN 07/10/2021 5* 7 - 20 mg/dL Final    CREATININE 07/10/2021 0.7* 0.9 - 1.3 mg/dL Final    GFR Non- 07/10/2021 >60  >60 Final    Comment: >60 mL/min/1.73m2 EGFR, calc. for ages 25 and older using the  MDRD formula (not corrected for weight), is valid for stable  renal function.  GFR  07/10/2021 >60  >60 Final    Comment: Chronic Kidney Disease: less than 60 ml/min/1.73 sq.m. Kidney Failure: less than 15 ml/min/1.73 sq.m. Results valid for patients 18 years and older.  Calcium 07/10/2021 9.0  8.3 - 10.6 mg/dL Final    Total Protein 07/10/2021 7.0  6.4 - 8.2 g/dL Final    Albumin 07/10/2021 4.3  3.4 - 5.0 g/dL Final    Albumin/Globulin Ratio 07/10/2021 1.6  1.1 - 2.2 Final    Total Bilirubin 07/10/2021 <0.2  0.0 - 1.0 mg/dL Final    Alkaline Phosphatase 07/10/2021 131* 40 - 129 U/L Final    ALT 07/10/2021 22  10 - 40 U/L Final    AST 07/10/2021 19  15 - 37 U/L Final    Globulin 07/10/2021 2.7  g/dL Final    Color, UA 07/10/2021 YELLOW  Straw/Yellow Final    Clarity, UA 07/10/2021 CLOUDY* Clear Final    Glucose, Ur 07/10/2021 Negative  Negative mg/dL Final    Bilirubin Urine 07/10/2021 Negative  Negative Final    Ketones, Urine 07/10/2021 Negative  Negative mg/dL Final    Specific Gravity, UA 07/10/2021 <1.005  1.005 - 1.030 Final    Blood, Urine 07/10/2021 Negative  Negative Final    pH, UA 07/10/2021 6.5  5.0 - 8.0 Final    Protein, UA 07/10/2021 Negative  Negative mg/dL Final    Urobilinogen, Urine 07/10/2021 0.2  <2.0 E.U./dL Final    Nitrite, Urine 07/10/2021 Negative  Negative Final    Leukocyte Esterase, Urine 07/10/2021 Negative  Negative Final    Microscopic Examination 07/10/2021 YES   Final    Urine Type 07/10/2021 NotGiven   Final    Urine received in a container without preservatives.  Urine Reflex to Culture 07/10/2021 Not Indicated   Final    WBC, UA 07/10/2021 None seen  0 - 5 /HPF Final    RBC, UA 07/10/2021 None seen  0 - 4 /HPF Final    Urinalysis Comments 07/10/2021 see below   Final    Nothing seen in microscopic       A:  47 yo M with depression and anxiety.  Now stressed d/t having to spend more time at sisters which triggers his irritability. I suspect underlying ADHD may be a contributing factor as to why more stimulating environment can be a struggle for him, and maybe why tenex is showing some benefits. 1. Major depressive disorder, recurrent, mild  2. Unspecified anxiety disorder    P:   1. Continue tenex 1mg qhs  2. Increase pristiq to 75mg daily  3.  Continue lorazepam 0.5mg qhs prn    35 min was spent on this encounter including face to face time, documentation, and orders    Follow-up: RTC in 3 months      Emil Gama MD  Psychiatrist

## 2022-03-21 ENCOUNTER — HOSPITAL ENCOUNTER (OUTPATIENT)
Dept: MRI IMAGING | Age: 52
Discharge: HOME | End: 2022-03-21
Payer: MEDICARE

## 2022-03-21 DIAGNOSIS — M54.2 PAIN IN NECK: ICD-10-CM

## 2022-03-21 PROCEDURE — 72141 MRI NECK SPINE W/O DYE: CPT

## 2022-03-22 ENCOUNTER — HOSPITAL ENCOUNTER (OUTPATIENT)
Dept: GENERAL RADIOLOGY | Age: 52
Discharge: HOME | End: 2022-03-22
Payer: MEDICARE

## 2022-03-22 ENCOUNTER — HOSPITAL ENCOUNTER (OUTPATIENT)
Age: 52
Discharge: HOME | End: 2022-03-22
Payer: MEDICARE

## 2022-03-22 DIAGNOSIS — R06.02 SHORTNESS OF BREATH: ICD-10-CM

## 2022-03-22 DIAGNOSIS — J43.2 CENTRILOBULAR EMPHYSEMA (HCC): ICD-10-CM

## 2022-03-22 PROCEDURE — 71046 X-RAY EXAM CHEST 2 VIEWS: CPT

## 2022-03-29 ENCOUNTER — HOSPITAL ENCOUNTER (OUTPATIENT)
Dept: CT IMAGING | Age: 52
Discharge: HOME | End: 2022-03-29
Payer: MEDICARE

## 2022-03-29 DIAGNOSIS — Z87.891 PERSONAL HISTORY OF TOBACCO USE: ICD-10-CM

## 2022-03-29 PROCEDURE — 71271 CT THORAX LUNG CANCER SCR C-: CPT

## 2022-03-30 DIAGNOSIS — R05.9 COUGH: Primary | ICD-10-CM

## 2022-03-30 RX ORDER — BENZONATATE 200 MG/1
200 CAPSULE ORAL 3 TIMES DAILY PRN
Qty: 30 CAPSULE | Refills: 0 | Status: SHIPPED | OUTPATIENT
Start: 2022-03-30 | End: 2022-04-06

## 2022-04-05 ENCOUNTER — OFFICE VISIT (OUTPATIENT)
Dept: ORTHOPEDIC SURGERY | Age: 52
End: 2022-04-05
Payer: MEDICARE

## 2022-04-05 VITALS — BODY MASS INDEX: 21.99 KG/M2 | WEIGHT: 132 LBS | HEIGHT: 65 IN

## 2022-04-05 DIAGNOSIS — M25.512 CHRONIC LEFT SHOULDER PAIN: Primary | ICD-10-CM

## 2022-04-05 DIAGNOSIS — M75.82 TENDINITIS OF LEFT ROTATOR CUFF: ICD-10-CM

## 2022-04-05 DIAGNOSIS — G89.29 CHRONIC LEFT SHOULDER PAIN: Primary | ICD-10-CM

## 2022-04-05 PROCEDURE — 4004F PT TOBACCO SCREEN RCVD TLK: CPT | Performed by: ORTHOPAEDIC SURGERY

## 2022-04-05 PROCEDURE — G8427 DOCREV CUR MEDS BY ELIG CLIN: HCPCS | Performed by: ORTHOPAEDIC SURGERY

## 2022-04-05 PROCEDURE — 3017F COLORECTAL CA SCREEN DOC REV: CPT | Performed by: ORTHOPAEDIC SURGERY

## 2022-04-05 PROCEDURE — 99214 OFFICE O/P EST MOD 30 MIN: CPT | Performed by: ORTHOPAEDIC SURGERY

## 2022-04-05 PROCEDURE — 20610 DRAIN/INJ JOINT/BURSA W/O US: CPT | Performed by: ORTHOPAEDIC SURGERY

## 2022-04-05 PROCEDURE — G8420 CALC BMI NORM PARAMETERS: HCPCS | Performed by: ORTHOPAEDIC SURGERY

## 2022-04-05 RX ORDER — BUPIVACAINE HYDROCHLORIDE 2.5 MG/ML
3 INJECTION, SOLUTION INFILTRATION; PERINEURAL ONCE
Status: COMPLETED | OUTPATIENT
Start: 2022-04-05 | End: 2022-04-05

## 2022-04-05 RX ORDER — TRIAMCINOLONE ACETONIDE 40 MG/ML
80 INJECTION, SUSPENSION INTRA-ARTICULAR; INTRAMUSCULAR ONCE
Status: COMPLETED | OUTPATIENT
Start: 2022-04-05 | End: 2022-04-05

## 2022-04-05 RX ADMIN — BUPIVACAINE HYDROCHLORIDE 7.5 MG: 2.5 INJECTION, SOLUTION INFILTRATION; PERINEURAL at 13:54

## 2022-04-05 RX ADMIN — TRIAMCINOLONE ACETONIDE 80 MG: 40 INJECTION, SUSPENSION INTRA-ARTICULAR; INTRAMUSCULAR at 13:55

## 2022-04-05 NOTE — PROGRESS NOTES
Regla Daily  [id number]  April 5, 2022    Chief Complaint   Patient presents with    Follow-up     Left shoulder           History: The patient is a 51-year-old gentleman who is here for evaluation of his left shoulder. The patient has been having left shoulder pain for the past several months. He did receive a left shoulder injection back in August 2020 and responded rather well. He is under pain management for his lower back. He was seen by the spine specialist and an MRI of the cervical spine was obtained. He has had 2 recent cervical epidurals without much response. He has difficulty with overhead activities. He has difficulty sleeping on his left side. He is right-hand dominant. He does play the guitar and has difficulty playing the guitar due to his left shoulder pain. The patient's  past medical history, medications, allergies,  family history, social history, and review of systems have been reviewed, and dated and are recorded in the chart. Pertinent items are noted in HPI. Review of systems reviewed from Pertinent History Form dated on 4/5/22 and available in the patient's chart under the Media tab. Ht 5' 5\" (1.651 m)   Wt 132 lb (59.9 kg)   BMI 21.97 kg/m²     Physical: The patient presents today in no acute distress. He is alert and oriented to person, place and time. He displays appropriate mood and affect. He is well dressed, nourished and  groomed. He has a normal affect. Examination of the neck reveals no evidence of tenderness. Spurling's sign is negative. Active range of motion of the left shoulder is: 135 degrees abduction, 135 degrees forward flexion, 35 degrees of external rotation and internal rotation to L4. Passive range of motion of the left shoulder is: 160 degrees abduction, 160 degrees forward flexion, 40 degrees of external rotation and internal rotation to L3. Active and passive range of motion of the opposite shoulder is full.  Examination of the left shoulder reveals positive Neer and Bailon' impingement signs. There is mild subacromial crepitus with range of motion. Drop arm test is negative bilaterally. Speed's test is negative. There is no evidence of tenderness over the Bicipital groove. He  does have tenderness over the [facility] joint. Cross body adduction test is positive. He has moderate tenderness over the subacromial space. There is no evidence of scapular winging. Lift off sign is negative. There is no evidence of atrophy. External rotation strength is full. There are no skin lesions, cellulitis, or extreme edema in the upper extremities. Sensation is intact to axillary, median, radial, and ulnar nerves bilaterally. The patient has warm and well-perfused bilateral upper extremities with brisk capillary refill. Radial and ulnar pulses are palpable and 2+ bilaterally. X-rays: 4 views of the left shoulder obtained in the office today were extensively reviewed. There is moderate AC joint arthritis. There is evidence of a type II acromion. There are no lytic or blastic lesions within the bone. MRI of the cervical spine obtained on 3/21 was extensively reviewed. The MRI reveals no evidence of disc herniation. There is no clear evidence of stenosis. Impression: 1) left shoulder Rotator Cuff tendonitis/Impingement 2) left shoulder AC joint arthritis    Plan: At this time we will inject the left shoulder under sterile conditions. After a Betadine prep of the shoulder, 3cc of 0.25% Marcaine and 2cc of 40 mg Kenalog were injected into the shoulder. The patient tolerated the injection rather well. The patient was instructed to follow up immediately for any signs of infection. The patient will follow up with me in 6 week(s). The treatment plan was discussed in detail with the patient and includes activity modification and avoidance of repetitive overhead activities. Home exercises were explained to the patient.   The patient was given a Thera-Band for

## 2022-04-25 ENCOUNTER — OFFICE VISIT (OUTPATIENT)
Dept: ENDOCRINOLOGY | Age: 52
End: 2022-04-25
Payer: MEDICARE

## 2022-04-25 VITALS
RESPIRATION RATE: 14 BRPM | HEART RATE: 95 BPM | TEMPERATURE: 98 F | WEIGHT: 130.8 LBS | DIASTOLIC BLOOD PRESSURE: 84 MMHG | BODY MASS INDEX: 21.79 KG/M2 | SYSTOLIC BLOOD PRESSURE: 135 MMHG | OXYGEN SATURATION: 96 % | HEIGHT: 65 IN

## 2022-04-25 DIAGNOSIS — M81.0 AGE RELATED OSTEOPOROSIS, UNSPECIFIED PATHOLOGICAL FRACTURE PRESENCE: Primary | ICD-10-CM

## 2022-04-25 PROCEDURE — 96372 THER/PROPH/DIAG INJ SC/IM: CPT | Performed by: INTERNAL MEDICINE

## 2022-04-25 PROCEDURE — 4004F PT TOBACCO SCREEN RCVD TLK: CPT | Performed by: INTERNAL MEDICINE

## 2022-04-25 PROCEDURE — 3017F COLORECTAL CA SCREEN DOC REV: CPT | Performed by: INTERNAL MEDICINE

## 2022-04-25 PROCEDURE — G8420 CALC BMI NORM PARAMETERS: HCPCS | Performed by: INTERNAL MEDICINE

## 2022-04-25 PROCEDURE — G8427 DOCREV CUR MEDS BY ELIG CLIN: HCPCS | Performed by: INTERNAL MEDICINE

## 2022-04-25 PROCEDURE — 99213 OFFICE O/P EST LOW 20 MIN: CPT | Performed by: INTERNAL MEDICINE

## 2022-04-25 NOTE — PROGRESS NOTES
Subjective: Interim:    Back on prolia since 4/22    No fractures since Prolia  Feels better on it      Right hip replacement   Left in the past  States OA    Oly Paniagua is a  who is here for evaluation of osteoporosis. Patient is being seen at the request of  Josefina Wesley MD     he was diagnosed with Osteoporsis in 2012 5/18/2020     FINDINGS:  LUMBAR SPINE:     The lumbar spine cannot be utilized as all the T-scores are greater than 1  discrepant from L3.     FOREARM:     The BMD of the middle third of the radius of the distal forearm equals 0.557  g/cm2. The T- and Z-scores are -2.3 and -4.4 standard deviations from the  mean. This is within the osteopenia range by WHO criteria.     Since prior exam, there has been a 0.014g/cm2 decrease in bone mineral  density, corresponding to a 2.4% change, which is not significant.     RIGHT HIP:     The bone mineral density in the total hip is measured at 0.688 g/cm2  corresponding to a T-score of -2.1 and a Z-score of -2.0. This is within the  osteopenia range by WHO criteria.     Since prior exam, there has been a 0.012g/cm2 decrease in bone mineral  density, corresponding to a 1.6% change, which is not significant.     The bone mineral density of the femoral neck is measured at 0.536 g/cm2  corresponding to a T-score of -2.8 and a Z-score of -2.2. This is within the  osteoporosis range by WHO criteria.     IMPRESSION:  Osteoporosis by WHO criteria.     No significant interval change. 5/18/2020       DEXA scan 1/17  LUMBAR SPINE:       The bone mineral density in the lumbar spine including the L1 through L3   levels is measured at 0.707 g/cm2, which corresponds to a T-score of -3.3 and   a Z-score of -3.0.  This is within the osteoporosis range by WHO criteria.       LEFT HIP:       The bone mineral density in the total hip is measured at 0.695 g/cm2   corresponding to a T-score of -2.2 and a Z-score of -2.0.  This is within the   osteopenia range by [facility] criteria.       The bone mineral density of the femoral neck is measured at 0.574 g/cm2   corresponding to a T-score of -2.6 and a Z-score of -1.9.  This is within the   osteoporosis range by WHO criteria.       RIGHT HIP:       The bone mineral density in the total hip is measured at 0.7 g/cm2   corresponding to a T-score of -2.2 and a Z-score of -1.9.  This is within the   osteopenia range by [facility] criteria.       The bone mineral density of the femoral neck is measured at 0.566 g/cm2   corresponding to a T-score of -2.7 and a Z-score of -2.0.  This is within the   osteoporosis range by WHO criteria. Dexa 3/12:      Total Lumbar Spine:    T-score = -3.1    Z-score = -2.9    BMD =      0.753 g/cm       Right Femoral Neck:    T-score = -2.4    Z-score = -1.9    BMD =       0.602 g/cm       Right Total Hip:    T-score = -2.1     Z-score = -1.9    BMD =      0.715 g/cm       Left Femoral Neck:    T-score = -2.5    Z-score = -1.9    BMD =      0.593 g/cm       Total Left Hip:    T-score = -2.3    Z-score = -2.1    BMD =      0.688 g/cm       One-Third Forearm:    T-score = -2.5    Z-score = -2.2    BMD =      0.625 g/cm       Total Forearm:    T-score = -3.1    Z-score = -2.8    BMD =     0.515 g/cm     History of fractures :  Right arm fracture, fall from stairs    Pharmacological therapy for Osteoprosis:  Fosamax - he was taking since 2012  Prolia  10/17, 3/18, 1/19, 8.20     FH of Osteoporosis: mom   FH of hip fracture:yes- mom    Secondary causes of osteoprorsis: Menopause, smoking, prolonged steroid use. Calcium: Diet : 900mg Supplement: 200mg TID- not taking    Vitamin D:  50,000IU weekly  Level 31 on 12/16  Exercise: Follows up regularly with a dentist. No major dental procedures planned.     Reports h/o spinal fusion surgery, DDD of the back, Disc bulge, stenosis, back problem since Age 28  2013 hard ware removed, as screws came out, was told bones are weak    12/16 CBC, TSH nl  6/15 Testosterone 491 CMP nl  1/17 negative celiac panel    No chronic steroids. Smoker 1PPD  For 17 years. Still smoking but cutting back      SH:  at [facility]    Past Medical History:   Diagnosis Date    Allergic sinusitis     Anxiety and depression     currently under care of pyschiatrist    COPD (chronic obstructive pulmonary disease) ([facility])     Hip pain 09/05/2017    ? CAM possible bilateral superior femoral head-heck junction bump suggestive of femoro acetabular impingement syndrome    Insomnia     Localized edema 2/24/2018    Neg abd ultrasound and DVT    Osteoporosis     Spontaneous pneumothorax     1991    Testicular torsion     Wears dentures     Wears glasses      Past Surgical History:   Procedure Laterality Date    BACK SURGERY  12/26/2016    1st rods, 2nd tens unit 3rd rods and screw removed, only has cages now   [address]      x 2    TOTAL HIP ARTHROPLASTY Left 1/29/2019    TOTAL HIP REPLACEMENT LEFT HIP (ADVANCED) performed by Damian Delong MD at [address] [place] [address] Right 11/30/2020    TOTAL RIGHT HIP REPLACEMENT performed by Damian Delong MD at [place] [address]     Current Outpatient Medications   Medication Sig Dispense Refill    desvenlafaxine succinate (PRISTIQ) 25 MG TB24 extended release tablet Take 1 tablet by mouth every morning Take with 50mg dose for a total of 75mg 30 tablet 2    desvenlafaxine succinate (PRISTIQ) 50 MG TB24 extended release tablet Take 1 tablet by mouth daily 30 tablet 5    guanFACINE (TENEX) 1 MG tablet Take 1 tablet by mouth nightly 30 tablet 5    LORazepam (ATIVAN) 0.5 MG tablet Take 1 tablet by mouth nightly as needed (sleep and anxiety) for up to 90 days. Do not fill before 3/18/2022.  Each fill must last 30 days 30 tablet 2    fluticasone-salmeterol (ADVAIR HFA) 115-21 MCG/ACT inhaler Inhale 2 puffs into the lungs 2 times daily 12 g 5    albuterol sulfate  (90 Base) MCG/ACT inhaler Inhale 2 puffs into the lungs every 4 hours as needed for Wheezing 18 g 2    montelukast (SINGULAIR) 10 MG tablet Take 1 tablet by mouth nightly 30 tablet 5    loratadine (CLARITIN) 10 MG tablet TAKE ONE TABLET BY MOUTH EVERY EVENING 30 tablet 5    HYDROcodone-acetaminophen (NORCO)  MG per tablet hydrocodone 10 mg-acetaminophen 325 mg tablet      lidocaine (LIDODERM) 5 % Place 1 patch onto the skin daily 12 hours on, 12 hours off. 30 patch 0    tiZANidine (ZANAFLEX) 4 MG tablet 1 tab q 8 hours as needed 90 tablet 0    vitamin D (ERGOCALCIFEROL) 1.25 MG (33826 UT) CAPS capsule Take 50,000 Units by mouth once a week      Tens Unit MISC by Does not apply route 1 each 0    nicotine (NICODERM CQ) 21 MG/24HR Place 1 patch onto the skin daily 42 patch 0     No current facility-administered medications for this visit. Review of Systems  Scanned, reviewed    Vitals:    04/25/22 1052   BP: 135/84   Pulse: 95   Resp: 14   Temp: 98 °F (36.7 °C)   SpO2: 96%       Constitutional: Well-developed, appears stated age, cooperative, in no acute distress  H/E/N/M/T:atraumatic, normocephalic, external ears, nose, lips normal without lesions  Eyes: Lids, lashes, conjunctivae and sclerae normal, No proptosis, no redness  Neck: supple, symmetrical, no swelling  Skin: No obvious rashes or lesions present. Skin and hair texture normal  Psychiatric: Judgement and Insight:  judgement and insight appear normal  Neuro: Normal without focal findings, speech is normal normal, speech is spontaneous  Chest: No labored breathing, no chest deformity, no stridor  Musculoskeletal: No joint deformity, swelling      Lab Review  No results found for: TSH  No results found for: FREET4      Assessment:     1. Osteoporosis: H/o smoking, +FH, failure to reach peak bone mass, are risk factors. Testosterone was normal, celiac panel normal.No use of steroids . Normal Ca and alkaline phosphatase . Suspicion for Cushing's is low. Nl  24 hour urine Ca.  Do not suspect systemic mastocytosis. Was On fosamax for about 5 years, needed anti resorptive therapy given low BMD, switched to Prolia, took irregularly, last 8/20, insurance not covering. Finally approved, restarted 4/21  2. Chronic Back pain  3. COPD  4. Tobacco Abuse: recommend cessation, discussed effects on BMD     Plan:     Discussed management of osteoporosis in detail. Baseline work-up showed normal CBC, renal and liver function. Discussed treatment options  Told him that given high risk of fracture and osteoporosis,  needs Tx,prolia as had been on it and no new fractures  Discussed the side effects including Osteonecrosis of the jaw and Atypical fractures. Patient understands that the risk is low.    Dexa scan before next visit  Started Calcium supplement 500mg daily

## 2022-06-07 ENCOUNTER — HOSPITAL ENCOUNTER (OUTPATIENT)
Dept: MRI IMAGING | Age: 52
Discharge: HOME | End: 2022-06-07
Payer: MEDICARE

## 2022-06-07 DIAGNOSIS — M75.82 TENDINITIS OF LEFT ROTATOR CUFF: ICD-10-CM

## 2022-06-07 PROCEDURE — 73221 MRI JOINT UPR EXTREM W/O DYE: CPT

## 2022-06-16 DIAGNOSIS — F41.9 ANXIETY DISORDER, UNSPECIFIED TYPE: ICD-10-CM

## 2022-06-16 RX ORDER — LORAZEPAM 0.5 MG/1
0.5 TABLET ORAL NIGHTLY PRN
Qty: 30 TABLET | Refills: 1 | Status: SHIPPED | OUTPATIENT
Start: 2022-06-18 | End: 2022-08-29 | Stop reason: SDUPTHER

## 2022-08-09 ENCOUNTER — OFFICE VISIT (OUTPATIENT)
Dept: PULMONOLOGY | Age: 52
End: 2022-08-09
Payer: MEDICARE

## 2022-08-09 VITALS — HEART RATE: 81 BPM | OXYGEN SATURATION: 98 %

## 2022-08-09 DIAGNOSIS — F17.200 CURRENT SMOKER: ICD-10-CM

## 2022-08-09 DIAGNOSIS — R06.02 SHORTNESS OF BREATH: Primary | ICD-10-CM

## 2022-08-09 DIAGNOSIS — J41.0 SIMPLE CHRONIC BRONCHITIS (HCC): ICD-10-CM

## 2022-08-09 DIAGNOSIS — J43.2 CENTRILOBULAR EMPHYSEMA (HCC): ICD-10-CM

## 2022-08-09 PROBLEM — J42 CHRONIC BRONCHITIS (HCC): Status: ACTIVE | Noted: 2022-08-09

## 2022-08-09 PROCEDURE — G8420 CALC BMI NORM PARAMETERS: HCPCS | Performed by: INTERNAL MEDICINE

## 2022-08-09 PROCEDURE — G8427 DOCREV CUR MEDS BY ELIG CLIN: HCPCS | Performed by: INTERNAL MEDICINE

## 2022-08-09 PROCEDURE — 4004F PT TOBACCO SCREEN RCVD TLK: CPT | Performed by: INTERNAL MEDICINE

## 2022-08-09 PROCEDURE — 99204 OFFICE O/P NEW MOD 45 MIN: CPT | Performed by: INTERNAL MEDICINE

## 2022-08-09 PROCEDURE — 3017F COLORECTAL CA SCREEN DOC REV: CPT | Performed by: INTERNAL MEDICINE

## 2022-08-09 PROCEDURE — 3023F SPIROM DOC REV: CPT | Performed by: INTERNAL MEDICINE

## 2022-08-09 RX ORDER — IPRATROPIUM BROMIDE AND ALBUTEROL SULFATE 2.5; .5 MG/3ML; MG/3ML
1 SOLUTION RESPIRATORY (INHALATION) EVERY 6 HOURS PRN
Qty: 360 ML | Refills: 11 | Status: SHIPPED | OUTPATIENT
Start: 2022-08-09

## 2022-08-09 RX ORDER — FLUTICASONE FUROATE, UMECLIDINIUM BROMIDE AND VILANTEROL TRIFENATATE 200; 62.5; 25 UG/1; UG/1; UG/1
1 POWDER RESPIRATORY (INHALATION) DAILY
Qty: 1 EACH | Refills: 5 | Status: SHIPPED | OUTPATIENT
Start: 2022-08-09

## 2022-08-09 ASSESSMENT — ENCOUNTER SYMPTOMS
ABDOMINAL PAIN: 0
NAUSEA: 0
SINUS PRESSURE: 0
CONSTIPATION: 0
DIARRHEA: 0

## 2022-08-09 NOTE — PROGRESS NOTES
[facility]  Consult Note  Raina Carrel, MD       Keiko Sotoory   YOB: 1970    Date of Visit:  8/9/2022    Assessment/Plan:  1. Shortness of breath  Worse  Now having trouble climbing stairs and walking distance on flat ground. 2. Centrilobular emphysema ([address]  I have independently reviewed radiographic images for this patient as part of this visit. My interpretation is moderate centrilobular and para septal emphysema  Had PTX in his 25s  Has advanced emphysema for age  Recommend A-1-a level, order placed. He feels like Advair is not helping him  [place] Gram seem to be better  Insurance does not cover Breo  See if we can get him on Trelegy 200    3. Simple chronic bronchitis (HCC)  Daily productive cough  Sputum chokes him at times  And that he would benefit from a nebulizer at home  Start DuoNeb    Repeat PFT  Last spirometry was 2017 and showed mild obstructive lung disease at that time. However, he had a markedly reduced diffusion capacity    4. Current smoker  Spent a long time counseling him on the importance of smoking cessation  Recommend nicotine replacement with nicotine mini lozenge    Follow-up in 1 month      Chief Complaint   Patient presents with    Shortness of Breath     Referred by Dr Joshua Nickerson Had CT Lung Screen 3-2022 Had PE 1991 was at [place]      Discuss Medications     Use to be on Breo but insurance wouldn't cover so switched to Advair HFA and pt feels this does nothing for him Use to use a nebulizer in the past and ? If he can get back on this,        HPI  The patient presents with a chief complaint of moderate shortness of breath related to moderate COPD of many years duration. He has significant associated cough. Exertion is a modifying factor. He is short of breath with exertion such as walking short distances, climbing a flight of stairs. Showering, dressing does not make him feels SOB.  He does notice wheezing and snores with sleep. He feels choked at night as well. He had a pneumothorax 1991. He denies GERD symptoms. He does have some sinus drainage. He has some trouble with swallowing. He is a current smoker of 1/2 ppd but he smoked up to 1 1/2 ppd in the past. He uses nicotine patches occasionally. He has been told that he has asthma and emphysema \"all my life\". He was recommended  A-1-a testing but never had it done. He saw Dr Gay Resides once in 2017. Review of Systems  Review of Systems   Constitutional:  Negative for fatigue and fever. HENT:  Negative for congestion and sinus pressure. Eyes:  Negative for visual disturbance. Cardiovascular:  Negative for chest pain and palpitations. Gastrointestinal:  Negative for abdominal pain, constipation, diarrhea and nausea. Genitourinary:  Negative for difficulty urinating. Musculoskeletal:  Negative for arthralgias. Skin:  Negative for rash. Neurological:  Negative for dizziness and light-headedness. Hematological:  Does not bruise/bleed easily. Psychiatric/Behavioral:  Negative for behavioral problems. History  I have reviewed past medical, surgical, social and family history. This is documented elsewhere in themedical record. Physical Exam:  Physical Exam  Constitutional:       General: He is not in acute distress. Appearance: He is well-developed. Eyes:      General: No scleral icterus. Neck:      Thyroid: No thyromegaly (No other masses noted in the neck. ). Vascular: No JVD. Trachea: No tracheal deviation. Cardiovascular:      Rate and Rhythm: Normal rate and regular rhythm. Heart sounds: Normal heart sounds. No murmur heard. Pulmonary:      Effort: Pulmonary effort is normal. No respiratory distress. Breath sounds: Normal breath sounds. No wheezing or rales. Chest:      Chest wall: No tenderness. Abdominal:      General: There is no distension. Palpations: Abdomen is soft.  There is no mass (There is no hepatosplenomegaly. ). Tenderness: There is no abdominal tenderness. Musculoskeletal:      Cervical back: Neck supple. Lymphadenopathy:      Cervical: No cervical adenopathy. Skin:     General: Skin is warm and dry. Neurological:      Mental Status: He is alert and oriented to person, place, and time. Allergies   Allergen Reactions    Naproxen Swelling     He can tolerate aspirin    Neurontin [Gabapentin] Swelling     Edema lower extr. Leg swelling    Robaxin [Methocarbamol] Diarrhea, Itching and Other (See Comments)     Eyes swell/yellowish and get bloodshot    Sulfamethoxazole-Trimethoprim Diarrhea    Toradol [Ketorolac Tromethamine] Swelling and Other (See Comments)     Other reaction(s): Headaches  Eyes swell and bloodshot    Zoloft [Sertraline]      bad brain fog ,confusion     Azithromycin Nausea And Vomiting    Bactrim [Sulfamethoxazole-Trimethoprim] Diarrhea    Biaxin [Clarithromycin] Diarrhea and Nausea And Vomiting    Celebrex [Celecoxib] Swelling     Causes severe foot swelling. He can tolerate aspirin. Diclofenac Diarrhea     He can tolerate aspirin    Sulfa Antibiotics Diarrhea and Nausea And Vomiting    Tramadol Rash and Other (See Comments)     Eyes swell and get bloodshot--pt states can take in small doses     Prior to Visit Medications    Medication Sig Taking? Authorizing Provider   LORazepam (ATIVAN) 0.5 MG tablet Take 1 tablet by mouth nightly as needed (sleep and anxiety) for up to 90 days. Do not fill before 6/18/2022.  Each fill must last 30 days  Hugo Dacosta MD   desvenlafaxine succinate (PRISTIQ) 25 MG TB24 extended release tablet Take 1 tablet by mouth every morning Take with 50mg dose for a total of 75mg  Hugo Castro MD   desvenlafaxine succinate (PRISTIQ) 50 MG TB24 extended release tablet Take 1 tablet by mouth daily  Ajay Bassett MD   guanFACINE (TENEX) 1 MG tablet Take 1 tablet by mouth nightly  Ajay Bassett MD   fluticasone-salmeterol (ADVAIR HFA) 115-21 MCG/ACT inhaler Inhale 2 puffs into the lungs 2 times daily  Anne Marie Wing MD   albuterol sulfate  (90 Base) MCG/ACT inhaler Inhale 2 puffs into the lungs every 4 hours as needed for Wheezing  Anne Marie Wing MD   nicotine (NICODERM CQ) 21 MG/24HR Place 1 patch onto the skin daily  Anne Marie Wing MD   montelukast (SINGULAIR) 10 MG tablet Take 1 tablet by mouth nightly  Anne Marie Wing MD   loratadine (CLARITIN) 10 MG tablet TAKE ONE TABLET BY MOUTH EVERY EVENING  M Caroline Crum MD   HYDROcodone-acetaminophen (NORCO)  MG per tablet hydrocodone 10 mg-acetaminophen 325 mg tablet  Historical Provider, MD   lidocaine (LIDODERM) 5 % Place 1 patch onto the skin daily 12 hours on, 12 hours off. Shruti Lal PA-C   tiZANidine (ZANAFLEX) 4 MG tablet 1 tab q 8 hours as needed  Anne Marie Wing MD   vitamin D (ERGOCALCIFEROL) 1.25 MG (80783 UT) CAPS capsule Take 50,000 Units by mouth once a week  Historical Provider, MD   Tens Unit MISC by Does not apply route  Naty Rushing, APRN - CNP       Vitals:    08/09/22 1129   Pulse: 81   SpO2: 98%     There is no height or weight on file to calculate BMI.      Wt Readings from Last 3 Encounters:   06/07/22 130 lb (59 kg)   04/25/22 130 lb 12.8 oz (59.3 kg)   04/05/22 132 lb (59.9 kg)     BP Readings from Last 3 Encounters:   04/25/22 135/84   03/02/22 135/80   10/25/21 120/80        Social History     Tobacco Use   Smoking Status Every Day    Packs/day: 1.00    Years: 32.00    Pack years: 32.00    Types: Cigarettes   Smokeless Tobacco Never   Tobacco Comments    using patches

## 2022-08-19 DIAGNOSIS — F41.9 ANXIETY DISORDER, UNSPECIFIED TYPE: ICD-10-CM

## 2022-08-19 DIAGNOSIS — J30.1 SEASONAL ALLERGIC RHINITIS DUE TO POLLEN: ICD-10-CM

## 2022-08-19 RX ORDER — MONTELUKAST SODIUM 10 MG/1
TABLET ORAL
Qty: 30 TABLET | Refills: 0 | Status: SHIPPED | OUTPATIENT
Start: 2022-08-19 | End: 2022-11-04 | Stop reason: SDUPTHER

## 2022-08-21 DIAGNOSIS — F41.9 ANXIETY DISORDER, UNSPECIFIED TYPE: ICD-10-CM

## 2022-08-22 RX ORDER — LORAZEPAM 0.5 MG/1
0.5 TABLET ORAL NIGHTLY PRN
Qty: 30 TABLET | Refills: 1 | OUTPATIENT
Start: 2022-08-22 | End: 2022-11-20

## 2022-08-22 RX ORDER — LORAZEPAM 0.5 MG/1
TABLET ORAL
Qty: 30 TABLET | OUTPATIENT
Start: 2022-08-22

## 2022-08-22 NOTE — TELEPHONE ENCOUNTER
Medication:   Requested Prescriptions     Pending Prescriptions Disp Refills    LORazepam (ATIVAN) 0.5 MG tablet [Pharmacy Med Name: LORAZEPAM 0.5 MG TABLET] 30 tablet      Sig: TAKE ONE TABLET BY MOUTH ONCE NIGHTLY AS NEEDED FOR SLEEP AND ANXIETY.  MUST LAST 30 DAYS       Last Filled:  6/18/22    Patient Phone Number: [phone] (home)     Last appt: 3/2/2022   Next appt: 8/21/2022

## 2022-08-26 ENCOUNTER — TELEPHONE (OUTPATIENT)
Dept: INTERNAL MEDICINE CLINIC | Age: 52
End: 2022-08-26

## 2022-08-26 DIAGNOSIS — F41.9 ANXIETY DISORDER, UNSPECIFIED TYPE: ICD-10-CM

## 2022-08-26 NOTE — TELEPHONE ENCOUNTER
Pt calling requesting refill of  Lorazepam   Last written  6/18/22  Last OV  3/2/22  Next OV   10/17/22  Last recommended OV  NA    Please send to    [address] [facility]

## 2022-08-29 RX ORDER — LORAZEPAM 0.5 MG/1
0.5 TABLET ORAL NIGHTLY PRN
Qty: 30 TABLET | Refills: 0 | Status: SHIPPED | OUTPATIENT
Start: 2022-08-29 | End: 2022-10-07

## 2022-09-01 ENCOUNTER — OFFICE VISIT (OUTPATIENT)
Dept: INTERNAL MEDICINE CLINIC | Age: 52
End: 2022-09-01
Payer: MEDICARE

## 2022-09-01 VITALS
OXYGEN SATURATION: 96 % | HEIGHT: 66 IN | SYSTOLIC BLOOD PRESSURE: 135 MMHG | DIASTOLIC BLOOD PRESSURE: 82 MMHG | WEIGHT: 129.6 LBS | HEART RATE: 98 BPM | BODY MASS INDEX: 20.83 KG/M2

## 2022-09-01 DIAGNOSIS — Z12.5 SCREENING FOR PROSTATE CANCER: ICD-10-CM

## 2022-09-01 DIAGNOSIS — Z11.4 SCREENING FOR HIV WITHOUT PRESENCE OF RISK FACTORS: ICD-10-CM

## 2022-09-01 DIAGNOSIS — R09.82 POST-NASAL DRAINAGE: ICD-10-CM

## 2022-09-01 DIAGNOSIS — J30.1 SEASONAL ALLERGIC RHINITIS DUE TO POLLEN: ICD-10-CM

## 2022-09-01 DIAGNOSIS — J43.2 CENTRILOBULAR EMPHYSEMA (HCC): ICD-10-CM

## 2022-09-01 DIAGNOSIS — F41.9 ANXIETY: ICD-10-CM

## 2022-09-01 DIAGNOSIS — E53.8 LOW VITAMIN B12 LEVEL: ICD-10-CM

## 2022-09-01 DIAGNOSIS — Z12.11 SCREENING FOR COLON CANCER: ICD-10-CM

## 2022-09-01 DIAGNOSIS — Z23 NEED FOR PROPHYLACTIC VACCINATION AGAINST STREPTOCOCCUS PNEUMONIAE (PNEUMOCOCCUS): ICD-10-CM

## 2022-09-01 DIAGNOSIS — R06.02 SHORTNESS OF BREATH: ICD-10-CM

## 2022-09-01 DIAGNOSIS — R06.02 SHORTNESS OF BREATH: Primary | ICD-10-CM

## 2022-09-01 LAB
ANION GAP SERPL CALCULATED.3IONS-SCNC: 9 MMOL/L (ref 3–16)
BUN BLDV-MCNC: 10 MG/DL (ref 7–20)
CALCIUM SERPL-MCNC: 9.7 MG/DL (ref 8.3–10.6)
CHLORIDE BLD-SCNC: 101 MMOL/L (ref 99–110)
CO2: 26 MMOL/L (ref 21–32)
CREAT SERPL-MCNC: 0.9 MG/DL (ref 0.9–1.3)
FOLATE: 8.68 NG/ML (ref 4.78–24.2)
GFR AFRICAN AMERICAN: >60
GFR NON-AFRICAN AMERICAN: >60
GLUCOSE BLD-MCNC: 81 MG/DL (ref 70–99)
HCT VFR BLD CALC: 44.6 % (ref 40.5–52.5)
HEMOGLOBIN: 15.3 G/DL (ref 13.5–17.5)
MCH RBC QN AUTO: 31.6 PG (ref 26–34)
MCHC RBC AUTO-ENTMCNC: 34.3 G/DL (ref 31–36)
MCV RBC AUTO: 92.1 FL (ref 80–100)
PDW BLD-RTO: 14.5 % (ref 12.4–15.4)
PLATELET # BLD: 330 K/UL (ref 135–450)
PMV BLD AUTO: 7.3 FL (ref 5–10.5)
POTASSIUM SERPL-SCNC: 4.3 MMOL/L (ref 3.5–5.1)
PROSTATE SPECIFIC ANTIGEN: 1.21 NG/ML (ref 0–4)
RBC # BLD: 4.85 M/UL (ref 4.2–5.9)
SODIUM BLD-SCNC: 136 MMOL/L (ref 136–145)
VITAMIN B-12: 453 PG/ML (ref 211–911)
WBC # BLD: 10.4 K/UL (ref 4–11)

## 2022-09-01 PROCEDURE — 90471 IMMUNIZATION ADMIN: CPT | Performed by: INTERNAL MEDICINE

## 2022-09-01 PROCEDURE — 3023F SPIROM DOC REV: CPT | Performed by: INTERNAL MEDICINE

## 2022-09-01 PROCEDURE — 90677 PCV20 VACCINE IM: CPT | Performed by: INTERNAL MEDICINE

## 2022-09-01 PROCEDURE — 4004F PT TOBACCO SCREEN RCVD TLK: CPT | Performed by: INTERNAL MEDICINE

## 2022-09-01 PROCEDURE — G8420 CALC BMI NORM PARAMETERS: HCPCS | Performed by: INTERNAL MEDICINE

## 2022-09-01 PROCEDURE — 99214 OFFICE O/P EST MOD 30 MIN: CPT | Performed by: INTERNAL MEDICINE

## 2022-09-01 PROCEDURE — G8427 DOCREV CUR MEDS BY ELIG CLIN: HCPCS | Performed by: INTERNAL MEDICINE

## 2022-09-01 PROCEDURE — 3017F COLORECTAL CA SCREEN DOC REV: CPT | Performed by: INTERNAL MEDICINE

## 2022-09-01 RX ORDER — FLUTICASONE PROPIONATE 50 MCG
1 SPRAY, SUSPENSION (ML) NASAL DAILY
Qty: 16 G | Refills: 0 | Status: SHIPPED | OUTPATIENT
Start: 2022-09-01 | End: 2022-10-01

## 2022-09-01 RX ORDER — LORATADINE 10 MG/1
TABLET ORAL
Qty: 30 TABLET | Refills: 5 | Status: SHIPPED | OUTPATIENT
Start: 2022-09-01

## 2022-09-01 ASSESSMENT — PATIENT HEALTH QUESTIONNAIRE - PHQ9
SUM OF ALL RESPONSES TO PHQ QUESTIONS 1-9: 9
1. LITTLE INTEREST OR PLEASURE IN DOING THINGS: 1
9. THOUGHTS THAT YOU WOULD BE BETTER OFF DEAD, OR OF HURTING YOURSELF: 0
SUM OF ALL RESPONSES TO PHQ QUESTIONS 1-9: 9
5. POOR APPETITE OR OVEREATING: 1
8. MOVING OR SPEAKING SO SLOWLY THAT OTHER PEOPLE COULD HAVE NOTICED. OR THE OPPOSITE, BEING SO FIGETY OR RESTLESS THAT YOU HAVE BEEN MOVING AROUND A LOT MORE THAN USUAL: 1
3. TROUBLE FALLING OR STAYING ASLEEP: 2
4. FEELING TIRED OR HAVING LITTLE ENERGY: 2
SUM OF ALL RESPONSES TO PHQ9 QUESTIONS 1 & 2: 2
SUM OF ALL RESPONSES TO PHQ QUESTIONS 1-9: 9
6. FEELING BAD ABOUT YOURSELF - OR THAT YOU ARE A FAILURE OR HAVE LET YOURSELF OR YOUR FAMILY DOWN: 0
10. IF YOU CHECKED OFF ANY PROBLEMS, HOW DIFFICULT HAVE THESE PROBLEMS MADE IT FOR YOU TO DO YOUR WORK, TAKE CARE OF THINGS AT HOME, OR GET ALONG WITH OTHER PEOPLE: 0
7. TROUBLE CONCENTRATING ON THINGS, SUCH AS READING THE NEWSPAPER OR WATCHING TELEVISION: 1
SUM OF ALL RESPONSES TO PHQ QUESTIONS 1-9: 9
2. FEELING DOWN, DEPRESSED OR HOPELESS: 1

## 2022-09-01 ASSESSMENT — ENCOUNTER SYMPTOMS
TROUBLE SWALLOWING: 0
PHOTOPHOBIA: 0
VOICE CHANGE: 0
CHEST TIGHTNESS: 0
WHEEZING: 0
SHORTNESS OF BREATH: 1

## 2022-09-01 NOTE — PROGRESS NOTES
Kristie Gaytan  1970  male  46 y.o. SUBJECTIVE:       Chief Complaint   Patient presents with    1 Month Follow-Up     SOB         HPI:    Follow-up visit for chronic problems. Patient continues to smoke cigarettes. History of COPD emphysema. Since starting Trelegy patient has been feeling overall better. He have not done the blood work ordered by pulmonologist.    Patient continue to follow-up with the psychiatrist for underlying history of depression anxiety. He reported could not tolerate Pristiq more than 50 mg. Past Medical History:   Diagnosis Date    Allergic sinusitis     Anxiety and depression     currently under care of pyschiatrist    COPD (chronic obstructive pulmonary disease) ([facility] [address]     Hip pain 09/05/2017    ?  CAM possible bilateral superior femoral head-heck junction bump suggestive of femoro acetabular impingement syndrome    Insomnia     Localized edema 2/24/2018    Neg abd ultrasound and DVT    Osteoporosis     Spontaneous pneumothorax     1991    Testicular torsion     Wears dentures     Wears glasses      Past Surgical History:   Procedure Laterality Date    BACK SURGERY  12/26/2016    1st rods, 2nd tens unit 3rd rods and screw removed, only has cages now    [address]      x 2    TOTAL HIP ARTHROPLASTY Left 1/29/2019    TOTAL HIP REPLACEMENT LEFT HIP (ADVANCED) performed by Henna Zarate MD at [address] [place] [address] 9 Right 11/30/2020    TOTAL RIGHT HIP REPLACEMENT performed by Henna Zarate MD at [place] [address] History     Socioeconomic History    Marital status:      Spouse name: None    Number of children: None    Years of education: None    Highest education level: None   Occupational History    Occupation: unemployed     Comment: [employer], [employer],   Tobacco Use    Smoking status: Every Day     Packs/day: 1.00     Years: 32.00     Pack years: 32.00     Types: Cigarettes    Smokeless tobacco: Never    Tobacco comments: using patches   Vaping Use    Vaping Use: Never used   Substance and Sexual Activity    Alcohol use: No    Drug use: No     Types: Marijuana Estil Catena)     Comment: In [employer] Only    Sexual activity: Never     Social Determinants of Health     Financial Resource Strain: High Risk    Difficulty of Paying Living Expenses: Hard   Food Insecurity: Food Insecurity Present    Worried About Running Out of Food in the Last Year: Often true    Ran Out of Food in the Last Year: Often true     Family History   Problem Relation Age of Onset    Arthritis Mother     Other Mother     Neurofibromatosis Mother     Other Father        Review of Systems   Constitutional:  Negative for diaphoresis and unexpected weight change. HENT:  Positive for congestion. Negative for trouble swallowing and voice change. Patient complains of night cough. He feels salty taste at night behind his throat when he lays down   Eyes:  Negative for photophobia and visual disturbance. Respiratory:  Positive for shortness of breath. Negative for chest tightness and wheezing. Cardiovascular:  Negative for chest pain and palpitations. Neurological:  Negative for dizziness and light-headedness. OBJECTIVE:  Pulse Readings from Last 4 Encounters:   09/01/22 98   08/09/22 81   04/25/22 95   03/02/22 106     Wt Readings from Last 4 Encounters:   09/01/22 129 lb 9.6 oz (58.8 kg)   06/07/22 130 lb (59 kg)   04/25/22 130 lb 12.8 oz (59.3 kg)   04/05/22 132 lb (59.9 kg)     BP Readings from Last 4 Encounters:   09/01/22 135/82   04/25/22 135/84   03/02/22 135/80   10/25/21 120/80     Physical Exam  Vitals and nursing note reviewed. Constitutional:       Appearance: He is not ill-appearing. HENT:      Nose: Congestion present. Comments: Bilateral nasal mucosal swelling. Partial blockage of air passage  Eyes:      General: No scleral icterus.      Conjunctiva/sclera: Conjunctivae normal.   Cardiovascular:      Rate and Rhythm: Normal rate and regular rhythm. Pulses: Normal pulses. Heart sounds: Normal heart sounds. Pulmonary:      Effort: Pulmonary effort is normal.      Breath sounds: No wheezing or rhonchi. Abdominal:      General: Bowel sounds are normal.   Musculoskeletal:      Right lower leg: No edema. Left lower leg: No edema. Neurological:      Mental Status: He is alert.        CBC:   Lab Results   Component Value Date/Time    WBC 6.9 07/10/2021 12:45 PM    HGB 15.8 07/10/2021 12:45 PM    HCT 46.3 07/10/2021 12:45 PM     07/10/2021 12:45 PM     CMP:  Lab Results   Component Value Date/Time     07/10/2021 12:45 PM    K 4.2 07/10/2021 12:45 PM    CL 99 07/10/2021 12:45 PM    CO2 28 07/10/2021 12:45 PM    ANIONGAP 8 07/10/2021 12:45 PM    GLUCOSE 121 07/10/2021 12:45 PM    BUN 5 07/10/2021 12:45 PM    CREATININE 0.7 07/10/2021 12:45 PM    GFRAA >60 07/10/2021 12:45 PM    CALCIUM 9.0 07/10/2021 12:45 PM    PROT 7.0 07/10/2021 12:45 PM    LABALBU 4.3 07/10/2021 12:45 PM    AGRATIO 1.6 07/10/2021 12:45 PM    BILITOT <0.2 07/10/2021 12:45 PM    ALKPHOS 131 07/10/2021 12:45 PM    ALT 22 07/10/2021 12:45 PM    AST 19 07/10/2021 12:45 PM    GLOB 2.7 07/10/2021 12:45 PM     URINALYSIS:  Lab Results   Component Value Date/Time    GLUCOSEU Negative 07/10/2021 01:40 PM    KETUA Negative 07/10/2021 01:40 PM    SPECGRAV <1.005 07/10/2021 01:40 PM    BLOODU Negative 07/10/2021 01:40 PM    PHUR 6.5 07/10/2021 01:40 PM    PROTEINU Negative 07/10/2021 01:40 PM    NITRU Negative 07/10/2021 01:40 PM    LEUKOCYTESUR Negative 07/10/2021 01:40 PM    LABMICR YES 07/10/2021 01:40 PM    URINETYPE NotGiven 07/10/2021 01:40 PM     HBA1C:   Lab Results   Component Value Date/Time    LABA1C 5.4 12/01/2020 05:18 AM    .3 12/01/2020 05:18 AM     MICRO/ALB:   Lab Results   Component Value Date/Time    LABMICR YES 07/10/2021 01:40 PM    LABCREA 40.8 03/19/2021 08:34 PM     LIPID:  Lab Results   Component Value Date/Time    CHOL 154 12/13/2017 09:50 AM    TRIG 122 12/13/2017 09:50 AM    HDL 34 12/13/2017 09:50 AM    LDLCALC 96 12/13/2017 09:50 AM    LABVLDL 24 12/13/2017 09:50 AM     TSH:   Lab Results   Component Value Date/Time    TSHREFLEX 1.98 03/19/2021 08:34 PM     PSA:   Lab Results   Component Value Date/Time    PSA 1.1 07/25/2018 09:12 AM        ASSESSMENT/PLAN:  Assessment/Plan:  Mario Segundo was seen today for 1 month follow-up. Diagnoses and all orders for this visit:    Screening for colon cancer  -     FIT-DNA (Cologuard)    Seasonal allergic rhinitis due to pollen  -     loratadine (CLARITIN) 10 MG tablet; TAKE ONE TABLET BY MOUTH EVERY EVENING  -     fluticasone (FLONASE) 50 MCG/ACT nasal spray; 1 spray by Nasal route daily  -     Kelly Ivan MD, Otolaryngology, [facility]  Continue current Singulair  Screening for HIV without presence of risk factors  -     HIV Screen; Future    Need for prophylactic vaccination against Streptococcus pneumoniae (pneumococcus)  -     Pneumococcal Conjugate PCV20, PF (Prevnar 20)    Post-nasal drainage  -     Kelly Ivan MD, Otolaryngology, [facility]    Centrilobular emphysema [facility])  Patient not motivated to quit smoking. Shortness of breath probably related to emphysema. He is going to get alpha-1 antitrypsin level done today. he will continue Trelegy  Patient is here to get nebulizer treatment. He has been using albuterol average every alternate day. -     CBC; Future  -     Basic Metabolic Panel; Future    Screening for prostate cancer  -     PSA Screening; Future    Low vitamin B12 level  He had lower level of B12 in the past we will repeat  -     Vitamin B12 & Folate; Future    Anxiety and depression  Patient will continue to follow-up with psychiatrist.    Shortness of breath  -     CBC; Future  -     Basic Metabolic Panel;  Future          Orders Placed This Encounter   Procedures    FIT-DNA (Cologuard)    Pneumococcal Conjugate PCV20, PF (Prevnar 20)    HIV Inhale 2 puffs into the lungs every 4 hours as needed for Wheezing 18 g 2    HYDROcodone-acetaminophen (NORCO)  MG per tablet hydrocodone 10 mg-acetaminophen 325 mg tablet      vitamin D (ERGOCALCIFEROL) 1.25 MG (50937 UT) CAPS capsule Take 50,000 Units by mouth once a week      Tens Unit MISC by Does not apply route 1 each 0    nicotine (NICODERM CQ) 21 MG/24HR Place 1 patch onto the skin daily 42 patch 0    lidocaine (LIDODERM) 5 % Place 1 patch onto the skin daily 12 hours on, 12 hours off. (Patient not taking: Reported on 9/1/2022) 30 patch 0    tiZANidine (ZANAFLEX) 4 MG tablet 1 tab q 8 hours as needed (Patient not taking: Reported on 9/1/2022) 90 tablet 0     No current facility-administered medications for this visit. Return in about 3 months (around 12/1/2022). An After Visit Summary was printed and given to the patient. Documentation was done using voice recognition dragon software. Every effort was made to ensure accuracy; however, inadvertent  Unintentional computerized transcription errors may be present.

## 2022-09-02 LAB
HIV AG/AB: NORMAL
HIV ANTIGEN: NORMAL
HIV-1 ANTIBODY: NORMAL
HIV-2 AB: NORMAL

## 2022-09-05 LAB
ALPHA-1 ANTITRYPSIN PHENOTYPE: NORMAL
ALPHA-1 ANTITRYPSIN: 101 MG/DL (ref 90–200)

## 2022-10-07 DIAGNOSIS — F41.9 ANXIETY DISORDER, UNSPECIFIED TYPE: ICD-10-CM

## 2022-10-07 RX ORDER — LORAZEPAM 0.5 MG/1
TABLET ORAL
Qty: 10 TABLET | Refills: 0 | Status: SHIPPED | OUTPATIENT
Start: 2022-10-07 | End: 2022-10-17

## 2022-10-11 LAB — NONINV COLON CA DNA+OCC BLD SCRN STL QL: NORMAL

## 2022-10-12 DIAGNOSIS — Z12.11 COLON CANCER SCREENING: Primary | ICD-10-CM

## 2022-10-24 ENCOUNTER — HOSPITAL ENCOUNTER (EMERGENCY)
Age: 52
Discharge: HOME | End: 2022-10-25
Attending: EMERGENCY MEDICINE
Payer: MEDICARE

## 2022-10-24 ENCOUNTER — APPOINTMENT (OUTPATIENT)
Dept: CT IMAGING | Age: 52
End: 2022-10-24
Payer: MEDICARE

## 2022-10-24 VITALS
TEMPERATURE: 98.5 F | WEIGHT: 122 LBS | HEART RATE: 94 BPM | BODY MASS INDEX: 19.69 KG/M2 | SYSTOLIC BLOOD PRESSURE: 132 MMHG | RESPIRATION RATE: 18 BRPM | DIASTOLIC BLOOD PRESSURE: 81 MMHG | OXYGEN SATURATION: 97 %

## 2022-10-24 DIAGNOSIS — M54.42 ACUTE LEFT-SIDED LOW BACK PAIN WITH LEFT-SIDED SCIATICA: Primary | ICD-10-CM

## 2022-10-24 DIAGNOSIS — Z98.890 H/O LUMBOSACRAL SPINE SURGERY: ICD-10-CM

## 2022-10-24 PROCEDURE — 72131 CT LUMBAR SPINE W/O DYE: CPT

## 2022-10-24 PROCEDURE — 99284 EMERGENCY DEPT VISIT MOD MDM: CPT

## 2022-10-24 PROCEDURE — 6360000002 HC RX W HCPCS: Performed by: EMERGENCY MEDICINE

## 2022-10-24 PROCEDURE — 96372 THER/PROPH/DIAG INJ SC/IM: CPT

## 2022-10-24 PROCEDURE — 6370000000 HC RX 637 (ALT 250 FOR IP): Performed by: EMERGENCY MEDICINE

## 2022-10-24 RX ORDER — OXYCODONE HYDROCHLORIDE AND ACETAMINOPHEN 5; 325 MG/1; MG/1
2 TABLET ORAL ONCE
Status: COMPLETED | OUTPATIENT
Start: 2022-10-24 | End: 2022-10-24

## 2022-10-24 RX ORDER — ORPHENADRINE CITRATE 30 MG/ML
60 INJECTION INTRAMUSCULAR; INTRAVENOUS ONCE
Status: COMPLETED | OUTPATIENT
Start: 2022-10-24 | End: 2022-10-24

## 2022-10-24 RX ORDER — LIDOCAINE 4 G/G
1 PATCH TOPICAL ONCE
Status: DISCONTINUED | OUTPATIENT
Start: 2022-10-24 | End: 2022-10-25 | Stop reason: HOSPADM

## 2022-10-24 RX ADMIN — ORPHENADRINE CITRATE 60 MG: 30 INJECTION INTRAMUSCULAR; INTRAVENOUS at 22:48

## 2022-10-24 RX ADMIN — OXYCODONE AND ACETAMINOPHEN 2 TABLET: 5; 325 TABLET ORAL at 22:48

## 2022-10-24 ASSESSMENT — PAIN SCALES - GENERAL: PAINLEVEL_OUTOF10: 10

## 2022-10-25 ENCOUNTER — APPOINTMENT (OUTPATIENT)
Dept: GENERAL RADIOLOGY | Age: 52
End: 2022-10-25
Payer: MEDICARE

## 2022-10-25 PROCEDURE — 72100 X-RAY EXAM L-S SPINE 2/3 VWS: CPT

## 2022-10-25 PROCEDURE — 6370000000 HC RX 637 (ALT 250 FOR IP): Performed by: EMERGENCY MEDICINE

## 2022-10-25 PROCEDURE — 72120 X-RAY BEND ONLY L-S SPINE: CPT

## 2022-10-25 RX ORDER — OXYCODONE HYDROCHLORIDE 5 MG/1
5 TABLET ORAL ONCE
Status: COMPLETED | OUTPATIENT
Start: 2022-10-25 | End: 2022-10-25

## 2022-10-25 RX ORDER — LIDOCAINE 50 MG/G
1 PATCH TOPICAL DAILY
Qty: 30 PATCH | Refills: 0 | Status: SHIPPED | OUTPATIENT
Start: 2022-10-25 | End: 2022-12-01

## 2022-10-25 RX ORDER — CYCLOBENZAPRINE HCL 10 MG
10 TABLET ORAL 3 TIMES DAILY PRN
Qty: 20 TABLET | Refills: 0 | Status: SHIPPED | OUTPATIENT
Start: 2022-10-25 | End: 2022-11-04

## 2022-10-25 RX ORDER — METHYLPREDNISOLONE 4 MG/1
TABLET ORAL
Qty: 1 KIT | Refills: 0 | Status: SHIPPED | OUTPATIENT
Start: 2022-10-25 | End: 2022-12-01

## 2022-10-25 RX ADMIN — OXYCODONE 5 MG: 5 TABLET ORAL at 05:10

## 2022-10-25 ASSESSMENT — PAIN SCALES - GENERAL: PAINLEVEL_OUTOF10: 8

## 2022-10-25 NOTE — ED PROVIDER NOTES
[facility] Emergency Department    Cruz Medel MD, am the primary clinician of record. CHIEF COMPLAINT  Chief Complaint   Patient presents with    Back Pain     Pt states he heard his back \"pop\" this morning around 6am and experiencing excruciating pain since. Hx back and hip problems. HISTORY OF PRESENT ILLNESS  Rebecca Lafleur is a 46 y.o. male  who presents to the ED complaining of acute L>R low back pain, not directly traumatic but related he thinks to bending over and extending his back and felt a \"pop. \"  He has hardware in his back since 2007 and has had some of it removed in 2013 and now his back is \"inoperable. \"  He has a chronic pain specialist (Dr. Tori Saldivar) and is on chronic Vicodin from him. That has not helped with his pain. Pain worse with movement. No fall. No leg weakness but has pain shooting down the LLE. No sx down the RLE. No neck, upper back pain or abd pains. No numbness in the legs or saddle anesthesia. No loss of b/b function or urinary retention. No other complaints, modifying factors or associated symptoms. I have reviewed the following from the nursing documentation. Past Medical History:   Diagnosis Date    Allergic sinusitis     Anxiety and depression     currently under care of pyschiatrist    COPD (chronic obstructive pulmonary disease) ([facility] [address]     Hip pain 09/05/2017    ?  CAM possible bilateral superior femoral head-heck junction bump suggestive of femoro acetabular impingement syndrome    Insomnia     Localized edema 2/24/2018    Neg abd ultrasound and DVT    Osteoporosis     Spontaneous pneumothorax     1991    Testicular torsion     Wears dentures     Wears glasses      Past Surgical History:   Procedure Laterality Date    BACK SURGERY  12/26/2016    1st rods, 2nd tens unit 3rd rods and screw removed, only has cages now    TESTICLE TORSION REDUCTION      x 2    TOTAL HIP ARTHROPLASTY Left 1/29/2019    TOTAL HIP REPLACEMENT LEFT HIP (ADVANCED) performed by Marci Chiu MD at [facility] [address] Right 11/30/2020    TOTAL RIGHT HIP REPLACEMENT performed by Marci Chiu MD at [facility] History   Problem Relation Age of Onset    Arthritis Mother     Other Mother     Neurofibromatosis Mother     Other Father      Social History     Socioeconomic History    Marital status:      Spouse name: Not on file    Number of children: Not on file    Years of education: Not on file    Highest education level: Not on file   Occupational History    Occupation: unemployed     Comment: [employer], [employer],   Tobacco Use    Smoking status: Every Day     Packs/day: 1.00     Years: 32.00     Pack years: 32.00     Types: Cigarettes    Smokeless tobacco: Never    Tobacco comments:     using patches   Vaping Use    Vaping Use: Never used   Substance and Sexual Activity    Alcohol use: No    Drug use: No     Types: Marijuana Velricki Perkins)     Comment: In [employer] Only    Sexual activity: Never   Other Topics Concern    Not on file   Social History Narrative    Not on file     Social Determinants of Health     Financial Resource Strain: High Risk    Difficulty of Paying Living Expenses: Hard   Food Insecurity: Food Insecurity Present    Worried About Running Out of Food in the Last Year: Often true    Ran Out of Food in the Last Year: Often true   Transportation Needs: Not on file   Physical Activity: Not on file   Stress: Not on file   Social Connections: Not on file   Intimate Partner Violence: Not on file   Housing Stability: Not on file     Current Facility-Administered Medications   Medication Dose Route Frequency Provider Last Rate Last Admin    lidocaine 4 % external patch 1 patch  1 patch TransDERmal Once Vivek Aguilar MD   1 patch at 10/24/22 9932     Current Outpatient Medications   Medication Sig Dispense Refill    methylPREDNISolone (MEDROL, BEN,) 4 MG tablet Take by mouth.  1 kit 0    cyclobenzaprine (FLEXERIL) 10 MG tablet Take 1 tablet by mouth 3 times daily as needed for Muscle spasms (CAUTION: Can cause dizziness, don't drive while taking.) 20 tablet 0    lidocaine (LIDODERM) 5 % Place 1 patch onto the skin daily 12 hours on, 12 hours off. 30 patch 0    loratadine (CLARITIN) 10 MG tablet TAKE ONE TABLET BY MOUTH EVERY EVENING 30 tablet 5    fluticasone (FLONASE) 50 MCG/ACT nasal spray 1 spray by Nasal route daily 16 g 0    montelukast (SINGULAIR) 10 MG tablet TAKE ONE TABLET BY MOUTH ONCE NIGHTLY 30 tablet 0    Fluticasone-Umeclidin-Vilant (TRELEGY ELLIPTA) 200-62.5-25 MCG/INH AEPB Inhale 1 puff into the lungs daily 1 each 5    ipratropium-albuterol (DUONEB) 0.5-2.5 (3) MG/3ML SOLN nebulizer solution Inhale 3 mLs into the lungs every 6 hours as needed for Shortness of Breath DX:COPD J44.9 360 mL 11    desvenlafaxine succinate (PRISTIQ) 50 MG TB24 extended release tablet Take 1 tablet by mouth daily 30 tablet 5    guanFACINE (TENEX) 1 MG tablet Take 1 tablet by mouth nightly (Patient not taking: Reported on 10/24/2022) 30 tablet 5    albuterol sulfate  (90 Base) MCG/ACT inhaler Inhale 2 puffs into the lungs every 4 hours as needed for Wheezing 18 g 2    nicotine (NICODERM CQ) 21 MG/24HR Place 1 patch onto the skin daily 42 patch 0    HYDROcodone-acetaminophen (NORCO)  MG per tablet hydrocodone 10 mg-acetaminophen 325 mg tablet      vitamin D (ERGOCALCIFEROL) 1.25 MG (75674 UT) CAPS capsule Take 50,000 Units by mouth once a week      Tens Unit MISC by Does not apply route 1 each 0     Allergies   Allergen Reactions    Naproxen Swelling     He can tolerate aspirin    Neurontin [Gabapentin] Swelling     Edema lower extr.   Leg swelling    Robaxin [Methocarbamol] Diarrhea, Itching and Other (See Comments)     Eyes swell/yellowish and get bloodshot    Sulfamethoxazole-Trimethoprim Diarrhea    Toradol [Ketorolac Tromethamine] Swelling and Other (See Comments)     Other reaction(s): Headaches  Eyes swell and bloodshot    Zoloft [Sertraline]      bad brain fog ,confusion     Azithromycin Nausea And Vomiting    Bactrim [Sulfamethoxazole-Trimethoprim] Diarrhea    Biaxin [Clarithromycin] Diarrhea and Nausea And Vomiting    Celebrex [Celecoxib] Swelling     Causes severe foot swelling. He can tolerate aspirin. Diclofenac Diarrhea     He can tolerate aspirin    Sulfa Antibiotics Diarrhea and Nausea And Vomiting    Tramadol Rash and Other (See Comments)     Eyes swell and get bloodshot--pt states can take in small doses       REVIEW OF SYSTEMS  10 systems reviewed, pertinent positives per HPI otherwise noted to be negative. PHYSICAL EXAM  /81   Pulse 94   Temp 98.5 °F (36.9 °C) (Oral)   Resp 18   Wt 122 lb (55.3 kg)   SpO2 97%   BMI 19.69 kg/m²    GENERAL APPEARANCE: Awake and alert. Cooperative. No distress. HENT: Normocephalic. Atraumatic. Mucous membranes are dry. NECK: Supple. EYES: PERRL. EOM's grossly intact. HEART/CHEST: RRR. No murmurs. No chest wall tenderness. LUNGS: Respirations unlabored. CTAB. Good air exchange. Speaking comfortably in full sentences. ABDOMEN: No tenderness. Soft. Non-distended. No masses. No organomegaly. No guarding or rebound. Normal bowel sounds throughout. BACK:      Cervical: no tenderness noted, no midline tenderness, no paraspinous spasm      Thoracic: no tenderness noted, no midline tenderness, no paraspinous spasm      Lumbar: mild midline, moderate L paraspinal ttp with spasm, +SLR LLE, neg slr RLE. MUSCULOSKELETAL:  RLE: No tenderness. 2+ DP and PT. Sensation and motor function fully intact. Full range of motion of all major joints. No erythema, bruising, or lacerations. Compartments are soft. 2+ patellar reflex. Achilles nontender and intact. Able to bear weight. No joint swelling or effusions are noted. LLE: No tenderness. 2+ DP and PT. Sensation and motor function fully intact. Full range of motion of all major joints.   No erythema, bruising, or lacerations. Compartments are soft. 2+ patellar reflex. Achilles nontender and intact. Able to bear weight. No joint swelling or effusions are noted. SKIN: Warm and dry. No acute rashes. NEUROLOGICAL: Alert and oriented. CN's 2-12 intact. No gross facial drooping. Strength 5/5, sensation intact. 2 plus DTR's in knees bilaterally. Gait antalgic from pain. PSYCHIATRIC: Normal mood and affect. RADIOLOGY    XR LUMBAR SPINE (2-3 VIEWS)    Result Date: 10/25/2022  EXAMINATION: 3 XRAY VIEWS OF THE LUMBAR SPINE 10/25/2022 1:59 am COMPARISON: CT of 10/24/2022 HISTORY: ORDERING SYSTEM PROVIDED HISTORY: eval hardware, back pain TECHNOLOGIST PROVIDED HISTORY: Reason for exam:->eval hardware, back pain Reason for Exam: eval hardware, back pain FINDINGS: Hypoplastic ribs at T12.  5 rib-bearing lumbar type vertebral bodies. Metallic surgical device projecting at the L4-5 and L5-S1 disc spaces. The devices are tilted with the anterior ends angled inferiorly. Posterior edge is projecting at the posterior margin of the vertebra. The disc space narrowing is greater at L5-S1. There is no general collapse of the vertebral bodies or subluxation of the spine. Metallic surgical devices at the L4-5 and L5-S1 disc space are tilted with the anterior ends angled inferiorly. The posterior margin is at the posterior margin of the vertebral bodies. CT LUMBAR SPINE WO CONTRAST    Result Date: 10/25/2022  EXAMINATION: CT OF THE LUMBAR SPINE WITHOUT CONTRAST  10/24/2022 TECHNIQUE: CT of the lumbar spine was performed without the administration of intravenous contrast. Multiplanar reformatted images are provided for review. Adjustment of mA and/or kV according to patient size was utilized. Automated exposure control, iterative reconstruction, and/or weight based adjustment of the mA/kV was utilized to reduce the radiation dose to as low as reasonably achievable.  COMPARISON: None HISTORY: [address] PROVIDED HISTORY: back strain h/o hardware L sided sciatica pain TECHNOLOGIST PROVIDED HISTORY: Reason for exam:->back strain h/o hardware L sided sciatica pain Decision Support Exception - unselect if not a suspected or confirmed emergency medical condition->Emergency Medical Condition (MA) Reason for Exam: Back strain, h/o hardware L sided sciatica pain. Back Pain (Pt states he heard his back \"pop\" this morning around 6am and experiencing excruciating pain since. Hx back and hip problems. ). FINDINGS: BONES/ALIGNMENT: There is mild disc space narrowing throughout. No fracture or subluxation is seen. There are cage fusion devices in the L4-5 and L5-S1 disc spaces posteriorly which is causing a large amount of streak artifact at both levels and is partially obscuring detail. The disc markers appears slightly posterior to the disc space at both levels. The vertebral body height is well maintained throughout. The pedicles are intact. DEGENERATIVE CHANGES: There is a small central disc bulge at L3-4 causing mild dural sac effacement anteriorly. There are no pars defects. SOFT TISSUES/RETROPERITONEUM: No paraspinal mass is seen. Status post discectomies with placement of cage fusion devices along the L4-5 and L5-S1 disc spaces posteriorly. The cage fusion devices are causing a large amount of streak artifact limiting their evaluation with what appears be slight posterior displacement of both devices just be on the disc space causing which may be causing mild anterior dural sac effacement but is difficult to further evaluate due to the large amount of artifact. Recommend surgical follow-up. Small central disc bulge at L3-4. Mild osteoarthritic changes of the facets throughout with no significant spinal stenosis.      XR LUMBAR SPINE FLEXION AND EXTENSION ONLY    Result Date: 10/25/2022  EXAMINATION: 2 XRAY VIEWS OF THE LUMBAR SPINE 10/25/2022 3:54 am COMPARISON: Earlier on 10/25/2022 at 0238 hours HISTORY: ORDERING SYSTEM PROVIDED HISTORY: Back Pain TECHNOLOGIST PROVIDED HISTORY: Reason for exam:->Back Pain Reason for Exam: evaluate hardware/lower back pain FINDINGS: Lateral flexion and extension views of the lumbar spine. Neutral view on the earlier x-rays. The extension position appears similar to the neutral position without a significant change. No subluxation of the vertebral bodies. The alignment of the metallic device at R7-1 and L5-S1 is unchanged with the extension. There is a mild degree of flexion, centered more in the upper lumbar spine. No subluxation of the vertebral bodies. The alignment and position of the metallic device at X9-E7 and L5-S1 are unchanged at flexion. .     Flexion and extension views of the lumbar spine with mild degree of flexion and limited extension. There is no interval subluxation of the vertebral bodies with positional change. The alignment and configuration of the metallic device at U9-5 and L5-S1 are stable. ED COURSE/MDM  Patient seen and evaluated. Old records reviewed. Labs and imaging reviewed and results discussed with patient. After initial evaluation, differential diagnostic considerations included: abdominal aortic aneurysm, cauda equina syndrome, epidural mass lesion, spinal stenosis, herniated disk causing severe stenosis, sprain/strain, fracture, contusion, sciatica, UTI, pyelonephritis, kidney stone    The patient's ED workup was notable for initial concern for what started as nontraumatic low back pain with left-sided sciatica however with history of previous back surgeries and concern for malpositioned hardware, CT was obtained and this demonstrated some artifact but was concerning for a possible malpositioned cage. Dr. Ervin Diana from [facility] was consulted about the patient's ED history, physical, workup, and course so far.   Recommendations from this consultant included she reviewed an old MRI 2018 and believes the hardware to be in a similar position. Advised flex/ex L-spine XR. This was done and no interval subluxation of the vertebral bodies with position change and the device position was stable. As such I do feel that discharge is reasonable with symptomatic management with Flexeril, lidocaine patches and a steroid pack. He has a pain specialist and opiate prescriptions chronically already. He is independently ambulatory. He is not exhibit signs or symptoms on exam or history to suggest cauda equina syndrome. Is this patient to be included in the SEP-1 Core Measure? No   Exclusion criteria - the patient is NOT to be included for SEP-1 Core Measure due to: Infection is not suspected      During the patient's ED course, the patient was given:  Medications   lidocaine 4 % external patch 1 patch (1 patch TransDERmal Patch Applied 10/24/22 2248)   orphenadrine (NORFLEX) injection 60 mg (60 mg IntraMUSCular Given 10/24/22 2248)   oxyCODONE-acetaminophen (PERCOCET) 5-325 MG per tablet 2 tablet (2 tablets Oral Given 10/24/22 2248)   oxyCODONE (ROXICODONE) immediate release tablet 5 mg (5 mg Oral Given 10/25/22 0510)        CLINICAL IMPRESSION  1. Acute left-sided low back pain with left-sided sciatica    2. H/O lumbosacral spine surgery        Blood pressure 132/81, pulse 94, temperature 98.5 °F (36.9 °C), temperature source Oral, resp. rate 18, weight 122 lb (55.3 kg), SpO2 97 %. DISPOSITION  Tyesha So was discharged to home in stable condition. I have discussed the findings of today's workup with the patient and addressed the patient's questions and concerns. Important warning signs as well as new or worsening symptoms which would necessitate immediate return to the ED were discussed. The plan is to discharge from the ED at this time, and the patient is in stable condition. The patient acknowledged understanding is agreeable with this plan. Patient was given scripts for the following medications.  I counseled patient how to take these medications. New Prescriptions    CYCLOBENZAPRINE (FLEXERIL) 10 MG TABLET    Take 1 tablet by mouth 3 times daily as needed for Muscle spasms (CAUTION: Can cause dizziness, don't drive while taking.)    LIDOCAINE (LIDODERM) 5 %    Place 1 patch onto the skin daily 12 hours on, 12 hours off. METHYLPREDNISOLONE (MEDROL, BEN,) 4 MG TABLET    Take by mouth. Follow-up with:  Anjum Carney MD  [address]  [phone]    Schedule an appointment as soon as possible for a visit in 1 week  For symptom re-evaluation    [facility] Emergency Department  [address]  Go to   If symptoms worsen    DISCLAIMER: This chart was created using Dragon dictation software. Efforts were made by me to ensure accuracy, however some errors may be present due to limitations of this technology and occasionally words are not transcribed correctly.         Vivek Aguilar MD  10/25/22 4952

## 2022-10-26 ENCOUNTER — OFFICE VISIT (OUTPATIENT)
Dept: ENDOCRINOLOGY | Age: 52
End: 2022-10-26
Payer: MEDICARE

## 2022-10-26 VITALS
SYSTOLIC BLOOD PRESSURE: 138 MMHG | HEIGHT: 66 IN | WEIGHT: 130 LBS | RESPIRATION RATE: 14 BRPM | DIASTOLIC BLOOD PRESSURE: 87 MMHG | TEMPERATURE: 98 F | BODY MASS INDEX: 20.89 KG/M2 | HEART RATE: 98 BPM

## 2022-10-26 DIAGNOSIS — M81.0 AGE RELATED OSTEOPOROSIS, UNSPECIFIED PATHOLOGICAL FRACTURE PRESENCE: Primary | ICD-10-CM

## 2022-10-26 PROCEDURE — 96372 THER/PROPH/DIAG INJ SC/IM: CPT | Performed by: INTERNAL MEDICINE

## 2022-10-26 PROCEDURE — G8484 FLU IMMUNIZE NO ADMIN: HCPCS | Performed by: INTERNAL MEDICINE

## 2022-10-26 PROCEDURE — G8420 CALC BMI NORM PARAMETERS: HCPCS | Performed by: INTERNAL MEDICINE

## 2022-10-26 PROCEDURE — G8427 DOCREV CUR MEDS BY ELIG CLIN: HCPCS | Performed by: INTERNAL MEDICINE

## 2022-10-26 PROCEDURE — 4004F PT TOBACCO SCREEN RCVD TLK: CPT | Performed by: INTERNAL MEDICINE

## 2022-10-26 PROCEDURE — 99214 OFFICE O/P EST MOD 30 MIN: CPT | Performed by: INTERNAL MEDICINE

## 2022-10-26 PROCEDURE — 3017F COLORECTAL CA SCREEN DOC REV: CPT | Performed by: INTERNAL MEDICINE

## 2022-10-26 NOTE — PROGRESS NOTES
Subjective: Interim:    Back on prolia since 4/22    No fractures since Prolia  Feels better on it  stable    Right hip replacement   Left in the past  States OA    Ashley Jolley is a  who is here for evaluation of osteoporosis. Patient is being seen at the request of  Jaquan Fleming MD     he was diagnosed with Osteoporsis in 2012 5/18/2020     FINDINGS:  LUMBAR SPINE:     The lumbar spine cannot be utilized as all the T-scores are greater than 1  discrepant from L3.     FOREARM:     The BMD of the middle third of the radius of the distal forearm equals 0.557  g/cm2. The T- and Z-scores are -2.3 and -4.4 standard deviations from the  mean. This is within the osteopenia range by WHO criteria. Since prior exam, there has been a 0.014g/cm2 decrease in bone mineral  density, corresponding to a 2.4% change, which is not significant. RIGHT HIP:     The bone mineral density in the total hip is measured at 0.688 g/cm2  corresponding to a T-score of -2.1 and a Z-score of -2.0. This is within the  osteopenia range by WHO criteria. Since prior exam, there has been a 0.012g/cm2 decrease in bone mineral  density, corresponding to a 1.6% change, which is not significant. The bone mineral density of the femoral neck is measured at 0.536 g/cm2  corresponding to a T-score of -2.8 and a Z-score of -2.2. This is within the  osteoporosis range by WHO criteria. IMPRESSION:  Osteoporosis by WHO criteria. No significant interval change. 5/18/2020       DEXA scan 1/17  LUMBAR SPINE:       The bone mineral density in the lumbar spine including the L1 through L3   levels is measured at 0.707 g/cm2, which corresponds to a T-score of -3.3 and   a Z-score of -3.0. This is within the osteoporosis range by WHO criteria. LEFT HIP:       The bone mineral density in the total hip is measured at 0.695 g/cm2   corresponding to a T-score of -2.2 and a Z-score of -2.0.   This is within the osteopenia range by WHO criteria. The bone mineral density of the femoral neck is measured at 0.574 g/cm2   corresponding to a T-score of -2.6 and a Z-score of -1.9. This is within the   osteoporosis range by WHO criteria. RIGHT HIP:       The bone mineral density in the total hip is measured at 0.7 g/cm2   corresponding to a T-score of -2.2 and a Z-score of -1.9. This is within the   osteopenia range by WHO criteria. The bone mineral density of the femoral neck is measured at 0.566 g/cm2   corresponding to a T-score of -2.7 and a Z-score of -2.0. This is within the   osteoporosis range by WHO criteria. Dexa 3/12:      Total Lumbar Spine:    T-score = -3.1    Z-score = -2.9    BMD =      0.753 g/cm       Right Femoral Neck:    T-score = -2.4    Z-score = -1.9    BMD =       0.602 g/cm       Right Total Hip:    T-score = -2.1     Z-score = -1.9    BMD =      0.715 g/cm       Left Femoral Neck:    T-score = -2.5    Z-score = -1.9    BMD =      0.593 g/cm       Total Left Hip:    T-score = -2.3    Z-score = -2.1    BMD =      0.688 g/cm       One-Third Forearm:    T-score = -2.5    Z-score = -2.2    BMD =      0.625 g/cm       Total Forearm:    T-score = -3.1    Z-score = -2.8    BMD =     0.515 g/cm     History of fractures :  Right arm fracture, fall from stairs    Pharmacological therapy for Osteoprosis:  Fosamax - he was taking since 2012  Prolia  10/17, 3/18, 1/19, 8.20     FH of Osteoporosis: mom   FH of hip fracture:yes- mom    Secondary causes of osteoprorsis: Menopause, smoking, prolonged steroid use. Calcium: Diet : 900mg Supplement: 200mg TID- not taking    Vitamin D:  50,000IU weekly  Level 31 on 12/16  Exercise: Follows up regularly with a dentist. No major dental procedures planned.     Reports h/o spinal fusion surgery, DDD of the back, Disc bulge, stenosis, back problem since Age 28  2013 hard ware removed, as screws came out, was told bones are weak    12/16 CBC, TSH nl  6/15 Testosterone 491 CMP nl  1/17 negative celiac panel    No chronic steroids. Smoker 1PPD  For 17 years. Still smoking but cutting back      SH:  at [facility]    Past Medical History:   Diagnosis Date    Allergic sinusitis     Anxiety and depression     currently under care of pyschiatrist    COPD (chronic obstructive pulmonary disease) ([facility] [address]     Hip pain 09/05/2017    ? CAM possible bilateral superior femoral head-heck junction bump suggestive of femoro acetabular impingement syndrome    Insomnia     Localized edema 2/24/2018    Neg abd ultrasound and DVT    Osteoporosis     Spontaneous pneumothorax     1991    Testicular torsion     Wears dentures     Wears glasses      Past Surgical History:   Procedure Laterality Date    BACK SURGERY  12/26/2016    1st rods, 2nd tens unit 3rd rods and screw removed, only has cages now    [address]      x 2    TOTAL HIP ARTHROPLASTY Left 1/29/2019    TOTAL HIP REPLACEMENT LEFT HIP (ADVANCED) performed by Keira Tapia MD at [facility] Right 11/30/2020    TOTAL RIGHT HIP REPLACEMENT performed by Keira Tapia MD at [place] [address]     Current Outpatient Medications   Medication Sig Dispense Refill    methylPREDNISolone (MEDROL, BEN,) 4 MG tablet Take by mouth. 1 kit 0    cyclobenzaprine (FLEXERIL) 10 MG tablet Take 1 tablet by mouth 3 times daily as needed for Muscle spasms (CAUTION: Can cause dizziness, don't drive while taking.) 20 tablet 0    lidocaine (LIDODERM) 5 % Place 1 patch onto the skin daily 12 hours on, 12 hours off.  30 patch 0    loratadine (CLARITIN) 10 MG tablet TAKE ONE TABLET BY MOUTH EVERY EVENING 30 tablet 5    montelukast (SINGULAIR) 10 MG tablet TAKE ONE TABLET BY MOUTH ONCE NIGHTLY 30 tablet 0    Fluticasone-Umeclidin-Vilant (TRELEGY ELLIPTA) 200-62.5-25 MCG/INH AEPB Inhale 1 puff into the lungs daily 1 each 5    ipratropium-albuterol (DUONEB) 0.5-2.5 (3) MG/3ML SOLN nebulizer solution Inhale 3 mLs into the lungs every 6 hours as needed for Shortness of Breath DX:COPD J44.9 360 mL 11    desvenlafaxine succinate (PRISTIQ) 50 MG TB24 extended release tablet Take 1 tablet by mouth daily 30 tablet 5    guanFACINE (TENEX) 1 MG tablet Take 1 tablet by mouth nightly 30 tablet 5    albuterol sulfate  (90 Base) MCG/ACT inhaler Inhale 2 puffs into the lungs every 4 hours as needed for Wheezing 18 g 2    HYDROcodone-acetaminophen (NORCO)  MG per tablet hydrocodone 10 mg-acetaminophen 325 mg tablet      vitamin D (ERGOCALCIFEROL) 1.25 MG (90310 UT) CAPS capsule Take 50,000 Units by mouth once a week      Tens Unit MISC by Does not apply route 1 each 0    fluticasone (FLONASE) 50 MCG/ACT nasal spray 1 spray by Nasal route daily 16 g 0    nicotine (NICODERM CQ) 21 MG/24HR Place 1 patch onto the skin daily 42 patch 0     Current Facility-Administered Medications   Medication Dose Route Frequency Provider Last Rate Last Admin    denosumab (PROLIA) SC injection 60 mg  60 mg SubCUTAneous Once Soniya Echavarria MD           Review of Systems  Scanned, reviewed    Vitals:    10/26/22 1022   BP: 138/87   Pulse: 98   Resp: 14   Temp: 98 °F (36.7 °C)       Constitutional: Well-developed, appears stated age, cooperative, in no acute distress  H/E/N/M/T:atraumatic, normocephalic, external ears, nose, lips normal without lesions  Eyes: Lids, lashes, conjunctivae and sclerae normal, No proptosis, no redness  Neck: supple, symmetrical, no swelling  Skin: No obvious rashes or lesions present. Skin and hair texture normal  Psychiatric: Judgement and Insight:  judgement and insight appear normal  Neuro: Normal without focal findings, speech is normal normal, speech is spontaneous  Chest: No labored breathing, no chest deformity, no stridor  Musculoskeletal: No joint deformity, swelling      Lab Review  No results found for: TSH  No results found for: FREET4      Assessment:     1. Osteoporosis: H/o smoking, +FH, failure to reach peak bone mass, are risk factors. Testosterone was normal, celiac panel normal.No use of steroids . Normal Ca and alkaline phosphatase . Suspicion for Cushing's is low. Nl  24 hour urine Ca. Do not suspect systemic mastocytosis. Was On fosamax for about 5 years, needed anti resorptive therapy given low BMD, switched to Prolia, took irregularly as insurance not covering. Finally approved, restarted 4/21, missed 10.21, took 4.22  2. Chronic Back pain  3. COPD  4. Tobacco Abuse: recommend cessation, discussed effects on BMD     Plan:     Prolia 60mg  Discussed management of osteoporosis in detail. Baseline work-up showed normal CBC, renal and liver function. Discussed treatment options  Told him that given high risk of fracture and osteoporosis,  needs Tx,prolia as had been on it and no new fractures  Discussed the side effects including Osteonecrosis of the jaw and Atypical fractures. Patient understands that the risk is low.    Dexa scan before next visit  Started Calcium supplement 500mg daily

## 2022-11-04 ENCOUNTER — HOSPITAL ENCOUNTER (OUTPATIENT)
Dept: PULMONOLOGY | Age: 52
Discharge: HOME | End: 2022-11-04
Payer: MEDICARE

## 2022-11-04 VITALS — OXYGEN SATURATION: 97 % | HEART RATE: 78 BPM | RESPIRATION RATE: 18 BRPM

## 2022-11-04 DIAGNOSIS — J43.2 CENTRILOBULAR EMPHYSEMA (HCC): ICD-10-CM

## 2022-11-04 DIAGNOSIS — J30.1 SEASONAL ALLERGIC RHINITIS DUE TO POLLEN: ICD-10-CM

## 2022-11-04 DIAGNOSIS — F41.9 ANXIETY DISORDER, UNSPECIFIED TYPE: ICD-10-CM

## 2022-11-04 LAB
DLCO %PRED: 51 %
DLCO PRED: NORMAL
DLCO/VA %PRED: NORMAL
DLCO/VA PRED: NORMAL
DLCO/VA: NORMAL
DLCO: NORMAL
EXPIRATORY TIME-POST: NORMAL
EXPIRATORY TIME: NORMAL
FEF 25-75% %CHNG: NORMAL
FEF 25-75% %PRED-POST: NORMAL
FEF 25-75% %PRED-PRE: NORMAL
FEF 25-75% PRED: NORMAL
FEF 25-75%-POST: NORMAL
FEF 25-75%-PRE: NORMAL
FEV1 %PRED-POST: 72 %
FEV1 %PRED-PRE: 72 %
FEV1 PRED: NORMAL
FEV1-POST: NORMAL
FEV1-PRE: NORMAL
FEV1/FVC %PRED-POST: NORMAL
FEV1/FVC %PRED-PRE: NORMAL
FEV1/FVC PRED: NORMAL
FEV1/FVC-POST: 63 %
FEV1/FVC-PRE: 63 %
FVC %PRED-POST: NORMAL
FVC %PRED-PRE: NORMAL
FVC PRED: NORMAL
FVC-POST: NORMAL
FVC-PRE: NORMAL
GAW %PRED: NORMAL
GAW PRED: NORMAL
GAW: NORMAL
IC %PRED: NORMAL
IC PRED: NORMAL
IC: NORMAL
MEP: NORMAL
MIP: NORMAL
MVV %PRED-PRE: NORMAL
MVV PRED: NORMAL
MVV-PRE: NORMAL
PEF %PRED-POST: NORMAL
PEF %PRED-PRE: NORMAL
PEF PRED: NORMAL
PEF%CHNG: NORMAL
PEF-POST: NORMAL
PEF-PRE: NORMAL
RAW %PRED: NORMAL
RAW PRED: NORMAL
RAW: NORMAL
RV %PRED: NORMAL
RV PRED: NORMAL
RV: NORMAL
SVC %PRED: NORMAL
SVC PRED: NORMAL
SVC: NORMAL
TLC %PRED: 92 %
TLC PRED: NORMAL
TLC: NORMAL
VA %PRED: NORMAL
VA PRED: NORMAL
VA: NORMAL
VTG %PRED: NORMAL
VTG PRED: NORMAL
VTG: NORMAL

## 2022-11-04 PROCEDURE — 94726 PLETHYSMOGRAPHY LUNG VOLUMES: CPT

## 2022-11-04 PROCEDURE — 6370000000 HC RX 637 (ALT 250 FOR IP): Performed by: INTERNAL MEDICINE

## 2022-11-04 PROCEDURE — 94760 N-INVAS EAR/PLS OXIMETRY 1: CPT

## 2022-11-04 PROCEDURE — 94060 EVALUATION OF WHEEZING: CPT

## 2022-11-04 PROCEDURE — 94729 DIFFUSING CAPACITY: CPT

## 2022-11-04 RX ORDER — MONTELUKAST SODIUM 10 MG/1
10 TABLET ORAL NIGHTLY
Qty: 90 TABLET | Refills: 1 | Status: SHIPPED | OUTPATIENT
Start: 2022-11-04

## 2022-11-04 RX ORDER — ALBUTEROL SULFATE 90 UG/1
4 AEROSOL, METERED RESPIRATORY (INHALATION) ONCE
Status: COMPLETED | OUTPATIENT
Start: 2022-11-04 | End: 2022-11-04

## 2022-11-04 RX ADMIN — Medication 4 PUFF: at 10:24

## 2022-11-04 ASSESSMENT — PULMONARY FUNCTION TESTS
FEV1/FVC_POST: 63
FEV1/FVC_PRE: 63
FEV1_PERCENT_PREDICTED_PRE: 72
FEV1_PERCENT_PREDICTED_POST: 72

## 2022-11-07 ENCOUNTER — OFFICE VISIT (OUTPATIENT)
Dept: PULMONOLOGY | Age: 52
End: 2022-11-07
Payer: MEDICAID

## 2022-11-07 VITALS — HEART RATE: 84 BPM | OXYGEN SATURATION: 97 %

## 2022-11-07 DIAGNOSIS — J43.2 CENTRILOBULAR EMPHYSEMA (HCC): Primary | ICD-10-CM

## 2022-11-07 DIAGNOSIS — F17.200 CURRENT SMOKER: ICD-10-CM

## 2022-11-07 DIAGNOSIS — J44.9 COPD, MODERATE (HCC): ICD-10-CM

## 2022-11-07 PROCEDURE — G8484 FLU IMMUNIZE NO ADMIN: HCPCS | Performed by: INTERNAL MEDICINE

## 2022-11-07 PROCEDURE — 99214 OFFICE O/P EST MOD 30 MIN: CPT | Performed by: INTERNAL MEDICINE

## 2022-11-07 PROCEDURE — 3023F SPIROM DOC REV: CPT | Performed by: INTERNAL MEDICINE

## 2022-11-07 PROCEDURE — G8427 DOCREV CUR MEDS BY ELIG CLIN: HCPCS | Performed by: INTERNAL MEDICINE

## 2022-11-07 PROCEDURE — 3017F COLORECTAL CA SCREEN DOC REV: CPT | Performed by: INTERNAL MEDICINE

## 2022-11-07 PROCEDURE — 4004F PT TOBACCO SCREEN RCVD TLK: CPT | Performed by: INTERNAL MEDICINE

## 2022-11-07 PROCEDURE — G8420 CALC BMI NORM PARAMETERS: HCPCS | Performed by: INTERNAL MEDICINE

## 2022-11-07 RX ORDER — LORAZEPAM 0.5 MG/1
TABLET ORAL
Qty: 10 TABLET | Refills: 0 | OUTPATIENT
Start: 2022-11-07

## 2022-11-07 NOTE — PROCEDURES
Patient name:  Renate Griffin     [facility] Unit #:   [id number]   Date of test:  11/4/2022  Date of interpretation:   11/7/2022     Mr. Renate Griffin is a 46y.o. year-old current smoker. The spirometry data were acceptable and reproducible. Spirometry:  Flow volume loops were obstructed. The FEV-1/FVC ratio was decreased. The FEV-1 was 2.44 liters (72% of predicted), which was moderately decreased. The FVC was 3.9 liters (89% of predicted), which was normal. Response to inhaled bronchodilators (albuterol) was not significant. Lung volumes:  Lung volumes were tested by plethysmography. The total lung capacity was 5.36 liters (92% of predicted), which was normal. The residual volume was 1.47 liters (75% of predicted), which was decreased. The ratio of residual volume to total lung capacity (RV/TLC) was 27, which was normal.      Diffusion capacity was found to be 51% which is Moderately decreased. Interpretation:  Moderate obstructive airway disease with no significant bronchodilator reversibility.

## 2022-11-07 NOTE — PROGRESS NOTES
[facility]  Consult Note  MD Severiano Wolfe Rosalio   YOB: 1970    Date of Visit:  11/7/2022    Assessment/Plan:  1. Shortness of breath  Better  Trelegy and HHN have helped    2. Centrilobular emphysema (HCC)/COPD, moderate  PFT 11/22:  FEV1 is 72% and FEV1/FVC is 63%  Lung volumes are normal but DLCO is reduced. My impression is moderate COPD    I have independently reviewed radiographic images for this patient as part of this visit. My interpretation is moderate centrilobular and para septal emphysema  Had PTX in his 25s  A-1-a level is 101, normal    Medication Management:  The patient benefits from the medical regimen and reports no complications. Trelegy  HHN  Albuterol HFA    Trelegy and HHN have really helped him. 3.  Current smoker  Working on cessation    Follow-up in 6 months      Chief Complaint   Patient presents with    Shortness of Breath     Did his PFT last week but overnight oximetry was just done this past Friday        HPI  The patient presents with a chief complaint of moderate shortness of breath related to moderate COPD of many years duration. He has significant associated cough. Exertion is a modifying factor. He is short of breath with exertion such as walking short distances, climbing a flight of stairs. Showering, dressing does not make him feels SOB. He does notice wheezing and snores with sleep. He feels choked at night as well. He had a pneumothorax 1991. He denies GERD symptoms. He does have some sinus drainage. He has some trouble with swallowing. He is a current smoker of 1/2 ppd but he smoked up to 1 1/2 ppd in the past. He uses nicotine patches occasionally. He has been told that he has asthma and emphysema \"all my life\". He was recommended  A-1-a testing but never had it done. He saw Dr Koby Bryan once in 2017.        Review of Systems  No Chest pain, Nausea or vomiting reported    History  I have reviewed past medical, surgical, social and family history. This is documented elsewhere in themedical record. Physical Exam:  Well developed, well nourished  Alert and oriented  Sclera is clear  No cervical adenopathy  No JVD. Chest examination is clear. Cardiac examination reveals regular rate and rhythm without murmur, gallop or rub. The abdomen is soft, nontender and nondistended. There is no clubbing, cyanosis or edema of the extremities. There is no obvious skin rash. No focal neuro deficicts  Normal mood and affect      Allergies   Allergen Reactions    Naproxen Swelling     He can tolerate aspirin    Neurontin [Gabapentin] Swelling     Edema lower extr. Leg swelling    Robaxin [Methocarbamol] Diarrhea, Itching and Other (See Comments)     Eyes swell/yellowish and get bloodshot    Sulfamethoxazole-Trimethoprim Diarrhea    Toradol [Ketorolac Tromethamine] Swelling and Other (See Comments)     Other reaction(s): Headaches  Eyes swell and bloodshot    Zoloft [Sertraline]      bad brain fog ,confusion     Azithromycin Nausea And Vomiting    Bactrim [Sulfamethoxazole-Trimethoprim] Diarrhea    Biaxin [Clarithromycin] Diarrhea and Nausea And Vomiting    Celebrex [Celecoxib] Swelling     Causes severe foot swelling. He can tolerate aspirin. Diclofenac Diarrhea     He can tolerate aspirin    Sulfa Antibiotics Diarrhea and Nausea And Vomiting    Tramadol Rash and Other (See Comments)     Eyes swell and get bloodshot--pt states can take in small doses     Prior to Visit Medications    Medication Sig Taking? Authorizing Provider   montelukast (SINGULAIR) 10 MG tablet Take 1 tablet by mouth nightly  Jadon Stein MD   methylPREDNISolone (MEDROL, BEN,) 4 MG tablet Take by mouth.   Kina Logan MD   lidocaine (LIDODERM) 5 % Place 1 patch onto the skin daily 12 hours on, 12 hours off.  Kina Logan MD   loratadine (CLARITIN) 10 MG tablet TAKE ONE TABLET BY MOUTH EVERY EVENING  [facility], MD   fluticasone (FLONASE) 50 MCG/ACT nasal spray 1 spray by Nasal route daily  Caitlin Kwan MD   Fluticasone-Umeclidin-Vilant (TRELEGY ELLIPTA) 200-62.5-25 MCG/INH AEPB Inhale 1 puff into the lungs daily  Esther Frost MD   ipratropium-albuterol (DUONEB) 0.5-2.5 (3) MG/3ML SOLN nebulizer solution Inhale 3 mLs into the lungs every 6 hours as needed for Shortness of Breath DX:COPD J44.9  Esther Frost MD   desvenlafaxine succinate (PRISTIQ) 50 MG TB24 extended release tablet Take 1 tablet by mouth daily  Leela Capellan MD   guanFACINE (TENEX) 1 MG tablet Take 1 tablet by mouth nightly  Hugo Castro MD   albuterol sulfate  (90 Base) MCG/ACT inhaler Inhale 2 puffs into the lungs every 4 hours as needed for Wheezing  Caitlin Kwan MD   nicotine (NICODERM CQ) 21 MG/24HR Place 1 patch onto the skin daily  Caitlin Kwan MD   HYDROcodone-acetaminophen (NORCO)  MG per tablet hydrocodone 10 mg-acetaminophen 325 mg tablet  Historical Provider, MD   vitamin D (ERGOCALCIFEROL) 1.25 MG (09697 UT) CAPS capsule Take 50,000 Units by mouth once a week  Historical Provider, MD   Tens Unit MISC by Does not apply route  Real Rein, APRN - CNP       Vitals:    11/07/22 1011   Pulse: 84   SpO2: 97%     There is no height or weight on file to calculate BMI.      Wt Readings from Last 3 Encounters:   10/26/22 130 lb (59 kg)   10/24/22 122 lb (55.3 kg)   09/01/22 129 lb 9.6 oz (58.8 kg)     BP Readings from Last 3 Encounters:   10/26/22 138/87   10/24/22 132/81   09/01/22 135/82        Social History     Tobacco Use   Smoking Status Every Day    Packs/day: 1.00    Years: 32.00    Pack years: 32.00    Types: Cigarettes   Smokeless Tobacco Never   Tobacco Comments    using patches

## 2022-11-07 NOTE — PROGRESS NOTES
Pulmonary Function Testing      Patient name:  Ariana Blanc     [facility] Unit #:   [id number]   Date of test:  11/4/2022  Date of interpretation:   11/7/2022    Mr. Ariana Blanc is a 46y.o. year-old current smoker. The spirometry data were acceptable and reproducible. Spirometry:  Flow volume loops were obstructed. The FEV-1/FVC ratio was decreased. The FEV-1 was 2.44 liters (72% of predicted), which was moderately decreased. The FVC was 3.9 liters (89% of predicted), which was normal. Response to inhaled bronchodilators (albuterol) was not significant. Lung volumes:  Lung volumes were tested by plethysmography. The total lung capacity was 5.36 liters (92% of predicted), which was normal. The residual volume was 1.47 liters (75% of predicted), which was decreased. The ratio of residual volume to total lung capacity (RV/TLC) was 27, which was normal.     Diffusion capacity was found to be 51% which is Moderately decreased. Interpretation:  Moderate obstructive airway disease with no significant bronchodilator reversibility.     Comments:

## 2022-12-01 ENCOUNTER — OFFICE VISIT (OUTPATIENT)
Dept: INTERNAL MEDICINE CLINIC | Age: 52
End: 2022-12-01
Payer: MEDICARE

## 2022-12-01 VITALS
WEIGHT: 128 LBS | BODY MASS INDEX: 20.57 KG/M2 | DIASTOLIC BLOOD PRESSURE: 80 MMHG | OXYGEN SATURATION: 97 % | SYSTOLIC BLOOD PRESSURE: 110 MMHG | HEIGHT: 66 IN | HEART RATE: 98 BPM

## 2022-12-01 DIAGNOSIS — F41.1 GAD (GENERALIZED ANXIETY DISORDER): ICD-10-CM

## 2022-12-01 DIAGNOSIS — F41.9 ANXIETY DISORDER, UNSPECIFIED TYPE: ICD-10-CM

## 2022-12-01 DIAGNOSIS — M54.50 CHRONIC MIDLINE LOW BACK PAIN, UNSPECIFIED WHETHER SCIATICA PRESENT: ICD-10-CM

## 2022-12-01 DIAGNOSIS — Z87.891 PERSONAL HISTORY OF TOBACCO USE: ICD-10-CM

## 2022-12-01 DIAGNOSIS — Z23 NEED FOR SHINGLES VACCINE: ICD-10-CM

## 2022-12-01 DIAGNOSIS — G89.29 CHRONIC MIDLINE LOW BACK PAIN, UNSPECIFIED WHETHER SCIATICA PRESENT: ICD-10-CM

## 2022-12-01 DIAGNOSIS — Z12.11 COLON CANCER SCREENING: ICD-10-CM

## 2022-12-01 DIAGNOSIS — Z23 NEED FOR PROPHYLACTIC VACCINATION AND INOCULATION AGAINST VARICELLA: ICD-10-CM

## 2022-12-01 DIAGNOSIS — Z23 NEED FOR PROPHYLACTIC VACCINATION AGAINST DIPHTHERIA-TETANUS-PERTUSSIS (DTP): ICD-10-CM

## 2022-12-01 DIAGNOSIS — G47.00 INSOMNIA, UNSPECIFIED TYPE: Primary | ICD-10-CM

## 2022-12-01 PROCEDURE — 99214 OFFICE O/P EST MOD 30 MIN: CPT | Performed by: INTERNAL MEDICINE

## 2022-12-01 PROCEDURE — 4004F PT TOBACCO SCREEN RCVD TLK: CPT | Performed by: INTERNAL MEDICINE

## 2022-12-01 PROCEDURE — G8420 CALC BMI NORM PARAMETERS: HCPCS | Performed by: INTERNAL MEDICINE

## 2022-12-01 PROCEDURE — 90750 HZV VACC RECOMBINANT IM: CPT | Performed by: INTERNAL MEDICINE

## 2022-12-01 PROCEDURE — 90471 IMMUNIZATION ADMIN: CPT | Performed by: INTERNAL MEDICINE

## 2022-12-01 PROCEDURE — 3017F COLORECTAL CA SCREEN DOC REV: CPT | Performed by: INTERNAL MEDICINE

## 2022-12-01 PROCEDURE — G8427 DOCREV CUR MEDS BY ELIG CLIN: HCPCS | Performed by: INTERNAL MEDICINE

## 2022-12-01 PROCEDURE — G8484 FLU IMMUNIZE NO ADMIN: HCPCS | Performed by: INTERNAL MEDICINE

## 2022-12-01 RX ORDER — LANOLIN ALCOHOL/MO/W.PET/CERES
3 CREAM (GRAM) TOPICAL DAILY
Qty: 30 TABLET | Refills: 2 | Status: SHIPPED | OUTPATIENT
Start: 2022-12-01

## 2022-12-01 ASSESSMENT — ENCOUNTER SYMPTOMS
VOICE CHANGE: 0
TROUBLE SWALLOWING: 0

## 2022-12-01 NOTE — PROGRESS NOTES
Rena Child  1970  male  46 y.o. SUBJECTIVE:       Chief Complaint   Patient presents with    3 Month Follow-Up     Refuses flu vaccine, here with wif--using cane    Shortness of Breath     Helped by arminda and using his nebulizer PRN       HPI:  Follow-up visit for chronic problems. Patient report his anxiety stress has been much better after stopping his Pristiq as well as Tenex. He has been off for Ativan for quite some times as well. He is following up with a psychologist.  He is not interested to continue to follow-up with his psychiatrist.    Past Medical History:   Diagnosis Date    Allergic sinusitis     Anxiety and depression     currently under care of pyschiatrist    COPD (chronic obstructive pulmonary disease) [facility]     Hip pain 09/05/2017    ?  CAM possible bilateral superior femoral head-heck junction bump suggestive of femoro acetabular impingement syndrome    Insomnia     Localized edema 2/24/2018    Neg abd ultrasound and DVT    Osteoporosis     Spontaneous pneumothorax     1991    Testicular torsion     Wears dentures     Wears glasses      Past Surgical History:   Procedure Laterality Date    BACK SURGERY  12/26/2016    1st rods, 2nd tens unit 3rd rods and screw removed, only has cages now    [address]      x 2    TOTAL HIP ARTHROPLASTY Left 1/29/2019    TOTAL HIP REPLACEMENT LEFT HIP (ADVANCED) performed by Gema Williamson MD at [address] [place] [address] Right 11/30/2020    TOTAL RIGHT HIP REPLACEMENT performed by Gema Williamson MD at . [facility] History     Socioeconomic History    Marital status:      Spouse name: None    Number of children: None    Years of education: None    Highest education level: None   Occupational History    Occupation: unemployed     Comment: [employer], [employer],   Tobacco Use    Smoking status: Every Day     Packs/day: 1.00     Years: 32.00     Pack years: 32.00     Types: Cigarettes     Passive exposure: Past    Smokeless tobacco: Never    Tobacco comments:     using patches   Vaping Use    Vaping Use: Never used   Substance and Sexual Activity    Alcohol use: No    Drug use: No     Types: Marijuana Damian Shoemaker)     Comment: In [employer] Only    Sexual activity: Never     Family History   Problem Relation Age of Onset    Arthritis Mother     Other Mother     Neurofibromatosis Mother     Other Father        Review of Systems   Constitutional:  Negative for diaphoresis and unexpected weight change. HENT:  Positive for congestion. Negative for trouble swallowing and voice change. Respiratory:          No change from baseline. Cardiovascular:  Negative for chest pain and palpitations. Musculoskeletal:         Chronic back pain and hip pain   Psychiatric/Behavioral:  Positive for sleep disturbance. OBJECTIVE:  Pulse Readings from Last 4 Encounters:   12/01/22 98   11/07/22 84   11/04/22 78   10/26/22 98     Wt Readings from Last 4 Encounters:   12/01/22 128 lb (58.1 kg)   10/26/22 130 lb (59 kg)   10/24/22 122 lb (55.3 kg)   09/01/22 129 lb 9.6 oz (58.8 kg)     BP Readings from Last 4 Encounters:   12/01/22 110/80   10/26/22 138/87   10/24/22 132/81   09/01/22 135/82     Physical Exam  Vitals and nursing note reviewed. Constitutional:       Appearance: He is not ill-appearing. Cardiovascular:      Rate and Rhythm: Normal rate and regular rhythm. Pulses: Normal pulses. Heart sounds: Normal heart sounds. Pulmonary:      Effort: Pulmonary effort is normal.      Breath sounds: No wheezing or rhonchi. Abdominal:      General: Bowel sounds are normal.   Neurological:      General: No focal deficit present. Mental Status: He is alert and oriented to person, place, and time.        CBC:   Lab Results   Component Value Date/Time    WBC 10.4 09/01/2022 02:46 PM    HGB 15.3 09/01/2022 02:46 PM    HCT 44.6 09/01/2022 02:46 PM     09/01/2022 02:46 PM     CMP:  Lab Results   Component Value Date/Time     09/01/2022 02:46 PM    K 4.3 09/01/2022 02:46 PM    K 4.2 07/10/2021 12:45 PM     09/01/2022 02:46 PM    CO2 26 09/01/2022 02:46 PM    ANIONGAP 9 09/01/2022 02:46 PM    GLUCOSE 81 09/01/2022 02:46 PM    BUN 10 09/01/2022 02:46 PM    CREATININE 0.9 09/01/2022 02:46 PM    GFRAA >60 09/01/2022 02:46 PM    CALCIUM 9.7 09/01/2022 02:46 PM    PROT 7.0 07/10/2021 12:45 PM    LABALBU 4.3 07/10/2021 12:45 PM    AGRATIO 1.6 07/10/2021 12:45 PM    BILITOT <0.2 07/10/2021 12:45 PM    ALKPHOS 131 07/10/2021 12:45 PM    ALT 22 07/10/2021 12:45 PM    AST 19 07/10/2021 12:45 PM    GLOB 2.7 07/10/2021 12:45 PM     URINALYSIS:  Lab Results   Component Value Date/Time    GLUCOSEU Negative 07/10/2021 01:40 PM    KETUA Negative 07/10/2021 01:40 PM    SPECGRAV <1.005 07/10/2021 01:40 PM    BLOODU Negative 07/10/2021 01:40 PM    PHUR 6.5 07/10/2021 01:40 PM    PROTEINU Negative 07/10/2021 01:40 PM    NITRU Negative 07/10/2021 01:40 PM    LEUKOCYTESUR Negative 07/10/2021 01:40 PM    LABMICR YES 07/10/2021 01:40 PM    Vergie Blower NotGiven 07/10/2021 01:40 PM     HBA1C:   Lab Results   Component Value Date/Time    LABA1C 5.4 12/01/2020 05:18 AM    .3 12/01/2020 05:18 AM     MICRO/ALB:   Lab Results   Component Value Date/Time    LABMICR YES 07/10/2021 01:40 PM    LABCREA 40.8 03/19/2021 08:34 PM     LIPID:  Lab Results   Component Value Date/Time    CHOL 154 12/13/2017 09:50 AM    TRIG 122 12/13/2017 09:50 AM    HDL 34 12/13/2017 09:50 AM    LDLCALC 96 12/13/2017 09:50 AM    LABVLDL 24 12/13/2017 09:50 AM     TSH:   Lab Results   Component Value Date/Time    TSHREFLEX 1.98 03/19/2021 08:34 PM     PSA:   Lab Results   Component Value Date/Time    PSA 1.21 09/01/2022 02:46 PM        ASSESSMENT/PLAN:  Patient has stopped taking Pristiq as well as Tenex himself. He is not interested to continue to follow-up with another psychiatrist.  He feels with his wife  help he can handle his stress and anxiety symptoms. Only issues he have some insomnia. Considering patient multiple underlying mental issues, I recommend him he should reestablish with another psychiatrist.    History of chronic back pain and history of back surgery. He will continue to follow-up with pain management specialist.  Patient continues to have chronic nasal drainage and sinus congestion. He is again advised to make appointment with ENT. Will reorder Cologuard. Patient will get up-to-date on Tdap vaccination. 1. Insomnia, unspecified type    2. Anxiety disorder, unspecified type    3. JUANCHO (generalized anxiety disorder)    4. Personal history of tobacco use    5. Colon cancer screening    6. Chronic midline low back pain, unspecified whether sciatica present    7. Need for prophylactic vaccination against diphtheria-tetanus-pertussis (DTP)    8. Need for prophylactic vaccination and inoculation against varicella            Orders Placed This Encounter   Procedures    Fecal DNA Colorectal cancer screening (Cologuard)     Current Outpatient Medications   Medication Sig Dispense Refill    melatonin (RA MELATONIN) 3 MG TABS tablet Take 1 tablet by mouth daily 30 tablet 2    zoster recombinant adjuvanted vaccine (SHINGRIX) 50 MCG/0.5ML SUSR injection Inject 0.5 mLs into the muscle once for 1 dose 50 MCG IM then repeat 2-6 months.  1 each 1    Tdap (ADACEL) 5-2-15.5 LF-MCG/0.5 injection Inject 0.5 mLs into the muscle once for 1 dose 0.5 mL 0    montelukast (SINGULAIR) 10 MG tablet Take 1 tablet by mouth nightly 90 tablet 1    loratadine (CLARITIN) 10 MG tablet TAKE ONE TABLET BY MOUTH EVERY EVENING 30 tablet 5    fluticasone (FLONASE) 50 MCG/ACT nasal spray 1 spray by Nasal route daily 16 g 0    Fluticasone-Umeclidin-Vilant (TRELEGY ELLIPTA) 200-62.5-25 MCG/INH AEPB Inhale 1 puff into the lungs daily 1 each 5    ipratropium-albuterol (DUONEB) 0.5-2.5 (3) MG/3ML SOLN nebulizer solution Inhale 3 mLs into the lungs every 6 hours as needed for Shortness of Breath DX:COPD J44.9 360 mL 11    albuterol sulfate  (90 Base) MCG/ACT inhaler Inhale 2 puffs into the lungs every 4 hours as needed for Wheezing 18 g 2    HYDROcodone-acetaminophen (NORCO)  MG per tablet hydrocodone 10 mg-acetaminophen 325 mg tablet      vitamin D (ERGOCALCIFEROL) 1.25 MG (10026 UT) CAPS capsule Take 50,000 Units by mouth once a week      Tens Unit MISC by Does not apply route 1 each 0    guanFACINE (TENEX) 1 MG tablet Take 1 tablet by mouth nightly (Patient not taking: Reported on 12/1/2022) 30 tablet 5    nicotine (NICODERM CQ) 21 MG/24HR Place 1 patch onto the skin daily (Patient not taking: Reported on 12/1/2022) 42 patch 0     No current facility-administered medications for this visit. Return in about 3 months (around 3/1/2023) for second shingles and chronic disease. An After Visit Summary was printed and given to the patient. Documentation was done using voice recognition dragon software. Every effort was made to ensure accuracy; however, inadvertent  Unintentional computerized transcription errors may be present.

## 2023-02-13 ENCOUNTER — OFFICE VISIT (OUTPATIENT)
Dept: ENT CLINIC | Age: 53
End: 2023-02-13
Payer: MEDICARE

## 2023-02-13 VITALS
HEART RATE: 103 BPM | HEIGHT: 66 IN | TEMPERATURE: 98.6 F | BODY MASS INDEX: 19.93 KG/M2 | DIASTOLIC BLOOD PRESSURE: 104 MMHG | WEIGHT: 124 LBS | SYSTOLIC BLOOD PRESSURE: 159 MMHG

## 2023-02-13 DIAGNOSIS — J31.0 CHRONIC RHINITIS: ICD-10-CM

## 2023-02-13 DIAGNOSIS — J32.8 OTHER CHRONIC SINUSITIS: Primary | ICD-10-CM

## 2023-02-13 DIAGNOSIS — J34.3 HYPERTROPHY OF BOTH INFERIOR NASAL TURBINATES: ICD-10-CM

## 2023-02-13 DIAGNOSIS — R09.81 NASAL CONGESTION: ICD-10-CM

## 2023-02-13 DIAGNOSIS — J34.89 NASAL OBSTRUCTION: ICD-10-CM

## 2023-02-13 DIAGNOSIS — J34.2 DEVIATED NASAL SEPTUM: ICD-10-CM

## 2023-02-13 PROCEDURE — 99204 OFFICE O/P NEW MOD 45 MIN: CPT | Performed by: STUDENT IN AN ORGANIZED HEALTH CARE EDUCATION/TRAINING PROGRAM

## 2023-02-13 PROCEDURE — 3017F COLORECTAL CA SCREEN DOC REV: CPT | Performed by: STUDENT IN AN ORGANIZED HEALTH CARE EDUCATION/TRAINING PROGRAM

## 2023-02-13 PROCEDURE — 4004F PT TOBACCO SCREEN RCVD TLK: CPT | Performed by: STUDENT IN AN ORGANIZED HEALTH CARE EDUCATION/TRAINING PROGRAM

## 2023-02-13 PROCEDURE — G8420 CALC BMI NORM PARAMETERS: HCPCS | Performed by: STUDENT IN AN ORGANIZED HEALTH CARE EDUCATION/TRAINING PROGRAM

## 2023-02-13 PROCEDURE — 31231 NASAL ENDOSCOPY DX: CPT | Performed by: STUDENT IN AN ORGANIZED HEALTH CARE EDUCATION/TRAINING PROGRAM

## 2023-02-13 PROCEDURE — G8427 DOCREV CUR MEDS BY ELIG CLIN: HCPCS | Performed by: STUDENT IN AN ORGANIZED HEALTH CARE EDUCATION/TRAINING PROGRAM

## 2023-02-13 PROCEDURE — G8484 FLU IMMUNIZE NO ADMIN: HCPCS | Performed by: STUDENT IN AN ORGANIZED HEALTH CARE EDUCATION/TRAINING PROGRAM

## 2023-02-13 RX ORDER — DOXYCYCLINE HYCLATE 100 MG
100 TABLET ORAL 2 TIMES DAILY
Qty: 20 TABLET | Refills: 0 | Status: SHIPPED | OUTPATIENT
Start: 2023-02-13 | End: 2023-02-23

## 2023-02-13 RX ORDER — IBUPROFEN 200 MG
200 TABLET ORAL EVERY 6 HOURS PRN
COMMUNITY

## 2023-02-13 NOTE — PROGRESS NOTES
[address] [place] [address] (:  1970) is a 48 y.o. male, here for evaluation of the following chief complaint(s): Other (Seasonal allergic rhinitis due to pollen, post-nasal drainage, headaches. Worsening since last visit. Progressively keeps getting worse.)      ASSESSMENT/PLAN:  1. Other chronic sinusitis  -     CT SINUS FOR IMAGE GUIDANCE; Future  2. Hypertrophy of both inferior nasal turbinates  3. Deviated nasal septum  4. Nasal congestion  5. Nasal obstruction  6. Chronic rhinitis    This is a very pleasant 48 y.o. male here today for evaluation of the the above-noted complaints. The patient has already failed oral antihistamines and intranasal corticosteroid sprays and still symptomatic. Patient has physical exam findings consistent with possible chronic sinusitis. We will treat him with a round of doxycycline 100 mg twice daily for 10 days. We will obtain a posttreatment CT scan. I have asked him to begin saline lavages. The risks, benefits and alternatives to antibiotics were discussed with patient in detail including but not limited to the risk of GI upset, xerostomia, anaphylaxis and rash/ sun sensitivity. The patient was instructed to contact me should they develop any adverse reactions or have other concerns. Medical Decision Making: The following items were considered in medical decision making:  Independent review of images  Review / order clinical lab tests  Review / order radiology tests  Decision to obtain old records  Review and summation of old records as [facility] if applicable    SUBJECTIVE/OBJECTIVE:  GERHARD    Dejon Hood is here today for evaluation of  recurrent sinus infections. Patient has many years of sinus infections. Has gotten multiple rounds of antibiotics. Gets temporary relief with these, but symptoms always return.      Has tried flonase consistently for over one month. Has had some iimprovement with signulari and flonase. Now getting Migraines that will cause his vision to get blurry and eyes to swell. Gets constant post nasal drainage, mainly clear/white. Frequent nasal congestion/obstruction. Intermittent loss of smell and taste. Also has chronic cough. Has used     PCP notes:  Patient continues to have chronic nasal drainage and sinus congestion. He is again advised to make appointment with ENT. REVIEW OF SYSTEMS  The following systems were reviewed and revealed the following in addition to any already discussed in the HPI:    PHYSICAL EXAM    GENERAL: No acute distress, alert and oriented, no hoarseness, strong voice  EYES: EOMI, Anti-icteric  HENT:   Head: Normocephalic and atraumatic. Face:  Symmetric, facial nerve intact  Right Ear: Normal external ear, normal external auditory canal, intact tympanic membrane with normal mobility and aerated middle ear  Left Ear: Normal external ear, normal external auditory canal, intact tympanic membrane with normal mobility and aerated middle ear  Mouth/Oral Cavity:  normal lips, Uvula is midline, no mucosal lesions, no trismus, false dentition, normal salivary quality/flow  Oropharynx/Larynx:  normal oropharynx, 1+ tonsils  Nose:Normal external nasal appearance. Anterior rhinoscopy shows  a deviated septum preventing view posteriorly. Mild hypertrophy of turbinates.   Normal mucosa   NECK: Normal range of motion, no thyromegaly, trachea is midline, no lymphadenopathy, no neck masses, no crepitus  CHEST: Normal respiratory effort, no retractions, breathing comfortably  SKIN: No rashes, normal appearing skin, no evidence of skin lesions/tumors  Neuro:  cranial nerve II-XII intact; normal gait  Cardio:  no edema        PROCEDURE  Nasal Endoscopy (CPT code 09682)       Due to the patients chronic sinus disease and/or history of sinonasal neoplasm for surveillance a nasal endoscopy with or without debridement will be performed to complete a significant physical examination of the patient which cannot be performed by anterior rhinoscopy alone (failure of complete examination of the paranasal sinuses). Failure to provide this procedure may lead to late detection of significant chronic benign disease, acute exacerbation, resolution or failure of early diagnosis of recurrent cancer. The procedure report is present in the body of the chart. Verbal consent was received. After topical anesthesia and decongestion had been obtained using aerosolized 1% lidocaine and oxymetazoline, a 45 degree rigid endoscope was placed into both nares with the patient in a sitting position. The following was observed:      Septum: intact and deviated   Other:   -The inferior and middle turbinates were examined. The middle meatus, and sphenoethmoid recess was examined bilaterally.    -Inferior turbinate hypertrophy, clear secretions bilaterally, edema of mucosa. -There were no complications. Tolerated well without complication. I attest that I was present for and did the entire procedure myself. This note was generated completely or in part utilizing Dragon dictation speech recognition software. Occasionally, words are mistranscribed and despite editing, the text may contain inaccuracies due to incorrect word recognition. If further clarification is needed please contact the office at [phone]. An electronic signature was used to authenticate this note.     --Sarai Torrez MD

## 2023-02-27 ENCOUNTER — TELEPHONE (OUTPATIENT)
Dept: INTERNAL MEDICINE CLINIC | Age: 53
End: 2023-02-27

## 2023-02-27 RX ORDER — TIZANIDINE 4 MG/1
4 TABLET ORAL EVERY 12 HOURS PRN
COMMUNITY

## 2023-03-02 NOTE — TELEPHONE ENCOUNTER
How frustrating. This occurs in less than 1 % of people. Have called in seraquel to try.
Pt's wife cld. Pt started getting hick ups after starting the medication, to where they actually went to the [facility]). They gave him Benadryl. She wants to know what Dyllan Jones should do now since he obviously cannot take the Buspar anymore due to the side effects. Please advise. She can be called at [phone].
Wife informed.
- - -

## 2023-03-06 ENCOUNTER — HOSPITAL ENCOUNTER (OUTPATIENT)
Dept: GENERAL RADIOLOGY | Age: 53
Discharge: HOME | End: 2023-03-06
Payer: MEDICARE

## 2023-03-06 DIAGNOSIS — M81.0 AGE RELATED OSTEOPOROSIS, UNSPECIFIED PATHOLOGICAL FRACTURE PRESENCE: ICD-10-CM

## 2023-03-06 PROCEDURE — 77080 DXA BONE DENSITY AXIAL: CPT

## 2023-03-10 ENCOUNTER — TELEPHONE (OUTPATIENT)
Dept: ENT CLINIC | Age: 53
End: 2023-03-10

## 2023-03-10 NOTE — TELEPHONE ENCOUNTER
Called [facility] and spoke with Wendy Marsh the pharmacist. Refill has been called in for doxycycline.

## 2023-03-10 NOTE — TELEPHONE ENCOUNTER
Refill Request:     Last Office Visit:  2/13/2023     Next Scheduled Visit : 3/13/2023     Medication Requested: doxycycline 100 mg    Pharmacy:    Joelle Ru[facility] [id number] [facility], [facility] [phone]  [facility]  Phone: [phone] Fax: [phone]    [facility] [address] [address]. [address] [facility] [phone]  F [phone]  [facility] [address]  [address]  Phone: [phone] Fax: [phone]    [facility], [address] [facility] [phone] - f [phone]  LISSET[address]aña [address]  Phone: [phone] Fax: [phone] [facility], [address] [facility] - [address] Humberto Omalleyjo   [address] [address]  Phone: [phone] Fax: [phone]

## 2023-03-10 NOTE — TELEPHONE ENCOUNTER
Refill request. Doxycycline 100 mg - take 1 tablet BID for 10 days. Last filled on 2/13/2023 - 20 tablets with 0 refills. Please advise. Thank you.

## 2023-03-11 ENCOUNTER — HOSPITAL ENCOUNTER (OUTPATIENT)
Dept: CT IMAGING | Age: 53
Discharge: HOME | End: 2023-03-11
Payer: MEDICARE

## 2023-03-11 DIAGNOSIS — J32.8 OTHER CHRONIC SINUSITIS: ICD-10-CM

## 2023-03-11 PROCEDURE — 70486 CT MAXILLOFACIAL W/O DYE: CPT

## 2023-03-13 ENCOUNTER — TELEPHONE (OUTPATIENT)
Dept: CASE MANAGEMENT | Age: 53
End: 2023-03-13

## 2023-03-13 ENCOUNTER — OFFICE VISIT (OUTPATIENT)
Dept: ENT CLINIC | Age: 53
End: 2023-03-13
Payer: MEDICARE

## 2023-03-13 VITALS
HEART RATE: 85 BPM | DIASTOLIC BLOOD PRESSURE: 77 MMHG | RESPIRATION RATE: 16 BRPM | WEIGHT: 124 LBS | HEIGHT: 66 IN | BODY MASS INDEX: 19.93 KG/M2 | OXYGEN SATURATION: 97 % | SYSTOLIC BLOOD PRESSURE: 149 MMHG

## 2023-03-13 DIAGNOSIS — J30.9 ALLERGIC RHINITIS, UNSPECIFIED SEASONALITY, UNSPECIFIED TRIGGER: ICD-10-CM

## 2023-03-13 DIAGNOSIS — J31.0 CHRONIC RHINITIS: Primary | ICD-10-CM

## 2023-03-13 DIAGNOSIS — J34.1 MUCOCELE OF NASAL SINUS: ICD-10-CM

## 2023-03-13 DIAGNOSIS — J01.01 ACUTE RECURRENT MAXILLARY SINUSITIS: ICD-10-CM

## 2023-03-13 PROCEDURE — 99214 OFFICE O/P EST MOD 30 MIN: CPT | Performed by: STUDENT IN AN ORGANIZED HEALTH CARE EDUCATION/TRAINING PROGRAM

## 2023-03-13 PROCEDURE — 4004F PT TOBACCO SCREEN RCVD TLK: CPT | Performed by: STUDENT IN AN ORGANIZED HEALTH CARE EDUCATION/TRAINING PROGRAM

## 2023-03-13 PROCEDURE — G8420 CALC BMI NORM PARAMETERS: HCPCS | Performed by: STUDENT IN AN ORGANIZED HEALTH CARE EDUCATION/TRAINING PROGRAM

## 2023-03-13 PROCEDURE — G8484 FLU IMMUNIZE NO ADMIN: HCPCS | Performed by: STUDENT IN AN ORGANIZED HEALTH CARE EDUCATION/TRAINING PROGRAM

## 2023-03-13 PROCEDURE — 3017F COLORECTAL CA SCREEN DOC REV: CPT | Performed by: STUDENT IN AN ORGANIZED HEALTH CARE EDUCATION/TRAINING PROGRAM

## 2023-03-13 PROCEDURE — G8427 DOCREV CUR MEDS BY ELIG CLIN: HCPCS | Performed by: STUDENT IN AN ORGANIZED HEALTH CARE EDUCATION/TRAINING PROGRAM

## 2023-03-13 RX ORDER — AZELASTINE 1 MG/ML
2 SPRAY, METERED NASAL 2 TIMES DAILY
Qty: 30 ML | Refills: 3 | Status: SHIPPED | OUTPATIENT
Start: 2023-03-13

## 2023-03-13 NOTE — PROGRESS NOTES
[address] [place] [address] (:  1970) is a 48 y.o. male, here for evaluation of the following chief complaint(s):  Results (CT)      ASSESSMENT/PLAN:  1. Chronic rhinitis  -     MT INTRACUTANEOUS TESTS W/ALLERGENIC EXTRACTS  -     MT IC TSTS W/ALLGIC XTRCS DLYD TYP RXN W/READING  2. Acute recurrent maxillary sinusitis  3. Allergic rhinitis, unspecified seasonality, unspecified trigger  4. Mucocele of nasal sinus    This is a very pleasant 48 y.o. male here today for evaluation of the the above-noted complaints. Had some improvement with antibiotics. We will prescribe him another course of doxycycline. I reviewed the patient's posttreatment CT scan. It shows mild thickening of the frontal sinus on the left, left sphenoid sinus and a mucocele versus opacification of the right posterior ethmoid sinus. We discussed different treatment options. He has already failed intranasal corticosteroid sprays. I have asked him to continue his Singulair and Zyrtec. I will prescribe him azelastine 2 sprays twice daily. I have asked the patient to continue his saline irrigations. We will refer him to allergy see if he is a candidate for immunotherapy. Counseled the patient to stop smoking as I think this is contributing to some of his symptoms. If he fails all other measures, we discussed that we could consider something such as septoplasty, turbinate reduction, balloon sinuplasty with dilation. The risks, benefits and alternatives to antibiotics were discussed with patient in detail including but not limited to the risk of GI upset, xerostomia, anaphylaxis and rash/ sun sensitivity. The patient was instructed to contact me should they develop any adverse reactions or have other concerns. Medical Decision Making:   The following items were considered in medical decision making:  Independent review of images  Review / order clinical lab tests  Review / order radiology tests  Decision to obtain old records  Review and summation of old records as accessed through [facility] if applicable    SUBJECTIVE/OBJECTIVE:  HPI    Martha Portillo is here today for evaluation of  recurrent sinus infections. Patient has many years of sinus infections. Has gotten multiple rounds of antibiotics. Gets temporary relief with these, but symptoms always return. Has tried flonase consistently for over one month. Has had some iimprovement with signulari and flonase. Now getting Migraines that will cause his vision to get blurry and eyes to swell. Gets constant post nasal drainage, mainly clear/white. Frequent nasal congestion/obstruction. Intermittent loss of smell and taste. Also has chronic cough. Has used     PCP notes:  Patient continues to have chronic nasal drainage and sinus congestion. He is again advised to make appointment with ENT. Update 3/13/2023:  -Had some improvement with antibiotics, but once his course of antibiotics finished he was beginning to become more symptomatic. Still getting a lot of nasal congestion, pressure in the cheeks, thick mucoid posterior nasal drainage. REVIEW OF SYSTEMS  The following systems were reviewed and revealed the following in addition to any already discussed in the HPI:    PHYSICAL EXAM    GENERAL: No acute distress, alert and oriented, no hoarseness, strong voice  EYES: EOMI, Anti-icteric  HENT:   Head: Normocephalic and atraumatic.    Face:  Symmetric, facial nerve intact  Right Ear: Normal external ear, normal external auditory canal, intact tympanic membrane with normal mobility and aerated middle ear  Left Ear: Normal external ear, normal external auditory canal, intact tympanic membrane with normal mobility and aerated middle ear  Mouth/Oral Cavity:  normal lips, Uvula is midline, no mucosal lesions, no trismus, false dentition, normal salivary quality/flow  Oropharynx/Larynx:  normal oropharynx, 1+ tonsils  Nose:Normal external nasal appearance. Anterior rhinoscopy shows  a deviated septum preventing view posteriorly. Mild hypertrophy of turbinates. Normal mucosa   NECK: Normal range of motion, no thyromegaly, trachea is midline, no lymphadenopathy, no neck masses, no crepitus  CHEST: Normal respiratory effort, no retractions, breathing comfortably  SKIN: No rashes, normal appearing skin, no evidence of skin lesions/tumors  Neuro:  cranial nerve II-XII intact; normal gait  Cardio:  no edema        PROCEDURE  Nasal Endoscopy (CPT code 10209) from initial visit, no charge       Due to the patients chronic sinus disease and/or history of sinonasal neoplasm for surveillance a nasal endoscopy with or without debridement will be performed to complete a significant physical examination of the patient which cannot be performed by anterior rhinoscopy alone (failure of complete examination of the paranasal sinuses). Failure to provide this procedure may lead to late detection of significant chronic benign disease, acute exacerbation, resolution or failure of early diagnosis of recurrent cancer. The procedure report is present in the body of the chart. Verbal consent was received. After topical anesthesia and decongestion had been obtained using aerosolized 1% lidocaine and oxymetazoline, a 45 degree rigid endoscope was placed into both nares with the patient in a sitting position. The following was observed:      Septum: intact and deviated   Other:   -The inferior and middle turbinates were examined. The middle meatus, and sphenoethmoid recess was examined bilaterally.    -Inferior turbinate hypertrophy, clear secretions bilaterally, edema of mucosa. -There were no complications. Tolerated well without complication. I attest that I was present for and did the entire procedure myself.       This note was generated completely or in part utilizing Dragon dictation speech recognition software. Occasionally, words are mistranscribed and despite editing, the text may contain inaccuracies due to incorrect word recognition. If further clarification is needed please contact the office at [phone]. An electronic signature was used to authenticate this note.     --Zara Jordan MD

## 2023-03-24 ENCOUNTER — TELEPHONE (OUTPATIENT)
Dept: ENT CLINIC | Age: 53
End: 2023-03-24

## 2023-03-24 ENCOUNTER — TELEMEDICINE (OUTPATIENT)
Dept: ENDOCRINOLOGY | Age: 53
End: 2023-03-24
Payer: MEDICARE

## 2023-03-24 DIAGNOSIS — M81.0 AGE-RELATED OSTEOPOROSIS WITHOUT CURRENT PATHOLOGICAL FRACTURE: Primary | ICD-10-CM

## 2023-03-24 PROCEDURE — 3017F COLORECTAL CA SCREEN DOC REV: CPT | Performed by: INTERNAL MEDICINE

## 2023-03-24 PROCEDURE — 99213 OFFICE O/P EST LOW 20 MIN: CPT | Performed by: INTERNAL MEDICINE

## 2023-03-24 PROCEDURE — G8427 DOCREV CUR MEDS BY ELIG CLIN: HCPCS | Performed by: INTERNAL MEDICINE

## 2023-03-24 NOTE — PROGRESS NOTES
mineral  density, corresponding to a 1.6% change, which is not significant. The bone mineral density of the femoral neck is measured at 0.536 g/cm2  corresponding to a T-score of -2.8 and a Z-score of -2.2. This is within the  osteoporosis range by WHO criteria. IMPRESSION:  Osteoporosis by WHO criteria. No significant interval change. 5/18/2020       DEXA scan 1/17  LUMBAR SPINE:       The bone mineral density in the lumbar spine including the L1 through L3   levels is measured at 0.707 g/cm2, which corresponds to a T-score of -3.3 and   a Z-score of -3.0. This is within the osteoporosis range by WHO criteria. LEFT HIP:       The bone mineral density in the total hip is measured at 0.695 g/cm2   corresponding to a T-score of -2.2 and a Z-score of -2.0. This is within the   osteopenia range by WHO criteria. The bone mineral density of the femoral neck is measured at 0.574 g/cm2   corresponding to a T-score of -2.6 and a Z-score of -1.9. This is within the   osteoporosis range by WHO criteria. RIGHT HIP:       The bone mineral density in the total hip is measured at 0.7 g/cm2   corresponding to a T-score of -2.2 and a Z-score of -1.9. This is within the   osteopenia range by WHO criteria. The bone mineral density of the femoral neck is measured at 0.566 g/cm2   corresponding to a T-score of -2.7 and a Z-score of -2.0. This is within the   osteoporosis range by WHO criteria.      Dexa 3/12:      Total Lumbar Spine:    T-score = -3.1    Z-score = -2.9    BMD =      0.753 g/cm       Right Femoral Neck:    T-score = -2.4    Z-score = -1.9    BMD =       0.602 g/cm       Right Total Hip:    T-score = -2.1     Z-score = -1.9    BMD =      0.715 g/cm       Left Femoral Neck:    T-score = -2.5    Z-score = -1.9    BMD =      0.593 g/cm       Total Left Hip:    T-score = -2.3    Z-score = -2.1    BMD =      0.688 g/cm       One-Third Forearm:    T-score = -2.5    Z-score = -2.2    BMD =

## 2023-04-25 ENCOUNTER — TELEPHONE (OUTPATIENT)
Dept: ENDOCRINOLOGY | Age: 53
End: 2023-04-25

## 2023-04-28 NOTE — TELEPHONE ENCOUNTER
----- Message from Diana Monroy sent at 11/4/2022  9:14 AM EDT -----  Regarding: Medication  I need my montelukast 10 mg refilled , and my lorazepam 0.5 mg refilled , I use [facility] pharmacy on [address] in [place].  Thank you  Gabriela Jerome
I see patient getting lorazepam from Dr. Anirudh Mohr. Please forward request to him for lorazepam.  I will sign other medicine
Patient has been dismissed from Dr aMría Elena Marroquin practice.   He will need to reestablish with another psychiatrist.
Patient was dismissed for no shows. He has not followed up to see me since March. I had told him several times that I could not continue to fill the lorazepam without an appointment but he never followed through with a visit.
None

## 2023-05-04 NOTE — TELEPHONE ENCOUNTER
Called and spoke with patient and informed him his wife is not on HIPPA so we were unable to her. Informed him of Myriam's message below. Continue Medrol Dosepak then start Ibuprofen. He is calling to get into pain management but due to Covid-19 does not have many options but he will continue to try and get scheduled.
Patient was given prescription for Medrol dosepak. He should continue taking that. When finished, he may take over the counter ibuprofen. We will not be prescribing pain medications other than these.     Please inform patient/ his wife
Please advise. He was given a Pain management order at last OV visit. Left BETO DOS 1/29/19 & seen for right hip pain on 4/9.
Wife called for patient requesting pain medication.  Pain level 7 or [address] # [phone]
74

## 2023-05-05 ENCOUNTER — TELEPHONE (OUTPATIENT)
Dept: CASE MANAGEMENT | Age: 53
End: 2023-05-05

## 2023-05-19 ENCOUNTER — TELEPHONE (OUTPATIENT)
Dept: INTERNAL MEDICINE CLINIC | Age: 53
End: 2023-05-19

## 2023-05-23 ENCOUNTER — TELEPHONE (OUTPATIENT)
Dept: ENDOCRINOLOGY | Age: 53
End: 2023-05-23

## 2023-05-23 NOTE — TELEPHONE ENCOUNTER
[place] medicaid does not require PA but does require it to be buy and bill. Pt is good to get injection as buy and bill.

## 2023-05-25 ENCOUNTER — OFFICE VISIT (OUTPATIENT)
Dept: INTERNAL MEDICINE CLINIC | Age: 53
End: 2023-05-25

## 2023-05-25 VITALS
BODY MASS INDEX: 20.24 KG/M2 | HEART RATE: 85 BPM | OXYGEN SATURATION: 97 % | DIASTOLIC BLOOD PRESSURE: 80 MMHG | WEIGHT: 125.4 LBS | SYSTOLIC BLOOD PRESSURE: 122 MMHG

## 2023-05-25 DIAGNOSIS — F33.0 MILD EPISODE OF RECURRENT MAJOR DEPRESSIVE DISORDER (HCC): ICD-10-CM

## 2023-05-25 DIAGNOSIS — S39.92XD INJURY OF BACK, SUBSEQUENT ENCOUNTER: ICD-10-CM

## 2023-05-25 DIAGNOSIS — Z98.890 H/O LUMBOSACRAL SPINE SURGERY: Primary | ICD-10-CM

## 2023-05-25 DIAGNOSIS — M51.36 DDD (DEGENERATIVE DISC DISEASE), LUMBAR: ICD-10-CM

## 2023-05-25 DIAGNOSIS — M54.16 LUMBAR RADICULOPATHY: ICD-10-CM

## 2023-05-25 DIAGNOSIS — F11.20 NARCOTIC DEPENDENCY, CONTINUOUS (HCC): ICD-10-CM

## 2023-05-25 DIAGNOSIS — J43.2 CENTRILOBULAR EMPHYSEMA (HCC): ICD-10-CM

## 2023-05-25 DIAGNOSIS — M96.1 POSTLAMINECTOMY SYNDROME, LUMBAR: ICD-10-CM

## 2023-05-25 ASSESSMENT — ENCOUNTER SYMPTOMS
ABDOMINAL PAIN: 0
NAUSEA: 0
BACK PAIN: 1
SHORTNESS OF BREATH: 0
VOMITING: 0
WHEEZING: 0

## 2023-05-25 NOTE — PROGRESS NOTES
Keiko Headley  1970  male  48 y.o. SUBJECTIVE:       Chief Complaint   Patient presents with    Other     Need referral for [facility] eval/tx. ER visit 5/6 L1,L2 pain       HPI:  History of chronic back pain. Patient no longer following up in pain management clinic. He does not want to take any pain medication. He is not taking any medication for anxiety stress depression. He is just following up with the psychologist.    About 2 weeks ago patient reported he suddenly had increased pain at the lower back which is radiating to the posterior thigh and almost fell on his back. Since then having increasing pain he went to emergency room and had an CT scan of the lumbar spine. Apparently patient was referred to [address] [place] [address] however as per patient it was not done right he is unable to see [address] [place] [address]. Past Medical History:   Diagnosis Date    Allergic sinusitis     Anxiety and depression     currently under care of pyschiatrist    COPD (chronic obstructive pulmonary disease) ([facility] [address]     Hip pain 09/05/2017    ?  CAM possible bilateral superior femoral head-heck junction bump suggestive of femoro acetabular impingement syndrome    Insomnia     Localized edema 02/24/2018    Neg abd ultrasound and DVT    Lumbar post-laminectomy syndrome     OA (osteoarthritis) of knee     Osteoporosis     Spontaneous pneumothorax     1991    Testicular torsion     Wears dentures     Wears glasses      Past Surgical History:   Procedure Laterality Date    BACK SURGERY  12/26/2016    1st rods, 2nd tens unit 3rd rods and screw removed, only has cages now    [address]       x 2    TOTAL HIP ARTHROPLASTY Left 1/29/2019    TOTAL HIP REPLACEMENT LEFT HIP (ADVANCED) performed by Rachel Cross MD at [facility] Right 11/30/2020    TOTAL RIGHT HIP REPLACEMENT performed by Rachel Cross MD at [facility] History     Socioeconomic History    Marital

## 2023-06-02 ENCOUNTER — TELEPHONE (OUTPATIENT)
Dept: CASE MANAGEMENT | Age: 53
End: 2023-06-02

## 2023-06-02 DIAGNOSIS — Z87.891 PERSONAL HISTORY OF TOBACCO USE, PRESENTING HAZARDS TO HEALTH: Primary | ICD-10-CM

## 2023-06-02 DIAGNOSIS — J30.1 SEASONAL ALLERGIC RHINITIS DUE TO POLLEN: ICD-10-CM

## 2023-06-02 RX ORDER — MONTELUKAST SODIUM 10 MG/1
TABLET ORAL
Qty: 90 TABLET | Refills: 1 | Status: SHIPPED | OUTPATIENT
Start: 2023-06-02

## 2023-06-02 NOTE — TELEPHONE ENCOUNTER
Patient is overdue for an annual lung screen. For your convenience, I have pended the order for the scan. If you do not agree with the need for the test, please cancel the order and let me know. I will help contact the patient to schedule the scan once signed. He has not made an appointment with the pulmonologist yet.       Thank you,  Judie Leiva, RN  Lung Navigator  [address], [address] [place] [address]  [phone]

## 2023-06-02 NOTE — TELEPHONE ENCOUNTER
Past Visits    Date Provider Specialty Visit Type Primary Dx   05/25/2023 Tricia Field MD Internal Medicine Office Visit H/O lumbosacral spine surgery

## 2023-06-06 ENCOUNTER — TELEPHONE (OUTPATIENT)
Dept: CASE MANAGEMENT | Age: 53
End: 2023-06-06

## 2023-06-06 NOTE — TELEPHONE ENCOUNTER
Pt due for annual Lung Screening CT. Pt has an active order for the test. Called pt to assist with scheduling. No answer, left message to call [phone] to schedule his annual scan. Reminder letter sent.

## 2023-08-03 ENCOUNTER — HOSPITAL ENCOUNTER (OUTPATIENT)
Dept: CT IMAGING | Age: 53
Discharge: HOME | End: 2023-08-03
Attending: INTERNAL MEDICINE
Payer: MEDICARE

## 2023-08-03 DIAGNOSIS — Z87.891 PERSONAL HISTORY OF TOBACCO USE, PRESENTING HAZARDS TO HEALTH: ICD-10-CM

## 2023-08-03 PROCEDURE — 71271 CT THORAX LUNG CANCER SCR C-: CPT

## 2023-08-30 ENCOUNTER — HOSPITAL ENCOUNTER (OUTPATIENT)
Dept: MRI IMAGING | Age: 53
Discharge: HOME | End: 2023-08-30
Attending: STUDENT IN AN ORGANIZED HEALTH CARE EDUCATION/TRAINING PROGRAM
Payer: MEDICARE

## 2023-08-30 DIAGNOSIS — M54.16 LUMBAR RADICULOPATHY: ICD-10-CM

## 2023-08-30 PROCEDURE — 72148 MRI LUMBAR SPINE W/O DYE: CPT

## 2023-10-02 ENCOUNTER — TELEPHONE (OUTPATIENT)
Dept: INTERNAL MEDICINE CLINIC | Age: 53
End: 2023-10-02

## 2023-10-02 ENCOUNTER — HOSPITAL ENCOUNTER (OUTPATIENT)
Dept: PHYSICAL THERAPY | Age: 53
Setting detail: THERAPIES SERIES
Discharge: HOME | End: 2023-10-02
Payer: MEDICARE

## 2023-10-02 DIAGNOSIS — R29.898 DECREASED STRENGTH OF TRUNK AND BACK: ICD-10-CM

## 2023-10-02 DIAGNOSIS — M54.50 LUMBAR PAIN ON PALPATION: Primary | ICD-10-CM

## 2023-10-02 DIAGNOSIS — M53.86 DECREASED ROM OF LUMBAR SPINE: ICD-10-CM

## 2023-10-02 PROCEDURE — 97162 PT EVAL MOD COMPLEX 30 MIN: CPT

## 2023-10-02 PROCEDURE — 97530 THERAPEUTIC ACTIVITIES: CPT

## 2023-10-02 RX ORDER — DOXYCYCLINE HYCLATE 100 MG
100 TABLET ORAL 2 TIMES DAILY
Qty: 20 TABLET | Refills: 0 | Status: SHIPPED | OUTPATIENT
Start: 2023-10-02 | End: 2023-10-12

## 2023-10-02 NOTE — PLAN OF CARE
level of function. Status: [] Progressing: [] Met: [] Not Met: [] Adjusted  Patient will resume normal work/leisure activities without pain. Status: [] Progressing: [] Met: [] Not Met: [] Adjusted    TREATMENT PLAN     Frequency/Duration: 1-2x/week for 8 weeks for the following treatment interventions: Aquatic PT with land visits PRN     Interventions:  [x] Therapeutic exercise including: strength training, ROM, including postural re-education. [x] NMR activation and proprioception, including postural re-education. [x] Manual therapy as indicated to include: PROM, Gr I-IV mobilizations, and STM  [x] Modalities as needed that may include: NONE  [x] Patient education on joint protection, postural re-education, activity modification, progression of HEP.      HEP instruction: HEP instruction not indicated at this time    Electronically Signed by Jenniffer Irving, PT, DPT, OMT-C  Date: 10/02/2023

## 2023-10-02 NOTE — TELEPHONE ENCOUNTER
Spoke to spouse and OV tomorrow rescheduled for Wednesday for patient. Spouse reporting patient is having sinus infection symptoms, congestion, stuffiness, no fever, COVID negative, allergy medication does not work. Spouse asking for antibiotic to start before his next OV. She states they can do a virtual visit today if need be.

## 2023-10-04 ENCOUNTER — OFFICE VISIT (OUTPATIENT)
Dept: INTERNAL MEDICINE CLINIC | Age: 53
End: 2023-10-04

## 2023-10-04 VITALS
SYSTOLIC BLOOD PRESSURE: 140 MMHG | DIASTOLIC BLOOD PRESSURE: 72 MMHG | BODY MASS INDEX: 20.82 KG/M2 | OXYGEN SATURATION: 97 % | WEIGHT: 129 LBS | HEART RATE: 88 BPM

## 2023-10-04 DIAGNOSIS — J30.1 SEASONAL ALLERGIC RHINITIS DUE TO POLLEN: ICD-10-CM

## 2023-10-04 DIAGNOSIS — E55.9 VITAMIN D DEFICIENCY: ICD-10-CM

## 2023-10-04 DIAGNOSIS — S39.92XD INJURY OF BACK, SUBSEQUENT ENCOUNTER: ICD-10-CM

## 2023-10-04 DIAGNOSIS — Z12.5 SCREENING FOR PROSTATE CANCER: ICD-10-CM

## 2023-10-04 DIAGNOSIS — J41.0 SIMPLE CHRONIC BRONCHITIS (HCC): Primary | ICD-10-CM

## 2023-10-04 DIAGNOSIS — J43.2 CENTRILOBULAR EMPHYSEMA (HCC): ICD-10-CM

## 2023-10-04 DIAGNOSIS — Z98.890 H/O LUMBOSACRAL SPINE SURGERY: Primary | ICD-10-CM

## 2023-10-04 DIAGNOSIS — Z12.11 COLON CANCER SCREENING: ICD-10-CM

## 2023-10-04 DIAGNOSIS — R03.0 ELEVATED BP WITHOUT DIAGNOSIS OF HYPERTENSION: ICD-10-CM

## 2023-10-04 DIAGNOSIS — F17.200 CURRENT SMOKER: ICD-10-CM

## 2023-10-04 DIAGNOSIS — Z13.220 LIPID SCREENING: ICD-10-CM

## 2023-10-04 RX ORDER — AMOXICILLIN AND CLAVULANATE POTASSIUM 875; 125 MG/1; MG/1
1 TABLET, FILM COATED ORAL 2 TIMES DAILY
Qty: 14 TABLET | Refills: 0 | Status: SHIPPED | OUTPATIENT
Start: 2023-10-04 | End: 2023-10-11

## 2023-10-04 RX ORDER — METHADONE HYDROCHLORIDE 5 MG/1
5 TABLET ORAL 2 TIMES DAILY
Refills: 0 | COMMUNITY
Start: 2023-10-04

## 2023-10-04 RX ORDER — FLUTICASONE PROPIONATE 50 MCG
1 SPRAY, SUSPENSION (ML) NASAL DAILY
Qty: 16 G | Refills: 0 | Status: SHIPPED | OUTPATIENT
Start: 2023-10-04 | End: 2023-11-03

## 2023-10-04 RX ORDER — PREDNISONE 20 MG/1
20 TABLET ORAL 2 TIMES DAILY
Qty: 14 TABLET | Refills: 0 | Status: SHIPPED | OUTPATIENT
Start: 2023-10-04 | End: 2023-10-11

## 2023-10-04 RX ORDER — LORATADINE 10 MG/1
TABLET ORAL
Qty: 30 TABLET | Refills: 5 | Status: SHIPPED | OUTPATIENT
Start: 2023-10-04

## 2023-10-04 RX ORDER — NICOTINE 21 MG/24HR
1 PATCH, TRANSDERMAL 24 HOURS TRANSDERMAL DAILY
Qty: 42 PATCH | Refills: 0 | Status: SHIPPED | OUTPATIENT
Start: 2023-10-04 | End: 2023-11-15

## 2023-10-04 SDOH — ECONOMIC STABILITY: FOOD INSECURITY: WITHIN THE PAST 12 MONTHS, YOU WORRIED THAT YOUR FOOD WOULD RUN OUT BEFORE YOU GOT MONEY TO BUY MORE.: PATIENT DECLINED

## 2023-10-04 SDOH — ECONOMIC STABILITY: FOOD INSECURITY: WITHIN THE PAST 12 MONTHS, THE FOOD YOU BOUGHT JUST DIDN'T LAST AND YOU DIDN'T HAVE MONEY TO GET MORE.: PATIENT DECLINED

## 2023-10-04 SDOH — ECONOMIC STABILITY: INCOME INSECURITY: HOW HARD IS IT FOR YOU TO PAY FOR THE VERY BASICS LIKE FOOD, HOUSING, MEDICAL CARE, AND HEATING?: PATIENT DECLINED

## 2023-10-04 SDOH — ECONOMIC STABILITY: HOUSING INSECURITY
IN THE LAST 12 MONTHS, WAS THERE A TIME WHEN YOU DID NOT HAVE A STEADY PLACE TO SLEEP OR SLEPT IN A SHELTER (INCLUDING NOW)?: PATIENT REFUSED

## 2023-10-04 ASSESSMENT — PATIENT HEALTH QUESTIONNAIRE - PHQ9: DEPRESSION UNABLE TO ASSESS: PT REFUSES

## 2023-10-04 ASSESSMENT — ENCOUNTER SYMPTOMS
PHOTOPHOBIA: 0
TROUBLE SWALLOWING: 0
COUGH: 1
ABDOMINAL PAIN: 0
SINUS PRESSURE: 1
VOICE CHANGE: 0
WHEEZING: 1
RHINORRHEA: 1
NAUSEA: 0

## 2023-10-04 NOTE — PROGRESS NOTES
Future     Standing Expiration Date:   10/4/2024    [facility] - Verenice Acuña MD, Gastroenterology, [facility]     Referral Priority:   Routine     Referral Type:   Eval and Treat     Referral Reason:   Specialty Services Required     Referred to Provider:   Verenice Acuña MD     Requested Specialty:   Gastroenterology     Number of Visits Requested:   1     Current Outpatient Medications   Medication Sig Dispense Refill    predniSONE (DELTASONE) 20 MG tablet Take 1 tablet by mouth 2 times daily for 7 days 14 tablet 0    amoxicillin-clavulanate (AUGMENTIN) 875-125 MG per tablet Take 1 tablet by mouth 2 times daily for 7 days 14 tablet 0    nicotine (NICODERM CQ) 21 MG/24HR Place 1 patch onto the skin daily 42 patch 0    fluticasone (FLONASE) 50 MCG/ACT nasal spray 1 spray by Nasal route daily 16 g 0    loratadine (CLARITIN) 10 MG tablet TAKE ONE TABLET BY MOUTH EVERY EVENING 30 tablet 5    doxycycline hyclate (VIBRA-TABS) 100 MG tablet Take 1 tablet by mouth 2 times daily for 10 days 20 tablet 0    montelukast (SINGULAIR) 10 MG tablet TAKE ONE TABLET BY MOUTH ONCE NIGHTLY 90 tablet 1    azelastine (ASTELIN) 0.1 % nasal spray 2 sprays by Nasal route 2 times daily Use in each nostril as directed 30 mL 3    tiZANidine (ZANAFLEX) 4 MG tablet Take 0.5 tablets by mouth every 12 hours as needed      ibuprofen (ADVIL;MOTRIN) 200 MG tablet Take 1 tablet by mouth every 6 hours as needed for Pain      Fluticasone-Umeclidin-Vilant (TRELEGY ELLIPTA) 200-62.5-25 MCG/INH AEPB Inhale 1 puff into the lungs daily 1 each 5    ipratropium-albuterol (DUONEB) 0.5-2.5 (3) MG/3ML SOLN nebulizer solution Inhale 3 mLs into the lungs every 6 hours as needed for Shortness of Breath DX:COPD J44.9 360 mL 11    albuterol sulfate  (90 Base) MCG/ACT inhaler Inhale 2 puffs into the lungs every 4 hours as needed for Wheezing 18 g 2    Tens Unit MISC by Does not apply route 1 each 0    methadone (DOLOPHINE) 5 MG tablet Take 1 tablet by mouth in

## 2023-10-05 ENCOUNTER — HOSPITAL ENCOUNTER (OUTPATIENT)
Dept: GENERAL RADIOLOGY | Age: 53
Discharge: HOME | End: 2023-10-05
Payer: MEDICARE

## 2023-10-05 ENCOUNTER — HOSPITAL ENCOUNTER (OUTPATIENT)
Age: 53
Discharge: HOME | End: 2023-10-05
Payer: MEDICARE

## 2023-10-05 DIAGNOSIS — E55.9 VITAMIN D DEFICIENCY: ICD-10-CM

## 2023-10-05 DIAGNOSIS — R03.0 ELEVATED BP WITHOUT DIAGNOSIS OF HYPERTENSION: ICD-10-CM

## 2023-10-05 DIAGNOSIS — Z12.5 SCREENING FOR PROSTATE CANCER: ICD-10-CM

## 2023-10-05 DIAGNOSIS — J43.2 CENTRILOBULAR EMPHYSEMA (HCC): ICD-10-CM

## 2023-10-05 DIAGNOSIS — F17.200 CURRENT SMOKER: ICD-10-CM

## 2023-10-05 DIAGNOSIS — J41.0 SIMPLE CHRONIC BRONCHITIS (HCC): ICD-10-CM

## 2023-10-05 DIAGNOSIS — Z13.220 LIPID SCREENING: ICD-10-CM

## 2023-10-05 LAB
25(OH)D3 SERPL-MCNC: 15.5 NG/ML
ALBUMIN SERPL-MCNC: 4 G/DL (ref 3.4–5)
ALBUMIN/GLOB SERPL: 1.6 {RATIO} (ref 1.1–2.2)
ALP SERPL-CCNC: 154 U/L (ref 40–129)
ALT SERPL-CCNC: 15 U/L (ref 10–40)
ANION GAP SERPL CALCULATED.3IONS-SCNC: 12 MMOL/L (ref 3–16)
AST SERPL-CCNC: 19 U/L (ref 15–37)
BILIRUB SERPL-MCNC: 0.3 MG/DL (ref 0–1)
BILIRUB UR QL STRIP.AUTO: NEGATIVE
BUN SERPL-MCNC: 6 MG/DL (ref 7–20)
CALCIUM SERPL-MCNC: 9.1 MG/DL (ref 8.3–10.6)
CHLORIDE SERPL-SCNC: 101 MMOL/L (ref 99–110)
CHOLEST SERPL-MCNC: 140 MG/DL (ref 0–199)
CLARITY UR: CLEAR
CO2 SERPL-SCNC: 25 MMOL/L (ref 21–32)
COLOR UR: YELLOW
CREAT SERPL-MCNC: 1 MG/DL (ref 0.9–1.3)
DEPRECATED RDW RBC AUTO: 14.1 % (ref 12.4–15.4)
GFR SERPLBLD CREATININE-BSD FMLA CKD-EPI: >60 ML/MIN/{1.73_M2}
GLUCOSE SERPL-MCNC: 100 MG/DL (ref 70–99)
GLUCOSE UR STRIP.AUTO-MCNC: NEGATIVE MG/DL
HCT VFR BLD AUTO: 45 % (ref 40.5–52.5)
HDLC SERPL-MCNC: 28 MG/DL (ref 40–60)
HGB BLD-MCNC: 15.2 G/DL (ref 13.5–17.5)
HGB UR QL STRIP.AUTO: NEGATIVE
KETONES UR STRIP.AUTO-MCNC: ABNORMAL MG/DL
LDLC SERPL CALC-MCNC: 82 MG/DL
LEUKOCYTE ESTERASE UR QL STRIP.AUTO: NEGATIVE
MCH RBC QN AUTO: 30.7 PG (ref 26–34)
MCHC RBC AUTO-ENTMCNC: 33.7 G/DL (ref 31–36)
MCV RBC AUTO: 91 FL (ref 80–100)
NITRITE UR QL STRIP.AUTO: NEGATIVE
PH UR STRIP.AUTO: 6 [PH] (ref 5–8)
PLATELET # BLD AUTO: 395 K/UL (ref 135–450)
PMV BLD AUTO: 7.9 FL (ref 5–10.5)
POTASSIUM SERPL-SCNC: 4.4 MMOL/L (ref 3.5–5.1)
PROT SERPL-MCNC: 6.5 G/DL (ref 6.4–8.2)
PROT UR STRIP.AUTO-MCNC: NEGATIVE MG/DL
PSA SERPL DL<=0.01 NG/ML-MCNC: 0.78 NG/ML (ref 0–4)
RBC # BLD AUTO: 4.95 M/UL (ref 4.2–5.9)
SODIUM SERPL-SCNC: 138 MMOL/L (ref 136–145)
SP GR UR STRIP.AUTO: 1.01 (ref 1–1.03)
TRIGL SERPL-MCNC: 149 MG/DL (ref 0–150)
TSH SERPL DL<=0.005 MIU/L-ACNC: 1.89 UIU/ML (ref 0.27–4.2)
UA DIPSTICK W REFLEX MICRO PNL UR: ABNORMAL
URN SPEC COLLECT METH UR: ABNORMAL
UROBILINOGEN UR STRIP-ACNC: 1 E.U./DL
VLDLC SERPL CALC-MCNC: 30 MG/DL
WBC # BLD AUTO: 9.5 K/UL (ref 4–11)

## 2023-10-05 PROCEDURE — 81003 URINALYSIS AUTO W/O SCOPE: CPT

## 2023-10-05 PROCEDURE — 80061 LIPID PANEL: CPT

## 2023-10-05 PROCEDURE — 36415 COLL VENOUS BLD VENIPUNCTURE: CPT

## 2023-10-05 PROCEDURE — 84153 ASSAY OF PSA TOTAL: CPT

## 2023-10-05 PROCEDURE — 85027 COMPLETE CBC AUTOMATED: CPT

## 2023-10-05 PROCEDURE — 80053 COMPREHEN METABOLIC PANEL: CPT

## 2023-10-05 PROCEDURE — 84443 ASSAY THYROID STIM HORMONE: CPT

## 2023-10-05 PROCEDURE — 71046 X-RAY EXAM CHEST 2 VIEWS: CPT

## 2023-10-05 PROCEDURE — 82306 VITAMIN D 25 HYDROXY: CPT

## 2023-10-06 DIAGNOSIS — E55.9 VITAMIN D DEFICIENCY: Primary | ICD-10-CM

## 2023-10-06 RX ORDER — ERGOCALCIFEROL 1.25 MG/1
50000 CAPSULE ORAL WEEKLY
Qty: 12 CAPSULE | Refills: 2 | Status: SHIPPED | OUTPATIENT
Start: 2023-10-06

## 2023-10-09 ENCOUNTER — HOSPITAL ENCOUNTER (OUTPATIENT)
Dept: PHYSICAL THERAPY | Age: 53
Setting detail: THERAPIES SERIES
Discharge: HOME | End: 2023-10-09
Payer: MEDICARE

## 2023-10-09 PROCEDURE — 97113 AQUATIC THERAPY/EXERCISES: CPT

## 2023-10-09 NOTE — FLOWSHEET NOTE
[] Manual (15061)    [] Estim Unattended (78947)     [] Ther. Act (58266)    [] Oliviamarina UrbinaHamblen. Traction (41026)     [] Gait (42271)    [] Dry Needle 1-2 muscle (83701)     [x] Aquatic Therex (48678) 32 2  [] Dry Needle 3+ muscle (10216)     [] Iontophoresis (35510)    [] VASO (37983)     [] Ultrasound (56908)    [] Group Therapy (77473)     [] Estim Attended (14106)    [] Other: Total Timed Code Tx Minutes 40        Total Treatment Minutes 32        Charge Justification:  (81502) AQUATIC THERAPY - THERAPEUTIC PROCEDURE, 1 OR MORE AREAS, EACH 15 MINUTES  WITH THERAPEUTIC EXERCISES. Provided verbal/tactile cueing in aquatic environment for activities related to strengthening, flexibility, endurance, ROM performed to prevent loss of range of motion, maintain or improve muscular strength or increase flexibility, following either an injury or surgery    TREATMENT PLAN   Plan: Cont POC- Continue emphasis/focus on exercise progression, improving proper muscle recruitment and activation/motor control patterns, modulating pain, increasing ROM, and improving postural awareness. Next visit plan to progress reps, add new exercises, and progress balance     Electronically Signed by Liliane Ortiz PT              Date: 10/09/2023     Note: If patient does not return for scheduled/recommended follow up visits, this note will serve as a discharge from care along with the most recent update on progress.

## 2023-10-12 ENCOUNTER — HOSPITAL ENCOUNTER (EMERGENCY)
Age: 53
Discharge: HOME | End: 2023-10-12
Attending: EMERGENCY MEDICINE
Payer: MEDICARE

## 2023-10-12 ENCOUNTER — APPOINTMENT (OUTPATIENT)
Dept: GENERAL RADIOLOGY | Age: 53
End: 2023-10-12
Payer: MEDICARE

## 2023-10-12 VITALS
HEART RATE: 70 BPM | WEIGHT: 130 LBS | RESPIRATION RATE: 16 BRPM | OXYGEN SATURATION: 94 % | DIASTOLIC BLOOD PRESSURE: 84 MMHG | BODY MASS INDEX: 20.98 KG/M2 | SYSTOLIC BLOOD PRESSURE: 161 MMHG | TEMPERATURE: 98.4 F

## 2023-10-12 DIAGNOSIS — R07.9 NONSPECIFIC CHEST PAIN: Primary | ICD-10-CM

## 2023-10-12 DIAGNOSIS — F41.1 ANXIETY STATE: ICD-10-CM

## 2023-10-12 LAB
ANION GAP SERPL CALCULATED.3IONS-SCNC: 12 MMOL/L (ref 3–16)
BASOPHILS # BLD: 0.1 K/UL (ref 0–0.2)
BASOPHILS NFR BLD: 0.4 %
BUN SERPL-MCNC: 9 MG/DL (ref 7–20)
CALCIUM SERPL-MCNC: 9.9 MG/DL (ref 8.3–10.6)
CHLORIDE SERPL-SCNC: 100 MMOL/L (ref 99–110)
CO2 SERPL-SCNC: 23 MMOL/L (ref 21–32)
CREAT SERPL-MCNC: 0.9 MG/DL (ref 0.9–1.3)
DEPRECATED RDW RBC AUTO: 14 % (ref 12.4–15.4)
EOSINOPHIL # BLD: 0 K/UL (ref 0–0.6)
EOSINOPHIL NFR BLD: 0.1 %
GFR SERPLBLD CREATININE-BSD FMLA CKD-EPI: >60 ML/MIN/{1.73_M2}
GLUCOSE SERPL-MCNC: 133 MG/DL (ref 70–99)
HCT VFR BLD AUTO: 47.3 % (ref 40.5–52.5)
HGB BLD-MCNC: 16 G/DL (ref 13.5–17.5)
LYMPHOCYTES # BLD: 1.6 K/UL (ref 1–5.1)
LYMPHOCYTES NFR BLD: 11.9 %
MCH RBC QN AUTO: 30.5 PG (ref 26–34)
MCHC RBC AUTO-ENTMCNC: 33.7 G/DL (ref 31–36)
MCV RBC AUTO: 90.5 FL (ref 80–100)
MONOCYTES # BLD: 0.4 K/UL (ref 0–1.3)
MONOCYTES NFR BLD: 2.8 %
NEUTROPHILS # BLD: 11.5 K/UL (ref 1.7–7.7)
NEUTROPHILS NFR BLD: 84.8 %
PLATELET # BLD AUTO: 418 K/UL (ref 135–450)
PMV BLD AUTO: 6.8 FL (ref 5–10.5)
POTASSIUM SERPL-SCNC: 4.6 MMOL/L (ref 3.5–5.1)
RBC # BLD AUTO: 5.23 M/UL (ref 4.2–5.9)
SODIUM SERPL-SCNC: 135 MMOL/L (ref 136–145)
TROPONIN, HIGH SENSITIVITY: <6 NG/L (ref 0–22)
TROPONIN, HIGH SENSITIVITY: <6 NG/L (ref 0–22)
WBC # BLD AUTO: 13.5 K/UL (ref 4–11)

## 2023-10-12 PROCEDURE — 71046 X-RAY EXAM CHEST 2 VIEWS: CPT

## 2023-10-12 PROCEDURE — 93005 ELECTROCARDIOGRAM TRACING: CPT | Performed by: EMERGENCY MEDICINE

## 2023-10-12 PROCEDURE — 84484 ASSAY OF TROPONIN QUANT: CPT

## 2023-10-12 PROCEDURE — 99285 EMERGENCY DEPT VISIT HI MDM: CPT

## 2023-10-12 PROCEDURE — 80048 BASIC METABOLIC PNL TOTAL CA: CPT

## 2023-10-12 PROCEDURE — 6370000000 HC RX 637 (ALT 250 FOR IP): Performed by: EMERGENCY MEDICINE

## 2023-10-12 PROCEDURE — 85025 COMPLETE CBC W/AUTO DIFF WBC: CPT

## 2023-10-12 PROCEDURE — 36415 COLL VENOUS BLD VENIPUNCTURE: CPT

## 2023-10-12 RX ORDER — HYDROXYZINE HYDROCHLORIDE 25 MG/1
25 TABLET, FILM COATED ORAL EVERY 8 HOURS PRN
Qty: 30 TABLET | Refills: 0 | Status: SHIPPED | OUTPATIENT
Start: 2023-10-12 | End: 2023-10-22

## 2023-10-12 RX ORDER — HYDROXYZINE PAMOATE 25 MG/1
50 CAPSULE ORAL ONCE
Status: COMPLETED | OUTPATIENT
Start: 2023-10-12 | End: 2023-10-12

## 2023-10-12 RX ADMIN — HYDROXYZINE PAMOATE 50 MG: 25 CAPSULE ORAL at 14:26

## 2023-10-13 LAB
EKG ATRIAL RATE: 83 BPM
EKG DIAGNOSIS: NORMAL
EKG P AXIS: 69 DEGREES
EKG P-R INTERVAL: 114 MS
EKG Q-T INTERVAL: 374 MS
EKG QRS DURATION: 88 MS
EKG QTC CALCULATION (BAZETT): 439 MS
EKG R AXIS: 34 DEGREES
EKG T AXIS: 49 DEGREES
EKG VENTRICULAR RATE: 83 BPM

## 2023-10-13 PROCEDURE — 93010 ELECTROCARDIOGRAM REPORT: CPT | Performed by: INTERNAL MEDICINE

## 2023-10-14 NOTE — ED PROVIDER NOTES
EMERGENCY DEPARTMENT PROVIDER NOTE    Patient Identification  Pt Name: Zoie Vinson  MRN: [id number]  [address] [facility] 1970  Date of evaluation: 10/12/2023  Provider: Mine Palumbo DO  PCP: Santiago Villalta MD    Chief Complaint  Medication Reaction (Pt believes he is having negative side effects from his methadone. States he was put on it for chronic back pain. Pt reports anxiety attacks, unable to sleep, palpitations. ) and Anxiety      HPI  (History provided by patient)  This is a 48 y.o. male with pertinent past medical history of chronic opioid dependence, chronic pain syndrome, anxiety, COPD who was brought in by self for feelings of anxiety. Patient reports over the past several days he has had difficulty sleeping, chest aching and heart palpitations which he attributes to anxiety attacks, although states his symptoms are much more severe than they have been from prior bouts of anxiety. Recently started on methadone which she believes may be contributing. He denies any fevers, chills or shortness of breath. He denies any suicidal ideation. .       I have reviewed the following nursing documentation:  Allergies: Naproxen, Neurontin [gabapentin], Robaxin [methocarbamol], Sulfamethoxazole-trimethoprim, Toradol [ketorolac tromethamine], Zoloft [sertraline], Azithromycin, Bactrim [sulfamethoxazole-trimethoprim], Biaxin [clarithromycin], Celebrex [celecoxib], Diclofenac, Sulfa antibiotics, and Tramadol    Past medical history:   Past Medical History:   Diagnosis Date    Allergic sinusitis     Anxiety and depression     currently under care of pyschiatrist    COPD (chronic obstructive pulmonary disease) ([address])     Hip pain 09/05/2017    ?  CAM possible bilateral superior femoral head-heck junction bump suggestive of femoro acetabular impingement syndrome    Insomnia     Localized edema 02/24/2018    Neg abd ultrasound and DVT    Lumbar post-laminectomy syndrome     OA (osteoarthritis) of knee     Osteoporosis

## 2023-10-16 ENCOUNTER — HOSPITAL ENCOUNTER (OUTPATIENT)
Dept: PHYSICAL THERAPY | Age: 53
Setting detail: THERAPIES SERIES
Discharge: HOME | End: 2023-10-16
Payer: MEDICARE

## 2023-10-16 NOTE — FLOWSHEET NOTE
[facility]     Physical Therapy  Cancellation/No-show Note  Patient Name:  Alex Mtaos  :  1970   Date:  10/16/2023  Cancelled visits to date: 0  No-shows to date: 1    Patient status for today's appointment patient:  []  Cancelled  []  Rescheduled appointment  [x]  No-show 10/16 (aq)     Reason given by patient:  []  Patient ill  []  Conflicting appointment  []  No transportation    []  Conflict with work  []  No reason given  []  Other:     Comments:      Phone call information:   []  Phone call made today to patient at _ time at number provided:      []  Patient answered, conversation as follows:    []  Patient did not answer, message left as follows:  [x]  Phone call not made today  []  Phone call not needed - pt contacted us to cancel and provided reason for cancellation.      Electronically signed by:  Dirk Amado PTA, ATC

## 2023-10-19 ENCOUNTER — TELEMEDICINE (OUTPATIENT)
Dept: INTERNAL MEDICINE CLINIC | Age: 53
End: 2023-10-19

## 2023-10-19 DIAGNOSIS — J41.0 SIMPLE CHRONIC BRONCHITIS (HCC): Primary | ICD-10-CM

## 2023-10-19 DIAGNOSIS — U07.1 COVID-19: ICD-10-CM

## 2023-10-19 DIAGNOSIS — J44.1 COPD EXACERBATION (HCC): ICD-10-CM

## 2023-10-19 DIAGNOSIS — F17.200 CURRENT SMOKER: ICD-10-CM

## 2023-10-19 RX ORDER — GUAIFENESIN/DEXTROMETHORPHAN 100-10MG/5
10 SYRUP ORAL 3 TIMES DAILY PRN
Qty: 120 ML | Refills: 0 | Status: SHIPPED | OUTPATIENT
Start: 2023-10-19 | End: 2023-10-24

## 2023-10-19 RX ORDER — AMOXICILLIN AND CLAVULANATE POTASSIUM 875; 125 MG/1; MG/1
1 TABLET, FILM COATED ORAL 2 TIMES DAILY
Qty: 14 TABLET | Refills: 0 | Status: SHIPPED | OUTPATIENT
Start: 2023-10-19 | End: 2023-10-26

## 2023-10-19 ASSESSMENT — ENCOUNTER SYMPTOMS
NAUSEA: 0
ABDOMINAL PAIN: 0
COUGH: 1
TROUBLE SWALLOWING: 0
WHEEZING: 0
VOICE CHANGE: 0
VOMITING: 0

## 2023-10-19 NOTE — PROGRESS NOTES
10/19/2023    TELEHEALTH EVALUATION -- Audio/Visual    HPI:    Braydon Da Silva (:  1970) has requested an audio/video evaluation for the following concern(s):  Chief Complaint   Patient presents with    OTHER     Still having cough and congeston, sometimes can't cough up anything. Recent ATB helped some. Recently exposed to [facility] this past Friday- pt did not test. No reported fever. ER visit 10/12 for chest pain, anxiety. Was told it was from Prednisone. Pt stopped and further symptoms. Patient reportedly exposed to his relatives was admitted to hospital for COVID-19 about 7 days ago. Patient started to complain of cough and congestion since for the last 3 days. He has been having recurrent cough and wheezing shortness of breath over the last couple of months. He underwent multiple rounds of antibiotics. Recently he was started on prednisone. Patient report because of prednisone he was feeling jittery, anxiety, chest pain and went to emergency room. Patient recall his sister in the past told him to avoid prednisone  Patient has been using multiple inhalers including nebulizer treatment and singular. Unfortunately patient continues to  smoke cigarettes. Patient denies fever chills nausea vomiting or systemic symptoms. Review of Systems   Constitutional:  Negative for unexpected weight change. HENT:  Positive for congestion and postnasal drip. Negative for trouble swallowing and voice change. Respiratory:  Positive for cough. Negative for wheezing. Cardiovascular:  Negative for chest pain and palpitations. Gastrointestinal:  Negative for abdominal pain, nausea and vomiting. Genitourinary:  Negative for difficulty urinating and flank pain. Neurological:  Negative for dizziness and light-headedness. Prior to Visit Medications    Medication Sig Taking?  Authorizing Provider   amoxicillin-clavulanate (AUGMENTIN) 875-125 MG per tablet Take 1 tablet by mouth 2 times daily

## 2023-10-25 DIAGNOSIS — J43.2 CENTRILOBULAR EMPHYSEMA (HCC): ICD-10-CM

## 2023-10-25 RX ORDER — ALBUTEROL SULFATE 90 UG/1
2 AEROSOL, METERED RESPIRATORY (INHALATION) EVERY 4 HOURS PRN
Qty: 18 G | Refills: 2 | Status: SHIPPED | OUTPATIENT
Start: 2023-10-25

## 2023-10-27 ENCOUNTER — HOSPITAL ENCOUNTER (OUTPATIENT)
Dept: PHYSICAL THERAPY | Age: 53
Setting detail: THERAPIES SERIES
Discharge: HOME | End: 2023-10-27
Payer: MEDICARE

## 2023-10-27 PROCEDURE — 97113 AQUATIC THERAPY/EXERCISES: CPT

## 2023-10-27 NOTE — FLOWSHEET NOTE
[address] [facility] and Therapy [facility], [address] [facility], [place] [facility], [address] [facility] office: [phone] fax: [phone]      Physical Therapy: TREATMENT/PROGRESS NOTE   Patient: Waldemar Brar (13 y.o. male)  \"Dago\"  Treatment Date: 10/27/2023   :  1970 MRN: [id number]   Visit #: 3    Insurance: Payor: MEDICARE / Plan: MEDICARE PART A AND B / Product Type: *No Product type* /   Insurance ID: [id number] - (Medicare)  Secondary Insurance (if applicable): MEDICAID [place]   Treatment Diagnosis:   No diagnosis found. Medical Diagnosis:    Postlaminectomy syndrome, not elsewhere classified [M96.1]   Referring Physician: ANNIKA Nicole -*  PCP: Melisa Mitchell MD                             Plan of care signed (Y/N):     Date of Patient follow up with Physician:      Progress Report/POC: EVAL today    POC update due: (10 visits or 30 days whichever is less OR AUTH LIMITs): 11/3    Visit # Insurance Allowable Auth Needed   3 mn []Yes    [x]No     Latex Allergy:  [x]NO      []YES    Preferred Language for Healthcare:   [x]English       []other:    SUBJECTIVE EXAMINATION     Patient Report/Comments: Pt denies low back pain currently however, does continue to experience occasional pains which is affecting CKC activity tolerances. Pt also reports being afraid of R LE \"giving out. \"    OBJECTIVE EXAMINATION     Observation:     Test measurements: see eval     Test used Initial score 10/27/2023   Pain Summary VAS 8 6   Functional questionnaire Modified Oswestry 73%    ROM        See eval                Strength  See eval                        RESTRICTIONS/PRECAUTIONS: lumbar sx hx x3 with laminectomy     Exercises/Interventions:     Therapeutic Ex (22063)  resistance Sets/time Reps Notes/Cues/Progressions                                                                         Manual Intervention (19880)  TIME                                        NMR re-education (18139)

## 2023-10-30 ENCOUNTER — HOSPITAL ENCOUNTER (OUTPATIENT)
Dept: PHYSICAL THERAPY | Age: 53
Setting detail: THERAPIES SERIES
Discharge: HOME | End: 2023-10-30
Payer: MEDICARE

## 2023-10-30 NOTE — FLOWSHEET NOTE
[address] [facility]     Physical Therapy  Cancellation/No-show Note  Patient Name:  Braydon Da Silva  :  1970   Date:  10/30/2023  Cancelled visits to date: 0  No-shows to date: 2    Patient status for today's appointment patient:  []  Cancelled  []  Rescheduled appointment  [x]  No-show 10/16 (aq), 10/30 (aq)     Reason given by patient:  []  Patient ill  []  Conflicting appointment  []  No transportation    []  Conflict with work  []  No reason given  []  Other:     Comments:      Phone call information:   []  Phone call made today to patient at _ time at number provided:      []  Patient answered, conversation as follows:    []  Patient did not answer, message left as follows:  [x]  Phone call not made today  []  Phone call not needed - pt contacted us to cancel and provided reason for cancellation.      Electronically signed by:  Anthony Benitez PTA, ATC

## 2023-11-06 ENCOUNTER — HOSPITAL ENCOUNTER (OUTPATIENT)
Dept: PHYSICAL THERAPY | Age: 53
Setting detail: THERAPIES SERIES
Discharge: HOME | End: 2023-11-06

## 2023-11-06 NOTE — FLOWSHEET NOTE
[address] [employer]     Physical Therapy  Cancellation/No-show Note  Patient Name:  Elif Castle  :  1970   Date:  2023  Cancelled visits to date: 0  No-shows to date: 3    Patient status for today's appointment patient:  []  Cancelled  []  Rescheduled appointment  [x]  No-show 10/16 (aq), 10/30 (aq),  (aq)     Reason given by patient:  []  Patient ill  []  Conflicting appointment  []  No transportation    []  Conflict with work  []  No reason given  [x]  Other:     Comments:  no call no show    Phone call information:   []  Phone call made today to patient at _ time at number provided:      []  Patient answered, conversation as follows:    []  Patient did not answer, message left as follows:  []  Phone call not made today  []  Phone call not needed - pt contacted us to cancel and provided reason for cancellation.      Electronically signed by:  Milo Jiang, PT, ATC

## 2023-11-06 NOTE — FLOWSHEET NOTE
[address] [employer]     Physical Therapy  Cancellation/No-show Note  Patient Name:  Denzel Quesada  :  1970   Date:  2023  Cancelled visits to date: 0  No-shows to date: 3    Patient status for today's appointment patient:  []  Cancelled  []  Rescheduled appointment  [x]  No-show 10/16 (aq), 10/30 (aq),  (aq)     Reason given by patient:  []  Patient ill  []  Conflicting appointment  []  No transportation    []  Conflict with work  []  No reason given  []  Other:     Comments:      Phone call information:   []  Phone call made today to patient at _ time at number provided:      []  Patient answered, conversation as follows:    []  Patient did not answer, message left as follows:  [x]  Phone call not made today  []  Phone call not needed - pt contacted us to cancel and provided reason for cancellation.      Electronically signed by:  Evelin Asif, PT, DPT Duplicate, filled yesterday

## 2023-11-08 ENCOUNTER — OFFICE VISIT (OUTPATIENT)
Dept: ENDOCRINOLOGY | Age: 53
End: 2023-11-08
Payer: MEDICARE

## 2023-11-08 VITALS
HEIGHT: 66 IN | DIASTOLIC BLOOD PRESSURE: 76 MMHG | HEART RATE: 79 BPM | BODY MASS INDEX: 21.28 KG/M2 | TEMPERATURE: 98 F | WEIGHT: 132.4 LBS | SYSTOLIC BLOOD PRESSURE: 137 MMHG | RESPIRATION RATE: 15 BRPM | OXYGEN SATURATION: 96 %

## 2023-11-08 DIAGNOSIS — M81.0 AGE-RELATED OSTEOPOROSIS WITHOUT CURRENT PATHOLOGICAL FRACTURE: Primary | ICD-10-CM

## 2023-11-08 DIAGNOSIS — E55.9 VITAMIN D DEFICIENCY: ICD-10-CM

## 2023-11-08 PROCEDURE — G8420 CALC BMI NORM PARAMETERS: HCPCS | Performed by: INTERNAL MEDICINE

## 2023-11-08 PROCEDURE — G8484 FLU IMMUNIZE NO ADMIN: HCPCS | Performed by: INTERNAL MEDICINE

## 2023-11-08 PROCEDURE — 4004F PT TOBACCO SCREEN RCVD TLK: CPT | Performed by: INTERNAL MEDICINE

## 2023-11-08 PROCEDURE — G8427 DOCREV CUR MEDS BY ELIG CLIN: HCPCS | Performed by: INTERNAL MEDICINE

## 2023-11-08 PROCEDURE — 99214 OFFICE O/P EST MOD 30 MIN: CPT | Performed by: INTERNAL MEDICINE

## 2023-11-08 PROCEDURE — 96372 THER/PROPH/DIAG INJ SC/IM: CPT | Performed by: INTERNAL MEDICINE

## 2023-11-08 PROCEDURE — 3017F COLORECTAL CA SCREEN DOC REV: CPT | Performed by: INTERNAL MEDICINE

## 2023-11-08 RX ORDER — ERGOCALCIFEROL 1.25 MG/1
50000 CAPSULE ORAL WEEKLY
Qty: 12 CAPSULE | Refills: 4 | Status: SHIPPED | OUTPATIENT
Start: 2023-11-08

## 2023-11-08 NOTE — PROGRESS NOTES
Patient to remain in the waiting room for approximately 20 minutes due to this is the first Prolia injection. Informed patient if any signs of redness,rash,swelling or unusual symptoms occur, please contact the office. Prolia given per physician order. Prolia supplied by the physician office.

## 2023-11-08 NOTE — PROGRESS NOTES
Subjective: Interim:    +Fall off stairs  No fractures  Feels better on Prolia  States can tell when close to injection  He had last Prolia 4/22  None since then  States not able to schedule  Also off of vitamin D, did not get refill    No fractures since Prolia  Feels better on it  stable    Right hip replacement   Left in the past  States OA    Yoko Curran is a  who is here for evaluation of osteoporosis. Patient is being seen at the request of  Arlen Newman MD     he was diagnosed with Osteoporsis in 2012 5/18/2020     FINDINGS:  LUMBAR SPINE:     The lumbar spine cannot be utilized as all the T-scores are greater than 1  discrepant from L3.     FOREARM:     The BMD of the middle third of the radius of the distal forearm equals 0.557  g/cm2. The T- and Z-scores are -2.3 and -4.4 standard deviations from the  mean. This is within the osteopenia range by WHO criteria. Since prior exam, there has been a 0.014g/cm2 decrease in bone mineral  density, corresponding to a 2.4% change, which is not significant. RIGHT HIP:     The bone mineral density in the total hip is measured at 0.688 g/cm2  corresponding to a T-score of -2.1 and a Z-score of -2.0. This is within the  osteopenia range by WHO criteria. Since prior exam, there has been a 0.012g/cm2 decrease in bone mineral  density, corresponding to a 1.6% change, which is not significant. The bone mineral density of the femoral neck is measured at 0.536 g/cm2  corresponding to a T-score of -2.8 and a Z-score of -2.2. This is within the  osteoporosis range by WHO criteria. IMPRESSION:  Osteoporosis by WHO criteria. No significant interval change. 5/18/2020       DEXA scan 1/17  LUMBAR SPINE:       The bone mineral density in the lumbar spine including the L1 through L3   levels is measured at 0.707 g/cm2, which corresponds to a T-score of -3.3 and   a Z-score of -3.0. This is within the osteoporosis range by WHO criteria.

## 2023-11-09 ENCOUNTER — HOSPITAL ENCOUNTER (OUTPATIENT)
Dept: PHYSICAL THERAPY | Age: 53
Setting detail: THERAPIES SERIES
Discharge: HOME | End: 2023-11-09

## 2023-11-09 NOTE — FLOWSHEET NOTE
[address] has not been compliant with PT POC, 4 no shows and unable to reach pt at this time. Referring CNP notified. Physical Therapy  Cancellation/No-show Note  Patient Name:  Elif Castle  :  1970   Date:  2023  Cancelled visits to date: 0  No-shows to date: 4    Patient status for today's appointment patient:  []  Cancelled  []  Rescheduled appointment  [x]  No-show 10/16 (aq), 10/30 (aq),  (aq) ,       Reason given by patient:  []  Patient ill  []  Conflicting appointment  []  No transportation    []  Conflict with work  []  No reason given  []  Other:     Comments:      Phone call information:   [x]  Phone call made today to patient at _ time at number provided:      []  Patient answered, conversation as follows:    [x]  Patient did not answer, message left as follows: unable to leave message   []  Phone call not made today  []  Phone call not needed - pt contacted us to cancel and provided reason for cancellation.      Electronically signed by:  Himanshu Castro, PT, DPT

## 2023-11-17 ENCOUNTER — TELEPHONE (OUTPATIENT)
Dept: INTERNAL MEDICINE CLINIC | Age: 53
End: 2023-11-17

## 2023-11-17 RX ORDER — HYDROXYZINE HYDROCHLORIDE 25 MG/1
25 TABLET, FILM COATED ORAL EVERY 8 HOURS PRN
Qty: 90 TABLET | Refills: 0 | Status: SHIPPED | OUTPATIENT
Start: 2023-11-17 | End: 2023-11-27

## 2023-11-17 NOTE — TELEPHONE ENCOUNTER
Pt calling requesting refill of hydroxyzine HCL    Last written 10/12/23  by Sandee Dodson  Last OV 10/19/23  Next OV none  Last recommended OV NA     Please send to Rodol[facility] Lynch

## 2023-12-13 ENCOUNTER — TELEPHONE (OUTPATIENT)
Dept: INTERNAL MEDICINE CLINIC | Age: 53
End: 2023-12-13

## 2023-12-13 NOTE — TELEPHONE ENCOUNTER
Spouse stating she was told [place] [facility] requested an order for [place] for patient - they did not have an agency in mind. Spouse reports patient needs assist with bathing, dressing, walking at times - most ADLs.      Are you ok with referral?

## 2023-12-13 NOTE — TELEPHONE ENCOUNTER
Pt wife calling---she was told that pt needed 14 hrs of [place] [employer] a week--[facility]---she did not have any more info than that---not sure if you know what this is--please call the wife back. Thanks.

## 2023-12-13 NOTE — TELEPHONE ENCOUNTER
LVM for spouse with request for a call back for clarification. Does the spouse/pt have a home health agency that they want to use? Dr. Judy Hwang may need to see patient in the office for this request - will have to have documentation as far as the need for the home health services.

## 2023-12-14 NOTE — TELEPHONE ENCOUNTER
This writer reached out to [id number] for instructions/advice on how to proceed. Usually [facility] agencies do not cover that many hours of assistance. [facility] may be able to assist d/t patient is Medicaid. Awaiting call back.

## 2023-12-15 ENCOUNTER — TELEPHONE (OUTPATIENT)
Dept: INTERNAL MEDICINE CLINIC | Age: 53
End: 2023-12-15

## 2023-12-15 NOTE — TELEPHONE ENCOUNTER
LVM for spouse stating patient may go to Urgent Care or he may schedule a virtual visit on the virtualist schedule.

## 2023-12-15 NOTE — TELEPHONE ENCOUNTER
LVM for spouse with request to please reach out to Medicaid to seek assistance with a name of an agency for services requested.

## 2023-12-15 NOTE — TELEPHONE ENCOUNTER
Wife calling in reporting patient is having recurrent cough, nasal/chest congestion. Patient has used Doxycycline in the past which has helped. Tessalon pearls also helped in the past and she is asking about a refill of that if possible. Michelle Rai made aware. He needs to see patient in the office - no openings. He is recommending an urgent care visit or can be added to the virtualist schedule. Spouse was called back - she did not answer.

## 2023-12-15 NOTE — TELEPHONE ENCOUNTER
Spouse provded name and number for  at [facility] [phone] for assistance. Brandy Negron was call - she did not have any information on this - she told patient or spouse to contact Medicaid to get information.

## 2023-12-18 PROBLEM — R05.9 COUGH: Status: ACTIVE | Noted: 2023-12-18

## 2023-12-18 PROBLEM — J06.9 URTI (ACUTE UPPER RESPIRATORY INFECTION): Status: ACTIVE | Noted: 2023-12-18

## 2024-01-09 DIAGNOSIS — J30.1 SEASONAL ALLERGIC RHINITIS DUE TO POLLEN: ICD-10-CM

## 2024-01-09 RX ORDER — MONTELUKAST SODIUM 10 MG/1
TABLET ORAL
Qty: 30 TABLET | Refills: 1 | Status: SHIPPED | OUTPATIENT
Start: 2024-01-09

## 2024-01-12 ENCOUNTER — TELEPHONE (OUTPATIENT)
Dept: ENT CLINIC | Age: 54
End: 2024-01-12

## 2024-01-12 NOTE — TELEPHONE ENCOUNTER
Follow up call to patient.    Message left for patient to return call to office to schedule allergy testing

## 2024-01-17 PROBLEM — R05.9 COUGH: Status: RESOLVED | Noted: 2023-12-18 | Resolved: 2024-01-17

## 2024-01-19 RX ORDER — FLUTICASONE FUROATE, UMECLIDINIUM BROMIDE AND VILANTEROL TRIFENATATE 200; 62.5; 25 UG/1; UG/1; UG/1
1 POWDER RESPIRATORY (INHALATION) DAILY
Qty: 60 EACH | Refills: 5 | OUTPATIENT
Start: 2024-01-19

## 2024-02-23 ENCOUNTER — TELEMEDICINE (OUTPATIENT)
Dept: PRIMARY CARE CLINIC | Age: 54
End: 2024-02-23

## 2024-02-23 DIAGNOSIS — J44.1 COPD EXACERBATION (HCC): Primary | ICD-10-CM

## 2024-02-23 DIAGNOSIS — R51.9 SINUS HEADACHE: ICD-10-CM

## 2024-02-23 DIAGNOSIS — J41.0 SIMPLE CHRONIC BRONCHITIS (HCC): ICD-10-CM

## 2024-02-23 DIAGNOSIS — R09.81 NASAL CONGESTION: ICD-10-CM

## 2024-02-23 DIAGNOSIS — J01.90 ACUTE BACTERIAL SINUSITIS: ICD-10-CM

## 2024-02-23 DIAGNOSIS — J43.2 CENTRILOBULAR EMPHYSEMA (HCC): ICD-10-CM

## 2024-02-23 DIAGNOSIS — B96.89 ACUTE BACTERIAL SINUSITIS: ICD-10-CM

## 2024-02-23 DIAGNOSIS — F17.200 CURRENT SMOKER: ICD-10-CM

## 2024-02-23 RX ORDER — ALBUTEROL SULFATE 90 UG/1
2 AEROSOL, METERED RESPIRATORY (INHALATION) EVERY 4 HOURS PRN
Qty: 18 G | Refills: 0 | Status: SHIPPED | OUTPATIENT
Start: 2024-02-23

## 2024-02-23 RX ORDER — AMOXICILLIN AND CLAVULANATE POTASSIUM 875; 125 MG/1; MG/1
1 TABLET, FILM COATED ORAL 2 TIMES DAILY
Qty: 14 TABLET | Refills: 0 | Status: SHIPPED | OUTPATIENT
Start: 2024-02-23 | End: 2024-03-01

## 2024-02-23 RX ORDER — AZELASTINE 1 MG/ML
2 SPRAY, METERED NASAL 2 TIMES DAILY
Qty: 30 ML | Refills: 0 | Status: SHIPPED | OUTPATIENT
Start: 2024-02-23

## 2024-02-23 ASSESSMENT — ENCOUNTER SYMPTOMS
SINUS COMPLAINT: 1
SHORTNESS OF BREATH: 1
SINUS PRESSURE: 1
WHEEZING: 1
COUGH: 1
RHINORRHEA: 1
SINUS PAIN: 1

## 2024-04-16 ENCOUNTER — HOSPITAL ENCOUNTER (OUTPATIENT)
Age: 54
Discharge: HOME | End: 2024-04-16
Payer: MEDICARE

## 2024-04-16 DIAGNOSIS — M81.0 AGE-RELATED OSTEOPOROSIS WITHOUT CURRENT PATHOLOGICAL FRACTURE: ICD-10-CM

## 2024-04-16 DIAGNOSIS — E55.9 VITAMIN D DEFICIENCY: ICD-10-CM

## 2024-04-16 LAB
25(OH)D3 SERPL-MCNC: 60.6 NG/ML
EKG ATRIAL RATE: 83 BPM
EKG DIAGNOSIS: NORMAL
EKG P AXIS: 60 DEGREES
EKG P-R INTERVAL: 128 MS
EKG Q-T INTERVAL: 362 MS
EKG QRS DURATION: 90 MS
EKG QTC CALCULATION (BAZETT): 425 MS
EKG R AXIS: 46 DEGREES
EKG T AXIS: 59 DEGREES
EKG VENTRICULAR RATE: 83 BPM

## 2024-04-16 PROCEDURE — 82306 VITAMIN D 25 HYDROXY: CPT

## 2024-04-16 PROCEDURE — 36415 COLL VENOUS BLD VENIPUNCTURE: CPT

## 2024-04-16 PROCEDURE — 93010 ELECTROCARDIOGRAM REPORT: CPT | Performed by: INTERNAL MEDICINE

## 2024-04-16 PROCEDURE — 93005 ELECTROCARDIOGRAM TRACING: CPT | Performed by: NURSE PRACTITIONER

## 2024-05-13 ENCOUNTER — OFFICE VISIT (OUTPATIENT)
Dept: ENDOCRINOLOGY | Age: 54
End: 2024-05-13
Payer: MEDICARE

## 2024-05-13 VITALS
BODY MASS INDEX: 20.62 KG/M2 | HEART RATE: 78 BPM | OXYGEN SATURATION: 97 % | WEIGHT: 123.8 LBS | HEIGHT: 65 IN | SYSTOLIC BLOOD PRESSURE: 102 MMHG | RESPIRATION RATE: 15 BRPM | DIASTOLIC BLOOD PRESSURE: 68 MMHG

## 2024-05-13 DIAGNOSIS — M81.0 AGE-RELATED OSTEOPOROSIS WITHOUT CURRENT PATHOLOGICAL FRACTURE: Primary | ICD-10-CM

## 2024-05-13 DIAGNOSIS — E55.9 VITAMIN D DEFICIENCY: ICD-10-CM

## 2024-05-13 PROCEDURE — G8420 CALC BMI NORM PARAMETERS: HCPCS | Performed by: INTERNAL MEDICINE

## 2024-05-13 PROCEDURE — 4004F PT TOBACCO SCREEN RCVD TLK: CPT | Performed by: INTERNAL MEDICINE

## 2024-05-13 PROCEDURE — 3017F COLORECTAL CA SCREEN DOC REV: CPT | Performed by: INTERNAL MEDICINE

## 2024-05-13 PROCEDURE — G8427 DOCREV CUR MEDS BY ELIG CLIN: HCPCS | Performed by: INTERNAL MEDICINE

## 2024-05-13 PROCEDURE — 96372 THER/PROPH/DIAG INJ SC/IM: CPT | Performed by: INTERNAL MEDICINE

## 2024-05-13 PROCEDURE — 99214 OFFICE O/P EST MOD 30 MIN: CPT | Performed by: INTERNAL MEDICINE

## 2024-05-13 RX ORDER — ERGOCALCIFEROL 1.25 MG/1
50000 CAPSULE ORAL WEEKLY
Qty: 12 CAPSULE | Refills: 4 | Status: SHIPPED | OUTPATIENT
Start: 2024-05-13

## 2024-05-13 NOTE — PROGRESS NOTES
Subjective:     Interim:      He had  Prolia 4/22, got off schedule  Restarted 11/23  Taking vit D    No fractures since Prolia  Feels better on it  stable    Right hip replacement   Left in the past  States OA    Jaret COVARRUBIAS Patrickavery is a  who is here for evaluation of osteoporosis.    Patient is being seen at the request of  BENTLEY Bartlett MD     he was diagnosed with Osteoporsis in 2012 5/18/2020     FINDINGS:  LUMBAR SPINE:     The lumbar spine cannot be utilized as all the T-scores are greater than 1  discrepant from L3.     FOREARM:     The BMD of the middle third of the radius of the distal forearm equals 0.557  g/cm2. The T- and Z-scores are -2.3 and -4.4 standard deviations from the  mean.  This is within the osteopenia range by WHO criteria.     Since prior exam, there has been a 0.014g/cm2 decrease in bone mineral  density, corresponding to a 2.4% change, which is not significant.     RIGHT HIP:     The bone mineral density in the total hip is measured at 0.688 g/cm2  corresponding to a T-score of -2.1 and a Z-score of -2.0.  This is within the  osteopenia range by WHO criteria.     Since prior exam, there has been a 0.012g/cm2 decrease in bone mineral  density, corresponding to a 1.6% change, which is not significant.     The bone mineral density of the femoral neck is measured at 0.536 g/cm2  corresponding to a T-score of -2.8 and a Z-score of -2.2.  This is within the  osteoporosis range by WHO criteria.     IMPRESSION:  Osteoporosis by WHO criteria.     No significant interval change.5/18/2020       DEXA scan 1/17  LUMBAR SPINE:       The bone mineral density in the lumbar spine including the L1 through L3   levels is measured at 0.707 g/cm2, which corresponds to a T-score of -3.3 and   a Z-score of -3.0.  This is within the osteoporosis range by WHO criteria.       LEFT HIP:       The bone mineral density in the total hip is measured at 0.695 g/cm2   corresponding to a T-score of -2.2 and a Z-score

## 2024-05-13 NOTE — PROGRESS NOTES
Informed patient if any signs of redness,rash,swelling or unusual symptoms occur, please contact the office. Prolia given per physician order.Prolia supplied by the physician office.   None

## 2024-05-16 ENCOUNTER — TELEPHONE (OUTPATIENT)
Dept: INTERNAL MEDICINE CLINIC | Age: 54
End: 2024-05-16

## 2024-06-29 DIAGNOSIS — J30.1 SEASONAL ALLERGIC RHINITIS DUE TO POLLEN: ICD-10-CM

## 2024-07-01 RX ORDER — MONTELUKAST SODIUM 10 MG/1
TABLET ORAL
Qty: 30 TABLET | Refills: 1 | OUTPATIENT
Start: 2024-07-01

## 2024-07-03 DIAGNOSIS — J30.1 SEASONAL ALLERGIC RHINITIS DUE TO POLLEN: ICD-10-CM

## 2024-07-03 RX ORDER — MONTELUKAST SODIUM 10 MG/1
TABLET ORAL
Qty: 30 TABLET | Refills: 1 | OUTPATIENT
Start: 2024-07-03

## 2024-07-08 ENCOUNTER — TELEPHONE (OUTPATIENT)
Dept: CASE MANAGEMENT | Age: 54
End: 2024-07-08

## 2024-07-08 DIAGNOSIS — Z87.891 PERSONAL HISTORY OF TOBACCO USE, PRESENTING HAZARDS TO HEALTH: Primary | ICD-10-CM

## 2024-07-08 NOTE — TELEPHONE ENCOUNTER
Patient is overdue for appointment with me.  If he makes follow-up appointment then we can sign CT lung screening

## 2024-07-08 NOTE — TELEPHONE ENCOUNTER
BENTLEY Yeager Dr., MD,    Patient is due for their annual lung screen on 8/3/24.  For your convenience, I have pended the order for the scan.  If you do not agree with the need for the test, please cancel the order and let me know.      Patient will be mailed reminder letter to schedule.      Thank you,     Ann Ortiz  Lung Navigator  [facility]  [phone]    Future Appointments   Date Time Provider Department Center   11/18/2024 10:10 AM Gómez Fernandez MD [facility]       Last PCP Appt: 10/4/2023     Lung Screen Criteria  Age 50-80  Current smoker or quit within the last 15 years  Has => 20 pack year history.

## 2024-07-22 ENCOUNTER — OFFICE VISIT (OUTPATIENT)
Dept: INTERNAL MEDICINE CLINIC | Age: 54
End: 2024-07-22
Payer: MEDICARE

## 2024-07-22 VITALS
DIASTOLIC BLOOD PRESSURE: 78 MMHG | SYSTOLIC BLOOD PRESSURE: 132 MMHG | TEMPERATURE: 100 F | OXYGEN SATURATION: 95 % | BODY MASS INDEX: 20.79 KG/M2 | HEART RATE: 100 BPM | WEIGHT: 123.4 LBS

## 2024-07-22 DIAGNOSIS — Z23 NEED FOR PROPHYLACTIC VACCINATION AGAINST DIPHTHERIA-TETANUS-PERTUSSIS (DTP): Primary | ICD-10-CM

## 2024-07-22 DIAGNOSIS — Z12.11 COLON CANCER SCREENING: ICD-10-CM

## 2024-07-22 DIAGNOSIS — J43.2 CENTRILOBULAR EMPHYSEMA (HCC): ICD-10-CM

## 2024-07-22 DIAGNOSIS — F17.200 CURRENT SMOKER: ICD-10-CM

## 2024-07-22 DIAGNOSIS — Z23 NEED FOR PROPHYLACTIC VACCINATION AND INOCULATION AGAINST VARICELLA: ICD-10-CM

## 2024-07-22 DIAGNOSIS — J41.0 SIMPLE CHRONIC BRONCHITIS (HCC): ICD-10-CM

## 2024-07-22 DIAGNOSIS — J30.1 SEASONAL ALLERGIC RHINITIS DUE TO POLLEN: ICD-10-CM

## 2024-07-22 DIAGNOSIS — J44.1 COPD EXACERBATION (HCC): ICD-10-CM

## 2024-07-22 DIAGNOSIS — F11.20 NARCOTIC DEPENDENCY, CONTINUOUS (HCC): ICD-10-CM

## 2024-07-22 DIAGNOSIS — F33.0 MILD EPISODE OF RECURRENT MAJOR DEPRESSIVE DISORDER (HCC): ICD-10-CM

## 2024-07-22 PROCEDURE — 4004F PT TOBACCO SCREEN RCVD TLK: CPT | Performed by: INTERNAL MEDICINE

## 2024-07-22 PROCEDURE — 3023F SPIROM DOC REV: CPT | Performed by: INTERNAL MEDICINE

## 2024-07-22 PROCEDURE — G8420 CALC BMI NORM PARAMETERS: HCPCS | Performed by: INTERNAL MEDICINE

## 2024-07-22 PROCEDURE — 3017F COLORECTAL CA SCREEN DOC REV: CPT | Performed by: INTERNAL MEDICINE

## 2024-07-22 PROCEDURE — 99214 OFFICE O/P EST MOD 30 MIN: CPT | Performed by: INTERNAL MEDICINE

## 2024-07-22 PROCEDURE — G8427 DOCREV CUR MEDS BY ELIG CLIN: HCPCS | Performed by: INTERNAL MEDICINE

## 2024-07-22 RX ORDER — LORATADINE 10 MG/1
TABLET ORAL
Qty: 30 TABLET | Refills: 5 | Status: SHIPPED | OUTPATIENT
Start: 2024-07-22

## 2024-07-22 RX ORDER — TETANUS TOXOID, REDUCED DIPHTHERIA TOXOID AND ACELLULAR PERTUSSIS VACCINE, ADSORBED 5; 2.5; 8; 8; 2.5 [IU]/.5ML; [IU]/.5ML; UG/.5ML; UG/.5ML; UG/.5ML
0.5 SUSPENSION INTRAMUSCULAR ONCE
Qty: 0.5 ML | Refills: 0 | Status: SHIPPED | OUTPATIENT
Start: 2024-07-22 | End: 2024-07-22

## 2024-07-22 RX ORDER — MONTELUKAST SODIUM 10 MG/1
10 TABLET ORAL NIGHTLY
Qty: 30 TABLET | Refills: 5 | Status: SHIPPED | OUTPATIENT
Start: 2024-07-22

## 2024-07-22 RX ORDER — ALBUTEROL SULFATE 90 UG/1
2 AEROSOL, METERED RESPIRATORY (INHALATION) EVERY 4 HOURS PRN
Qty: 18 G | Refills: 5 | Status: SHIPPED | OUTPATIENT
Start: 2024-07-22

## 2024-07-22 ASSESSMENT — PATIENT HEALTH QUESTIONNAIRE - PHQ9
7. TROUBLE CONCENTRATING ON THINGS, SUCH AS READING THE NEWSPAPER OR WATCHING TELEVISION: NOT AT ALL
3. TROUBLE FALLING OR STAYING ASLEEP: SEVERAL DAYS
4. FEELING TIRED OR HAVING LITTLE ENERGY: SEVERAL DAYS
10. IF YOU CHECKED OFF ANY PROBLEMS, HOW DIFFICULT HAVE THESE PROBLEMS MADE IT FOR YOU TO DO YOUR WORK, TAKE CARE OF THINGS AT HOME, OR GET ALONG WITH OTHER PEOPLE: SOMEWHAT DIFFICULT
9. THOUGHTS THAT YOU WOULD BE BETTER OFF DEAD, OR OF HURTING YOURSELF: NOT AT ALL
8. MOVING OR SPEAKING SO SLOWLY THAT OTHER PEOPLE COULD HAVE NOTICED. OR THE OPPOSITE - BEING SO FIDGETY OR RESTLESS THAT YOU HAVE BEEN MOVING AROUND A LOT MORE THAN USUAL: NOT AT ALL
6. FEELING BAD ABOUT YOURSELF - OR THAT YOU ARE A FAILURE OR HAVE LET YOURSELF OR YOUR FAMILY DOWN: NOT AT ALL
1. LITTLE INTEREST OR PLEASURE IN DOING THINGS: NOT AT ALL
SUM OF ALL RESPONSES TO PHQ QUESTIONS 1-9: 2
2. FEELING DOWN, DEPRESSED OR HOPELESS: NOT AT ALL
5. POOR APPETITE OR OVEREATING: NOT AT ALL

## 2024-07-22 ASSESSMENT — ENCOUNTER SYMPTOMS
PHOTOPHOBIA: 0
ABDOMINAL PAIN: 0
TROUBLE SWALLOWING: 0
VOICE CHANGE: 0
CHEST TIGHTNESS: 0
VOMITING: 0
NAUSEA: 0

## 2024-07-22 NOTE — PROGRESS NOTES
Jaret Rubio  1970  male  54 y.o.    SUBJECTIVE:       Chief Complaint   Patient presents with    Medication Refill     Refused to do AWV       HPI:    Patient with history of COPD and reactive airway disease as well as recurrent sinus and allergy symptoms, requesting refill of multiple medications.  Last seen by me in October of last year  Patient denies any worsening symptoms from baseline.  He uses albuterol few times a month.  Patient report Trelegy  helped him tremendously     denies night symptoms of cough and wheezing  He feels Singulair also  helped him tremendously as well      Past Medical History:   Diagnosis Date    Allergic sinusitis     Anxiety and depression     currently under care of pyschiatrist    COPD (chronic obstructive pulmonary disease) (HCC)     Hip pain 09/05/2017    ? CAM possible bilateral superior femoral head-heck junction bump suggestive of femoro acetabular impingement syndrome    Insomnia     Localized edema 02/24/2018    Neg abd ultrasound and DVT    Lumbar post-laminectomy syndrome     OA (osteoarthritis) of knee     Osteoporosis     Spontaneous pneumothorax     1991    Testicular torsion     Wears dentures     Wears glasses      Past Surgical History:   Procedure Laterality Date    BACK SURGERY  12/26/2016    1st rods, 2nd tens unit 3rd rods and screw removed, only has cages now    TESTICLE TORSION REDUCTION      x 2    TOTAL HIP ARTHROPLASTY Left 1/29/2019    TOTAL HIP REPLACEMENT LEFT HIP (ADVANCED) performed by Rudy Liao MD at [facility] OR    TOTAL HIP ARTHROPLASTY Right 11/30/2020    TOTAL RIGHT HIP REPLACEMENT performed by Rudy Liao MD at [facility] OR     Social History     Socioeconomic History    Marital status:      Spouse name: None    Number of children: None    Years of education: None    Highest education level: None   Occupational History    Occupation: unemployed     Comment: [employer], [employer],   Tobacco Use    Smoking status: Every Day

## 2024-08-08 NOTE — TELEPHONE ENCOUNTER
Leodan Bartlett,    It looks like Mr. Rubio was seen last seen on 7/22/24 in your office.  Please advise if you wish to order the lung screen or discontinue to close the encounter.    Thanks    Ann Ortiz RN  Lung Navigator  [address] [address] [phone]  [email or web address]

## 2024-08-14 LAB — NONINV COLON CA DNA+OCC BLD SCRN STL QL: NEGATIVE

## 2024-08-27 ENCOUNTER — TELEPHONE (OUTPATIENT)
Dept: INTERNAL MEDICINE CLINIC | Age: 54
End: 2024-08-27

## 2024-09-16 ENCOUNTER — TELEPHONE (OUTPATIENT)
Dept: CASE MANAGEMENT | Age: 54
End: 2024-09-16

## 2024-09-20 DIAGNOSIS — J30.1 SEASONAL ALLERGIC RHINITIS DUE TO POLLEN: ICD-10-CM

## 2024-09-20 RX ORDER — MONTELUKAST SODIUM 10 MG/1
10 TABLET ORAL NIGHTLY
Qty: 90 TABLET | Refills: 1 | Status: SHIPPED | OUTPATIENT
Start: 2024-09-20

## 2024-10-10 ENCOUNTER — TELEPHONE (OUTPATIENT)
Dept: CASE MANAGEMENT | Age: 54
End: 2024-10-10

## 2024-10-10 NOTE — TELEPHONE ENCOUNTER
Patient meets lung screening criteria. Patient due for annual CT Lung Screening. Final reminder letter mailed. If ordered, Patient may call [phone] to schedule.     Lung Screen Criteria  Age 50-80  Current smoker or quit within the last 15 years  Has => 20 pack year history.    Future Appointments   Date Time Provider Department Center   11/18/2024 10:10 AM Gómez Fernandez MD [facility]       Active Lung Screen order on chart.

## 2024-11-18 ENCOUNTER — OFFICE VISIT (OUTPATIENT)
Dept: ENDOCRINOLOGY | Age: 54
End: 2024-11-18
Payer: MEDICARE

## 2024-11-18 VITALS
RESPIRATION RATE: 15 BRPM | WEIGHT: 127 LBS | HEART RATE: 75 BPM | BODY MASS INDEX: 21.16 KG/M2 | DIASTOLIC BLOOD PRESSURE: 77 MMHG | SYSTOLIC BLOOD PRESSURE: 125 MMHG | HEIGHT: 65 IN | OXYGEN SATURATION: 97 %

## 2024-11-18 DIAGNOSIS — E55.9 VITAMIN D DEFICIENCY: ICD-10-CM

## 2024-11-18 DIAGNOSIS — M81.0 AGE-RELATED OSTEOPOROSIS WITHOUT CURRENT PATHOLOGICAL FRACTURE: Primary | ICD-10-CM

## 2024-11-18 PROCEDURE — G8484 FLU IMMUNIZE NO ADMIN: HCPCS | Performed by: INTERNAL MEDICINE

## 2024-11-18 PROCEDURE — 99214 OFFICE O/P EST MOD 30 MIN: CPT | Performed by: INTERNAL MEDICINE

## 2024-11-18 PROCEDURE — 96372 THER/PROPH/DIAG INJ SC/IM: CPT | Performed by: INTERNAL MEDICINE

## 2024-11-18 PROCEDURE — 4004F PT TOBACCO SCREEN RCVD TLK: CPT | Performed by: INTERNAL MEDICINE

## 2024-11-18 PROCEDURE — G8427 DOCREV CUR MEDS BY ELIG CLIN: HCPCS | Performed by: INTERNAL MEDICINE

## 2024-11-18 PROCEDURE — 3017F COLORECTAL CA SCREEN DOC REV: CPT | Performed by: INTERNAL MEDICINE

## 2024-11-18 PROCEDURE — G8420 CALC BMI NORM PARAMETERS: HCPCS | Performed by: INTERNAL MEDICINE

## 2024-11-18 RX ORDER — ERGOCALCIFEROL 1.25 MG/1
50000 CAPSULE, LIQUID FILLED ORAL WEEKLY
Qty: 12 CAPSULE | Refills: 4 | Status: SHIPPED | OUTPATIENT
Start: 2024-11-18

## 2024-11-18 NOTE — PROGRESS NOTES
Subjective:     Interim:    States can tell when injection due  Drained and aches    He had  Prolia 4/22, got off schedule  Restarted 11/23  Taking vit D    No fractures since Prolia  Feels better on it  stable    Right hip replacement   Left in the past  States OA    Jaret Rubio is a  who is here for evaluation of osteoporosis.    Patient is being seen at the request of  BENTLEY Bartlett MD     he was diagnosed with Osteoporsis in 2012 5/18/2020     FINDINGS:  LUMBAR SPINE:     The lumbar spine cannot be utilized as all the T-scores are greater than 1  discrepant from L3.     FOREARM:     The BMD of the middle third of the radius of the distal forearm equals 0.557  g/cm2. The T- and Z-scores are -2.3 and -4.4 standard deviations from the  mean.  This is within the osteopenia range by WHO criteria.     Since prior exam, there has been a 0.014g/cm2 decrease in bone mineral  density, corresponding to a 2.4% change, which is not significant.     RIGHT HIP:     The bone mineral density in the total hip is measured at 0.688 g/cm2  corresponding to a T-score of -2.1 and a Z-score of -2.0.  This is within the  osteopenia range by WHO criteria.     Since prior exam, there has been a 0.012g/cm2 decrease in bone mineral  density, corresponding to a 1.6% change, which is not significant.     The bone mineral density of the femoral neck is measured at 0.536 g/cm2  corresponding to a T-score of -2.8 and a Z-score of -2.2.  This is within the  osteoporosis range by WHO criteria.     IMPRESSION:  Osteoporosis by WHO criteria.     No significant interval change.5/18/2020       DEXA scan 1/17  LUMBAR SPINE:       The bone mineral density in the lumbar spine including the L1 through L3   levels is measured at 0.707 g/cm2, which corresponds to a T-score of -3.3 and   a Z-score of -3.0.  This is within the osteoporosis range by WHO criteria.       LEFT HIP:       The bone mineral density in the total hip is measured at 0.695

## 2025-02-20 ENCOUNTER — HOSPITAL ENCOUNTER (OUTPATIENT)
Age: 55
Discharge: HOME | End: 2025-02-20

## 2025-02-20 LAB
EKG ATRIAL RATE: 85 BPM
EKG DIAGNOSIS: NORMAL
EKG P AXIS: 73 DEGREES
EKG P-R INTERVAL: 120 MS
EKG Q-T INTERVAL: 384 MS
EKG QRS DURATION: 88 MS
EKG QTC CALCULATION (BAZETT): 456 MS
EKG R AXIS: 45 DEGREES
EKG T AXIS: 61 DEGREES
EKG VENTRICULAR RATE: 85 BPM

## 2025-04-30 NOTE — TELEPHONE ENCOUNTER
MEDICATION ISSUES    ALL MEDICATION REFILLS NEED 48-HOUR ADVANCED NOTICE - Not filled on weekends or holidays.      Issue:  Medication Refill   Requesting Refill: Yes    Medication requested: Morphine   Reporting Side effects: No / If other, Describe: N/A   Last date filled: 6/7/18   OARRS done: Yes - scanned, please review    Follow-up Appointment date: Yes, 8/7/18 05-May-2025

## 2025-05-16 DIAGNOSIS — J30.1 SEASONAL ALLERGIC RHINITIS DUE TO POLLEN: ICD-10-CM

## 2025-05-16 RX ORDER — MONTELUKAST SODIUM 10 MG/1
TABLET ORAL
Qty: 90 TABLET | Refills: 1 | Status: SHIPPED | OUTPATIENT
Start: 2025-05-16

## 2025-05-16 NOTE — TELEPHONE ENCOUNTER
Received refill request for montelukast (SINGULAIR) 10 MG tablet  from [facility]  pharmacy.     Last OV: 07/22/24    Next OV: none    Last Labs: 04/16/24    Last Filled: 09/20/24

## 2025-05-18 ASSESSMENT — PATIENT HEALTH QUESTIONNAIRE - PHQ9
10. IF YOU CHECKED OFF ANY PROBLEMS, HOW DIFFICULT HAVE THESE PROBLEMS MADE IT FOR YOU TO DO YOUR WORK, TAKE CARE OF THINGS AT HOME, OR GET ALONG WITH OTHER PEOPLE: SOMEWHAT DIFFICULT
8. MOVING OR SPEAKING SO SLOWLY THAT OTHER PEOPLE COULD HAVE NOTICED. OR THE OPPOSITE, BEING SO FIGETY OR RESTLESS THAT YOU HAVE BEEN MOVING AROUND A LOT MORE THAN USUAL: NOT AT ALL
6. FEELING BAD ABOUT YOURSELF - OR THAT YOU ARE A FAILURE OR HAVE LET YOURSELF OR YOUR FAMILY DOWN: NOT AT ALL
SUM OF ALL RESPONSES TO PHQ QUESTIONS 1-9: 2
3. TROUBLE FALLING OR STAYING ASLEEP: SEVERAL DAYS
4. FEELING TIRED OR HAVING LITTLE ENERGY: SEVERAL DAYS
8. MOVING OR SPEAKING SO SLOWLY THAT OTHER PEOPLE COULD HAVE NOTICED. OR THE OPPOSITE - BEING SO FIDGETY OR RESTLESS THAT YOU HAVE BEEN MOVING AROUND A LOT MORE THAN USUAL: NOT AT ALL
7. TROUBLE CONCENTRATING ON THINGS, SUCH AS READING THE NEWSPAPER OR WATCHING TELEVISION: NOT AT ALL
SUM OF ALL RESPONSES TO PHQ QUESTIONS 1-9: 2
5. POOR APPETITE OR OVEREATING: NOT AT ALL
1. LITTLE INTEREST OR PLEASURE IN DOING THINGS: NOT AT ALL
SUM OF ALL RESPONSES TO PHQ QUESTIONS 1-9: 2
1. LITTLE INTEREST OR PLEASURE IN DOING THINGS: NOT AT ALL
SUM OF ALL RESPONSES TO PHQ QUESTIONS 1-9: 2
4. FEELING TIRED OR HAVING LITTLE ENERGY: SEVERAL DAYS
2. FEELING DOWN, DEPRESSED OR HOPELESS: NOT AT ALL
SUM OF ALL RESPONSES TO PHQ QUESTIONS 1-9: 2
2. FEELING DOWN, DEPRESSED OR HOPELESS: NOT AT ALL
5. POOR APPETITE OR OVEREATING: NOT AT ALL
9. THOUGHTS THAT YOU WOULD BE BETTER OFF DEAD, OR OF HURTING YOURSELF: NOT AT ALL
7. TROUBLE CONCENTRATING ON THINGS, SUCH AS READING THE NEWSPAPER OR WATCHING TELEVISION: NOT AT ALL
9. THOUGHTS THAT YOU WOULD BE BETTER OFF DEAD, OR OF HURTING YOURSELF: NOT AT ALL
10. IF YOU CHECKED OFF ANY PROBLEMS, HOW DIFFICULT HAVE THESE PROBLEMS MADE IT FOR YOU TO DO YOUR WORK, TAKE CARE OF THINGS AT HOME, OR GET ALONG WITH OTHER PEOPLE: SOMEWHAT DIFFICULT
6. FEELING BAD ABOUT YOURSELF - OR THAT YOU ARE A FAILURE OR HAVE LET YOURSELF OR YOUR FAMILY DOWN: NOT AT ALL
3. TROUBLE FALLING OR STAYING ASLEEP: SEVERAL DAYS

## 2025-05-19 ENCOUNTER — TELEPHONE (OUTPATIENT)
Dept: ENDOCRINOLOGY | Age: 55
End: 2025-05-19

## 2025-05-19 ENCOUNTER — OFFICE VISIT (OUTPATIENT)
Dept: ENDOCRINOLOGY | Age: 55
End: 2025-05-19

## 2025-05-19 VITALS
HEART RATE: 92 BPM | BODY MASS INDEX: 21.33 KG/M2 | HEIGHT: 65 IN | RESPIRATION RATE: 16 BRPM | DIASTOLIC BLOOD PRESSURE: 87 MMHG | SYSTOLIC BLOOD PRESSURE: 142 MMHG | WEIGHT: 128 LBS

## 2025-05-19 DIAGNOSIS — E55.9 VITAMIN D DEFICIENCY: ICD-10-CM

## 2025-05-19 DIAGNOSIS — R53.83 OTHER FATIGUE: ICD-10-CM

## 2025-05-19 DIAGNOSIS — M81.0 AGE-RELATED OSTEOPOROSIS WITHOUT CURRENT PATHOLOGICAL FRACTURE: Primary | ICD-10-CM

## 2025-05-19 DIAGNOSIS — M81.0 AGE-RELATED OSTEOPOROSIS WITHOUT CURRENT PATHOLOGICAL FRACTURE: ICD-10-CM

## 2025-05-19 NOTE — TELEPHONE ENCOUNTER
He is due for his prolia, so will need it. If the prolia is approved then he can keep appointment otherwise will need to reschedule

## 2025-05-19 NOTE — TELEPHONE ENCOUNTER
Patient states that he didn't get the dexo scan done for his appointment today because he doesn't have a car he wonder should he still come in today for his appointment

## 2025-05-19 NOTE — PROGRESS NOTES
Patient presents for a prolia injection. Pt denies any prior adverse reactions. Prolia 60 mg given SubQ in the right arm with no complications. Patient tolerated well. Patient to return to office in 6 months for next injection.     Lot: [id number]  Exp: 7/31/27  NDC: 97439-358-31  
Insight:  judgement and insight appear normal  Neuro: Normal without focal findings, speech is normal normal, speech is spontaneous  Chest: No labored breathing, no chest deformity, no stridor  Musculoskeletal: No joint deformity, swelling        Lab Review  Lab Results   Component Value Date/Time    TSH 1.89 10/05/2023 09:48 AM     No results found for: \"FREET4\"      Assessment:     1.Osteoporosis: H/o smoking, +FH, failure to reach peak bone mass, are risk factors.Testosterone was normal, celiac panel normal.No use of steroids . Normal Ca and alkaline phosphatase . Suspicion for Cushing's is low. Nl  24 hour urine Ca. Do not suspect systemic mastocytosis.   Was On fosamax for about 5 years, needed anti resorptive therapy given low BMD, switched to Prolia, took irregularly as previously  insurance not covering. Finally approved, restarted 4/21, missed 10.21, took 4.22, restarted 11.23  Dexa 3.23 shows improvement in BMD,8.5% at spine,  in osteopenia range  Off prolia as states somehow was not scheduled. Restarted 11/23  Advised needs to be on it every 6 months  Dexa due  Spine and distal forearm checked due to h.o hip replacement  2.Chronic Back pain  3.COPD  4.Tobacco Abuse: recommend cessation, discussed effects on BMD.He had restarted  5.Vit D deficiency: Restarted Vitamin D, level Improved    Plan:     Prolia 60mg  Discussed management of osteoporosis in detail.  Baseline work-up showed normal CBC, renal and liver function.   Discussed treatment options  Told him that given high risk of fracture and osteoporosis,  needs Tx,prolia as had been on it and no new fractures  Discussed the side effects including Osteonecrosis of the jaw and Atypical fractures. Patient understands that the risk is low.   Calcium supplement 500mg daily

## 2025-05-20 ENCOUNTER — RESULTS FOLLOW-UP (OUTPATIENT)
Dept: ENDOCRINOLOGY | Age: 55
End: 2025-05-20

## 2025-05-20 DIAGNOSIS — E87.5 HYPERKALEMIA: Primary | ICD-10-CM

## 2025-05-20 LAB
25(OH)D3 SERPL-MCNC: 44.9 NG/ML
ANION GAP SERPL CALCULATED.3IONS-SCNC: 8 MMOL/L (ref 3–16)
BUN SERPL-MCNC: 8 MG/DL (ref 7–20)
CALCIUM SERPL-MCNC: 10.4 MG/DL (ref 8.3–10.6)
CHLORIDE SERPL-SCNC: 103 MMOL/L (ref 99–110)
CO2 SERPL-SCNC: 29 MMOL/L (ref 21–32)
CREAT SERPL-MCNC: 1 MG/DL (ref 0.9–1.3)
GFR SERPLBLD CREATININE-BSD FMLA CKD-EPI: 89 ML/MIN/{1.73_M2}
GLUCOSE SERPL-MCNC: 93 MG/DL (ref 70–99)
POTASSIUM SERPL-SCNC: 5.8 MMOL/L (ref 3.5–5.1)
SODIUM SERPL-SCNC: 140 MMOL/L (ref 136–145)
TSH SERPL DL<=0.005 MIU/L-ACNC: 0.67 UIU/ML (ref 0.27–4.2)

## 2025-05-22 ENCOUNTER — OFFICE VISIT (OUTPATIENT)
Dept: INTERNAL MEDICINE CLINIC | Age: 55
End: 2025-05-22

## 2025-05-22 ENCOUNTER — OFFICE VISIT (OUTPATIENT)
Dept: INTERNAL MEDICINE CLINIC | Age: 55
End: 2025-05-22
Payer: MEDICARE

## 2025-05-22 VITALS
BODY MASS INDEX: 22.32 KG/M2 | OXYGEN SATURATION: 99 % | DIASTOLIC BLOOD PRESSURE: 88 MMHG | SYSTOLIC BLOOD PRESSURE: 138 MMHG | WEIGHT: 132 LBS | HEART RATE: 97 BPM

## 2025-05-22 VITALS
OXYGEN SATURATION: 99 % | BODY MASS INDEX: 22.53 KG/M2 | WEIGHT: 132 LBS | HEART RATE: 97 BPM | SYSTOLIC BLOOD PRESSURE: 138 MMHG | DIASTOLIC BLOOD PRESSURE: 88 MMHG | HEIGHT: 64 IN

## 2025-05-22 DIAGNOSIS — J43.2 CENTRILOBULAR EMPHYSEMA (HCC): Primary | ICD-10-CM

## 2025-05-22 DIAGNOSIS — F41.9 ANXIETY: ICD-10-CM

## 2025-05-22 DIAGNOSIS — Z12.5 SCREENING FOR PROSTATE CANCER: ICD-10-CM

## 2025-05-22 DIAGNOSIS — M96.1 POSTLAMINECTOMY SYNDROME, LUMBAR: ICD-10-CM

## 2025-05-22 DIAGNOSIS — F17.200 CURRENT SMOKER: ICD-10-CM

## 2025-05-22 DIAGNOSIS — Z00.00 INITIAL MEDICARE ANNUAL WELLNESS VISIT: Primary | ICD-10-CM

## 2025-05-22 DIAGNOSIS — Z87.891 PERSONAL HISTORY OF TOBACCO USE: ICD-10-CM

## 2025-05-22 DIAGNOSIS — D72.829 LEUKOCYTOSIS, UNSPECIFIED TYPE: ICD-10-CM

## 2025-05-22 DIAGNOSIS — E87.5 SERUM POTASSIUM ELEVATED: ICD-10-CM

## 2025-05-22 DIAGNOSIS — F11.20 NARCOTIC DEPENDENCY, CONTINUOUS (HCC): ICD-10-CM

## 2025-05-22 DIAGNOSIS — F11.90 CHRONIC, CONTINUOUS USE OF OPIOIDS: ICD-10-CM

## 2025-05-22 PROCEDURE — G0296 VISIT TO DETERM LDCT ELIG: HCPCS | Performed by: INTERNAL MEDICINE

## 2025-05-22 PROCEDURE — 3017F COLORECTAL CA SCREEN DOC REV: CPT | Performed by: INTERNAL MEDICINE

## 2025-05-22 PROCEDURE — G0438 PPPS, INITIAL VISIT: HCPCS | Performed by: INTERNAL MEDICINE

## 2025-05-22 SDOH — ECONOMIC STABILITY: FOOD INSECURITY: WITHIN THE PAST 12 MONTHS, YOU WORRIED THAT YOUR FOOD WOULD RUN OUT BEFORE YOU GOT MONEY TO BUY MORE.: NEVER TRUE

## 2025-05-22 SDOH — ECONOMIC STABILITY: FOOD INSECURITY: WITHIN THE PAST 12 MONTHS, THE FOOD YOU BOUGHT JUST DIDN'T LAST AND YOU DIDN'T HAVE MONEY TO GET MORE.: NEVER TRUE

## 2025-05-22 ASSESSMENT — PATIENT HEALTH QUESTIONNAIRE - PHQ9
SUM OF ALL RESPONSES TO PHQ QUESTIONS 1-9: 3
SUM OF ALL RESPONSES TO PHQ QUESTIONS 1-9: 3
2. FEELING DOWN, DEPRESSED OR HOPELESS: SEVERAL DAYS
SUM OF ALL RESPONSES TO PHQ QUESTIONS 1-9: 3
SUM OF ALL RESPONSES TO PHQ QUESTIONS 1-9: 3
5. POOR APPETITE OR OVEREATING: NOT AT ALL
1. LITTLE INTEREST OR PLEASURE IN DOING THINGS: SEVERAL DAYS
3. TROUBLE FALLING OR STAYING ASLEEP: NOT AT ALL
10. IF YOU CHECKED OFF ANY PROBLEMS, HOW DIFFICULT HAVE THESE PROBLEMS MADE IT FOR YOU TO DO YOUR WORK, TAKE CARE OF THINGS AT HOME, OR GET ALONG WITH OTHER PEOPLE: SOMEWHAT DIFFICULT
9. THOUGHTS THAT YOU WOULD BE BETTER OFF DEAD, OR OF HURTING YOURSELF: NOT AT ALL
7. TROUBLE CONCENTRATING ON THINGS, SUCH AS READING THE NEWSPAPER OR WATCHING TELEVISION: NOT AT ALL
6. FEELING BAD ABOUT YOURSELF - OR THAT YOU ARE A FAILURE OR HAVE LET YOURSELF OR YOUR FAMILY DOWN: NOT AT ALL
4. FEELING TIRED OR HAVING LITTLE ENERGY: SEVERAL DAYS
8. MOVING OR SPEAKING SO SLOWLY THAT OTHER PEOPLE COULD HAVE NOTICED. OR THE OPPOSITE, BEING SO FIGETY OR RESTLESS THAT YOU HAVE BEEN MOVING AROUND A LOT MORE THAN USUAL: NOT AT ALL

## 2025-05-22 ASSESSMENT — LIFESTYLE VARIABLES
HOW MANY STANDARD DRINKS CONTAINING ALCOHOL DO YOU HAVE ON A TYPICAL DAY: PATIENT DOES NOT DRINK
HOW OFTEN DO YOU HAVE A DRINK CONTAINING ALCOHOL: NEVER

## 2025-05-22 ASSESSMENT — ENCOUNTER SYMPTOMS
VOMITING: 0
CHEST TIGHTNESS: 0
ABDOMINAL PAIN: 0
TROUBLE SWALLOWING: 0
NAUSEA: 0
VOICE CHANGE: 0
PHOTOPHOBIA: 0

## 2025-05-22 NOTE — PATIENT INSTRUCTIONS
call for help?  Watch closely for changes in your health, and be sure to contact your doctor if:    You have new symptoms such as fever or a rash.     Your fatigue gets worse.     You have been feeling down, depressed, or hopeless. Or you may have lost interest in things that you usually enjoy.     You are not getting better as expected.   Where can you learn more?  Go to https://www.[employer].com.net/patientEd and enter W864 to learn more about \"Fatigue: Care Instructions.\"  Current as of: July 31, 2024  Content Version: 14.4  © 0597-4153 [employer].   Care instructions adapted under license by [employer]. If you have questions about a medical condition or this instruction, always ask your healthcare professional. [employer], disclaims any warranty or liability for your use of this information.         Learning About Stress  What is stress?     Stress is your body's response to a hard situation. Your body can have a physical, emotional, or mental response. Stress is a fact of life for most people, and it affects everyone differently. What causes stress for you may not be stressful for someone else.  A lot of things can cause stress. You may feel stress when you go on a job interview, take a test, or run a race. This kind of short-term stress is normal and even useful. It can help you if you need to work hard or react quickly. For example, stress can help you finish an important job on time.  Long-term stress is caused by ongoing stressful situations or events. Examples of long-term stress include long-term health problems, ongoing problems at work, or conflicts in your family. Long-term stress can harm your health.  How does stress affect your health?  When you are stressed, your body responds as though you are in danger. It makes hormones that speed up your heart, make you breathe faster, and give you a burst of energy. This is called the fight-or-flight stress response. If the stress is

## 2025-05-22 NOTE — PROGRESS NOTES
Medicare Annual Wellness Visit    Jaret Rubio is here for Medicare AWV    Assessment & Plan   Initial Medicare annual wellness visit  Personal history of tobacco use  -     OR VISIT TO DISCUSS LUNG CA SCREEN W LDCT  -     CT Lung Screen (Initial/Annual/Baseline); Future     Return in 1 year (on 5/22/2026) for Medicare AWV.     Subjective   Discuss with patient the importance of the recommended CARE GAPS of: SHINGLES, COVID-19, HEPATITS B, and DTAP vaccinations. Patient states that he will not get any more COVID vaccinations and will think about getting the other vaccinations completed and also knows that vaccinations has to be completed at his local pharmacy.     Patient's complete Health Risk Assessment and screening values have been reviewed and are found in Flowsheets. The following problems were reviewed today and where indicated follow up appointments were made and/or referrals ordered.    Positive Risk Factor Screenings with Interventions:    Fall Risk:  Do you feel unsteady or are you worried about falling? : (!) yes  2 or more falls in past year?: no  Fall with injury in past year?: no     Interventions:    Reviewed medications, home hazards, visual acuity, and co-morbidities that can increase risk for falls  See AVS for additional education material        Controlled Medication Review:      Today's Pain Level: No data recorded     Opioid Risk: (High risk score >=55) Opioid risk score: The patient doesn't have any registry metric data available      Last PDMP Ricky as Reviewed:  Review User Review Instant Review Result   MD BRIEN HAIRSTON 5/22/2025  5:22 PM @   Reviewed PDMP [1]     Last Controlled Substance Monitoring Documentation      Flowsheet Row Office Visit from 5/22/2025 in [facility]   Periodic Controlled Substance Monitoring No signs of potential drug abuse or diversion identified., Possible medication side effects, risk of tolerance/dependence & alternative

## 2025-05-22 NOTE — PATIENT INSTRUCTIONS
follow-up, he or she will help you understand what to do next.  After a lung cancer screening, you can go back to your usual activities right away.  A lung cancer screening test can't tell if you have lung cancer. If your results are positive, your doctor can't tell whether an abnormal finding is a harmless nodule, cancer, or something else without doing more tests.  What can you do to help prevent lung cancer?  Some lung cancers can't be prevented. But if you smoke, quitting smoking is the best step you can take to prevent lung cancer. If you want to quit, your doctor can recommend medicines or other ways to help.  Follow-up care is a key part of your treatment and safety. Be sure to make and go to all appointments, and call your doctor if you are having problems. It's also a good idea to know your test results and keep a list of the medicines you take.  Where can you learn more?  Go to https://www.[employer].net/patientEd and enter Q940 to learn more about \"Learning About Lung Cancer Screening.\"  Current as of: October 25, 2024  Content Version: 14.4  © 5608-8190 [employer].   Care instructions adapted under license by [employer]. If you have questions about a medical condition or this instruction, always ask your healthcare professional. [employer], disclaims any warranty or liability for your use of this information.

## 2025-05-22 NOTE — PROGRESS NOTES
symptoms of lung cancer?:   No     Is this the first (baseline) CT or an annual exam?:   Annual [2]     Is this a low dose CT or a routine CT?:   Low Dose CT [1]     Smoking Status?:   Light Smoker [10]     Pack Years:   35    PSA Screening     Standing Status:   Future     Expected Date:   5/22/2025     Expiration Date:   5/22/2026    CBC with Auto Differential     Standing Status:   Future     Expected Date:   5/22/2025     Expiration Date:   5/22/2026    Hepatic Function Panel     Standing Status:   Future     Expected Date:   5/22/2025     Expiration Date:   5/22/2026    Potassium     Standing Status:   Future     Expected Date:   5/22/2025     Expiration Date:   5/22/2026    NV VISIT TO DISCUSS LUNG CA SCREEN W LDCT     Current Outpatient Medications   Medication Sig Dispense Refill    montelukast (SINGULAIR) 10 MG tablet TAKE 1 TABLET BY MOUTH ONCE NIGHTLY (Patient taking differently: Take 1 tablet by mouth every morning) 90 tablet 1    vitamin D (ERGOCALCIFEROL) 1.25 MG (53737 UT) CAPS capsule Take 1 capsule by mouth once a week 12 capsule 4    loratadine (CLARITIN) 10 MG tablet TAKE ONE TABLET BY MOUTH EVERY EVENING 30 tablet 5    albuterol sulfate HFA (PROVENTIL;VENTOLIN;PROAIR) 108 (90 Base) MCG/ACT inhaler Inhale 2 puffs into the lungs every 4 hours as needed for Wheezing 18 g 5    fluticasone-umeclidin-vilant (TRELEGY ELLIPTA) 200-62.5-25 MCG/ACT AEPB inhaler Inhale 1 puff into the lungs daily 1 each 5    methadone (DOLOPHINE) 5 MG tablet Take 1 tablet by mouth in the morning and at bedtime.  0    tiZANidine (ZANAFLEX) 4 MG tablet Take 0.5 tablets by mouth every 12 hours as needed      ibuprofen (ADVIL;MOTRIN) 200 MG tablet Take 1 tablet by mouth every 6 hours as needed for Pain      melatonin (RA MELATONIN) 3 MG TABS tablet Take 1 tablet by mouth daily 30 tablet 2    ipratropium-albuterol (DUONEB) 0.5-2.5 (3) MG/3ML SOLN nebulizer solution Inhale 3 mLs into the lungs every 6 hours as needed for

## 2025-05-26 DIAGNOSIS — J41.0 SIMPLE CHRONIC BRONCHITIS (HCC): ICD-10-CM

## 2025-05-26 DIAGNOSIS — J30.1 SEASONAL ALLERGIC RHINITIS DUE TO POLLEN: ICD-10-CM

## 2025-05-26 DIAGNOSIS — J43.2 CENTRILOBULAR EMPHYSEMA (HCC): ICD-10-CM

## 2025-05-27 RX ORDER — FLUTICASONE FUROATE, UMECLIDINIUM BROMIDE AND VILANTEROL TRIFENATATE 200; 62.5; 25 UG/1; UG/1; UG/1
1 POWDER RESPIRATORY (INHALATION) DAILY
Qty: 3 EACH | Refills: 1 | Status: SHIPPED | OUTPATIENT
Start: 2025-05-27

## 2025-07-24 ENCOUNTER — HOSPITAL ENCOUNTER (OUTPATIENT)
Age: 55
Discharge: HOME | End: 2025-07-24
Attending: INTERNAL MEDICINE
Payer: MEDICARE

## 2025-07-24 ENCOUNTER — RESULTS FOLLOW-UP (OUTPATIENT)
Dept: INTERNAL MEDICINE CLINIC | Age: 55
End: 2025-07-24

## 2025-07-24 ENCOUNTER — HOSPITAL ENCOUNTER (OUTPATIENT)
Dept: CT IMAGING | Age: 55
Discharge: HOME | End: 2025-07-24
Attending: INTERNAL MEDICINE
Payer: MEDICARE

## 2025-07-24 DIAGNOSIS — F11.20 NARCOTIC DEPENDENCY, CONTINUOUS (HCC): ICD-10-CM

## 2025-07-24 DIAGNOSIS — Z87.891 PERSONAL HISTORY OF TOBACCO USE: ICD-10-CM

## 2025-07-24 DIAGNOSIS — E87.5 SERUM POTASSIUM ELEVATED: ICD-10-CM

## 2025-07-24 DIAGNOSIS — J43.2 CENTRILOBULAR EMPHYSEMA (HCC): ICD-10-CM

## 2025-07-24 DIAGNOSIS — Z12.5 SCREENING FOR PROSTATE CANCER: ICD-10-CM

## 2025-07-24 LAB
ALBUMIN SERPL-MCNC: 4.2 G/DL (ref 3.4–5)
ALP SERPL-CCNC: 100 U/L (ref 40–129)
ALT SERPL-CCNC: 24 U/L (ref 10–40)
AST SERPL-CCNC: 26 U/L (ref 15–37)
BASOPHILS # BLD: 0 K/UL (ref 0–0.2)
BASOPHILS NFR BLD: 0.5 %
BILIRUB DIRECT SERPL-MCNC: <0.1 MG/DL (ref 0–0.3)
BILIRUB INDIRECT SERPL-MCNC: 0.4 MG/DL (ref 0–1)
BILIRUB SERPL-MCNC: 0.5 MG/DL (ref 0–1)
DEPRECATED RDW RBC AUTO: 14.5 % (ref 12.4–15.4)
EOSINOPHIL # BLD: 0.1 K/UL (ref 0–0.6)
EOSINOPHIL NFR BLD: 0.9 %
HCT VFR BLD AUTO: 47.2 % (ref 40.5–52.5)
HGB BLD-MCNC: 16.5 G/DL (ref 13.5–17.5)
LYMPHOCYTES # BLD: 3.3 K/UL (ref 1–5.1)
LYMPHOCYTES NFR BLD: 34.6 %
MCH RBC QN AUTO: 31.3 PG (ref 26–34)
MCHC RBC AUTO-ENTMCNC: 34.9 G/DL (ref 31–36)
MCV RBC AUTO: 89.5 FL (ref 80–100)
MONOCYTES # BLD: 0.6 K/UL (ref 0–1.3)
MONOCYTES NFR BLD: 6.8 %
NEUTROPHILS # BLD: 5.5 K/UL (ref 1.7–7.7)
NEUTROPHILS NFR BLD: 57.2 %
PLATELET # BLD AUTO: 328 K/UL (ref 135–450)
PMV BLD AUTO: 8.1 FL (ref 5–10.5)
POTASSIUM SERPL-SCNC: 4.8 MMOL/L (ref 3.5–5.1)
PROT SERPL-MCNC: 6.7 G/DL (ref 6.4–8.2)
PSA SERPL DL<=0.01 NG/ML-MCNC: 1.3 NG/ML (ref 0–4)
RBC # BLD AUTO: 5.27 M/UL (ref 4.2–5.9)
WBC # BLD AUTO: 9.5 K/UL (ref 4–11)

## 2025-07-24 PROCEDURE — 84153 ASSAY OF PSA TOTAL: CPT

## 2025-07-24 PROCEDURE — 85025 COMPLETE CBC W/AUTO DIFF WBC: CPT

## 2025-07-24 PROCEDURE — 36415 COLL VENOUS BLD VENIPUNCTURE: CPT

## 2025-07-24 PROCEDURE — 71271 CT THORAX LUNG CANCER SCR C-: CPT

## 2025-07-24 PROCEDURE — 84132 ASSAY OF SERUM POTASSIUM: CPT

## 2025-07-24 PROCEDURE — 80076 HEPATIC FUNCTION PANEL: CPT

## 2025-07-31 ENCOUNTER — HOSPITAL ENCOUNTER (OUTPATIENT)
Dept: GENERAL RADIOLOGY | Age: 55
Discharge: HOME | End: 2025-07-31
Payer: MEDICARE

## 2025-07-31 DIAGNOSIS — M81.0 AGE-RELATED OSTEOPOROSIS WITHOUT CURRENT PATHOLOGICAL FRACTURE: ICD-10-CM

## 2025-07-31 PROCEDURE — 77080 DXA BONE DENSITY AXIAL: CPT

## (undated) DEVICE — PAD,NON-ADHERENT,3X8,STERILE,LF,1/PK: Brand: MEDLINE

## (undated) DEVICE — SOLUTION IV IRRIG POUR BRL 0.9% SODIUM CHL 2F7124

## (undated) DEVICE — Z DISCONTINUED USE 2716304 SUTURE STRATAFIX SPRL SZ 3-0 L12IN ABSRB UD FS-1 L24MM 3/8

## (undated) DEVICE — TOTAL BASIC PK

## (undated) DEVICE — GLOVE ORANGE PI 8 1/2   MSG9085

## (undated) DEVICE — ELECTRODE BLDE L6.5IN CAUT EXT DISP

## (undated) DEVICE — 3M™ IOBAN™ 2 ANTIMICROBIAL INCISE DRAPE 6650EZ: Brand: IOBAN™ 2

## (undated) DEVICE — DRAPE,REIN 53X77,STERILE: Brand: MEDLINE

## (undated) DEVICE — NEEDLE HYPO 22GA L1 1/2IN PIVOTING SHLD FOR LUERLOCK SYR

## (undated) DEVICE — TOTAL HIP: Brand: MEDLINE INDUSTRIES, INC.

## (undated) DEVICE — CHLORAPREP 26ML ORANGE

## (undated) DEVICE — 1010 S-DRAPE TOWEL DRAPE 10/BX: Brand: STERI-DRAPE™

## (undated) DEVICE — 450 ML BOTTLE OF 0.05% CHLORHEXIDINE GLUCONATE IN 99.95% STERILE WATER FOR IRRIGATION, USP AND APPLICATOR.: Brand: IRRISEPT ANTIMICROBIAL WOUND LAVAGE

## (undated) DEVICE — SPONGE GZ W4XL4IN COT 12 PLY TYP VII WVN C FLD DSGN

## (undated) DEVICE — DECANTER BAG 9": Brand: MEDLINE INDUSTRIES, INC.

## (undated) DEVICE — 2108 SERIES SAGITTAL BLADE AGGRESSIVE  (25.0 X 1.19 X 85.0MM)

## (undated) DEVICE — GOWN SIRUS NONREIN XL W/TWL: Brand: MEDLINE INDUSTRIES, INC.

## (undated) DEVICE — SYSTEM SKIN CLSR 22CM DERMBND PRINEO

## (undated) DEVICE — SPONGE LAP W18XL18IN WHT COT 4 PLY FLD STRUNG RADPQ DISP ST

## (undated) DEVICE — SUTURE VCRL SZ 1 L27IN ABSRB UD CT-1 L36MM 1/2 CIR J261H

## (undated) DEVICE — GARMENT COMPR STD FOR 17IN CALF UNIF THER FLOTRN

## (undated) DEVICE — DRAPE,HIP,W/POUCHES,STERILE: Brand: MEDLINE

## (undated) DEVICE — COVER,TABLE,77X90,STERILE: Brand: MEDLINE

## (undated) DEVICE — Z INACTIVE USE 2660664 SOLUTION IRRIG 3000ML 0.9% SOD CHL USP UROMATIC PLAS CONT

## (undated) DEVICE — HANDPIECE SET WITH HIGH FLOW TIP AND SUCTION TUBE: Brand: INTERPULSE

## (undated) DEVICE — 4-PORT MANIFOLD: Brand: NEPTUNE 2

## (undated) DEVICE — ANTI-EMBOLISM STOCKINGS,THIGH LENGTH,LARGE-LONG-SIZE J: Brand: T.E.D.

## (undated) DEVICE — 3M™ STERI-DRAPE™ U-DRAPE 1015: Brand: STERI-DRAPE™

## (undated) DEVICE — PILLOW POS W15XH6XL22IN RASPBERRY FOAM ABD W/ STRP DISP FOR

## (undated) DEVICE — SUTURE ABSORBABLE BRAIDED 2-0 CT-1 27 IN UD VICRYL J259H

## (undated) DEVICE — SYRINGE MED 30ML STD CLR PLAS LUERLOCK TIP N CTRL DISP

## (undated) DEVICE — INTENDED FOR TISSUE SEPARATION, AND OTHER PROCEDURES THAT REQUIRE A SHARP SURGICAL BLADE TO PUNCTURE OR CUT.: Brand: BARD-PARKER ® STAINLESS STEEL BLADES

## (undated) DEVICE — KIT OR ROOM TURNOVER W/STRAP

## (undated) DEVICE — CLEANER,CAUTERY TIP,2X2",STERILE: Brand: MEDLINE

## (undated) DEVICE — DRAPE,U/SHT,SPLIT,FILM,60X84,STERILE: Brand: MEDLINE

## (undated) DEVICE — GLOVE,SURG,SENSICARE SLT,LF,PF,8.5: Brand: MEDLINE

## (undated) DEVICE — COVER LT HNDL BLU PLAS

## (undated) DEVICE — GLOVE SURG SZ 85 L12IN FNGR THK94MIL STD WHT LTX FREE

## (undated) DEVICE — ELECTRODE PT RET AD L9FT HI MOIST COND ADH HYDRGEL CORDED

## (undated) DEVICE — SYRINGE IRRIG 60ML SFT PLIABLE BLB EZ TO GRP 1 HND USE W/

## (undated) DEVICE — PAD,ABDOMINAL,8"X7.5",STERILE,LF,1/PK: Brand: MEDLINE

## (undated) DEVICE — GLOVE,SURG,SENSICARE SLT,LF,PF,8: Brand: MEDLINE